# Patient Record
Sex: FEMALE | Race: OTHER | NOT HISPANIC OR LATINO | Employment: UNEMPLOYED | URBAN - METROPOLITAN AREA
[De-identification: names, ages, dates, MRNs, and addresses within clinical notes are randomized per-mention and may not be internally consistent; named-entity substitution may affect disease eponyms.]

---

## 2017-04-01 DIAGNOSIS — M54.31 SCIATICA OF RIGHT SIDE: ICD-10-CM

## 2017-04-03 RX ORDER — GABAPENTIN 300 MG/1
CAPSULE ORAL
Qty: 60 CAPSULE | Refills: 5 | OUTPATIENT
Start: 2017-04-03

## 2017-04-06 DIAGNOSIS — M54.31 SCIATICA OF RIGHT SIDE: ICD-10-CM

## 2017-04-07 RX ORDER — GABAPENTIN 300 MG/1
CAPSULE ORAL
Qty: 60 CAPSULE | Refills: 5 | Status: SHIPPED | OUTPATIENT
Start: 2017-04-07 | End: 2017-04-17 | Stop reason: SDUPTHER

## 2017-04-10 ENCOUNTER — TELEPHONE (OUTPATIENT)
Dept: FAMILY MEDICINE | Facility: CLINIC | Age: 57
End: 2017-04-10

## 2017-04-10 NOTE — TELEPHONE ENCOUNTER
----- Message from Judy Browning sent at 4/10/2017 11:15 AM CDT -----  Contact: Osbaldo with [facility]   Refill request for Gabapentin  #[phone] MB

## 2017-04-10 NOTE — TELEPHONE ENCOUNTER
Spoke with Osbaldo at [facility], notified him that patient script was sent on 4/7/17

## 2017-04-12 ENCOUNTER — TELEPHONE (OUTPATIENT)
Dept: FAMILY MEDICINE | Facility: CLINIC | Age: 57
End: 2017-04-12

## 2017-04-12 NOTE — TELEPHONE ENCOUNTER
----- Message from Yadiel Grace sent at 4/12/2017 11:28 AM CDT -----  Contact: Reed/[facility]/[phone]  Refill:  gabapentin (NEURONTIN) 300 MG capsule    Thank you.

## 2017-04-17 DIAGNOSIS — M54.31 SCIATICA OF RIGHT SIDE: ICD-10-CM

## 2017-04-17 NOTE — TELEPHONE ENCOUNTER
Received a fax from [facility] pharmacy requesting 90 days supplies Gabapentin  300 mg

## 2017-04-18 RX ORDER — GABAPENTIN 300 MG/1
300 CAPSULE ORAL 2 TIMES DAILY
Qty: 180 CAPSULE | Refills: 3 | Status: ON HOLD | OUTPATIENT
Start: 2017-04-18 | End: 2018-03-09 | Stop reason: HOSPADM

## 2017-10-10 ENCOUNTER — OFFICE VISIT (OUTPATIENT)
Dept: OBSTETRICS AND GYNECOLOGY | Facility: CLINIC | Age: 57
End: 2017-10-10
Payer: COMMERCIAL

## 2017-10-10 VITALS
DIASTOLIC BLOOD PRESSURE: 76 MMHG | SYSTOLIC BLOOD PRESSURE: 132 MMHG | BODY MASS INDEX: 34.91 KG/M2 | HEIGHT: 63 IN | WEIGHT: 197.06 LBS

## 2017-10-10 DIAGNOSIS — Z90.710 STATUS POST HYSTERECTOMY: ICD-10-CM

## 2017-10-10 DIAGNOSIS — Z01.419 ENCOUNTER FOR GYNECOLOGICAL EXAMINATION WITHOUT ABNORMAL FINDING: ICD-10-CM

## 2017-10-10 DIAGNOSIS — N95.1 MENOPAUSAL STATE: ICD-10-CM

## 2017-10-10 DIAGNOSIS — Z00.00 ANNUAL PHYSICAL EXAM: Primary | ICD-10-CM

## 2017-10-10 PROCEDURE — 99396 PREV VISIT EST AGE 40-64: CPT | Mod: S$GLB,,, | Performed by: OBSTETRICS & GYNECOLOGY

## 2017-10-10 PROCEDURE — 99999 PR PBB SHADOW E&M-EST. PATIENT-LVL IV: CPT | Mod: PBBFAC,,, | Performed by: OBSTETRICS & GYNECOLOGY

## 2017-10-10 NOTE — PROGRESS NOTES
Subjective:      Chief Complaint:    Chief Complaint   Patient presents with    Gynecologic Exam       Menstrual History:    OB History      Para Term  AB Living    4         4    SAB TAB Ectopic Multiple Live Births                       Menarche age: 12     No LMP recorded. Patient has had a hysterectomy.           Objective:        History of Present Illness AND  Examination detailed DICTATE:       HISTORY OF PRESENT ILLNESS:  The patient is 57 years of age.  Here for annual   examination.  She is a  4, para 4.  Medical history of problem, increased   blood pressure, cardiac problem.  Surgery hysterectomy, BSO, cardiac bypass.    The patient is presently on Premarin.  Mammogram in 2015, negative.  Bone   density negative,   Pap smear 2015 is negative.  The patient is doing well,   no real problem or complaint.    REVIEW OF SYSTEMS:  HEAD, EAR, EYES, NOSE, AND THROAT:  Negative.  CARDIORESPIRATORY:  Negative.  GASTROINTESTINAL:  Negative.  GENITOURINARY:  See present illness.  NEUROMUSCULAR:  No problem or complaint.    PHYSICAL EXAMINATION:  VITAL SIGNS:  Blood pressure 132/76, weight 197.  HEAD:  Normocephalic.  EYES:  Reactive.  NECK:  Supple.  Thyroid is palpable.  No nodes.  CHEST:  Clear.  HEART:  Regular sinus rhythm, no murmur.  BREASTS:  No lumps, masses, discharge, skin changes, retraction, nipple changes.    Axilla negative.  ABDOMEN:  Upper abdomen normal.  Lower abdomen normal.  No guarding, rebound   tenderness.  Midline scar noted.  Bowel sounds normal.  PELVIC:  External normal.  Vulva normal.  Bartholin, urethral and Colp glands   are negative.  Vagina clear.  Excellent rugal pattern.  Cervix, uterus and   adnexa are absent.  Good apical support.  No pain elicited on exam.  RECTAL:  Negative.  MUSCULOSKELETAL:  Negative.  NEUROLOGIC:  Grossly normal.  SKIN:  Clear.    Essentially normal postmenopausal examination.    PLAN:  Continue with Premarin We will order also  mammogram.  Continue with   multivitamin, calcium, vitamin D, and increase physical activity, and we will   see her back within a year.      EDWINA/IN  dd: 10/10/2017 15:15:25 (CDT)  td: 10/11/2017 04:42:46 (CDT)  Doc ID   #[id number]  Job ID #[id number]    CC:           Assessment:      Diagnosis: ANNUAL  EXAM  GYN  EXAM   MENOPAUSAL           Plan:      Return in 12  months

## 2017-11-07 ENCOUNTER — APPOINTMENT (OUTPATIENT)
Dept: RADIOLOGY | Facility: HOSPITAL | Age: 57
End: 2017-11-07
Attending: NURSE PRACTITIONER
Payer: COMMERCIAL

## 2017-11-07 ENCOUNTER — HOSPITAL ENCOUNTER (OUTPATIENT)
Dept: RADIOLOGY | Facility: HOSPITAL | Age: 57
Discharge: HOME | End: 2017-11-07
Attending: NURSE PRACTITIONER
Payer: COMMERCIAL

## 2017-11-07 ENCOUNTER — OFFICE VISIT (OUTPATIENT)
Dept: FAMILY MEDICINE | Facility: CLINIC | Age: 57
End: 2017-11-07
Payer: COMMERCIAL

## 2017-11-07 VITALS
OXYGEN SATURATION: 95 % | BODY MASS INDEX: 34.65 KG/M2 | TEMPERATURE: 98 F | RESPIRATION RATE: 17 BRPM | WEIGHT: 195.56 LBS | DIASTOLIC BLOOD PRESSURE: 100 MMHG | SYSTOLIC BLOOD PRESSURE: 150 MMHG | HEIGHT: 63 IN | HEART RATE: 76 BPM

## 2017-11-07 DIAGNOSIS — R05.9 COUGH: ICD-10-CM

## 2017-11-07 DIAGNOSIS — Z77.120 MOLD EXPOSURE: ICD-10-CM

## 2017-11-07 DIAGNOSIS — J30.89 ACUTE NON-SEASONAL ALLERGIC RHINITIS, UNSPECIFIED TRIGGER: Primary | ICD-10-CM

## 2017-11-07 PROCEDURE — 96372 THER/PROPH/DIAG INJ SC/IM: CPT | Mod: S$GLB,,, | Performed by: INTERNAL MEDICINE

## 2017-11-07 PROCEDURE — 99999 PR PBB SHADOW E&M-EST. PATIENT-LVL III: CPT | Mod: PBBFAC,,, | Performed by: NURSE PRACTITIONER

## 2017-11-07 PROCEDURE — 99214 OFFICE O/P EST MOD 30 MIN: CPT | Mod: 25,S$GLB,, | Performed by: NURSE PRACTITIONER

## 2017-11-07 PROCEDURE — 71020 XR CHEST PA AND LATERAL: CPT | Mod: TC,PN

## 2017-11-07 PROCEDURE — 71020 XR CHEST PA AND LATERAL: CPT | Mod: 26,,, | Performed by: RADIOLOGY

## 2017-11-07 RX ORDER — LOSARTAN POTASSIUM 100 MG/1
TABLET ORAL
Status: ON HOLD | COMMUNITY
Start: 2017-10-01 | End: 2018-03-09 | Stop reason: HOSPADM

## 2017-11-07 RX ORDER — CLONIDINE HYDROCHLORIDE 0.2 MG/1
0.2 TABLET ORAL ONCE
Refills: 0 | Status: ON HOLD | COMMUNITY
Start: 2017-10-31 | End: 2018-03-09 | Stop reason: HOSPADM

## 2017-11-07 RX ORDER — DIAZEPAM 5 MG/1
TABLET ORAL
Status: ON HOLD | COMMUNITY
Start: 2017-07-31 | End: 2018-03-09 | Stop reason: HOSPADM

## 2017-11-07 RX ORDER — LOSARTAN POTASSIUM 25 MG/1
TABLET ORAL
Refills: 3 | Status: ON HOLD | COMMUNITY
Start: 2017-10-30 | End: 2018-03-09 | Stop reason: HOSPADM

## 2017-11-07 RX ORDER — AMLODIPINE BESYLATE 5 MG/1
5 TABLET ORAL DAILY
COMMUNITY
Start: 2017-10-02 | End: 2018-03-27 | Stop reason: SDUPTHER

## 2017-11-07 RX ORDER — FLUTICASONE PROPIONATE 50 MCG
2 SPRAY, SUSPENSION (ML) NASAL DAILY
Qty: 1 BOTTLE | Refills: 1 | Status: SHIPPED | OUTPATIENT
Start: 2017-11-07 | End: 2017-12-07

## 2017-11-07 RX ORDER — SERTRALINE HYDROCHLORIDE 50 MG/1
TABLET, FILM COATED ORAL
Refills: 3 | Status: ON HOLD | COMMUNITY
Start: 2017-10-30 | End: 2018-03-09 | Stop reason: HOSPADM

## 2017-11-07 RX ORDER — CETIRIZINE HYDROCHLORIDE 10 MG/1
10 TABLET ORAL DAILY
Qty: 30 TABLET | Refills: 1 | Status: ON HOLD | COMMUNITY
Start: 2017-11-07 | End: 2018-03-09 | Stop reason: HOSPADM

## 2017-11-07 RX ORDER — DICLOFENAC SODIUM 20 MG/G
SOLUTION TOPICAL
Refills: 2 | Status: ON HOLD | COMMUNITY
Start: 2017-10-02 | End: 2018-03-09 | Stop reason: HOSPADM

## 2017-11-07 RX ORDER — BENZONATATE 200 MG/1
200 CAPSULE ORAL 3 TIMES DAILY PRN
Qty: 30 CAPSULE | Refills: 0 | Status: SHIPPED | OUTPATIENT
Start: 2017-11-07 | End: 2017-11-17

## 2017-11-07 RX ORDER — HYDROCODONE BITARTRATE AND ACETAMINOPHEN 7.5; 325 MG/1; MG/1
TABLET ORAL
Status: ON HOLD | COMMUNITY
Start: 2017-08-14 | End: 2018-03-09 | Stop reason: HOSPADM

## 2017-11-07 RX ORDER — OXCARBAZEPINE 150 MG/1
TABLET, FILM COATED ORAL
Status: ON HOLD | COMMUNITY
Start: 2017-10-02 | End: 2018-03-09 | Stop reason: HOSPADM

## 2017-11-07 NOTE — PROGRESS NOTES
Patient tolerate Solu medrol 125 mg IM injection. Patient advise to wait 15 min after injection  for assessment of any posssible side effects.

## 2017-11-07 NOTE — PATIENT INSTRUCTIONS
Follow up with primary care provider if not improved  Go to ER for new, worse or concerning symptoms  Drink plenty of fluids  Tylenol as needed for fever or pain    Understanding Nasal Allergies  Nasal allergies (also called allergic rhinitis) are a common health problem. They may be seasonal. This means they cause symptoms only at certain times of the year. Or they may be perennial. This means they cause symptoms all year long. Other health problems, such as asthma, often occur along with allergies as well.    What is an allergic reaction?  An allergy is a reaction to a substance called an allergen. Common allergens include:  · Wind-borne pollen  · Mold  · Dust mites  · Furry and feathered animals  · Cockroaches  Normally, allergens are harmless. But when a person has allergies, the body thinks they are harmful. The body then attacks allergens with antibodies. Antibodies are attached to special cells called mast cells. Allergens stick to the antibodies. This makes the mast cells release histamine and other chemicals. This is an allergic reaction. The chemicals irritate nearby nasal tissue. This causes nasal allergy symptoms.  Common nasal allergy symptoms  Allergies can cause nasal tissue to swell. This makes the air passages smaller. The nose may feel stuffed up. The nose may also make extra mucus, which can plug the nasal passages or drip out of the nose. Mucus can drip down the back of the throat (postnasal drip) as well. Sinus tissue can swell. This may cause pain and headache. Common allergy symptoms include:  · Runny nose with clear, watery discharge  · Stuffy nose (nasal congestion)  · Drainage down your throat (postnasal drip)  · Sneezing  · Red, watery eyes  · Itchy nose, eyes, ears, and throat  · Plugged-up ears (ear congestion)  · Sore throat  · Coughing  · Sinus pain and swelling  · Headache  It may not be allergies  Other health problems can cause symptoms like those of nasal allergies. These  include:  · Nonallergic rhinitis and viruses such as colds  · Irritants and pollutants, such as strong odors or smoke  · Certain medicines  · Changes in the weather   Treatment  Your healthcare provider will evaluate you to find the cause of your symptoms then recommend treatment. If your symptoms are due to nasal allergies, your healthcare provider may prescribe nasal steroid sprays or oral antihistamines to help reduce symptoms. Avoidance of the allergen will also be suggested. You may also be referred to an allergist.   Date Last Reviewed: 10/1/2016  © 2968-6316 [employer]. [address] [place] [address]. All rights reserved. This information is not intended as a substitute for professional medical care. Always follow your healthcare professional's instructions.

## 2017-11-07 NOTE — PROGRESS NOTES
Upper Respiratory Infection  Patient complains of a 1week history of some URI complaints. Associated symptoms include productive cough, sore throat, worse at night.  She has attempted coriciden OTC.  Sick contacts include none, but she was cleaning an old house with mold and mildew.  She has not had a flu shot this season.    Subjective:       Patient ID: Keyonna Guillen is a 57 y.o. female.      Review of Systems   Constitutional: Negative for fever.   HENT: Positive for postnasal drip.    Respiratory: Positive for cough.        Objective:      Physical Exam   Constitutional: She is oriented to person, place, and time. She appears well-developed and well-nourished. She does not appear ill. No distress.   HENT:   Right Ear: A middle ear effusion is present.   Left Ear: A middle ear effusion is present.   Nose: Nose normal. Right sinus exhibits no maxillary sinus tenderness and no frontal sinus tenderness. Left sinus exhibits no maxillary sinus tenderness and no frontal sinus tenderness.   Cardiovascular: Normal rate, regular rhythm and normal heart sounds.  Exam reveals no friction rub.    No murmur heard.  Pulmonary/Chest: Effort normal and breath sounds normal. No respiratory distress. She has no decreased breath sounds. She has no wheezes. She has no rhonchi. She has no rales.   Musculoskeletal: Normal range of motion.   Neurological: She is alert and oriented to person, place, and time.   Skin: Skin is warm and dry.   Psychiatric: She has a normal mood and affect. Her behavior is normal.   Vitals reviewed.      Assessment:       1. Acute non-seasonal allergic rhinitis, unspecified trigger    2. Mold exposure    3. Cough        Plan:       Acute non-seasonal allergic rhinitis, unspecified trigger  -     fluticasone (FLONASE) 50 mcg/actuation nasal spray; 2 sprays by Each Nare route once daily.  Dispense: 1 Bottle; Refill: 1  -     cetirizine (ZYRTEC) 10 MG tablet; Take 1 tablet (10 mg total) by mouth once daily.   Dispense: 30 tablet; Refill: 1    Mold exposure  -     X-Ray Chest PA And Lateral; Future; Expected date: 11/07/2017    Cough  -     methylPREDNISolone sod suc(PF) injection 125 mg; Inject 125 mg into the muscle one time.  -     benzonatate (TESSALON) 200 MG capsule; Take 1 capsule (200 mg total) by mouth 3 (three) times daily as needed for Cough.  Dispense: 30 capsule; Refill: 0  Viral vs bacterial, will treat symptomatically, f/u if not improved, will consider abx.    Follow up with primary care provider if not improved  Go to ER for new, worse or concerning symptoms  Drink plenty of fluids  Tylenol as needed for fever or pain

## 2017-11-13 ENCOUNTER — TELEPHONE (OUTPATIENT)
Dept: FAMILY MEDICINE | Facility: CLINIC | Age: 57
End: 2017-11-13

## 2017-11-13 DIAGNOSIS — J32.9 SINUSITIS, UNSPECIFIED CHRONICITY, UNSPECIFIED LOCATION: Primary | ICD-10-CM

## 2017-11-13 RX ORDER — AMOXICILLIN 500 MG/1
500 TABLET, FILM COATED ORAL EVERY 12 HOURS
Qty: 20 TABLET | Refills: 0 | Status: SHIPPED | OUTPATIENT
Start: 2017-11-13 | End: 2017-11-23

## 2017-11-13 NOTE — TELEPHONE ENCOUNTER
----- Message from Cheryl Terrell sent at 11/13/2017 11:12 AM CST -----  Contact: self  Patient called stating that she was advised to contact office if she did not feel better. Patient states that she would like antibiotics sent to pharmacy. Please contact her at [phone].    Thanks!

## 2017-12-19 ENCOUNTER — HOSPITAL ENCOUNTER (OUTPATIENT)
Dept: RADIOLOGY | Facility: HOSPITAL | Age: 57
Discharge: HOME | End: 2017-12-19
Attending: OBSTETRICS & GYNECOLOGY
Payer: COMMERCIAL

## 2017-12-19 VITALS — WEIGHT: 196 LBS | BODY MASS INDEX: 34.73 KG/M2 | HEIGHT: 63 IN

## 2017-12-19 DIAGNOSIS — N95.1 MENOPAUSAL STATE: ICD-10-CM

## 2017-12-19 DIAGNOSIS — Z12.31 ENCOUNTER FOR SCREENING MAMMOGRAM FOR BREAST CANCER: ICD-10-CM

## 2017-12-19 PROCEDURE — 77063 BREAST TOMOSYNTHESIS BI: CPT | Mod: 26,,, | Performed by: RADIOLOGY

## 2017-12-19 PROCEDURE — 77067 SCR MAMMO BI INCL CAD: CPT | Mod: 26,,, | Performed by: RADIOLOGY

## 2017-12-19 PROCEDURE — 77067 SCR MAMMO BI INCL CAD: CPT | Mod: TC

## 2017-12-20 ENCOUNTER — TELEPHONE (OUTPATIENT)
Dept: OBSTETRICS AND GYNECOLOGY | Facility: CLINIC | Age: 57
End: 2017-12-20

## 2017-12-20 NOTE — TELEPHONE ENCOUNTER
----- Message from Luz Mas sent at 12/20/2017  3:12 PM CST -----  Contact: Self   Patient need Mammogram results. Please call at [phone].

## 2018-01-10 ENCOUNTER — PATIENT MESSAGE (OUTPATIENT)
Dept: FAMILY MEDICINE | Facility: CLINIC | Age: 58
End: 2018-01-10

## 2018-01-10 RX ORDER — FLUTICASONE PROPIONATE 50 MCG
2 SPRAY, SUSPENSION (ML) NASAL DAILY
Qty: 1 BOTTLE | Refills: 1 | Status: ON HOLD | OUTPATIENT
Start: 2018-01-10 | End: 2018-03-09 | Stop reason: HOSPADM

## 2018-03-09 ENCOUNTER — HOSPITAL ENCOUNTER (OUTPATIENT)
Facility: HOSPITAL | Age: 58
Discharge: HOME | End: 2018-03-09
Attending: EMERGENCY MEDICINE | Admitting: HOSPITALIST
Payer: COMMERCIAL

## 2018-03-09 ENCOUNTER — ANESTHESIA (OUTPATIENT)
Dept: CARDIOLOGY | Facility: HOSPITAL | Age: 58
End: 2018-03-09
Payer: COMMERCIAL

## 2018-03-09 ENCOUNTER — ANESTHESIA EVENT (OUTPATIENT)
Dept: CARDIOLOGY | Facility: HOSPITAL | Age: 58
End: 2018-03-09
Payer: COMMERCIAL

## 2018-03-09 VITALS
RESPIRATION RATE: 20 BRPM | HEART RATE: 61 BPM | TEMPERATURE: 97 F | HEIGHT: 63 IN | OXYGEN SATURATION: 95 % | SYSTOLIC BLOOD PRESSURE: 143 MMHG | BODY MASS INDEX: 33.51 KG/M2 | DIASTOLIC BLOOD PRESSURE: 61 MMHG | WEIGHT: 189.13 LBS

## 2018-03-09 DIAGNOSIS — I25.709 CORONARY ARTERY DISEASE INVOLVING CORONARY BYPASS GRAFT OF NATIVE HEART WITH ANGINA PECTORIS: ICD-10-CM

## 2018-03-09 DIAGNOSIS — I48.91 A-FIB: ICD-10-CM

## 2018-03-09 DIAGNOSIS — E87.6 HYPOKALEMIA: ICD-10-CM

## 2018-03-09 DIAGNOSIS — I10 ESSENTIAL HYPERTENSION: ICD-10-CM

## 2018-03-09 DIAGNOSIS — I48.91 NEW ONSET ATRIAL FIBRILLATION: Primary | ICD-10-CM

## 2018-03-09 DIAGNOSIS — R00.2 PALPITATIONS: ICD-10-CM

## 2018-03-09 PROBLEM — E78.49 OTHER HYPERLIPIDEMIA: Status: ACTIVE | Noted: 2018-03-09

## 2018-03-09 LAB
ALBUMIN SERPL BCP-MCNC: 3.8 G/DL
ALP SERPL-CCNC: 150 U/L
ALT SERPL W/O P-5'-P-CCNC: 34 U/L
ANION GAP SERPL CALC-SCNC: 12 MMOL/L
ANION GAP SERPL CALC-SCNC: 9 MMOL/L
AORTIC VALVE REGURGITATION: NORMAL
AST SERPL-CCNC: 23 U/L
BASOPHILS # BLD AUTO: 0.01 K/UL
BASOPHILS NFR BLD: 0.1 %
BILIRUB SERPL-MCNC: 0.3 MG/DL
BUN SERPL-MCNC: 21 MG/DL
BUN SERPL-MCNC: 22 MG/DL
CALCIUM SERPL-MCNC: 9.5 MG/DL
CALCIUM SERPL-MCNC: 9.9 MG/DL
CHLORIDE SERPL-SCNC: 106 MMOL/L
CHLORIDE SERPL-SCNC: 107 MMOL/L
CHOLEST SERPL-MCNC: 158 MG/DL
CHOLEST/HDLC SERPL: 4.1 {RATIO}
CO2 SERPL-SCNC: 26 MMOL/L
CO2 SERPL-SCNC: 28 MMOL/L
CREAT SERPL-MCNC: 0.9 MG/DL
CREAT SERPL-MCNC: 1.1 MG/DL
DIASTOLIC DYSFUNCTION: NO
DIFFERENTIAL METHOD: NORMAL
EOSINOPHIL # BLD AUTO: 0.1 K/UL
EOSINOPHIL NFR BLD: 0.6 %
ERYTHROCYTE [DISTWIDTH] IN BLOOD BY AUTOMATED COUNT: 13 %
EST. GFR  (AFRICAN AMERICAN): >60 ML/MIN/1.73 M^2
EST. GFR  (AFRICAN AMERICAN): >60 ML/MIN/1.73 M^2
EST. GFR  (NON AFRICAN AMERICAN): 55 ML/MIN/1.73 M^2
EST. GFR  (NON AFRICAN AMERICAN): >60 ML/MIN/1.73 M^2
ESTIMATED PA SYSTOLIC PRESSURE: 22.66
GLUCOSE SERPL-MCNC: 116 MG/DL
GLUCOSE SERPL-MCNC: 138 MG/DL
HCT VFR BLD AUTO: 41.2 %
HDLC SERPL-MCNC: 39 MG/DL
HDLC SERPL: 24.7 %
HGB BLD-MCNC: 14.1 G/DL
LDLC SERPL CALC-MCNC: 90.6 MG/DL
LYMPHOCYTES # BLD AUTO: 1.9 K/UL
LYMPHOCYTES NFR BLD: 23.1 %
MAGNESIUM SERPL-MCNC: 2 MG/DL
MCH RBC QN AUTO: 30.3 PG
MCHC RBC AUTO-ENTMCNC: 34.2 G/DL
MCV RBC AUTO: 89 FL
MITRAL VALVE MOBILITY: NORMAL
MITRAL VALVE REGURGITATION: NORMAL
MONOCYTES # BLD AUTO: 0.5 K/UL
MONOCYTES NFR BLD: 6.2 %
NEUTROPHILS # BLD AUTO: 5.6 K/UL
NEUTROPHILS NFR BLD: 70 %
NONHDLC SERPL-MCNC: 119 MG/DL
PLATELET # BLD AUTO: 285 K/UL
PMV BLD AUTO: 10.1 FL
POTASSIUM SERPL-SCNC: 2.9 MMOL/L
POTASSIUM SERPL-SCNC: 3 MMOL/L
PROT SERPL-MCNC: 7.2 G/DL
RBC # BLD AUTO: 4.65 M/UL
RETIRED EF AND QEF - SEE NOTES: 60 (ref 55–65)
SODIUM SERPL-SCNC: 143 MMOL/L
SODIUM SERPL-SCNC: 145 MMOL/L
TRICUSPID VALVE REGURGITATION: NORMAL
TRIGL SERPL-MCNC: 142 MG/DL
TROPONIN I SERPL DL<=0.01 NG/ML-MCNC: 0.03 NG/ML
TROPONIN I SERPL DL<=0.01 NG/ML-MCNC: 0.07 NG/ML
TSH SERPL DL<=0.005 MIU/L-ACNC: 1.48 UIU/ML
WBC # BLD AUTO: 8.06 K/UL

## 2018-03-09 PROCEDURE — 63600175 PHARM REV CODE 636 W HCPCS: Performed by: NURSE PRACTITIONER

## 2018-03-09 PROCEDURE — 93312 ECHO TRANSESOPHAGEAL: CPT | Mod: 26,,, | Performed by: INTERNAL MEDICINE

## 2018-03-09 PROCEDURE — G0378 HOSPITAL OBSERVATION PER HR: HCPCS

## 2018-03-09 PROCEDURE — 99220 PR INITIAL OBSERVATION CARE,LEVL III: CPT | Mod: ,,, | Performed by: INTERNAL MEDICINE

## 2018-03-09 PROCEDURE — 63600175 PHARM REV CODE 636 W HCPCS: Performed by: NURSE ANESTHETIST, CERTIFIED REGISTERED

## 2018-03-09 PROCEDURE — 85025 COMPLETE CBC W/AUTO DIFF WBC: CPT

## 2018-03-09 PROCEDURE — 80048 BASIC METABOLIC PNL TOTAL CA: CPT

## 2018-03-09 PROCEDURE — 25000003 PHARM REV CODE 250: Performed by: INTERNAL MEDICINE

## 2018-03-09 PROCEDURE — 92960 CARDIOVERSION ELECTRIC EXT: CPT | Mod: ,,, | Performed by: INTERNAL MEDICINE

## 2018-03-09 PROCEDURE — 25000003 PHARM REV CODE 250: Performed by: EMERGENCY MEDICINE

## 2018-03-09 PROCEDURE — 84443 ASSAY THYROID STIM HORMONE: CPT

## 2018-03-09 PROCEDURE — 37000008 HC ANESTHESIA 1ST 15 MINUTES: Performed by: INTERNAL MEDICINE

## 2018-03-09 PROCEDURE — 93010 ELECTROCARDIOGRAM REPORT: CPT | Mod: ,,, | Performed by: INTERNAL MEDICINE

## 2018-03-09 PROCEDURE — 37000009 HC ANESTHESIA EA ADD 15 MINS: Performed by: INTERNAL MEDICINE

## 2018-03-09 PROCEDURE — 80053 COMPREHEN METABOLIC PANEL: CPT

## 2018-03-09 PROCEDURE — 80061 LIPID PANEL: CPT

## 2018-03-09 PROCEDURE — 84484 ASSAY OF TROPONIN QUANT: CPT | Mod: 91

## 2018-03-09 PROCEDURE — D9220A PRA ANESTHESIA: Mod: ANES,,, | Performed by: ANESTHESIOLOGY

## 2018-03-09 PROCEDURE — 93320 DOPPLER ECHO COMPLETE: CPT | Mod: 26,,, | Performed by: INTERNAL MEDICINE

## 2018-03-09 PROCEDURE — D9220A PRA ANESTHESIA: Mod: CRNA,,, | Performed by: NURSE ANESTHETIST, CERTIFIED REGISTERED

## 2018-03-09 PROCEDURE — 93005 ELECTROCARDIOGRAM TRACING: CPT

## 2018-03-09 PROCEDURE — 83735 ASSAY OF MAGNESIUM: CPT

## 2018-03-09 PROCEDURE — 27200139 CARDIOVERSION (DCCV)

## 2018-03-09 PROCEDURE — 96374 THER/PROPH/DIAG INJ IV PUSH: CPT

## 2018-03-09 PROCEDURE — 93010 ELECTROCARDIOGRAM REPORT: CPT | Mod: 76,,, | Performed by: INTERNAL MEDICINE

## 2018-03-09 PROCEDURE — 36415 COLL VENOUS BLD VENIPUNCTURE: CPT

## 2018-03-09 PROCEDURE — 96366 THER/PROPH/DIAG IV INF ADDON: CPT

## 2018-03-09 PROCEDURE — 93320 DOPPLER ECHO COMPLETE: CPT

## 2018-03-09 PROCEDURE — 99285 EMERGENCY DEPT VISIT HI MDM: CPT | Mod: 25

## 2018-03-09 PROCEDURE — 84484 ASSAY OF TROPONIN QUANT: CPT

## 2018-03-09 PROCEDURE — 96365 THER/PROPH/DIAG IV INF INIT: CPT

## 2018-03-09 RX ORDER — METOPROLOL SUCCINATE 50 MG/1
50 TABLET, EXTENDED RELEASE ORAL DAILY
Status: DISCONTINUED | OUTPATIENT
Start: 2018-03-09 | End: 2018-03-09 | Stop reason: HOSPADM

## 2018-03-09 RX ORDER — DILTIAZEM HYDROCHLORIDE 5 MG/ML
10 INJECTION INTRAVENOUS
Status: DISCONTINUED | OUTPATIENT
Start: 2018-03-09 | End: 2018-03-09 | Stop reason: HOSPADM

## 2018-03-09 RX ORDER — AMLODIPINE BESYLATE 5 MG/1
5 TABLET ORAL DAILY
Status: DISCONTINUED | OUTPATIENT
Start: 2018-03-09 | End: 2018-03-09 | Stop reason: HOSPADM

## 2018-03-09 RX ORDER — LOSARTAN POTASSIUM 25 MG/1
25 TABLET ORAL DAILY
Qty: 90 TABLET | Refills: 3 | Status: SHIPPED | OUTPATIENT
Start: 2018-03-10 | End: 2018-04-11 | Stop reason: SDUPTHER

## 2018-03-09 RX ORDER — PROPOFOL 10 MG/ML
VIAL (ML) INTRAVENOUS
Status: DISCONTINUED | OUTPATIENT
Start: 2018-03-09 | End: 2018-03-09

## 2018-03-09 RX ORDER — ASPIRIN 81 MG/1
81 TABLET ORAL DAILY
Status: DISCONTINUED | OUTPATIENT
Start: 2018-03-09 | End: 2018-03-09 | Stop reason: HOSPADM

## 2018-03-09 RX ORDER — LOSARTAN POTASSIUM 25 MG/1
25 TABLET ORAL DAILY
Status: DISCONTINUED | OUTPATIENT
Start: 2018-03-09 | End: 2018-03-09 | Stop reason: HOSPADM

## 2018-03-09 RX ORDER — ENOXAPARIN SODIUM 100 MG/ML
1 INJECTION SUBCUTANEOUS
Status: DISCONTINUED | OUTPATIENT
Start: 2018-03-09 | End: 2018-03-09

## 2018-03-09 RX ORDER — LIDOCAINE HCL/PF 100 MG/5ML
SYRINGE (ML) INTRAVENOUS
Status: DISCONTINUED | OUTPATIENT
Start: 2018-03-09 | End: 2018-03-09

## 2018-03-09 RX ORDER — DILTIAZEM HYDROCHLORIDE 5 MG/ML
10 INJECTION INTRAVENOUS
Status: COMPLETED | OUTPATIENT
Start: 2018-03-09 | End: 2018-03-09

## 2018-03-09 RX ORDER — ATORVASTATIN CALCIUM 40 MG/1
40 TABLET, FILM COATED ORAL NIGHTLY
Status: DISCONTINUED | OUTPATIENT
Start: 2018-03-09 | End: 2018-03-09 | Stop reason: HOSPADM

## 2018-03-09 RX ORDER — MIDAZOLAM HYDROCHLORIDE 1 MG/ML
INJECTION, SOLUTION INTRAMUSCULAR; INTRAVENOUS
Status: DISCONTINUED | OUTPATIENT
Start: 2018-03-09 | End: 2018-03-09

## 2018-03-09 RX ORDER — POTASSIUM CHLORIDE 7.45 MG/ML
10 INJECTION INTRAVENOUS
Status: ACTIVE | OUTPATIENT
Start: 2018-03-09 | End: 2018-03-09

## 2018-03-09 RX ORDER — ENOXAPARIN SODIUM 100 MG/ML
70 INJECTION SUBCUTANEOUS
Status: DISCONTINUED | OUTPATIENT
Start: 2018-03-09 | End: 2018-03-09

## 2018-03-09 RX ORDER — CLONIDINE HYDROCHLORIDE 0.1 MG/1
0.1 TABLET ORAL ONCE
Status: ON HOLD | COMMUNITY
End: 2018-03-09 | Stop reason: HOSPADM

## 2018-03-09 RX ORDER — POTASSIUM CHLORIDE 20 MEQ/1
40 TABLET, EXTENDED RELEASE ORAL ONCE
Status: COMPLETED | OUTPATIENT
Start: 2018-03-09 | End: 2018-03-09

## 2018-03-09 RX ORDER — SODIUM CHLORIDE 9 MG/ML
INJECTION, SOLUTION INTRAVENOUS CONTINUOUS
Status: DISCONTINUED | OUTPATIENT
Start: 2018-03-09 | End: 2018-03-09 | Stop reason: HOSPADM

## 2018-03-09 RX ORDER — METOPROLOL SUCCINATE 50 MG/1
50 TABLET, EXTENDED RELEASE ORAL DAILY
Qty: 30 TABLET | Refills: 11 | Status: SHIPPED | OUTPATIENT
Start: 2018-03-10 | End: 2018-04-11 | Stop reason: SDUPTHER

## 2018-03-09 RX ADMIN — LOSARTAN POTASSIUM 25 MG: 25 TABLET ORAL at 08:03

## 2018-03-09 RX ADMIN — AMLODIPINE BESYLATE 5 MG: 5 TABLET ORAL at 08:03

## 2018-03-09 RX ADMIN — POTASSIUM CHLORIDE 10 MEQ: 7.46 INJECTION, SOLUTION INTRAVENOUS at 08:03

## 2018-03-09 RX ADMIN — ASPIRIN 81 MG: 81 TABLET, COATED ORAL at 08:03

## 2018-03-09 RX ADMIN — LIDOCAINE HYDROCHLORIDE 50 MG: 20 INJECTION, SOLUTION INTRAVENOUS at 01:03

## 2018-03-09 RX ADMIN — METOPROLOL SUCCINATE 50 MG: 50 TABLET, EXTENDED RELEASE ORAL at 08:03

## 2018-03-09 RX ADMIN — RIVAROXABAN 20 MG: 20 TABLET, FILM COATED ORAL at 10:03

## 2018-03-09 RX ADMIN — PROPOFOL 20 MG: 10 INJECTION, EMULSION INTRAVENOUS at 01:03

## 2018-03-09 RX ADMIN — MIDAZOLAM HYDROCHLORIDE 2 MG: 1 INJECTION, SOLUTION INTRAMUSCULAR; INTRAVENOUS at 01:03

## 2018-03-09 RX ADMIN — PROPOFOL 80 MG: 10 INJECTION, EMULSION INTRAVENOUS at 01:03

## 2018-03-09 RX ADMIN — DILTIAZEM HYDROCHLORIDE 10 MG: 5 INJECTION INTRAVENOUS at 02:03

## 2018-03-09 RX ADMIN — SODIUM CHLORIDE: 0.9 INJECTION, SOLUTION INTRAVENOUS at 08:03

## 2018-03-09 RX ADMIN — POTASSIUM CHLORIDE 40 MEQ: 1500 TABLET, EXTENDED RELEASE ORAL at 09:03

## 2018-03-09 NOTE — H&P
"[facility] Medicine  History & Physical    Patient Name: Keyonna Guillen  MRN: [id number]  Admission Date: 3/9/2018  Attending Physician: Heather Wynne MD   Primary Care Provider: Primary Doctor No         Patient information was obtained from patient and ER records.     Subjective:     Principal Problem:New onset atrial fibrillation    Chief Complaint:   Chief Complaint   Patient presents with    Jaw Pain     pt reports she had jaw pain and palpitations starting about 2 hours ago; pt took 3 Nitro SL tablets 5 minutes apart; pt currently denies the jaw pain & palpitations; pt reports that her Cardiologist told her to take the Nitros when having jaw pains because that is the same symptoms she had before needing 3 bipass surgeries    Palpitations        HPI: Keyonna Guillen is a 57 y.o. female with a history as below who presented to the ED with a CC of  bilateral jaw tighntess which radiates to her upper chest and palpitations she describes as "fast then slow, feeling like a basketball dripping in my chest" which began x2 hours PTA. Patient reports she took SL NTG x3 with relief after the 3rd attempt while in route to the emergency room. She denies fever, chills, diaphoresis, abdominal pain, NVD or any other associated symptoms. Patient reports similar episode prior to having her triple bypass surgery 3 years ago. She does not smoke or drink etoh.  In the ED, found to be in a-fib rvr RVR, rate controlled with IV diltiazem. Imaging and labs obtained and reviewed.                  Past Medical History:   Diagnosis Date    Hypertension        Past Surgical History:   Procedure Laterality Date    BREAST BIOPSY      CARDIAC SURGERY      HYSTERECTOMY         Review of patient's allergies indicates:  No Known Allergies    No current facility-administered medications on file prior to encounter.      Current Outpatient Prescriptions on File Prior to Encounter   Medication Sig    amLODIPine " (NORVASC) 5 MG tablet Take 5 mg by mouth once daily.     atorvastatin (LIPITOR) 40 MG tablet Take 40 mg by mouth every evening.    cloNIDine (CATAPRES) 0.2 MG tablet Take 0.2 mg by mouth once.     hydrALAZINE (APRESOLINE) 100 MG tablet once daily    metoprolol succinate (TOPROL-XL) 25 MG 24 hr tablet Take 25 mg by mouth 2 (two) times daily.    triamterene-hydrochlorothiazide 37.5-25 mg (MAXZIDE-25) 37.5-25 mg per tablet Take 1 tablet by mouth once daily.    cetirizine (ZYRTEC) 10 MG tablet Take 1 tablet (10 mg total) by mouth once daily.    cloNIDine 0.2 mg/24 hr td ptwk (CATAPRES) 0.2 mg/24 hr Place 1 patch onto the skin every 7 days.    diazePAM (VALIUM) 5 MG tablet     estrogens, conjugated, (PREMARIN) 0.3 MG tablet Take 1 tablet (0.3 mg total) by mouth once daily.    fluticasone (FLONASE) 50 mcg/actuation nasal spray 2 sprays (100 mcg total) by Each Nare route once daily.    gabapentin (NEURONTIN) 300 MG capsule Take 1 capsule (300 mg total) by mouth 2 (two) times daily.    hydrocodone-acetaminophen 7.5-325mg (NORCO) 7.5-325 mg per tablet     losartan (COZAAR) 100 MG tablet     losartan (COZAAR) 25 MG tablet TAKE 1 TABLET(S) EVERY DAY BY ORAL ROUTE IN THE MORNING FOR 30 DAYS.    methocarbamol (ROBAXIN) 750 MG Tab Take 1,500 mg by mouth 3 (three) times daily.    naproxen (NAPROSYN) 500 MG tablet Take 1 tablet (500 mg total) by mouth 2 (two) times daily.    OXcarbazepine (TRILEPTAL) 150 MG Tab     PENNSAID 20 mg/gram /actuation(2 %) sopm APPLY 2 PUMPS TO THE AFFECTED AREA TWICE DAILY    sertraline (ZOLOFT) 50 MG tablet TAKE 1 TABLET(S) EVERY DAY BY ORAL ROUTE AT DINNER FOR 30 DAYS.     Family History     Problem Relation (Age of Onset)    Breast cancer Maternal Uncle    Cancer Mother        Social History Main Topics    Smoking status: Never Smoker    Smokeless tobacco: Never Used    Alcohol use No    Drug use: No    Sexual activity: Yes     Partners: Male     Birth control/ protection:  Surgical     Review of Systems   Constitutional: Negative for chills, diaphoresis and fever.   HENT: Negative for congestion, postnasal drip, sinus pressure and sore throat.    Eyes: Negative for visual disturbance.   Respiratory: Negative for cough, chest tightness, shortness of breath and wheezing.    Cardiovascular: Positive for chest pain and palpitations. Negative for leg swelling.        Jaw pain   Gastrointestinal: Negative for abdominal distention, abdominal pain, blood in stool, constipation, diarrhea, nausea and vomiting.   Endocrine: Negative.    Genitourinary: Negative for dysuria.   Musculoskeletal: Negative.    Skin: Negative.    Allergic/Immunologic: Negative.    Neurological: Negative for dizziness, weakness, numbness and headaches.   Hematological: Negative.    Psychiatric/Behavioral: Negative.      Objective:     Vital Signs (Most Recent):  Temp: 97.6 °F (36.4 °C) (03/09/18 0815)  Pulse: 95 (03/09/18 0815)  Resp: 18 (03/09/18 0815)  BP: (!) 210/112 (03/09/18 0815)  SpO2: 95 % (03/09/18 0815) Vital Signs (24h Range):  Temp:  [97.1 °F (36.2 °C)-97.7 °F (36.5 °C)] 97.6 °F (36.4 °C)  Pulse:  [] 95  Resp:  [18-20] 18  SpO2:  [94 %-97 %] 95 %  BP: (129-210)/() 210/112     Weight: 85.8 kg (189 lb 2.5 oz)  Body mass index is 33.51 kg/m².    Physical Exam   Constitutional: She is oriented to person, place, and time. She appears well-developed and well-nourished. She is cooperative.  Non-toxic appearance. No distress.   HENT:   Head: Normocephalic and atraumatic.   Eyes: Conjunctivae and lids are normal. Pupils are equal, round, and reactive to light.   Neck: Trachea normal, normal range of motion and full passive range of motion without pain. Neck supple. Normal carotid pulses and no JVD present. No tracheal deviation present. No thyroid mass and no thyromegaly present.   Cardiovascular: Normal rate, regular rhythm, S1 normal, S2 normal, normal heart sounds and normal pulses.    Pulmonary/Chest:  Effort normal and breath sounds normal. No stridor.   Abdominal: Soft. Normal appearance and bowel sounds are normal. There is no tenderness.   Musculoskeletal: Normal range of motion.   Neurological: She is alert and oriented to person, place, and time. She has normal strength. No cranial nerve deficit or sensory deficit.   Skin: Skin is warm, dry and intact. She is not diaphoretic. No cyanosis. Nails show no clubbing.   Psychiatric: She has a normal mood and affect. Her speech is normal and behavior is normal. Judgment and thought content normal. Cognition and memory are normal.         CRANIAL NERVES     CN III, IV, VI   Pupils are equal, round, and reactive to light.       Significant Labs:   CBC:   Recent Labs  Lab 03/09/18  0150   WBC 8.06   HGB 14.1   HCT 41.2        CMP:   Recent Labs  Lab 03/09/18  0150 03/09/18  0615    143   K 2.9* 3.0*    106   CO2 26 28   * 116*   BUN 22* 21*   CREATININE 1.1 0.9   CALCIUM 9.9 9.5   PROT 7.2  --    ALBUMIN 3.8  --    BILITOT 0.3  --    ALKPHOS 150*  --    AST 23  --    ALT 34  --    ANIONGAP 12 9   EGFRNONAA 55* >60     Magnesium:   Recent Labs  Lab 03/09/18  0615   MG 2.0     Troponin:   Recent Labs  Lab 03/09/18  0150 03/09/18  0826   TROPONINI 0.027* 0.065*       Significant Imaging:   Imaging Results          X-Ray Chest 1 View (Final result)  Result time 03/09/18 02:29:12    Final result by Florin Camp MD (03/09/18 02:29:12)                 Impression:       No acute process.              Electronically signed by: FLORIN CAMP MD  Date:     03/09/18  Time:    02:29              Narrative:    Exam: [id number]  03/09/18  02:10:46 IMG34 ([facility]) : XR CHEST 1 VIEW    Technique:    Single frontal chest x-ray.    Comparison:    11/7/17    Findings:      The monitoring EKG leads are present.  The patient is status post median sternotomy.  The appearance of the sternal wires are unremarkable.    The trachea is unremarkable.  There is stable prominence  of the cardiomediastinal silhouette.  Hemidiaphragms are unremarkable.  There is no evidence of free air beneath the hemidiaphragms.  There are no pleural effusions.  There is no evidence of a pneumothorax.  There is no evidence of pneumomediastinum.  No airspace opacities are present.  There are degenerative changes in the osseous structures.  The subcutaneous tissues are unremarkable.                                Assessment/Plan:     * New onset atrial fibrillation    See HPI.  EKG showed atrial fibrillation with rate 140s. In ED, IV diltiazem given with rate controlled. Cardiology consulted and evaluated with recommendations for FRANCISCO/CV. Plan for xeralto prior to discharge. awating further recommendations per cardiology.                Palpitations    See above          Hypokalemia    Could likely be 2/2 use of diuretics without replacement supplement. Will monitor and replete.          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 Patient appears to be on several antihypertensive agents, however, not maximized on any.  Per cardiology, start metoprolol ER 50mg qd, losartan 25mg qd, amlod 5mg qd; discontinued maxide, hydrala, clonidine. Monitor and adjust for appropriate response.           VTE Risk Mitigation         Ordered     rivaroxaban tablet 20 mg  With dinner     Route:  Oral        03/09/18 0905             LEXI Manzo  Department of Hospital Medicine   [facility]

## 2018-03-09 NOTE — SUBJECTIVE & OBJECTIVE
Past Medical History:   Diagnosis Date    Hypertension        Past Surgical History:   Procedure Laterality Date    BREAST BIOPSY      CARDIAC SURGERY      HYSTERECTOMY         Review of patient's allergies indicates:  No Known Allergies    No current facility-administered medications on file prior to encounter.      Current Outpatient Prescriptions on File Prior to Encounter   Medication Sig    amLODIPine (NORVASC) 5 MG tablet Take 5 mg by mouth once daily.     atorvastatin (LIPITOR) 40 MG tablet Take 40 mg by mouth every evening.    cloNIDine (CATAPRES) 0.2 MG tablet Take 0.2 mg by mouth once.     hydrALAZINE (APRESOLINE) 100 MG tablet once daily    metoprolol succinate (TOPROL-XL) 25 MG 24 hr tablet Take 25 mg by mouth 2 (two) times daily.    triamterene-hydrochlorothiazide 37.5-25 mg (MAXZIDE-25) 37.5-25 mg per tablet Take 1 tablet by mouth once daily.    cetirizine (ZYRTEC) 10 MG tablet Take 1 tablet (10 mg total) by mouth once daily.    cloNIDine 0.2 mg/24 hr td ptwk (CATAPRES) 0.2 mg/24 hr Place 1 patch onto the skin every 7 days.    diazePAM (VALIUM) 5 MG tablet     estrogens, conjugated, (PREMARIN) 0.3 MG tablet Take 1 tablet (0.3 mg total) by mouth once daily.    fluticasone (FLONASE) 50 mcg/actuation nasal spray 2 sprays (100 mcg total) by Each Nare route once daily.    gabapentin (NEURONTIN) 300 MG capsule Take 1 capsule (300 mg total) by mouth 2 (two) times daily.    hydrocodone-acetaminophen 7.5-325mg (NORCO) 7.5-325 mg per tablet     losartan (COZAAR) 100 MG tablet     losartan (COZAAR) 25 MG tablet TAKE 1 TABLET(S) EVERY DAY BY ORAL ROUTE IN THE MORNING FOR 30 DAYS.    methocarbamol (ROBAXIN) 750 MG Tab Take 1,500 mg by mouth 3 (three) times daily.    naproxen (NAPROSYN) 500 MG tablet Take 1 tablet (500 mg total) by mouth 2 (two) times daily.    OXcarbazepine (TRILEPTAL) 150 MG Tab     PENNSAID 20 mg/gram /actuation(2 %) sopm APPLY 2 PUMPS TO THE AFFECTED AREA TWICE DAILY     sertraline (ZOLOFT) 50 MG tablet TAKE 1 TABLET(S) EVERY DAY BY ORAL ROUTE AT DINNER FOR 30 DAYS.     Family History     Problem Relation (Age of Onset)    Breast cancer Maternal Uncle    Cancer Mother        Social History Main Topics    Smoking status: Never Smoker    Smokeless tobacco: Never Used    Alcohol use No    Drug use: No    Sexual activity: Yes     Partners: Male     Birth control/ protection: Surgical     Review of Systems   Constitutional: Negative for chills, diaphoresis and fever.   HENT: Negative for congestion, postnasal drip, sinus pressure and sore throat.    Eyes: Negative for visual disturbance.   Respiratory: Negative for cough, chest tightness, shortness of breath and wheezing.    Cardiovascular: Positive for chest pain and palpitations. Negative for leg swelling.        Jaw pain   Gastrointestinal: Negative for abdominal distention, abdominal pain, blood in stool, constipation, diarrhea, nausea and vomiting.   Endocrine: Negative.    Genitourinary: Negative for dysuria.   Musculoskeletal: Negative.    Skin: Negative.    Allergic/Immunologic: Negative.    Neurological: Negative for dizziness, weakness, numbness and headaches.   Hematological: Negative.    Psychiatric/Behavioral: Negative.      Objective:     Vital Signs (Most Recent):  Temp: 97.6 °F (36.4 °C) (03/09/18 0815)  Pulse: 95 (03/09/18 0815)  Resp: 18 (03/09/18 0815)  BP: (!) 210/112 (03/09/18 0815)  SpO2: 95 % (03/09/18 0815) Vital Signs (24h Range):  Temp:  [97.1 °F (36.2 °C)-97.7 °F (36.5 °C)] 97.6 °F (36.4 °C)  Pulse:  [] 95  Resp:  [18-20] 18  SpO2:  [94 %-97 %] 95 %  BP: (129-210)/() 210/112     Weight: 85.8 kg (189 lb 2.5 oz)  Body mass index is 33.51 kg/m².    Physical Exam   Constitutional: She is oriented to person, place, and time. She appears well-developed and well-nourished. She is cooperative.  Non-toxic appearance. No distress.   HENT:   Head: Normocephalic and atraumatic.   Eyes: Conjunctivae and  lids are normal. Pupils are equal, round, and reactive to light.   Neck: Trachea normal, normal range of motion and full passive range of motion without pain. Neck supple. Normal carotid pulses and no JVD present. No tracheal deviation present. No thyroid mass and no thyromegaly present.   Cardiovascular: Normal rate, regular rhythm, S1 normal, S2 normal, normal heart sounds and normal pulses.    Pulmonary/Chest: Effort normal and breath sounds normal. No stridor.   Abdominal: Soft. Normal appearance and bowel sounds are normal. There is no tenderness.   Musculoskeletal: Normal range of motion.   Neurological: She is alert and oriented to person, place, and time. She has normal strength. No cranial nerve deficit or sensory deficit.   Skin: Skin is warm, dry and intact. She is not diaphoretic. No cyanosis. Nails show no clubbing.   Psychiatric: She has a normal mood and affect. Her speech is normal and behavior is normal. Judgment and thought content normal. Cognition and memory are normal.         CRANIAL NERVES     CN III, IV, VI   Pupils are equal, round, and reactive to light.       Significant Labs:   CBC:   Recent Labs  Lab 03/09/18  0150   WBC 8.06   HGB 14.1   HCT 41.2        CMP:   Recent Labs  Lab 03/09/18  0150 03/09/18  0615    143   K 2.9* 3.0*    106   CO2 26 28   * 116*   BUN 22* 21*   CREATININE 1.1 0.9   CALCIUM 9.9 9.5   PROT 7.2  --    ALBUMIN 3.8  --    BILITOT 0.3  --    ALKPHOS 150*  --    AST 23  --    ALT 34  --    ANIONGAP 12 9   EGFRNONAA 55* >60     Magnesium:   Recent Labs  Lab 03/09/18  0615   MG 2.0     Troponin:   Recent Labs  Lab 03/09/18  0150 03/09/18  0826   TROPONINI 0.027* 0.065*       Significant Imaging:   Imaging Results          X-Ray Chest 1 View (Final result)  Result time 03/09/18 02:29:12    Final result by Florin Camp MD (03/09/18 02:29:12)                 Impression:       No acute process.              Electronically signed by: FLORIN CAMP  MD  Date:     03/09/18  Time:    02:29              Narrative:    Exam: [id number]  03/09/18  02:10:46 IMG34 ([facility]) : XR CHEST 1 VIEW    Technique:    Single frontal chest x-ray.    Comparison:    11/7/17    Findings:      The monitoring EKG leads are present.  The patient is status post median sternotomy.  The appearance of the sternal wires are unremarkable.    The trachea is unremarkable.  There is stable prominence of the cardiomediastinal silhouette.  Hemidiaphragms are unremarkable.  There is no evidence of free air beneath the hemidiaphragms.  There are no pleural effusions.  There is no evidence of a pneumothorax.  There is no evidence of pneumomediastinum.  No airspace opacities are present.  There are degenerative changes in the osseous structures.  The subcutaneous tissues are unremarkable.

## 2018-03-09 NOTE — ASSESSMENT & PLAN NOTE
See HPI.  EKG showed atrial fibrillation with rate 140s. In ED, IV diltiazem given with rate controlled. Cardiology consulted and evaluated with recommendations for FRANCISCO/CV. Plan for xeralto prior to discharge. awating further recommendations per cardiology.

## 2018-03-09 NOTE — HPI
"57 y.o. Female with HTN presents to the ED c/o bilateral jaw tighntess which radiates to her upper chest and palpitations which began x2 hours PTA. Pt reports she took nitro x3 with relief after the 3rd attempt. She denies nausea, diaphoresis, fever, chills, sore throat, diarrhea, "dizzy spells", smoking or drinking. Pt has hx of x3 bypass surgeries.  She also noted palps "like a basketball in my chest".  Noted to be in AF/RVR, now controlled.  CP resolved.  Initial trop neg.      Prev followed by Dr. Meyer at [facility].  Told by practitioner there that she was not to take ASA d/t her BP.  "

## 2018-03-09 NOTE — CONSULTS
"[facility]  Cardiology  Consult Note    Patient Name: Keyonna Guillen  MRN: [id number]  Admission Date: 3/9/2018  Hospital Length of Stay: 0 days  Code Status: No Order   Attending Provider: Heather Wynne MD   Consulting Provider: José Torres MD  Primary Care Physician: Primary Doctor No  Principal Problem:New onset atrial fibrillation    Patient information was obtained from patient and ER records.     Inpatient consult to Cardiology  Consult performed by: JOSÉ TORRES  Consult ordered by: QUINCY GONZALEZ  Reason for consult: AF/RVR, CP/CAD        Subjective:     Chief Complaint:  CP, palps     HPI:   57 y.o. Female with HTN presents to the ED c/o bilateral jaw tighntess which radiates to her upper chest and palpitations which began x2 hours PTA. Pt reports she took nitro x3 with relief after the 3rd attempt. She denies nausea, diaphoresis, fever, chills, sore throat, diarrhea, "dizzy spells", smoking or drinking. Pt has hx of x3 bypass surgeries.  She also noted palps "like a basketball in my chest".  Noted to be in AF/RVR, now controlled.  CP resolved.  Initial trop neg.      Prev followed by Dr. Meyer at [facility].  Told by practitioner there that she was not to take ASA d/t her BP.    Past Medical History:   Diagnosis Date    Hypertension        Past Surgical History:   Procedure Laterality Date    BREAST BIOPSY      CARDIAC SURGERY      HYSTERECTOMY         Review of patient's allergies indicates:  No Known Allergies    No current facility-administered medications on file prior to encounter.      Current Outpatient Prescriptions on File Prior to Encounter   Medication Sig    amLODIPine (NORVASC) 5 MG tablet Take 5 mg by mouth once daily.     atorvastatin (LIPITOR) 40 MG tablet Take 40 mg by mouth every evening.    cloNIDine (CATAPRES) 0.2 MG tablet Take 0.2 mg by mouth once.     hydrALAZINE (APRESOLINE) 100 MG tablet once daily    metoprolol succinate (TOPROL-XL) 25 MG 24 " hr tablet Take 25 mg by mouth 2 (two) times daily.    triamterene-hydrochlorothiazide 37.5-25 mg (MAXZIDE-25) 37.5-25 mg per tablet Take 1 tablet by mouth once daily.    cetirizine (ZYRTEC) 10 MG tablet Take 1 tablet (10 mg total) by mouth once daily.    cloNIDine 0.2 mg/24 hr td ptwk (CATAPRES) 0.2 mg/24 hr Place 1 patch onto the skin every 7 days.    diazePAM (VALIUM) 5 MG tablet     estrogens, conjugated, (PREMARIN) 0.3 MG tablet Take 1 tablet (0.3 mg total) by mouth once daily.    fluticasone (FLONASE) 50 mcg/actuation nasal spray 2 sprays (100 mcg total) by Each Nare route once daily.    gabapentin (NEURONTIN) 300 MG capsule Take 1 capsule (300 mg total) by mouth 2 (two) times daily.    hydrocodone-acetaminophen 7.5-325mg (NORCO) 7.5-325 mg per tablet     losartan (COZAAR) 100 MG tablet     losartan (COZAAR) 25 MG tablet TAKE 1 TABLET(S) EVERY DAY BY ORAL ROUTE IN THE MORNING FOR 30 DAYS.    methocarbamol (ROBAXIN) 750 MG Tab Take 1,500 mg by mouth 3 (three) times daily.    naproxen (NAPROSYN) 500 MG tablet Take 1 tablet (500 mg total) by mouth 2 (two) times daily.    OXcarbazepine (TRILEPTAL) 150 MG Tab     PENNSAID 20 mg/gram /actuation(2 %) sopm APPLY 2 PUMPS TO THE AFFECTED AREA TWICE DAILY    sertraline (ZOLOFT) 50 MG tablet TAKE 1 TABLET(S) EVERY DAY BY ORAL ROUTE AT DINNER FOR 30 DAYS.     Family History     Problem Relation (Age of Onset)    Breast cancer Maternal Uncle    Cancer Mother        Social History Main Topics    Smoking status: Never Smoker    Smokeless tobacco: Never Used    Alcohol use No    Drug use: No    Sexual activity: Yes     Partners: Male     Birth control/ protection: Surgical     Review of Systems   Constitution: Negative for chills, diaphoresis, fever, weakness and malaise/fatigue.   HENT: Negative for nosebleeds.    Eyes: Negative for blurred vision and double vision.   Cardiovascular: Positive for chest pain and palpitations. Negative for claudication,  cyanosis, dyspnea on exertion, leg swelling, orthopnea, paroxysmal nocturnal dyspnea and syncope.   Respiratory: Negative for cough, shortness of breath and wheezing.    Skin: Negative for dry skin and poor wound healing.   Musculoskeletal: Negative for back pain, joint swelling and myalgias.   Gastrointestinal: Negative for abdominal pain, nausea and vomiting.   Genitourinary: Negative for hematuria.   Neurological: Negative for dizziness, headaches, numbness and seizures.   Psychiatric/Behavioral: Negative for altered mental status and depression.     Objective:     Vital Signs (Most Recent):  Temp: 97.1 °F (36.2 °C) (03/09/18 0632)  Pulse: 88 (03/09/18 0632)  Resp: 18 (03/09/18 0632)  BP: (!) 136/92 (03/09/18 0632)  SpO2: 97 % (03/09/18 0632) Vital Signs (24h Range):  Temp:  [97.1 °F (36.2 °C)-97.7 °F (36.5 °C)] 97.1 °F (36.2 °C)  Pulse:  [] 88  Resp:  [18-20] 18  SpO2:  [94 %-97 %] 97 %  BP: (129-141)/(82-92) 136/92     Weight: 85.8 kg (189 lb 2.5 oz)  Body mass index is 33.51 kg/m².    SpO2: 97 %  O2 Device (Oxygen Therapy): room air    No intake or output data in the 24 hours ending 03/09/18 0749    Lines/Drains/Airways          No matching active lines, drains, or airways          Physical Exam   Constitutional: She is oriented to person, place, and time. She appears well-developed and well-nourished. No distress.   HENT:   Head: Normocephalic and atraumatic.   Mouth/Throat: No oropharyngeal exudate.   Eyes: Conjunctivae and EOM are normal. Pupils are equal, round, and reactive to light. No scleral icterus.   Neck: Normal range of motion. Neck supple. No JVD present. No tracheal deviation present.   Cardiovascular: Normal rate, S1 normal and S2 normal.  An irregularly irregular rhythm present. Exam reveals no gallop and no friction rub.    No murmur heard.  Pulmonary/Chest: Effort normal and breath sounds normal. No respiratory distress. She has no wheezes. She has no rales. She exhibits no tenderness.    Abdominal: Soft. She exhibits no distension.   obese   Musculoskeletal: Normal range of motion. She exhibits no edema.   Neurological: She is alert and oriented to person, place, and time. No cranial nerve deficit.   Skin: Skin is warm and dry. She is not diaphoretic.   Psychiatric: She has a normal mood and affect. Her behavior is normal. Judgment normal.       Current Medications:   enoxaparin  70 mg Subcutaneous Q24H    potassium chloride  10 mEq Intravenous Q1H       [id number]    Laboratory:  CBC:    Recent Labs  Lab 03/09/18  0150   WHITE BLOOD CELL COUNT 8.06   HEMOGLOBIN 14.1   HEMATOCRIT 41.2   PLATELETS 285       CHEMISTRIES:    Recent Labs  Lab 03/09/18  0150 03/09/18  0615   GLUCOSE 138 H 116 H   SODIUM 145 143   POTASSIUM 2.9 L 3.0 L   BUN BLD 22 H 21 H   CREATININE 1.1 0.9   EGFR IF  >60 >60   EGFR IF NON- 55 A >60   CALCIUM 9.9 9.5   MAGNESIUM  --  2.0       CARDIAC BIOMARKERS:    Recent Labs  Lab 03/09/18  0150   TROPONIN I 0.027 H       COAGS:        LIPIDS/LFTS:    Recent Labs  Lab 03/09/18  0150   AST 23   ALT 34     No results found for: TSH    Diagnostic Results:  ECG (personally reviewed tracings):   3/9/18 0129 , inflat ST abnl ?isch    Chest X-Ray (personally reviewed image(s)): 3/9/18 NAD, prior sternotomy      Assessment and Plan:     * New onset atrial fibrillation    AF/RVR with associated CP noted on admission.  Now rate controlled and CP resolved.  Initial trop neg, repeat pending.  If trop neg, will plan FRANCISCO/DCCV today.  If trop positive, pt will need cath.  Agree with enox for time being with eventual NOAC (Xarelto 20mg qd) and ASA 81mg qd.  Cont metoprolol po for rate control.        Coronary artery disease involving coronary bypass graft of native heart with angina pectoris    CP noted with AF/RVR, now CP resolved with control of HR.  ?Demand related.  If trop elev, pt will need cath.  Restart ASA 81mg qd, atorva 40mg qhs, metoprolol ER 50mg  qd, losartan 25mg qd, amlod 5mg qd  Stop maxide, hydrala, clonidine.        Essential hypertension    Med rx as above  Hope to simplify regimen        Other hyperlipidemia    Cont atorva 40mg        Hypokalemia    Stop maxide  Replete K+            VTE Risk Mitigation         Ordered     enoxaparin injection 70 mg  Every 12 hours (non-standard times)     Route:  Subcutaneous        03/09/18 0823          Thank you for your consult. I will follow-up with patient. Please contact us if you have any additional questions.    José Khalil MD  Cardiology   [facility]    Addendum 920am:    Recent Labs  Lab 03/09/18  0826   TROPONINI 0.065*     Lab Results   Component Value Date    TSH 1.481 03/09/2018       Will proceed with FRANCISCO/DCCV.  Start Xarelto 20mg qd, first dose now.  Outpatient MPI.  Pt voices wish to follow up with me after discharge.    Risks, benefits and alternatives of the FRANCISCO/Cardioversion procedure were discussed with the patient and her sons in attendance, including throat irritation, aspiration, anesthetic complications, esophageal irritation/perforation, skin irritation, arrhythmia, etc.  Patient understands and agrees to proceed.

## 2018-03-09 NOTE — TRANSFER OF CARE
"Anesthesia Transfer of Care Note    Patient: Keyonna Guillen    Procedure(s) Performed: Procedure(s) (LRB):  Transesophageal Echocardiogram (FRANCISCO) (N/A)    Patient location: Other: OR6    Anesthesia Type: general    Transport from OR: Transported from OR on 2-3 L/min O2 by NC with adequate spontaneous ventilation    Post pain: adequate analgesia    Post assessment: no apparent anesthetic complications    Post vital signs: stable    Level of consciousness: awake    Nausea/Vomiting: no nausea/vomiting    Complications: none    Transfer of care protocol was followed      Last vitals:   Visit Vitals  /77   Pulse 72   Temp 36 °C (96.8 °F)   Resp 20   Ht 5' 3" (1.6 m)   Wt 85.8 kg (189 lb 2.5 oz)   SpO2 95%   Breastfeeding? No   BMI 33.51 kg/m²     "

## 2018-03-09 NOTE — ASSESSMENT & PLAN NOTE
AF/RVR with associated CP noted on admission.  Now rate controlled and CP resolved.  Initial trop neg, repeat pending.  If trop neg, will plan FRANCISCO/DCCV today.  If trop positive, pt will need cath.  Agree with enox for time being with eventual NOAC (Xarelto 20mg qd) and ASA 81mg qd.  Cont metoprolol po for rate control.

## 2018-03-09 NOTE — ED PROVIDER NOTES
"Encounter Date: 3/9/2018    SCRIBE #1 NOTE: I, Markos Hodge, am scribing for, and in the presence of,  Umair Davidson MD. I have scribed the following portions of the note - Other sections scribed: HPI, ROS, PE, EKG, MDM.       History     Chief Complaint   Patient presents with    Jaw Pain     pt reports she had jaw pain and palpitations starting about 2 hours ago; pt took 3 Nitro SL tablets 5 minutes apart; pt currently denies the jaw pain & palpitations; pt reports that her Cardiologist told her to take the Nitros when having jaw pains because that is the same symptoms she had before needing 3 bipass surgeries    Palpitations     CC: Jaw Pain; Palpitations;    HPI: This 57 y.o. Female with HTN presents to the ED c/o bilateral jaw tighntess which radiates to her upper chest and palpitations which began x2 hours PTA. Pt reports she took nitro x3 with relief after the 3rd attempt. She denies nausea, diaphoresis, fever, chills, sore throat, diarrhea, "dizzy spells", smoking or drinking. Pt has hx of x3 bypass surgeries.      The history is provided by the patient. No  was used.     Review of patient's allergies indicates:  No Known Allergies  Past Medical History:   Diagnosis Date    Hypertension      Past Surgical History:   Procedure Laterality Date    BREAST BIOPSY      CARDIAC SURGERY      HYSTERECTOMY       Family History   Problem Relation Age of Onset    Cancer Mother     Breast cancer Maternal Uncle      Social History   Substance Use Topics    Smoking status: Never Smoker    Smokeless tobacco: Never Used    Alcohol use No     Review of Systems   Constitutional: Negative for chills and fever.   HENT: Negative for ear pain, rhinorrhea and sore throat.         (+) bilateral jaw tightness   Eyes: Negative for redness.   Respiratory: Negative for shortness of breath.    Cardiovascular: Positive for palpitations. Negative for chest pain.   Gastrointestinal: Negative for abdominal " pain, diarrhea, nausea and vomiting.   Genitourinary: Negative for dysuria and hematuria.   Musculoskeletal: Negative for back pain and neck pain.   Skin: Negative for rash.   Neurological: Negative for weakness, numbness and headaches.   Hematological: Does not bruise/bleed easily.   Psychiatric/Behavioral: The patient is not nervous/anxious.        Physical Exam     Initial Vitals [03/09/18 0121]   BP Pulse Resp Temp SpO2   (!) 141/82 (!) 123 20 97.7 °F (36.5 °C) 97 %      MAP       101.67         Physical Exam    Nursing note and vitals reviewed.  Constitutional: She appears well-developed and well-nourished. She is cooperative.  Non-toxic appearance. No distress.   HENT:   Head: Normocephalic and atraumatic.   Mouth/Throat: Oropharynx is clear and moist. No oral lesions. No lacerations.   She has moist mucous membranes.   Eyes: Conjunctivae and EOM are normal. Pupils are equal, round, and reactive to light.   Neck: Normal range of motion and full passive range of motion without pain. Neck supple. No thyromegaly present. No JVD present.   Cardiovascular: Normal rate, normal heart sounds and normal pulses. An irregular rhythm present.   Pulmonary/Chest: Effort normal and breath sounds normal. No respiratory distress. She has no wheezes.   Pt has good inspiratory and expiratory breath sounds bilaterally.   Abdominal: Soft. Normal appearance. She exhibits no distension and no mass. Bowel sounds are decreased. There is no tenderness.   Musculoskeletal: Normal range of motion.        Cervical back: She exhibits no tenderness.        Right foot: Plantar foot sensation: normal.        Left foot: Plantar foot sensation: normal.   Pt has 2+ radial pulses bilaterally with good strength.   Neurological: She is alert and oriented to person, place, and time. She has normal strength. No cranial nerve deficit or sensory deficit.   Skin: Skin is warm, dry and intact. No rash noted.   Psychiatric: She has a normal mood and  affect. Her speech is normal and behavior is normal. Judgment and thought content normal.         ED Course   Procedures  Labs Reviewed   CBC W/ AUTO DIFFERENTIAL   COMPREHENSIVE METABOLIC PANEL   TROPONIN I     EKG Readings: (Independently Interpreted)   Heart Rate: 146. Axis: Normal.   Pt has no detectable p wave, PRS narrow, 496 QTC, ST depressions in multiple locations, no AV blocks and no hypertrophy.          Medical Decision Making:   Initial Assessment:   Patient presented with jaw pain which is her anginal equivalent which she had present prior to her three-vessel CABG started approximately 2 hours before arrival.  She took 3 nitroglycerin tablets with minimal relief.  By the time she arrived at triage was nearly gone and by the time she arrived in bed in the emergency department it was gone her pain did progress into her upper chest and she began noticing some significant palpitations.  She was found to have an EKG heart rate of 146 in atrial fibrillation for which she  had no prior history.  Differential Diagnosis:   Electrolyte abnormalities  Pneumonia  Currently in a fib  Heart Injuries  ED Management:  She was noted to have elevated heart rate irregular and a pattern of atrial fibrillation which was within rapid ventricular response controlled with 1 dose of Cardizem 10 mg IV which brought her into the 80s and 90s her blood pressure never changed from 120s to low 130s systolic she maintained good sats 95 x 6%.  She was more comfortable after the Cardizem dosage reported .  Also noted to have an elevated troponin at 0.027.  Patient met observation status and was given Lovenox dose and cards consult and a 2-D echo was ordered for the morning.              Scribe Attestation:   Scribe #1: I performed the above scribed service and the documentation accurately describes the services I performed. I attest to the accuracy of the note.    Attending Attestation:           Physician Attestation for  Scribe:  Physician Attestation Statement for Scribe #1: I, Umair Davidson MD, reviewed documentation, as scribed by Markos Hodge in my presence, and it is both accurate and complete.                    Clinical Impression:   The encounter diagnosis was Palpitations.   New onset atrial fibrillation  Elevated troponin    Disposition:   Disposition: Placed in Observation  Condition: Stable       I, Dr. Umair Davidson, personally performed the services described in this documentation. All medical record entries made by the scribe were at my direction and in my presence.  I have reviewed the chart and agree that the record reflects my personal performance and is accurate and complete. Umair Davidson MD.  4:25 AM 03/09/2018                   Umair Davidson MD  03/09/18 0425

## 2018-03-09 NOTE — PROGRESS NOTES
WRITTEN HEALTHCARE DISCHARGE INFORMATION     Things that YOU are responsible for to Manage Your Care At Home:  1. Getting your prescriptions filled.  2. Taking you medications as directed. DO NOT MISS ANY DOSES!  3. Going to your follow-up doctor appointments. This is important because it allows the doctor to monitor your progress and to determine if any changes need to be made to your treatment plan.    If you are unable to make your follow up appointments, please call the number listed and reschedule this appointment.     After discharge, if you need assistance, you can call [facility] On Call Nurse Care Line for 24/7 assistance at [phone]    Thank you for choosing [facility] and allowing us to care for you.   From your care manager:  Lori LOWE RN,TN  [phone]     You should receive a call from [facility] Discharge Department within 48-72 hours to help manage your care after discharge. Please try to make sure that you answer your phone for this important phone call.     Follow-up Information     Morgan Leger MD On 3/15/2018.    Specialty:  Family Medicine  Why:  Outpatient Services, PCP follow-up appointment. Patient should arrive by 9:40AM.   Contact information:  [address] [facility]  [place] [address]  [phone]             José Khalil MD On 3/19/2018.    Specialties:  Cardiology, INTERVENTIONAL CARDIOLOGY  Why:  Outpatient Services, Cardiology follow-up appointment. Patient should arrive by 11:20AM.   Contact information:  [address] [facility]  [address]  [place] [address]  [phone]

## 2018-03-09 NOTE — ANESTHESIA POSTPROCEDURE EVALUATION
"Anesthesia Post Evaluation    Patient: Keyonna Guillen    Procedure(s) Performed: Procedure(s) (LRB):  Transesophageal Echocardiogram (FRANCISCO) (N/A)    Final Anesthesia Type: general  Patient location during evaluation: PACU  Patient participation: Yes- Able to Participate  Level of consciousness: awake and alert, oriented and awake  Post-procedure vital signs: reviewed and stable  Pain management: adequate  Airway patency: patent  PONV status at discharge: No PONV  Anesthetic complications: no      Cardiovascular status: blood pressure returned to baseline, hemodynamically stable and stable  Respiratory status: unassisted and spontaneous ventilation  Hydration status: euvolemic  Follow-up not needed.        Visit Vitals  /77   Pulse 72   Temp 36 °C (96.8 °F)   Resp 20   Ht 5' 3" (1.6 m)   Wt 85.8 kg (189 lb 2.5 oz)   SpO2 95%   Breastfeeding? No   BMI 33.51 kg/m²       Pain/Hilario Score: Pain Assessment Performed: Yes (3/9/2018 12:00 PM)  Presence of Pain: denies (3/9/2018  8:00 AM)      "

## 2018-03-09 NOTE — HPI
"Keyonna Guillen is a 57 y.o. female with a history as below who presented to the ED with a CC of  bilateral jaw tighntess which radiates to her upper chest and palpitations she describes as "fast then slow, feeling like a basketball dripping in my chest" which began x2 hours PTA. Patient reports she took SL NTG x3 with relief after the 3rd attempt while in route to the emergency room. She denies fever, chills, diaphoresis, abdominal pain, NVD or any other associated symptoms. Patient reports similar episode prior to having her triple bypass surgery 3 years ago. She does not smoke or drink etoh.  In the ED, found to be in a-fib rvr RVR, rate controlled with IV diltiazem. Imaging and labs obtained and reviewed.                "

## 2018-03-09 NOTE — PROGRESS NOTES
Discharge Instructions for Atrial Fibrillation  You have been diagnosed with an abnormal heart rhythm called atrial fibrillation. With this condition, your hearts 2 upper chambers quiver rather than squeeze the blood out in a normal pattern. This leads to an irregular and sometimes rapid heartbeat. Some people will develop associated symptoms such as a flip-flopping heartbeat, chest pain, lightheadedness, or shortness of breath. Other people may have no symptoms at all. Atrial fibrillation is serious because it affects the hearts ability to fill with blood as it should. Blood clots may form. This increases the risk for stroke. Untreated atrial fibrillation can also lead to heart failure. Atrial fibrillation can be controlled. With treatment, most people with atrial fibrillation lead normal lives.  Treatment options  Recommended treatment for atrial fibrillation depends on your age, symptoms, how long you have had atrial fibrillation, and other factors. You will have a complete evaluation to find out if you have any abnormalities that caused your heart to go into atrial fibrillation. This might be blocked heart arteries or a thyroid problem. Your doctor will assess your particular case and discuss choices with you.  Treatment choices may include:  · Treating an underlying disorder that puts you at risk for atrial fibrillation. For example, correcting an abnormal thyroid or electrolyte problem, or treating a blocked heart artery.  · Restoring a normal heart rhythm with an electrical shock (cardioversion) or with an antiarrhythmic medicine (chemical cardioversion)  · Using medicine to control your heart rate in atrial fibrillation.  · Preventing the risk for blood clot and stroke using blood-thinning medicines. Your doctor will tell you what he or she recommends. Choices may include aspirin, clopidogrel, warfarin, dabigatran, rivaroxaban, apixaban, and edoxaban.  · Doing catheter ablation or a surgical maze  procedure. These use different methods to destroy certain areas of heart tissue. This interrupts the electrical signals causing atrial fibrillation. One of these procedures may be a choice when medicines do not work, or as an alternative to long-term medicine.  · Other treatment choices may be recommended for you by your doctor.  Managing risk factors for stroke and preventing heart failure are important parts of any treatment plan for atrial fibrillation.  Home care  · Take your medicines exactly as directed. Dont skip doses.  · Work with your doctor to find the right medicines and doses for you.  · Learn to take your own pulse. Keep a record of your results. Ask your doctor which pulse rates mean that you need medical attention. Slowing your pulse is often the goal of treatment. Ask your doctor if its OK for you to use an automatic machine to check your pulse at home. Sometimes these machines dont count the pulse correctly when you have atrial fibrillation.  · Limit your intake of coffee, tea, cola, and other beverages with caffeine. Talk with your doctor about whether you should eliminate caffeine.  · Avoid over-the-counter medicines that have caffeine in them.  · Let your doctor know what medicines you take, including prescription and over-the-counter medicines, as well as any supplements. They interfere with some medicines given for atrial fibrillation.  · Ask your doctor about whether you can drink alcohol. Some people need to avoid alcohol to better treat atrial fibrillation. If you are taking blood-thinner medicines, alcohol may interfere with them by increasing their effect.  · Never take stimulants such as amphetamines or cocaine. These drugs can speed up your heart rate and trigger atrial fibrillation.  Follow-up care  Follow up with your doctor, or as advised.     When should I call my healthcare provider  Call your healthcare provider right away if you have any of the  following:  · Weakness  · Dizziness  · Fainting  · Fatigue  · Shortness of breath  · Chest pain with increased activity  · A change in the usual regularity of your heartbeat, or an unusually fast heartbeat

## 2018-03-09 NOTE — ASSESSMENT & PLAN NOTE
CP noted with AF/RVR, now CP resolved with control of HR.  ?Demand related.  If trop elev, pt will need cath.  Restart ASA 81mg qd, atorva 40mg qhs, metoprolol ER 50mg qd, losartan 25mg qd, amlod 5mg qd  Stop maxide, hydrala, clonidine.

## 2018-03-09 NOTE — PROGRESS NOTES
GENO contacted scheduling @ [phone] to sachedule PCP and cardiology follow-up appointments. Yolanda schedule PCP appointment on 3/15 @ 9:40AM, and cardiology on 3/19 @ 11:40AM. Appointments has been added to follow-up tab.

## 2018-03-09 NOTE — ED TRIAGE NOTES
Pt reports around 2340 c/o b/l jaw pain, upper Cp, heart racing, took 3 nitro, 5 minutes apart, when pt arrived to ED bed 5 denied jaw pain. Pt denies SOB, N/V.

## 2018-03-09 NOTE — ANESTHESIA PREPROCEDURE EVALUATION
03/09/2018  Keyonna Guillen is a 57 y.o., female.    Anesthesia Evaluation    I have reviewed the Patient Summary Reports.    I have reviewed the Nursing Notes.   I have reviewed the Medications.     Review of Systems  Anesthesia Hx:  No previous Anesthesia  Denies Family Hx of Anesthesia complications.   Denies Personal Hx of Anesthesia complications.   Social:  Non-Smoker    Hematology/Oncology:  Hematology Normal   Oncology Normal     EENT/Dental:EENT/Dental Normal   Cardiovascular:   Hypertension Dysrhythmias Angina    Pulmonary:  Pulmonary Normal    Renal/:  Renal/ Normal     Hepatic/GI:  Hepatic/GI Normal    Musculoskeletal:  Musculoskeletal Normal    Neurological:  Neurology Normal    Endocrine:  Endocrine Normal    Dermatological:  Skin Normal    Psych:  Psychiatric Normal           Physical Exam  General:  Well nourished    Airway/Jaw/Neck:  Airway Findings: Mouth Opening: Normal Tongue: Normal  General Airway Assessment: Adult  Mallampati: II  TM Distance: 4 - 6 cm      Dental:  Dental Findings: In tact             Anesthesia Plan  Type of Anesthesia, risks & benefits discussed:  Anesthesia Type:  general  Patient's Preference:   Intra-op Monitoring Plan: standard ASA monitors  Intra-op Monitoring Plan Comments:   Post Op Pain Control Plan: multimodal analgesia, IV/PO Opioids PRN and per primary service following discharge from PACU  Post Op Pain Control Plan Comments:   Induction:    Beta Blocker:  Patient is not currently on a Beta-Blocker (No further documentation required).       Informed Consent: Patient understands risks and agrees with Anesthesia plan.  Questions answered. Anesthesia consent signed with patient.  ASA Score: 2     Day of Surgery Review of History & Physical:    H&P update referred to the provider.         Ready For Surgery From Anesthesia Perspective.

## 2018-03-09 NOTE — ASSESSMENT & PLAN NOTE
Uncontrolled on current home antihypertensive medication regimen. Plan to adjust medication and monitor for appropriate response.

## 2018-03-09 NOTE — BRIEF OP NOTE
[facility]  Brief Operative Note     SUMMARY     Surgery Date: 3/9/2018     Surgeon(s) and Role:     * José Khalil MD - Primary    Assisting Surgeon: None    Pre-op Diagnosis:  A-fib [I48.91]  New onset atrial fibrillation [I48.91]    Post-op Diagnosis:  Post-Op Diagnosis Codes:     * A-fib [I48.91]     * New onset atrial fibrillation [I48.91]    Procedure(s) (LRB):  Transesophageal Echocardiogram (FRANCISCO) (N/A)/DCCV    Anesthesia: General/MAC    Description of the findings of the procedure:uneventful FRANCISCO/DCCV with anesthesia    Findings/Key Components:  Normal bivent size/fxn, EF 55%, normal wall motion  Normal appearing valves.  Trace AI, mild MR, mild TR  No LA/LON thrombus    Succcessful DCCV af->NSR 360J x1.    Plan:  Cont med rx  Cont Xarelto 20mg qd  OK for discharge later on today and follow up with me in the office 1 week for outpatient stress test.    Estimated Blood Loss: none         Specimens: None    Diet: cardiac    Activity: ad wil.

## 2018-03-09 NOTE — PLAN OF CARE
"TN met with patient at bedside to complete discharge needs assessment. TN explained duties of case management to patient.  TN reviewed and provided  "[facility] Packet", "Discharge Planning Begins on Admission" brochure and discussed "Help at Home". TN also discussed her responsibilities to manage her health at home. Patient was informed to leave folder at bedside during hospital stay. Contact information added to white board.    Patient Preferred Pharmacy:   [id number] - [place] - [address]  [address] [place] [address]  Phone: [phone] Fax: [phone]      Appointment Time Preference: afternoon       03/09/18 1225   Discharge Assessment   Assessment Type Discharge Planning Assessment   Confirmed/corrected address and phone number on facesheet? Yes   Assessment information obtained from? Patient   Prior to hospitilization cognitive status: Alert/Oriented   Prior to hospitalization functional status: Independent   Current cognitive status: Alert/Oriented   Current Functional Status: Independent   Lives With spouse   Able to Return to Prior Arrangements yes   Is patient able to care for self after discharge? Yes   Who are your caregiver(s) and their phone number(s)? Spouse- George @ [phone]   Patient's perception of discharge disposition home or selfcare   Readmission Within The Last 30 Days no previous admission in last 30 days   Patient currently being followed by outpatient case management? No   Patient currently receives any other outside agency services? No   Equipment Currently Used at Home none   Do you have any problems affording any of your prescribed medications? No   Is the patient taking medications as prescribed? yes   Does the patient have transportation home? Yes   Transportation Available car;family or friend will provide   Does the patient receive services at the [facility]? No   Discharge Plan A Home;Home with family   Discharge Plan B Home;Home with family "   Patient/Family In Agreement With Plan yes

## 2018-03-09 NOTE — PROGRESS NOTES
03/09/18 0632   Vital Signs   Temp 97.1 °F (36.2 °C)   Temp src Oral   Pulse 88   Heart Rate Source NIBP   Resp 18   SpO2 97 %   Pulse Oximetry Type Intermittent   O2 Device (Oxygen Therapy) room air   BP (!) 136/92     Patient arrived to unit via wheelchair. AAO x 4 . Breathing is unlabored. Patient denies any discomforts at this time. IV is intact with no redness or swelling. Tele monitoring initiated. Patient oriented to room, bed is low and call light is within reach.

## 2018-03-09 NOTE — SUBJECTIVE & OBJECTIVE
Past Medical History:   Diagnosis Date    Hypertension        Past Surgical History:   Procedure Laterality Date    BREAST BIOPSY      CARDIAC SURGERY      HYSTERECTOMY         Review of patient's allergies indicates:  No Known Allergies    No current facility-administered medications on file prior to encounter.      Current Outpatient Prescriptions on File Prior to Encounter   Medication Sig    amLODIPine (NORVASC) 5 MG tablet Take 5 mg by mouth once daily.     atorvastatin (LIPITOR) 40 MG tablet Take 40 mg by mouth every evening.    cloNIDine (CATAPRES) 0.2 MG tablet Take 0.2 mg by mouth once.     hydrALAZINE (APRESOLINE) 100 MG tablet once daily    metoprolol succinate (TOPROL-XL) 25 MG 24 hr tablet Take 25 mg by mouth 2 (two) times daily.    triamterene-hydrochlorothiazide 37.5-25 mg (MAXZIDE-25) 37.5-25 mg per tablet Take 1 tablet by mouth once daily.    cetirizine (ZYRTEC) 10 MG tablet Take 1 tablet (10 mg total) by mouth once daily.    cloNIDine 0.2 mg/24 hr td ptwk (CATAPRES) 0.2 mg/24 hr Place 1 patch onto the skin every 7 days.    diazePAM (VALIUM) 5 MG tablet     estrogens, conjugated, (PREMARIN) 0.3 MG tablet Take 1 tablet (0.3 mg total) by mouth once daily.    fluticasone (FLONASE) 50 mcg/actuation nasal spray 2 sprays (100 mcg total) by Each Nare route once daily.    gabapentin (NEURONTIN) 300 MG capsule Take 1 capsule (300 mg total) by mouth 2 (two) times daily.    hydrocodone-acetaminophen 7.5-325mg (NORCO) 7.5-325 mg per tablet     losartan (COZAAR) 100 MG tablet     losartan (COZAAR) 25 MG tablet TAKE 1 TABLET(S) EVERY DAY BY ORAL ROUTE IN THE MORNING FOR 30 DAYS.    methocarbamol (ROBAXIN) 750 MG Tab Take 1,500 mg by mouth 3 (three) times daily.    naproxen (NAPROSYN) 500 MG tablet Take 1 tablet (500 mg total) by mouth 2 (two) times daily.    OXcarbazepine (TRILEPTAL) 150 MG Tab     PENNSAID 20 mg/gram /actuation(2 %) sopm APPLY 2 PUMPS TO THE AFFECTED AREA TWICE DAILY     sertraline (ZOLOFT) 50 MG tablet TAKE 1 TABLET(S) EVERY DAY BY ORAL ROUTE AT DINNER FOR 30 DAYS.     Family History     Problem Relation (Age of Onset)    Breast cancer Maternal Uncle    Cancer Mother        Social History Main Topics    Smoking status: Never Smoker    Smokeless tobacco: Never Used    Alcohol use No    Drug use: No    Sexual activity: Yes     Partners: Male     Birth control/ protection: Surgical     Review of Systems   Constitution: Negative for chills, diaphoresis, fever, weakness and malaise/fatigue.   HENT: Negative for nosebleeds.    Eyes: Negative for blurred vision and double vision.   Cardiovascular: Positive for chest pain and palpitations. Negative for claudication, cyanosis, dyspnea on exertion, leg swelling, orthopnea, paroxysmal nocturnal dyspnea and syncope.   Respiratory: Negative for cough, shortness of breath and wheezing.    Skin: Negative for dry skin and poor wound healing.   Musculoskeletal: Negative for back pain, joint swelling and myalgias.   Gastrointestinal: Negative for abdominal pain, nausea and vomiting.   Genitourinary: Negative for hematuria.   Neurological: Negative for dizziness, headaches, numbness and seizures.   Psychiatric/Behavioral: Negative for altered mental status and depression.     Objective:     Vital Signs (Most Recent):  Temp: 97.1 °F (36.2 °C) (03/09/18 0632)  Pulse: 88 (03/09/18 0632)  Resp: 18 (03/09/18 0632)  BP: (!) 136/92 (03/09/18 0632)  SpO2: 97 % (03/09/18 0632) Vital Signs (24h Range):  Temp:  [97.1 °F (36.2 °C)-97.7 °F (36.5 °C)] 97.1 °F (36.2 °C)  Pulse:  [] 88  Resp:  [18-20] 18  SpO2:  [94 %-97 %] 97 %  BP: (129-141)/(82-92) 136/92     Weight: 85.8 kg (189 lb 2.5 oz)  Body mass index is 33.51 kg/m².    SpO2: 97 %  O2 Device (Oxygen Therapy): room air    No intake or output data in the 24 hours ending 03/09/18 0749    Lines/Drains/Airways          No matching active lines, drains, or airways          Physical Exam    Constitutional: She is oriented to person, place, and time. She appears well-developed and well-nourished. No distress.   HENT:   Head: Normocephalic and atraumatic.   Mouth/Throat: No oropharyngeal exudate.   Eyes: Conjunctivae and EOM are normal. Pupils are equal, round, and reactive to light. No scleral icterus.   Neck: Normal range of motion. Neck supple. No JVD present. No tracheal deviation present.   Cardiovascular: Normal rate, S1 normal and S2 normal.  An irregularly irregular rhythm present. Exam reveals no gallop and no friction rub.    No murmur heard.  Pulmonary/Chest: Effort normal and breath sounds normal. No respiratory distress. She has no wheezes. She has no rales. She exhibits no tenderness.   Abdominal: Soft. She exhibits no distension.   obese   Musculoskeletal: Normal range of motion. She exhibits no edema.   Neurological: She is alert and oriented to person, place, and time. No cranial nerve deficit.   Skin: Skin is warm and dry. She is not diaphoretic.   Psychiatric: She has a normal mood and affect. Her behavior is normal. Judgment normal.       Current Medications:   enoxaparin  70 mg Subcutaneous Q24H    potassium chloride  10 mEq Intravenous Q1H       [id number]    Laboratory:  CBC:    Recent Labs  Lab 03/09/18  0150   WHITE BLOOD CELL COUNT 8.06   HEMOGLOBIN 14.1   HEMATOCRIT 41.2   PLATELETS 285       CHEMISTRIES:    Recent Labs  Lab 03/09/18  0150 03/09/18  0615   GLUCOSE 138 H 116 H   SODIUM 145 143   POTASSIUM 2.9 L 3.0 L   BUN BLD 22 H 21 H   CREATININE 1.1 0.9   EGFR IF  >60 >60   EGFR IF NON- 55 A >60   CALCIUM 9.9 9.5   MAGNESIUM  --  2.0       CARDIAC BIOMARKERS:    Recent Labs  Lab 03/09/18  0150   TROPONIN I 0.027 H       COAGS:        LIPIDS/LFTS:    Recent Labs  Lab 03/09/18  0150   AST 23   ALT 34     No results found for: TSH    Diagnostic Results:  ECG (personally reviewed tracings):   3/9/18 0129 , inflat ST abnl ?isch    Chest  X-Ray (personally reviewed image(s)): 3/9/18 NAD, prior sternotomy

## 2018-03-09 NOTE — PLAN OF CARE
"TN reviewed follow up appointment information as well as  "A-fib discharge instructions" handout with patient using teach back. Patient is in agreement and verbalized an understanding. Placed discharge information in blue discharge folder.  TN also reviewed patient responsibility checklist with her using teach back. Patient was able to verbalize her responsibilities after discharge to manage her care at home bein. Going to follow up appointments   2. Picking up rx from the pharmacy when discharged  3. Taking her medications as prescribed        18 1426   Final Note   Assessment Type Final Discharge Note   Discharge Disposition Home   What phone number can be called within the next 1-3 days to see how you are doing after discharge? ([phone])   Hospital Follow Up  Appt(s) scheduled? Yes   Discharge plans and expectations educations in teach back method with documentation complete? Yes   Right Care Referral Info   Post Acute Recommendation No Care     "

## 2018-03-09 NOTE — ASSESSMENT & PLAN NOTE
02/12/21        Gilda Plunkett  [address]  [id number]      Dear Ki Lopez,    [facility] records indicate that you have outstanding lab work and or testing that was ordered for you and has not yet been completed:  Orders Placed This Encounter Currently uncontrolled on home antihypertensive medication regimen. Patient appears to be on several antihypertensive agents, however, not maximized on any.  Per cardiology, start metoprolol ER 50mg qd, losartan 25mg qd, amlod 5mg qd; discontinued maxide, hydrala, clonidine. Monitor and adjust for appropriate response.

## 2018-03-09 NOTE — PROGRESS NOTES
Returned from [facility] post cardioversion. Vital signs stable and listed on flow sheet. Call bell in reach. Instructed on post procedure orders.

## 2018-03-20 NOTE — HOSPITAL COURSE
Patient admitted for evaluation of  bilateral jaw tighntess which radiates to her upper chest and palpitations. EKG showed atrial fibrillation with rate 140s. In ED, IV diltiazem given with rate controlled. Cardiology consulted and evaluated with recommendations for FRANCISCO/CV, which was completed successfully. Xeralto initiated prior to discharge. Patient with uncontrolled BP on multiple blood pressure medications, however, not maximized.  Adjustments made to medication regimen - per cardiology, restart ASA 81mg qd, atorva 40mg qhs, metoprolol ER 50mg qd, losartan 25mg qd, amlod 5mg qd. Stop maxide, hydrala, clonidine. BP at discharge improved with changes. Patient remained stable and will discharge with instructions to follow up with PCP and cardiology in 1 week for further outpatient evaluation and testing. Instructed to keep daily log of BP for further evaluation by PCP an cardiology to maintain good BP control and adjust BP medications if needed. Instructed to start 2gm sodium/low cholesterol diet. Activity as tolerated.

## 2018-03-20 NOTE — DISCHARGE SUMMARY
"[facility] Hospital Medicine  Discharge Summary      Patient Name: Keyonna Guillen  MRN: [id number]  Admission Date: 3/9/2018  Hospital Length of Stay: 0 days  Discharge Date and Time: 3/9/2018  5:14 PM  Attending Physician: Ely att. providers found   Discharging Provider: LEXI Manzo  Primary Care Provider: Primary Doctor Ely      HPI:   Keyonna Guillen is a 57 y.o. female with a history as below who presented to the ED with a CC of  bilateral jaw tighntess which radiates to her upper chest and palpitations she describes as "fast then slow, feeling like a basketball dripping in my chest" which began x2 hours PTA. Patient reports she took SL NTG x3 with relief after the 3rd attempt while in route to the emergency room. She denies fever, chills, diaphoresis, abdominal pain, NVD or any other associated symptoms. Patient reports similar episode prior to having her triple bypass surgery 3 years ago. She does not smoke or drink etoh.  In the ED, found to be in a-fib rvr RVR, rate controlled with IV diltiazem. Imaging and labs obtained and reviewed.                  Procedure(s) (LRB):  Transesophageal Echocardiogram (FRANCISCO) (N/A)      Hospital Course:   Patient admitted for evaluation of  bilateral jaw tighntess which radiates to her upper chest and palpitations. EKG showed atrial fibrillation with rate 140s. In ED, IV diltiazem given with rate controlled. Cardiology consulted and evaluated with recommendations for FRANCISCO/CV, which was completed successfully. Xeralto initiated prior to discharge. Patient with uncontrolled BP on multiple blood pressure medications, however, not maximized.  Adjustments made to medication regimen - per cardiology, restart ASA 81mg qd, atorva 40mg qhs, metoprolol ER 50mg qd, losartan 25mg qd, amlod 5mg qd. Stop maxide, hydrala, clonidine. BP at discharge improved with changes. Patient remained stable and will discharge with instructions to follow up with PCP and " cardiology in 1 week for further outpatient evaluation and testing. Instructed to keep daily log of BP for further evaluation by PCP an cardiology to maintain good BP control and adjust BP medications if needed. Instructed to start 2gm sodium/low cholesterol diet. Activity as tolerated.     Consults:   Consults         Status Ordering Provider     Inpatient consult to Cardiology  Once     Provider:  (Not yet assigned)    Completed QUINCY GONZALEZ          Essential hypertension    Currently uncontrolled on home antihypertensive medication regimen. Patient appears to be on several antihypertensive agents, however, not maximized on any.  Per cardiology, start metoprolol ER 50mg qd, losartan 25mg qd, amlod 5mg qd; discontinued maxide, hydrala, clonidine. Monitor and adjust for appropriate response.           Final Active Diagnoses:    Diagnosis Date Noted POA    PRINCIPAL PROBLEM:  New onset atrial fibrillation [I48.91] 03/09/2018 Yes    Other hyperlipidemia [E78.4] 03/09/2018 Yes    Hypokalemia [E87.6] 03/09/2018 Yes    A-fib [I48.91] 03/09/2018 Yes    Palpitations [R00.2]  Yes    Coronary artery disease involving coronary bypass graft of native heart with angina pectoris [I25.709] 08/18/2016 Yes    Essential hypertension [I10] 08/18/2016 Yes      Problems Resolved During this Admission:    Diagnosis Date Noted Date Resolved POA       Discharged Condition: good    Disposition: Home or Self Care    Follow Up:  Follow-up Information     Morgan Leger MD On 3/15/2018.    Specialty:  Family Medicine  Why:  Outpatient Services, PCP follow-up appointment. Patient should arrive by 9:40AM.   Contact information:  [address] [facility]  [place] [address]  [phone]             José Khalil MD On 3/19/2018.    Specialties:  Cardiology, INTERVENTIONAL CARDIOLOGY  Why:  Outpatient Services, Cardiology follow-up appointment. Patient should arrive by 11:20AM.   Contact information:  [address] [facility]  [address]  [place]  [address]  [phone]                 Patient Instructions:     Diet Cardiac     Activity as tolerated     Notify your health care provider if you experience any of the following:  severe uncontrolled pain     Notify your health care provider if you experience any of the following:  severe persistent headache     Notify your health care provider if you experience any of the following:  difficulty breathing or increased cough     Notify your health care provider if you experience any of the following:  persistent dizziness, light-headedness, or visual disturbances     Notify your health care provider if you experience any of the following:  increased confusion or weakness         Significant Diagnostic Studies: Labs: All labs within the past 24 hours have been reviewed    Pending Diagnostic Studies:     None         Medications:  Reconciled Home Medications:   Discharge Medication List as of 3/9/2018  4:55 PM      CONTINUE these medications which have CHANGED    Details   losartan (COZAAR) 25 MG tablet Take 1 tablet (25 mg total) by mouth once daily., Starting Sat 3/10/2018, Until Sun 3/10/2019, Normal      metoprolol succinate (TOPROL-XL) 50 MG 24 hr tablet Take 1 tablet (50 mg total) by mouth once daily., Starting Sat 3/10/2018, Until Sun 3/10/2019, Normal         CONTINUE these medications which have NOT CHANGED    Details   amLODIPine (NORVASC) 5 MG tablet Take 5 mg by mouth once daily. , Starting Mon 10/2/2017, Historical Med      atorvastatin (LIPITOR) 40 MG tablet Take 40 mg by mouth every evening., Starting 1/22/2015, Until Discontinued, Historical Med         STOP taking these medications       cloNIDine (CATAPRES) 0.1 MG tablet Comments:   Reason for Stopping:         cloNIDine (CATAPRES) 0.2 MG tablet Comments:   Reason for Stopping:         hydrALAZINE (APRESOLINE) 100 MG tablet Comments:   Reason for Stopping:         triamterene-hydrochlorothiazide 37.5-25 mg (MAXZIDE-25) 37.5-25 mg per tablet Comments:    Reason for Stopping:         cetirizine (ZYRTEC) 10 MG tablet Comments:   Reason for Stopping:         cloNIDine 0.2 mg/24 hr td ptwk (CATAPRES) 0.2 mg/24 hr Comments:   Reason for Stopping:         diazePAM (VALIUM) 5 MG tablet Comments:   Reason for Stopping:         estrogens, conjugated, (PREMARIN) 0.3 MG tablet Comments:   Reason for Stopping:         fluticasone (FLONASE) 50 mcg/actuation nasal spray Comments:   Reason for Stopping:         gabapentin (NEURONTIN) 300 MG capsule Comments:   Reason for Stopping:         hydrocodone-acetaminophen 7.5-325mg (NORCO) 7.5-325 mg per tablet Comments:   Reason for Stopping:         methocarbamol (ROBAXIN) 750 MG Tab Comments:   Reason for Stopping:         naproxen (NAPROSYN) 500 MG tablet Comments:   Reason for Stopping:         OXcarbazepine (TRILEPTAL) 150 MG Tab Comments:   Reason for Stopping:         PENNSAID 20 mg/gram /actuation(2 %) sopm Comments:   Reason for Stopping:         sertraline (ZOLOFT) 50 MG tablet Comments:   Reason for Stopping:               Indwelling Lines/Drains at time of discharge:   Lines/Drains/Airways          No matching active lines, drains, or airways          Time spent on the discharge of patient: 30 minutes  Patient was seen and examined on the date of discharge and determined to be suitable for discharge.         LEXI Manzo  Department of Hospital Medicine  [facility]

## 2018-03-27 ENCOUNTER — OFFICE VISIT (OUTPATIENT)
Dept: CARDIOLOGY | Facility: CLINIC | Age: 58
End: 2018-03-27
Payer: COMMERCIAL

## 2018-03-27 VITALS
OXYGEN SATURATION: 95 % | HEART RATE: 79 BPM | RESPIRATION RATE: 15 BRPM | DIASTOLIC BLOOD PRESSURE: 90 MMHG | WEIGHT: 193.56 LBS | HEIGHT: 63 IN | BODY MASS INDEX: 34.3 KG/M2 | SYSTOLIC BLOOD PRESSURE: 160 MMHG

## 2018-03-27 DIAGNOSIS — E78.5 HYPERLIPIDEMIA, UNSPECIFIED HYPERLIPIDEMIA TYPE: ICD-10-CM

## 2018-03-27 DIAGNOSIS — I10 ESSENTIAL HYPERTENSION: ICD-10-CM

## 2018-03-27 DIAGNOSIS — I48.91 NEW ONSET ATRIAL FIBRILLATION: ICD-10-CM

## 2018-03-27 DIAGNOSIS — I25.709 CORONARY ARTERY DISEASE INVOLVING CORONARY BYPASS GRAFT OF NATIVE HEART WITH ANGINA PECTORIS: Primary | ICD-10-CM

## 2018-03-27 DIAGNOSIS — E66.9 NON MORBID OBESITY, UNSPECIFIED OBESITY TYPE: ICD-10-CM

## 2018-03-27 DIAGNOSIS — I65.02 ASYMPTOMATIC STENOSIS OF LEFT VERTEBRAL ARTERY: ICD-10-CM

## 2018-03-27 PROCEDURE — 99999 PR PBB SHADOW E&M-EST. PATIENT-LVL IV: CPT | Mod: PBBFAC,,, | Performed by: INTERNAL MEDICINE

## 2018-03-27 PROCEDURE — 3077F SYST BP >= 140 MM HG: CPT | Mod: CPTII,S$GLB,, | Performed by: INTERNAL MEDICINE

## 2018-03-27 PROCEDURE — 99215 OFFICE O/P EST HI 40 MIN: CPT | Mod: S$GLB,,, | Performed by: INTERNAL MEDICINE

## 2018-03-27 PROCEDURE — 3080F DIAST BP >= 90 MM HG: CPT | Mod: CPTII,S$GLB,, | Performed by: INTERNAL MEDICINE

## 2018-03-27 NOTE — PROGRESS NOTES
CARDIOVASCULAR PROGRESS NOTE    REASON FOR CONSULT:   Keyonna Guillen is a 57 y.o. female who presents for follow up of PAF, recent hosp.    PCP: Page  HISTORY OF PRESENT ILLNESS:   The patient returns for follow-up of her recent hospitalization.  She is accompanied by her daughter to today's visit.  She was seen at the [facility] in early March for chest pain and paroxysmal atrial fibrillation.  The chest discomfort was controlled with control of her heart rate, although a minimal troponin elevation was noted.  She underwent successful FRANCISCO guided cardioversion.  She returns today without chest discomfort or shortness of breath.  Her  did accidentally dropped a fence post on her right leg, and there is some abrasion and erythema as well as swelling of that leg.  She has continued to take her Xarelto.  She otherwise denies palpitations, lightheadedness, dizziness, or syncope.  There's been no PND, orthopnea, melena, hematuria, or claudicant symptoms.    The patient brought with her today and MRI report of her neck noting a history of left vertebral artery occlusion.  She was subsequent seen by Dr. Kenrs without plans for intervention.    CARDIOVASCULAR HISTORY:   PAF s/p FRANCISCO/DCCV 3/8/18, on Xarelto, CHADS VASC 2    Hx L vert art occlusion    PAST MEDICAL HISTORY:     Past Medical History:   Diagnosis Date    Hypertension        PAST SURGICAL HISTORY:     Past Surgical History:   Procedure Laterality Date    BREAST BIOPSY      CARDIAC SURGERY      HYSTERECTOMY         ALLERGIES AND MEDICATION:   Review of patient's allergies indicates:  No Known Allergies  Previous Medications    ATORVASTATIN (LIPITOR) 40 MG TABLET    Take 40 mg by mouth every evening.    LOSARTAN (COZAAR) 25 MG TABLET    Take 1 tablet (25 mg total) by mouth once daily.    METOPROLOL SUCCINATE (TOPROL-XL) 50 MG 24 HR TABLET    Take 1 tablet (50 mg total) by mouth once daily.    RIVAROXABAN (XARELTO) 20 MG TAB    Take 1 tablet  "(20 mg total) by mouth daily with dinner or evening meal.       SOCIAL HISTORY:     Social History     Social History    Marital status:      Spouse name: N/A    Number of children: N/A    Years of education: N/A     Occupational History    Not on file.     Social History Main Topics    Smoking status: Never Smoker    Smokeless tobacco: Never Used    Alcohol use No    Drug use: No    Sexual activity: Yes     Partners: Male     Birth control/ protection: Surgical     Other Topics Concern    Not on file     Social History Narrative    No narrative on file       FAMILY HISTORY:     Family History   Problem Relation Age of Onset    Cancer Mother     Breast cancer Maternal Uncle        REVIEW OF SYSTEMS:   Review of Systems   Constitutional: Negative for chills, diaphoresis and fever.   HENT: Negative for nosebleeds.    Eyes: Negative for blurred vision, double vision and photophobia.   Respiratory: Negative for hemoptysis, shortness of breath and wheezing.    Cardiovascular: Negative for chest pain, palpitations, orthopnea, claudication, leg swelling (RLE related to injury) and PND.   Gastrointestinal: Negative for abdominal pain, blood in stool, heartburn, melena, nausea and vomiting.   Genitourinary: Negative for flank pain and hematuria.   Musculoskeletal: Negative for falls, myalgias and neck pain.   Skin: Negative for rash.   Neurological: Negative for dizziness, seizures, loss of consciousness, weakness and headaches.   Endo/Heme/Allergies: Negative for polydipsia. Does not bruise/bleed easily.   Psychiatric/Behavioral: Negative for depression and memory loss. The patient is not nervous/anxious.        PHYSICAL EXAM:     Vitals:    03/27/18 1150   BP: (!) 160/90   Pulse: 79   Resp: 15    Body mass index is 34.29 kg/m².  Weight: 87.8 kg (193 lb 9 oz)   Height: 5' 3" (160 cm)     Physical Exam   Constitutional: She is oriented to person, place, and time. She appears well-developed and " well-nourished. She is cooperative.  Non-toxic appearance. No distress.   HENT:   Head: Normocephalic and atraumatic.   Eyes: Conjunctivae and EOM are normal. Pupils are equal, round, and reactive to light. No scleral icterus.   Neck: Trachea normal. Neck supple. Normal carotid pulses and no JVD present. Carotid bruit is not present. No neck rigidity. No edema present. No thyromegaly present.   Cardiovascular: Normal rate, regular rhythm, S1 normal and S2 normal.  PMI is not displaced.  Exam reveals no gallop and no friction rub.    No murmur heard.  Pulses:       Carotid pulses are 2+ on the right side, and 2+ on the left side.  Pulmonary/Chest: Effort normal and breath sounds normal. No respiratory distress. She has no wheezes. She has no rales. She exhibits no tenderness.   Abdominal: Soft. Bowel sounds are normal. She exhibits no distension. There is no hepatosplenomegaly.   obese   Musculoskeletal: She exhibits edema (RLE related to recent injury). She exhibits no tenderness.   Feet:   Right Foot:   Skin Integrity: Negative for ulcer.   Left Foot:   Skin Integrity: Negative for ulcer.   Neurological: She is alert and oriented to person, place, and time. No cranial nerve deficit.   Skin: Skin is warm and dry. No rash noted. No erythema.   Psychiatric: She has a normal mood and affect. Her speech is normal and behavior is normal.   Vitals reviewed.      DATA:   EKG: (personally reviewed tracing)  3/9/18 , inflat ST abnl ?isch    Laboratory:  CBC:    Recent Labs  Lab 03/09/18  0150   WHITE BLOOD CELL COUNT 8.06   HEMOGLOBIN 14.1   HEMATOCRIT 41.2   PLATELETS 285       CHEMISTRIES:    Recent Labs  Lab 03/09/18  0150 03/09/18  0615   GLUCOSE 138 H 116 H   SODIUM 145 143   POTASSIUM 2.9 L 3.0 L   BUN BLD 22 H 21 H   CREATININE 1.1 0.9   EGFR IF  >60 >60   EGFR IF NON- 55 A >60   CALCIUM 9.9 9.5   MAGNESIUM  --  2.0       CARDIAC BIOMARKERS:    Recent Labs  Lab 03/09/18  0150  03/09/18  0826   TROPONIN I 0.027 H 0.065 H       COAGS:        LIPIDS/LFTS:    Recent Labs  Lab 03/09/18  0150 03/09/18  0615   CHOLESTEROL  --  158   TRIGLYCERIDES  --  142   HDL  --  39 L   LDL CHOLESTEROL  --  90.6   NON-HDL CHOLESTEROL  --  119   AST 23  --    ALT 34  --      Lab Results   Component Value Date    TSH 1.481 03/09/2018         Cardiovascular Testing:  FRANCISCO/DCCV 3/9/18  Normal bivent size/fxn, EF 55%, normal wall motion  Normal appearing valves.  Trace AI, mild MR, mild TR  No LA/LON thrombus  Succcessful DCCV af->NSR 360J x1.  Plan:  Cont med rx  Cont Xarelto 20mg qd  OK for discharge later on today and follow up with me in the office 1 week for outpatient stress test.      ASSESSMENT:   # PAF s/p FRANCISCO/DCCV 3/9/18.  CHADS VASC 2 on Xarelto.  # HTN  # HLP  # prior elev trop, ?demand MI  # hx L vert art occlusion    PLAN:   Cont med rx  In amlod to 10mg qd  Follow up with PCP for eval of RLE ?cellulitis  Check [place] MPI  Carotid US  Obtain records from [facility]  Diet/exercise/weight loss, Na+ restriction  RTC 1 month      José Khalil MD, FACC

## 2018-03-28 ENCOUNTER — OFFICE VISIT (OUTPATIENT)
Dept: FAMILY MEDICINE | Facility: CLINIC | Age: 58
End: 2018-03-28
Payer: COMMERCIAL

## 2018-03-28 VITALS
RESPIRATION RATE: 12 BRPM | HEIGHT: 63 IN | OXYGEN SATURATION: 97 % | TEMPERATURE: 98 F | DIASTOLIC BLOOD PRESSURE: 96 MMHG | HEART RATE: 72 BPM | WEIGHT: 194.25 LBS | BODY MASS INDEX: 34.42 KG/M2 | SYSTOLIC BLOOD PRESSURE: 160 MMHG

## 2018-03-28 DIAGNOSIS — S81.811D LACERATION OF RIGHT LOWER EXTREMITY, SUBSEQUENT ENCOUNTER: Primary | ICD-10-CM

## 2018-03-28 DIAGNOSIS — I10 ESSENTIAL HYPERTENSION: Chronic | ICD-10-CM

## 2018-03-28 PROCEDURE — 3080F DIAST BP >= 90 MM HG: CPT | Mod: CPTII,S$GLB,, | Performed by: FAMILY MEDICINE

## 2018-03-28 PROCEDURE — 99999 PR PBB SHADOW E&M-EST. PATIENT-LVL III: CPT | Mod: PBBFAC,,, | Performed by: FAMILY MEDICINE

## 2018-03-28 PROCEDURE — 3077F SYST BP >= 140 MM HG: CPT | Mod: CPTII,S$GLB,, | Performed by: FAMILY MEDICINE

## 2018-03-28 PROCEDURE — 99214 OFFICE O/P EST MOD 30 MIN: CPT | Mod: 25,S$GLB,, | Performed by: FAMILY MEDICINE

## 2018-03-28 PROCEDURE — 90471 IMMUNIZATION ADMIN: CPT | Mod: S$GLB,,, | Performed by: FAMILY MEDICINE

## 2018-03-28 PROCEDURE — 90715 TDAP VACCINE 7 YRS/> IM: CPT | Mod: S$GLB,,, | Performed by: FAMILY MEDICINE

## 2018-03-28 RX ORDER — MUPIROCIN 20 MG/G
OINTMENT TOPICAL 3 TIMES DAILY
Qty: 30 G | Refills: 0 | Status: SHIPPED | OUTPATIENT
Start: 2018-03-28 | End: 2018-12-13

## 2018-03-28 RX ORDER — CEPHALEXIN 500 MG/1
500 CAPSULE ORAL EVERY 6 HOURS
COMMUNITY
End: 2018-03-29

## 2018-03-28 RX ORDER — SULFAMETHOXAZOLE AND TRIMETHOPRIM 800; 160 MG/1; MG/1
1 TABLET ORAL 2 TIMES DAILY
Qty: 20 TABLET | Refills: 0 | Status: SHIPPED | OUTPATIENT
Start: 2018-03-28 | End: 2018-04-07

## 2018-03-29 ENCOUNTER — HOSPITAL ENCOUNTER (EMERGENCY)
Facility: HOSPITAL | Age: 58
Discharge: HOME | End: 2018-03-29
Attending: EMERGENCY MEDICINE
Payer: COMMERCIAL

## 2018-03-29 ENCOUNTER — TELEPHONE (OUTPATIENT)
Dept: FAMILY MEDICINE | Facility: CLINIC | Age: 58
End: 2018-03-29

## 2018-03-29 VITALS
OXYGEN SATURATION: 97 % | SYSTOLIC BLOOD PRESSURE: 194 MMHG | BODY MASS INDEX: 34.2 KG/M2 | HEART RATE: 65 BPM | WEIGHT: 193 LBS | RESPIRATION RATE: 18 BRPM | DIASTOLIC BLOOD PRESSURE: 95 MMHG | TEMPERATURE: 98 F | HEIGHT: 63 IN

## 2018-03-29 DIAGNOSIS — M79.89 LEG SWELLING: ICD-10-CM

## 2018-03-29 DIAGNOSIS — L03.90 CELLULITIS, UNSPECIFIED CELLULITIS SITE: Primary | ICD-10-CM

## 2018-03-29 PROCEDURE — 25000003 PHARM REV CODE 250: Performed by: EMERGENCY MEDICINE

## 2018-03-29 PROCEDURE — 99284 EMERGENCY DEPT VISIT MOD MDM: CPT

## 2018-03-29 RX ORDER — TRAMADOL HYDROCHLORIDE 50 MG/1
50 TABLET ORAL EVERY 6 HOURS PRN
Qty: 12 TABLET | Refills: 0 | Status: SHIPPED | OUTPATIENT
Start: 2018-03-29 | End: 2018-04-08

## 2018-03-29 RX ORDER — TRAMADOL HYDROCHLORIDE 50 MG/1
50 TABLET ORAL
Status: COMPLETED | OUTPATIENT
Start: 2018-03-29 | End: 2018-03-29

## 2018-03-29 RX ORDER — CEPHALEXIN 500 MG/1
500 CAPSULE ORAL 4 TIMES DAILY
Qty: 20 CAPSULE | Refills: 0 | Status: SHIPPED | OUTPATIENT
Start: 2018-03-29 | End: 2018-04-03

## 2018-03-29 RX ADMIN — TRAMADOL HYDROCHLORIDE 50 MG: 50 TABLET, FILM COATED ORAL at 02:03

## 2018-03-29 NOTE — ED TRIAGE NOTES
"Pt states "I was told I had cellulitis or a staph infection in my leg" (right mid calf). Pt states she was hit in the right leg with a ladder on the 18th. Pt right leg is red and swollen. Pt c/o tenderness in entire leg, swelling in right calf/ankle/foot, pain with weightbearing  "

## 2018-03-29 NOTE — ED NOTES
Pt notified of need for a ride home if given tramadol. Pt states her  or daughter will be picking her up

## 2018-03-29 NOTE — PROGRESS NOTES
(4 PM on 3/28/18) - TDAp was given IM in the right deltoid. Tolerated well. Remained in the clinic after injection for observation.

## 2018-03-29 NOTE — ED PROVIDER NOTES
"Encounter Date: 3/29/2018    SCRIBE #1 NOTE: I, Mary Alber, am scribing for, and in the presence of,  Oralia Quesada MD. I have scribed the following portions of the note - Other sections scribed: HPI, ROS, PE and MDM.       History     Chief Complaint   Patient presents with    Leg Pain     right leg pain since Sunday. Reports being hit in leg with ladder. Reports having cellulitis and currently taking anibiotics     CC: Leg Pain    HPI: This 57 y.o. female with a medical history of hypertension, atrial fibrillation and hyperlipidemia presents to the ED c/o constant right sided leg pain that began on 3/18/18. Pt reports that she was feeding her dogs in the backyard when her  accidentally dropped a ladder onto her right lower leg. She states that there was a small laceration present to the lower leg at onset and notes that the redness began to appear 3x days later. Pt reports that after the onset of symptoms she travelled to [place] with her  for work and notes that while there she visited an Urgent Care facility where she was treated for a possible infection and placed on a 7x day course of antibiotics (Azithromycin) without any improvement. She states that the symptoms have since worsened as the bruising, swelling and pain have radiated down the right lower leg into the the right foot. Pt describes the symptoms as "tight", "heavy" and "burning", rating the pain 7/10. Pt adds that she has also been experiencing a subjective fever and chills. She reports that she was evaluated by her PCP, Dr. Leger, yesterday for the symptoms and notes that she was advised to discontinue use of the Azithromycin. Pt denies knee pain and any other associated symptoms. No alleviating or exacerbating factors.           The history is provided by the patient. No  was used.     Review of patient's allergies indicates:  No Known Allergies  Past Medical History:   Diagnosis Date    Atrial " fibrillation     Hyperlipidemia     Hypertension      Past Surgical History:   Procedure Laterality Date    BREAST BIOPSY      CARDIAC SURGERY      CORONARY ARTERY BYPASS GRAFT      HYSTERECTOMY       Family History   Problem Relation Age of Onset    Cancer Mother     Breast cancer Maternal Uncle      Social History   Substance Use Topics    Smoking status: Never Smoker    Smokeless tobacco: Never Used    Alcohol use No     Review of Systems   Constitutional: Positive for chills and fever (subjective).   HENT: Negative for sore throat.    Respiratory: Negative for shortness of breath.    Cardiovascular: Negative for chest pain.   Gastrointestinal: Negative for nausea.   Genitourinary: Negative for dysuria.   Musculoskeletal: Negative for back pain.        (+) pain and swelling to the right lower leg (radiating into the foot)   Skin: Negative for rash.        (+) bruising to the right lower leg radiating into the foot   Neurological: Negative for weakness.       Physical Exam     Initial Vitals [03/29/18 1251]   BP Pulse Resp Temp SpO2   (!) 161/95 74 18 98.3 °F (36.8 °C) 98 %      MAP       117         Physical Exam    Nursing note and vitals reviewed.  Constitutional: She appears well-developed and well-nourished.  Non-toxic appearance. She does not appear ill.   HENT:   Head: Normocephalic and atraumatic.   Nose: Nose normal.   Eyes: EOM and lids are normal.   Neck: Neck supple.   Cardiovascular: Normal rate, regular rhythm, normal heart sounds and intact distal pulses.   Pulses:       Dorsalis pedis pulses are 2+ on the right side, and 2+ on the left side.   Pulmonary/Chest: Effort normal and breath sounds normal. No respiratory distress.   Abdominal: Soft. Normal appearance and bowel sounds are normal. She exhibits no distension. There is no tenderness.   Musculoskeletal: Normal range of motion. She exhibits edema.   There is significant edema present to the right lower extremity.   Neurological: She  is alert and oriented to person, place, and time. She has normal strength. No sensory deficit.   Skin: Skin is warm and dry.   There is a 17x9 cm erythematous area present to the right anterior lower extremity. There is ecchymosis of the right ankle and toes.   Psychiatric: She has a normal mood and affect. Her behavior is normal. Thought content normal.         ED Course   Procedures  Labs Reviewed - No data to display          Medical Decision Making:   ED Management:  A bedside ultrasound was preformed with no drainage collection present. ROCIO Howell-Student, will malcolm around the erythematous area. Pt will be given medication for pain and prescribed antibiotics upon discharge.               Scribe Attestation:   Scribe #1: I performed the above scribed service and the documentation accurately describes the services I performed. I attest to the accuracy of the note.    Attending Attestation:   Physician Attestation Statement for Resident:  As the supervising MD . -: Discussed w the mid-level provider.  Patient seen and examined by me.  Patient has no symptoms of end organ damage.  tookblood pressure medication this morning.  The patient likely has an infected hematoma on the anterior shin.  It is not streaking.  No constitutional symptoms.  She is not diabetic.  On bedside ultrasound does not have a drainable collection.  It is minimally minimally worse from yesterday with only about a 1 cm area that is worse in the distal medial region and appears to be more likely hematoma than cellulitis.  She has no constitutional symptoms.  We'll add cephalexin to Bactrim.  Ultrasound was negative.  She'll follow up with her doctor Monday.  Patient understands indications of when to return to emergency room for IV antibiotics.  She has no other complaints at this time.         Physician Attestation for Scribe:  Physician Attestation Statement for Scribe #1: I, Oralia Quesada MD, reviewed documentation, as scribed by  Mary Campo in my presence, and it is both accurate and complete.                    Clinical Impression:   The encounter diagnosis was Leg swelling.                           Oralia Quesada MD  03/29/18 [id number]

## 2018-03-29 NOTE — TELEPHONE ENCOUNTER
Spoke with patient and she said that she has started on the Bactrim but today the redness has now traveled down to her ankle and she is requesting to have IV therapy that was suggested by Dr [facility]. She said that she has been on 3 oral antibiotics and nothing is helping. She wanted to know how do she go about getting the IV antibiotics ordered. I advise her to contact Dr Wilson to see how he recommend this to be done due to his suggestion for there to have it done IV. Please advise if any other instruction is needed.

## 2018-03-29 NOTE — TELEPHONE ENCOUNTER
She just started the new antibiotic yesterday, so may need to take another day to see improvement.  If she has any fevers, chills, sweats, n/v/d, then she needs to go to the ED.    Thanks,  Dr. Leger

## 2018-03-29 NOTE — TELEPHONE ENCOUNTER
----- Message from Sherrell Iniguez sent at 3/28/2018  4:41 PM CDT -----  Contact: self  Pt states she just left appt and asking if a pain pill can be prescribed for .  Pt call back [phone].

## 2018-03-29 NOTE — PROGRESS NOTES
Routine Office Visit    Patient Name: Keyonna Guillen    : 1960  MRN: [id number]    Subjective:  Keyonna is a 57 y.o. female who presents today for:    1. Leg laceration  Patient presenting today with redness to RLE after having a shad ladder fall on it.  She was seen in urgent care in [place] and given a zpak, then by Dr. Wilson (derm) and given Keflex.  The redness continues to be present and is not getting any better.  There has been no fevers or chills.  The laceration has scabbed over.      Past Medical History  Past Medical History:   Diagnosis Date    Atrial fibrillation     Hyperlipidemia     Hypertension        Past Surgical History  Past Surgical History:   Procedure Laterality Date    BREAST BIOPSY      CARDIAC SURGERY      HYSTERECTOMY         Family History  Family History   Problem Relation Age of Onset    Cancer Mother     Breast cancer Maternal Uncle        Social History  Social History     Social History    Marital status:      Spouse name: N/A    Number of children: N/A    Years of education: N/A     Occupational History    Not on file.     Social History Main Topics    Smoking status: Never Smoker    Smokeless tobacco: Never Used    Alcohol use No    Drug use: No    Sexual activity: Yes     Partners: Male     Birth control/ protection: Surgical     Other Topics Concern    Not on file     Social History Narrative    No narrative on file       Current Medications  Current Outpatient Prescriptions on File Prior to Visit   Medication Sig Dispense Refill    amLODIPine (NORVASC) 10 MG Tab Take 1 tablet (10 mg total) by mouth once daily. 90 tablet 3    atorvastatin (LIPITOR) 40 MG tablet Take 40 mg by mouth every evening.  2    losartan (COZAAR) 25 MG tablet Take 1 tablet (25 mg total) by mouth once daily. 90 tablet 3    metoprolol succinate (TOPROL-XL) 50 MG 24 hr tablet Take 1 tablet (50 mg total) by mouth once daily. 30 tablet 11    rivaroxaban (XARELTO) 20 mg Tab Take 1  "tablet (20 mg total) by mouth daily with dinner or evening meal. 30 tablet 3     Current Facility-Administered Medications on File Prior to Visit   Medication Dose Route Frequency Provider Last Rate Last Dose    estradiol valerate injection 20 mg  20 mg Intramuscular Q21 Days Koby Brewer MD   20 mg at 06/15/16 1400    testosterone cypionate injection 50 mg  50 mg Intramuscular Q21 Days Koby Brewer MD   50 mg at 06/15/16 1401       Allergies   Review of patient's allergies indicates:  No Known Allergies    Review of Systems (Pertinent positives)  Review of Systems   Constitutional: Negative.    HENT: Negative.    Eyes: Negative.    Respiratory: Negative.    Cardiovascular: Negative.    Gastrointestinal: Negative.    Musculoskeletal: Positive for myalgias.   Skin: Positive for rash.   Neurological: Negative.          BP (!) 160/96 (BP Location: Right arm, Patient Position: Sitting, BP Method: Medium (Manual))   Pulse 72   Temp 98.1 °F (36.7 °C) (Oral)   Resp 12   Ht 5' 3" (1.6 m)   Wt 88.1 kg (194 lb 3.6 oz)   SpO2 97%   BMI 34.41 kg/m²     GENERAL APPEARANCE: in no apparent distress and well developed and well nourished  HEENT: PERRL, EOMI, Sclera clear, anicteric, Oropharynx clear, no lesions, Neck supple with midline trachea  NECK: normal, supple, no adenopathy, thyroid normal in size  RESPIRATORY: appears well, vitals normal, no respiratory distress, acyanotic, normal RR, chest clear, no wheezing, crepitations, rhonchi, normal symmetric air entry  HEART: regular rate and rhythm, S1, S2 normal, no murmur, click, rub or gallop.    ABDOMEN: abdomen is soft without tenderness, no masses, no hernias, no organomegaly, no rebound, no guarding. Suprapubic tenderness absent. No CVA tenderness.  SKIN: erythematous rash over right anterior lower leg; no drainage; wound scabbed over  PSYCH: Alert, oriented x 3, thought content appropriate, speech normal, pleasant and cooperative, good eye contact, well " groomed    Assessment/Plan:  Keyonna Guillen is a 57 y.o. female who presents today for :    Keyonna was seen today for recurrent skin infections.    Diagnoses and all orders for this visit:    Laceration of right lower extremity, subsequent encounter  -     sulfamethoxazole-trimethoprim 800-160mg (BACTRIM DS) 800-160 mg Tab; Take 1 tablet by mouth 2 (two) times daily.  -     Tdap Vaccine  -     mupirocin (BACTROBAN) 2 % ointment; Apply topically 3 (three) times daily.    Essential hypertension      1.  tdap given  2.  abx prescribed  3.  Cardiology following a-fib and htn      Morgan Leger MD

## 2018-04-03 ENCOUNTER — TELEPHONE (OUTPATIENT)
Dept: CARDIOLOGY | Facility: CLINIC | Age: 58
End: 2018-04-03

## 2018-04-03 NOTE — TELEPHONE ENCOUNTER
----- Message from Rose Arndt sent at 4/3/2018  1:15 PM CDT -----  Contact: self  Pt calling to see about getting a script refilled. Please call [phone]

## 2018-04-04 ENCOUNTER — HOSPITAL ENCOUNTER (OUTPATIENT)
Dept: CARDIOLOGY | Facility: HOSPITAL | Age: 58
Discharge: HOME | End: 2018-04-04
Attending: INTERNAL MEDICINE
Payer: COMMERCIAL

## 2018-04-04 ENCOUNTER — HOSPITAL ENCOUNTER (OUTPATIENT)
Dept: RADIOLOGY | Facility: HOSPITAL | Age: 58
Discharge: HOME | End: 2018-04-04
Attending: INTERNAL MEDICINE
Payer: COMMERCIAL

## 2018-04-04 DIAGNOSIS — I65.02 ASYMPTOMATIC STENOSIS OF LEFT VERTEBRAL ARTERY: ICD-10-CM

## 2018-04-04 DIAGNOSIS — I25.709 CORONARY ARTERY DISEASE INVOLVING CORONARY BYPASS GRAFT OF NATIVE HEART WITH ANGINA PECTORIS: ICD-10-CM

## 2018-04-04 LAB
DIASTOLIC DYSFUNCTION: NO
INTERNAL CAROTID STENOSIS: ABNORMAL

## 2018-04-04 PROCEDURE — 93018 CV STRESS TEST I&R ONLY: CPT | Mod: ,,, | Performed by: INTERNAL MEDICINE

## 2018-04-04 PROCEDURE — 93880 EXTRACRANIAL BILAT STUDY: CPT

## 2018-04-04 PROCEDURE — 93016 CV STRESS TEST SUPVJ ONLY: CPT | Mod: ,,, | Performed by: INTERNAL MEDICINE

## 2018-04-04 PROCEDURE — 63600175 PHARM REV CODE 636 W HCPCS

## 2018-04-04 PROCEDURE — 93017 CV STRESS TEST TRACING ONLY: CPT

## 2018-04-04 PROCEDURE — 78452 HT MUSCLE IMAGE SPECT MULT: CPT | Mod: 26,,, | Performed by: INTERNAL MEDICINE

## 2018-04-04 PROCEDURE — 93880 EXTRACRANIAL BILAT STUDY: CPT | Mod: 26,,, | Performed by: INTERNAL MEDICINE

## 2018-04-04 PROCEDURE — A9502 TC99M TETROFOSMIN: HCPCS

## 2018-04-04 RX ORDER — REGADENOSON 0.08 MG/ML
INJECTION, SOLUTION INTRAVENOUS
Status: DISPENSED
Start: 2018-04-04 | End: 2018-04-04

## 2018-04-06 ENCOUNTER — OFFICE VISIT (OUTPATIENT)
Dept: FAMILY MEDICINE | Facility: CLINIC | Age: 58
End: 2018-04-06
Payer: COMMERCIAL

## 2018-04-06 ENCOUNTER — LAB VISIT (OUTPATIENT)
Dept: LAB | Facility: HOSPITAL | Age: 58
End: 2018-04-06
Attending: FAMILY MEDICINE
Payer: COMMERCIAL

## 2018-04-06 VITALS
WEIGHT: 194.88 LBS | DIASTOLIC BLOOD PRESSURE: 80 MMHG | HEART RATE: 59 BPM | BODY MASS INDEX: 34.53 KG/M2 | SYSTOLIC BLOOD PRESSURE: 118 MMHG | TEMPERATURE: 98 F | OXYGEN SATURATION: 98 % | HEIGHT: 63 IN

## 2018-04-06 DIAGNOSIS — L03.115 CELLULITIS OF RIGHT LEG: Primary | ICD-10-CM

## 2018-04-06 DIAGNOSIS — L03.115 CELLULITIS OF RIGHT LEG: ICD-10-CM

## 2018-04-06 LAB
BASOPHILS # BLD AUTO: 0.01 K/UL
BASOPHILS NFR BLD: 0.2 %
DIFFERENTIAL METHOD: NORMAL
EOSINOPHIL # BLD AUTO: 0.1 K/UL
EOSINOPHIL NFR BLD: 0.8 %
ERYTHROCYTE [DISTWIDTH] IN BLOOD BY AUTOMATED COUNT: 12.9 %
HCT VFR BLD AUTO: 39.7 %
HGB BLD-MCNC: 12.8 G/DL
LYMPHOCYTES # BLD AUTO: 1.5 K/UL
LYMPHOCYTES NFR BLD: 23.5 %
MCH RBC QN AUTO: 29.5 PG
MCHC RBC AUTO-ENTMCNC: 32.2 G/DL
MCV RBC AUTO: 92 FL
MONOCYTES # BLD AUTO: 0.4 K/UL
MONOCYTES NFR BLD: 7 %
NEUTROPHILS # BLD AUTO: 4.3 K/UL
NEUTROPHILS NFR BLD: 68.3 %
PLATELET # BLD AUTO: 284 K/UL
PMV BLD AUTO: 10.7 FL
RBC # BLD AUTO: 4.34 M/UL
WBC # BLD AUTO: 6.29 K/UL

## 2018-04-06 PROCEDURE — 99999 PR PBB SHADOW E&M-EST. PATIENT-LVL III: CPT | Mod: PBBFAC,,, | Performed by: FAMILY MEDICINE

## 2018-04-06 PROCEDURE — 36415 COLL VENOUS BLD VENIPUNCTURE: CPT | Mod: PN

## 2018-04-06 PROCEDURE — 3079F DIAST BP 80-89 MM HG: CPT | Mod: CPTII,S$GLB,, | Performed by: FAMILY MEDICINE

## 2018-04-06 PROCEDURE — 85025 COMPLETE CBC W/AUTO DIFF WBC: CPT

## 2018-04-06 PROCEDURE — 3074F SYST BP LT 130 MM HG: CPT | Mod: CPTII,S$GLB,, | Performed by: FAMILY MEDICINE

## 2018-04-06 PROCEDURE — 99214 OFFICE O/P EST MOD 30 MIN: CPT | Mod: S$GLB,,, | Performed by: FAMILY MEDICINE

## 2018-04-06 RX ORDER — TRIAMTERENE/HYDROCHLOROTHIAZID 37.5-25 MG
1 TABLET ORAL DAILY
Refills: 11 | COMMUNITY
Start: 2018-03-17 | End: 2019-01-17

## 2018-04-06 RX ORDER — CEPHALEXIN 500 MG/1
CAPSULE ORAL
COMMUNITY
Start: 2018-04-04 | End: 2018-05-29

## 2018-04-06 NOTE — PROGRESS NOTES
Routine Office Visit    Patient Name: Keyonna Guillen    : 1960  MRN: [id number]    Subjective:  Keyonna is a 57 y.o. female who presents today for:    1. Cellulitis of right leg  Patient presenting for follow up of cellulitis of right leg.  She was seen in the ED and had keflex added to her bactrim regimen.  Symptoms improved, but she does have some nausea.  She states she wasn't sure if the nausea was from the antbiotics or from the infection.  No documented fevers.     Past Medical History  Past Medical History:   Diagnosis Date    Atrial fibrillation     Hyperlipidemia     Hypertension        Past Surgical History  Past Surgical History:   Procedure Laterality Date    BREAST BIOPSY      CARDIAC SURGERY      CORONARY ARTERY BYPASS GRAFT      HYSTERECTOMY         Family History  Family History   Problem Relation Age of Onset    Cancer Mother     Breast cancer Maternal Uncle        Social History  Social History     Social History    Marital status:      Spouse name: N/A    Number of children: N/A    Years of education: N/A     Occupational History    Not on file.     Social History Main Topics    Smoking status: Never Smoker    Smokeless tobacco: Never Used    Alcohol use No    Drug use: No    Sexual activity: Yes     Partners: Male     Birth control/ protection: Surgical     Other Topics Concern    Not on file     Social History Narrative    No narrative on file       Current Medications  Current Outpatient Prescriptions on File Prior to Visit   Medication Sig Dispense Refill    amLODIPine (NORVASC) 10 MG Tab Take 1 tablet (10 mg total) by mouth once daily. 90 tablet 3    atorvastatin (LIPITOR) 40 MG tablet Take 40 mg by mouth every evening.  2    losartan (COZAAR) 25 MG tablet Take 1 tablet (25 mg total) by mouth once daily. 90 tablet 3    metoprolol succinate (TOPROL-XL) 50 MG 24 hr tablet Take 1 tablet (50 mg total) by mouth once daily. 30 tablet 11    mupirocin (BACTROBAN) 2  "% ointment Apply topically 3 (three) times daily. 30 g 0    rivaroxaban (XARELTO) 20 mg Tab Take 1 tablet (20 mg total) by mouth daily with dinner or evening meal. 30 tablet 3    sulfamethoxazole-trimethoprim 800-160mg (BACTRIM DS) 800-160 mg Tab Take 1 tablet by mouth 2 (two) times daily. 20 tablet 0    traMADol (ULTRAM) 50 mg tablet Take 1 tablet (50 mg total) by mouth every 6 (six) hours as needed for Pain. 12 tablet 0     Current Facility-Administered Medications on File Prior to Visit   Medication Dose Route Frequency Provider Last Rate Last Dose    estradiol valerate injection 20 mg  20 mg Intramuscular Q21 Days Koby rBewer MD   20 mg at 06/15/16 1400    testosterone cypionate injection 50 mg  50 mg Intramuscular Q21 Days Koby Brewer MD   50 mg at 06/15/16 1401       Allergies   Review of patient's allergies indicates:  No Known Allergies    Review of Systems (Pertinent positives)  Review of Systems   Constitutional: Negative.    HENT: Negative.    Eyes: Negative.    Respiratory: Negative.    Cardiovascular: Negative.    Gastrointestinal: Positive for nausea.   Musculoskeletal: Positive for myalgias.   Skin: Positive for rash.         /80   Pulse (!) 59   Temp 98 °F (36.7 °C) (Oral)   Ht 5' 3" (1.6 m)   Wt 88.4 kg (194 lb 14.2 oz)   SpO2 98%   BMI 34.52 kg/m²     GENERAL APPEARANCE: in no apparent distress and well developed and well nourished  HEENT: PERRL, EOMI, Sclera clear, anicteric, Oropharynx clear, no lesions, Neck supple with midline trachea  NECK: normal, supple, no adenopathy, thyroid normal in size  RESPIRATORY: appears well, vitals normal, no respiratory distress, acyanotic, normal RR, chest clear, no wheezing, crepitations, rhonchi, normal symmetric air entry  HEART: regular rate and rhythm, S1, S2 normal, no murmur, click, rub or gallop.    ABDOMEN: abdomen is soft without tenderness, no masses, no hernias, no organomegaly, no rebound, no guarding. Suprapubic tenderness " absent. No CVA tenderness.  Extremities: warm/well perfused.  No abnormal hair patterns.  1+ edema in RLE with minimal redness (improved from last visit)  SKIN: erythema to anterior RLE  PSYCH: Alert, oriented x 3, thought content appropriate, speech normal, pleasant and cooperative, good eye contact, well groomed    Assessment/Plan:  Keynona Guillen is a 57 y.o. female who presents today for :    Keyonna was seen today for recurrent skin infections.    Diagnoses and all orders for this visit:    Cellulitis of right leg  -     CBC auto differential; Future      1.  Labs to be done today  2.  Finish abx  3.  Call with any concerns    Morgan Leger MD

## 2018-04-11 ENCOUNTER — OFFICE VISIT (OUTPATIENT)
Dept: CARDIOLOGY | Facility: CLINIC | Age: 58
End: 2018-04-11
Payer: COMMERCIAL

## 2018-04-11 VITALS
DIASTOLIC BLOOD PRESSURE: 65 MMHG | HEIGHT: 63 IN | HEART RATE: 60 BPM | OXYGEN SATURATION: 97 % | SYSTOLIC BLOOD PRESSURE: 130 MMHG | WEIGHT: 191.81 LBS | BODY MASS INDEX: 33.98 KG/M2 | RESPIRATION RATE: 15 BRPM

## 2018-04-11 DIAGNOSIS — E78.49 OTHER HYPERLIPIDEMIA: ICD-10-CM

## 2018-04-11 DIAGNOSIS — I10 ESSENTIAL HYPERTENSION: Chronic | ICD-10-CM

## 2018-04-11 DIAGNOSIS — I25.709 CORONARY ARTERY DISEASE INVOLVING CORONARY BYPASS GRAFT OF NATIVE HEART WITH ANGINA PECTORIS: Primary | ICD-10-CM

## 2018-04-11 DIAGNOSIS — I48.0 PAROXYSMAL ATRIAL FIBRILLATION: ICD-10-CM

## 2018-04-11 DIAGNOSIS — I65.02 OCCLUSION OF LEFT VERTEBRAL ARTERY: ICD-10-CM

## 2018-04-11 PROBLEM — I48.91 NEW ONSET ATRIAL FIBRILLATION: Status: RESOLVED | Noted: 2018-03-09 | Resolved: 2018-04-11

## 2018-04-11 PROCEDURE — 99214 OFFICE O/P EST MOD 30 MIN: CPT | Mod: S$GLB,,, | Performed by: INTERNAL MEDICINE

## 2018-04-11 PROCEDURE — 99999 PR PBB SHADOW E&M-EST. PATIENT-LVL III: CPT | Mod: PBBFAC,,, | Performed by: INTERNAL MEDICINE

## 2018-04-11 PROCEDURE — 3078F DIAST BP <80 MM HG: CPT | Mod: CPTII,S$GLB,, | Performed by: INTERNAL MEDICINE

## 2018-04-11 PROCEDURE — 3075F SYST BP GE 130 - 139MM HG: CPT | Mod: CPTII,S$GLB,, | Performed by: INTERNAL MEDICINE

## 2018-04-11 RX ORDER — METOPROLOL SUCCINATE 50 MG/1
50 TABLET, EXTENDED RELEASE ORAL DAILY
Qty: 90 TABLET | Refills: 3 | Status: SHIPPED | OUTPATIENT
Start: 2018-04-11 | End: 2019-02-08 | Stop reason: ALTCHOICE

## 2018-04-11 RX ORDER — AMLODIPINE BESYLATE 10 MG/1
10 TABLET ORAL DAILY
Qty: 90 TABLET | Refills: 3 | Status: SHIPPED | OUTPATIENT
Start: 2018-04-11 | End: 2018-11-14

## 2018-04-11 RX ORDER — LOSARTAN POTASSIUM 25 MG/1
25 TABLET ORAL DAILY
Qty: 90 TABLET | Refills: 3 | Status: SHIPPED | OUTPATIENT
Start: 2018-04-11 | End: 2018-05-15 | Stop reason: SDUPTHER

## 2018-04-11 NOTE — PROGRESS NOTES
CARDIOVASCULAR PROGRESS NOTE    REASON FOR CONSULT:   Keyonna Guillen is a 57 y.o. female who presents for follow up of PAF, CAD/CABG, HTN.    PCP: Page  HISTORY OF PRESENT ILLNESS:   The patient returns for follow-up.  She reports a generally stable status without angina or dyspnea.  She continues to complain of right lower extremity swelling and discomfort with overlying cellulitis.  She otherwise has had no palpitations, lightheadedness, dizziness, or syncope.  She denies PND, orthopnea, or lower extremity edema other than that noted above.  There's been no melena, hematuria, or claudicant symptoms.  She appears to be tolerating her Xarelto anticoagulation.    [facility]  I also reviewed the results of her nuclear stress test or normal.  Her carotid ultrasound revealed no evidence of significant ICA stenosis.  On personal review of the images, there did not appear to be much in the way of flow in the left vertebral artery consistent with her history of left vertebral artery occlusion.    CARDIOVASCULAR HISTORY:   CAD s/p CABG 4/5/11: LIMA-LAD, SVG-D, SVG-OM    PAF s/p FRANCISCO/DCCV 3/8/18, on Xarelto, CHADS VASC 2    Hx L vert art occlusion    PAST MEDICAL HISTORY:     Past Medical History:   Diagnosis Date    Atrial fibrillation     Coronary artery disease     Hyperlipidemia     Hypertension        PAST SURGICAL HISTORY:     Past Surgical History:   Procedure Laterality Date    BREAST BIOPSY      CARDIAC SURGERY      CORONARY ARTERY BYPASS GRAFT      HYSTERECTOMY         ALLERGIES AND MEDICATION:   Review of patient's allergies indicates:  No Known Allergies  Previous Medications    ATORVASTATIN (LIPITOR) 40 MG TABLET    Take 40 mg by mouth every evening.    CEPHALEXIN (KEFLEX) 500 MG CAPSULE        MUPIROCIN (BACTROBAN) 2 % OINTMENT    Apply topically 3 (three) times daily.    TRIAMTERENE-HYDROCHLOROTHIAZIDE 37.5-25 MG (MAXZIDE-25) 37.5-25 MG PER TABLET    Take 1 tablet by mouth once daily.  "      SOCIAL HISTORY:     Social History     Social History    Marital status:      Spouse name: N/A    Number of children: N/A    Years of education: N/A     Occupational History    Not on file.     Social History Main Topics    Smoking status: Never Smoker    Smokeless tobacco: Never Used    Alcohol use No    Drug use: No    Sexual activity: Yes     Partners: Male     Birth control/ protection: Surgical     Other Topics Concern    Not on file     Social History Narrative    No narrative on file       FAMILY HISTORY:     Family History   Problem Relation Age of Onset    Cancer Mother     Breast cancer Maternal Uncle        REVIEW OF SYSTEMS:   Review of Systems   Constitutional: Negative for chills, diaphoresis and fever.   HENT: Negative for nosebleeds.    Eyes: Negative for blurred vision, double vision and photophobia.   Respiratory: Negative for hemoptysis, shortness of breath and wheezing.    Cardiovascular: Negative for chest pain, palpitations, orthopnea, claudication, leg swelling (RLE related to injury) and PND.   Gastrointestinal: Negative for abdominal pain, blood in stool, heartburn, melena, nausea and vomiting.   Genitourinary: Negative for flank pain and hematuria.   Musculoskeletal: Negative for falls, myalgias and neck pain.   Skin: Negative for rash.   Neurological: Negative for dizziness, seizures, loss of consciousness, weakness and headaches.   Endo/Heme/Allergies: Negative for polydipsia. Does not bruise/bleed easily.   Psychiatric/Behavioral: Negative for depression and memory loss. The patient is not nervous/anxious.        PHYSICAL EXAM:     Vitals:    04/11/18 1302   BP: 130/65   Pulse: 60   Resp: 15    Body mass index is 33.98 kg/m².  Weight: 87 kg (191 lb 12.8 oz)   Height: 5' 3" (160 cm)     Physical Exam   Constitutional: She is oriented to person, place, and time. She appears well-developed and well-nourished. She is cooperative.  Non-toxic appearance. No distress. "   HENT:   Head: Normocephalic and atraumatic.   Eyes: Conjunctivae and EOM are normal. Pupils are equal, round, and reactive to light. No scleral icterus.   Neck: Trachea normal. Neck supple. Normal carotid pulses and no JVD present. Carotid bruit is not present. No neck rigidity. No edema present. No thyromegaly present.   Cardiovascular: Normal rate, regular rhythm, S1 normal and S2 normal.  PMI is not displaced.  Exam reveals no gallop and no friction rub.    No murmur heard.  Pulses:       Carotid pulses are 2+ on the right side, and 2+ on the left side.  Pulmonary/Chest: Effort normal and breath sounds normal. No respiratory distress. She has no wheezes. She has no rales. She exhibits no tenderness.   Abdominal: Soft. Bowel sounds are normal. She exhibits no distension. There is no hepatosplenomegaly.   obese   Musculoskeletal: She exhibits edema (RLE related to recent injury). She exhibits no tenderness.   Feet:   Right Foot:   Skin Integrity: Negative for ulcer.   Left Foot:   Skin Integrity: Negative for ulcer.   Neurological: She is alert and oriented to person, place, and time. No cranial nerve deficit.   Skin: Skin is warm and dry. No rash noted. No erythema.   Psychiatric: She has a normal mood and affect. Her speech is normal and behavior is normal.   Vitals reviewed.      DATA:   EKG: (personally reviewed tracing)  3/9/18 , inflat ST abnl ?isch    Laboratory:  CBC:    Recent Labs  Lab 03/09/18  0150 04/06/18  1103   WHITE BLOOD CELL COUNT 8.06 6.29   HEMOGLOBIN 14.1 12.8   HEMATOCRIT 41.2 39.7   PLATELETS 285 284       CHEMISTRIES:    Recent Labs  Lab 03/09/18  0150 03/09/18  0615   GLUCOSE 138 H 116 H   SODIUM 145 143   POTASSIUM 2.9 L 3.0 L   BUN BLD 22 H 21 H   CREATININE 1.1 0.9   EGFR IF  >60 >60   EGFR IF NON- 55 A >60   CALCIUM 9.9 9.5   MAGNESIUM  --  2.0       CARDIAC BIOMARKERS:    Recent Labs  Lab 03/09/18  0150 03/09/18  0826   TROPONIN I 0.027 H 0.065  H       COAGS:        LIPIDS/LFTS:    Recent Labs  Lab 03/09/18  0150 03/09/18  0615   CHOLESTEROL  --  158   TRIGLYCERIDES  --  142   HDL  --  39 L   LDL CHOLESTEROL  --  90.6   NON-HDL CHOLESTEROL  --  119   AST 23  --    ALT 34  --      Lab Results   Component Value Date    TSH 1.481 03/09/2018         Cardiovascular Testing:  Carotid US 4/4/18 (images pers rev, ?L vert occlusion)  TDS  There is 40 - 49% right Internal Carotid stenosis.  There is 0 - 19% left Internal Carotid stenosis.    Ofelia MPI 4/4/18  Nuclear Quantitative Functional Analysis:   LVEF: 69 %  LVED Volume: 88 ml  LVES Volume: 27 ml  Impression: NORMAL MYOCARDIAL PERFUSION  1. The perfusion scan is free of evidence for myocardial ischemia or injury.   2. Resting wall motion is physiologic.   3. Resting LV function is normal.   4. The ventricular volumes are normal at rest and stress.   5. The extracardiac distribution of radioactivity is normal.     FRANCISCO/DCCV 3/9/18  Normal bivent size/fxn, EF 55%, normal wall motion  Normal appearing valves.  Trace AI, mild MR, mild TR  No LA/LON thrombus  Succcessful DCCV af->NSR 360J x1.  Plan:  Cont med rx  Cont Xarelto 20mg qd  OK for discharge later on today and follow up with me in the office 1 week for outpatient stress test.    Cath 3/25/11 ([facility], subsequently had CABG)  LM: normal  LAD sequential 90% prox and 80% mid stenoses   D2 prox 80%  LCx: MLI   OM2 prox 80%  RCA: dom, mid-dist 40% sequential stenoses    ASSESSMENT:   # CAD s/p CABG x3V 4/2011.  MPI 4/2018 normal.  # PAF s/p FRANCISCO/DCCV 3/9/18.  CHADS VASC 2 on Xarelto.  # HTN, controlled  # HLP  # prior elev trop, ?demand MI.  MPi 4/2018 normal.  # hx L vert art occlusion    PLAN:   Cont med rx  Diet/exercise/weight loss, Na+ restriction  RTC 6 months      José Khalil MD, FACC    Addendum 4/17/18 140pm  I received a phone call from Dr. Jaffe.  The patient apparently saw her today for hematuria.  Note was made of the patient passing clots.  I  advise that the patient should hold her Xarelto, and labs and imaging are currently eating performed.  The patient will be referred to urology to further evaluate for a definitive cause of bleeding, I which time I would hope the Xarelto could be reinitiated if no further bleeding is evident.

## 2018-04-16 ENCOUNTER — TELEPHONE (OUTPATIENT)
Dept: CARDIOLOGY | Facility: CLINIC | Age: 58
End: 2018-04-16

## 2018-04-17 ENCOUNTER — HOSPITAL ENCOUNTER (OUTPATIENT)
Dept: RADIOLOGY | Facility: HOSPITAL | Age: 58
Discharge: HOME | End: 2018-04-17
Attending: INTERNAL MEDICINE
Payer: COMMERCIAL

## 2018-04-17 ENCOUNTER — OFFICE VISIT (OUTPATIENT)
Dept: FAMILY MEDICINE | Facility: CLINIC | Age: 58
End: 2018-04-17
Payer: COMMERCIAL

## 2018-04-17 VITALS
RESPIRATION RATE: 20 BRPM | HEIGHT: 63 IN | DIASTOLIC BLOOD PRESSURE: 80 MMHG | WEIGHT: 176.13 LBS | BODY MASS INDEX: 31.21 KG/M2 | OXYGEN SATURATION: 98 % | HEART RATE: 53 BPM | TEMPERATURE: 98 F | SYSTOLIC BLOOD PRESSURE: 146 MMHG

## 2018-04-17 DIAGNOSIS — I10 ESSENTIAL HYPERTENSION: Chronic | ICD-10-CM

## 2018-04-17 DIAGNOSIS — N95.1 MENOPAUSAL STATE: ICD-10-CM

## 2018-04-17 DIAGNOSIS — R31.9 HEMATURIA, UNSPECIFIED TYPE: ICD-10-CM

## 2018-04-17 DIAGNOSIS — I65.02 OCCLUSION OF LEFT VERTEBRAL ARTERY: ICD-10-CM

## 2018-04-17 DIAGNOSIS — R31.9 HEMATURIA, UNSPECIFIED TYPE: Primary | ICD-10-CM

## 2018-04-17 DIAGNOSIS — I25.709 CORONARY ARTERY DISEASE INVOLVING CORONARY BYPASS GRAFT OF NATIVE HEART WITH ANGINA PECTORIS: ICD-10-CM

## 2018-04-17 DIAGNOSIS — R31.0 GROSS HEMATURIA: ICD-10-CM

## 2018-04-17 DIAGNOSIS — E78.49 OTHER HYPERLIPIDEMIA: ICD-10-CM

## 2018-04-17 DIAGNOSIS — N32.89 BLADDER WALL THICKENING: Primary | ICD-10-CM

## 2018-04-17 DIAGNOSIS — Z90.710 STATUS POST HYSTERECTOMY: ICD-10-CM

## 2018-04-17 DIAGNOSIS — I48.0 PAROXYSMAL ATRIAL FIBRILLATION: ICD-10-CM

## 2018-04-17 LAB
BILIRUB SERPL-MCNC: NORMAL MG/DL
BLOOD URINE, POC: NORMAL
COLOR, POC UA: NORMAL
GLUCOSE UR QL STRIP: NORMAL
KETONES UR QL STRIP: NORMAL
LEUKOCYTE ESTERASE URINE, POC: NORMAL
NITRITE, POC UA: NORMAL
PH, POC UA: 6
PROTEIN, POC: NORMAL
SPECIFIC GRAVITY, POC UA: 1.01
UROBILINOGEN, POC UA: NORMAL

## 2018-04-17 PROCEDURE — 81001 URINALYSIS AUTO W/SCOPE: CPT | Mod: S$GLB,,, | Performed by: INTERNAL MEDICINE

## 2018-04-17 PROCEDURE — 87088 URINE BACTERIA CULTURE: CPT

## 2018-04-17 PROCEDURE — 93005 ELECTROCARDIOGRAM TRACING: CPT

## 2018-04-17 PROCEDURE — 87077 CULTURE AEROBIC IDENTIFY: CPT

## 2018-04-17 PROCEDURE — 87186 SC STD MICRODIL/AGAR DIL: CPT

## 2018-04-17 PROCEDURE — 87086 URINE CULTURE/COLONY COUNT: CPT

## 2018-04-17 PROCEDURE — 74176 CT ABD & PELVIS W/O CONTRAST: CPT | Mod: 26,,, | Performed by: RADIOLOGY

## 2018-04-17 PROCEDURE — 99999 PR PBB SHADOW E&M-EST. PATIENT-LVL V: CPT | Mod: PBBFAC,,, | Performed by: INTERNAL MEDICINE

## 2018-04-17 PROCEDURE — 99214 OFFICE O/P EST MOD 30 MIN: CPT | Mod: 25,S$GLB,, | Performed by: INTERNAL MEDICINE

## 2018-04-17 PROCEDURE — 74176 CT ABD & PELVIS W/O CONTRAST: CPT | Mod: TC

## 2018-04-17 PROCEDURE — 93010 ELECTROCARDIOGRAM REPORT: CPT | Mod: S$GLB,,, | Performed by: INTERNAL MEDICINE

## 2018-04-17 RX ORDER — NITROFURANTOIN 25; 75 MG/1; MG/1
100 CAPSULE ORAL 2 TIMES DAILY
Qty: 10 CAPSULE | Refills: 0 | Status: SHIPPED | OUTPATIENT
Start: 2018-04-17 | End: 2018-04-22

## 2018-04-17 NOTE — PROGRESS NOTES
HISTORY OF PRESENT ILLNESS:  Keyonna Guillen is a 57 y.o. female who presents to the clinic today for Dizziness (3 day onset) and Hematuria (dark red colored urine---1day onset)      Mrs. Guillen is seen today at the [facility] with her daughter also in the room.  She reports that she first noted the blood in her urine yesterday morning and at that time it was dark red-maroon color.  She reports seeing a clot in the urine yesterday.  She denies ever noticing blood in the urine previously.  Denies fever/chills, no flank pain, no abdominal/suprapubic or groin pain.  She reports feeling a little light headed over the last 3 days but denies any episode of syncope.  Urine today still has bloody appearance but not as dark as yesterday.    Of note she was initiated on Xarelto on 3/9/2018 when she presented to [facility] ED with atrial fibrillation.  She received FRANCISCO that did not show LA thrombus, also received DC cardioversion in the ED.  She is being followed by Dr. Khalil.    She was recently treated for cellulitis with Keflex and Bactrim.  Has completed abx.      PAST MEDICAL HISTORY:  Past Medical History:   Diagnosis Date    Atrial fibrillation     Coronary artery disease     Hyperlipidemia     Hypertension        PAST SURGICAL HISTORY:  Past Surgical History:   Procedure Laterality Date    BREAST BIOPSY      CARDIAC SURGERY      CORONARY ARTERY BYPASS GRAFT      HYSTERECTOMY         SOCIAL HISTORY:  Social History     Social History    Marital status:      Spouse name: N/A    Number of children: N/A    Years of education: N/A     Occupational History    Not on file.     Social History Main Topics    Smoking status: Never Smoker    Smokeless tobacco: Never Used    Alcohol use No    Drug use: No    Sexual activity: Yes     Partners: Male     Birth control/ protection: Surgical     Other Topics Concern    Not on file     Social History Narrative    No narrative on file        FAMILY HISTORY:  Family History   Problem Relation Age of Onset    Cancer Mother     Breast cancer Maternal Uncle        ALLERGIES AND MEDICATIONS: updated and reviewed.  Review of patient's allergies indicates:  No Known Allergies  Medication List with Changes/Refills   Current Medications    ATORVASTATIN (LIPITOR) 40 MG TABLET    Take 40 mg by mouth every evening.    CEPHALEXIN (KEFLEX) 500 MG CAPSULE        INV AMLODIPINE (NORVASC) 10 MG TABLET    Take 1 tablet (10 mg total) by mouth once daily.    LOSARTAN (COZAAR) 25 MG TABLET    Take 1 tablet (25 mg total) by mouth once daily.    METOPROLOL SUCCINATE (TOPROL-XL) 50 MG 24 HR TABLET    Take 1 tablet (50 mg total) by mouth once daily.    MUPIROCIN (BACTROBAN) 2 % OINTMENT    Apply topically 3 (three) times daily.    RIVAROXABAN (XARELTO) 20 MG TAB    Take 1 tablet (20 mg total) by mouth daily with dinner or evening meal.    TRIAMTERENE-HYDROCHLOROTHIAZIDE 37.5-25 MG (MAXZIDE-25) 37.5-25 MG PER TABLET    Take 1 tablet by mouth once daily.          CARE TEAM:  Patient Care Team:  José Khalil MD as PCP - General (Cardiology)         REVIEW OF SYSTEMS:  Review of Systems   Constitutional: Negative for activity change, chills, fatigue and fever.   HENT: Negative for nosebleeds.    Eyes: Negative for visual disturbance.   Respiratory: Positive for shortness of breath. Negative for cough.    Cardiovascular: Negative for chest pain and palpitations.   Gastrointestinal: Positive for nausea. Negative for abdominal distention, anal bleeding, blood in stool, diarrhea and vomiting.   Genitourinary: Positive for hematuria. Negative for dysuria, flank pain, pelvic pain, urgency and vaginal discharge.   Musculoskeletal: Negative for arthralgias and myalgias.   Skin: Negative for pallor and rash.   Neurological: Positive for light-headedness. Negative for dizziness, seizures, syncope, numbness and headaches.     PHYSICAL EXAM:   Vitals:    04/17/18 1131   BP: (!)  "146/80   Pulse: (!) 53   Resp: 20   Temp: 97.7 °F (36.5 °C)     Weight: 79.9 kg (176 lb 2.4 oz)   Height: 5' 3" (160 cm)   Body mass index is 31.2 kg/m².     General appearance - alert, well appearing, and in no distress, oriented to person, place, and time and overweight  Mental status - alert, oriented to person, place, and time, normal mood, behavior, speech, dress, motor activity, and thought processes, pleasant  Eyes - sclera anicteric, left eye normal, right eye normal  Chest - clear to auscultation, no wheezes, rales or rhonchi, symmetric air entry, no tachypnea, retractions or cyanosis  Heart - normal rate and regular rhythm  Abdomen - soft, nontender, nondistended, no masses or organomegaly  no rebound tenderness noted  no CVA tenderness  Neurological - alert, oriented, normal speech, no focal findings or movement disorder noted, motor and sensory grossly normal bilaterally  Musculoskeletal - no joint tenderness, deformity or swelling  Extremities - trace pedal edema to right leg; healing erythema from cellultis  Skin - normal coloration and turgor, no rashes, no suspicious skin lesions noted      ASSESSMENT AND PLAN:  1. Hematuria, unspecified type    - Concern in setting of recent initiation of anticoagulation.  Will assess H/H today, currently hemodynamically stable and mild symptoms of lightheadedness.  Her [id number] score qualifies her for anticoagulation therapy but in the setting of acute bleeding will hold her Xarelto for now until we find out more detail as to cause of bleeding (assess malignancy, stones, infection), severity of bleeding and possible anemia, and consultation with urology.  I discussed this with her cardiologist Dr. Khalil this afternoon.  - POCT URINE DIPSTICK performed in clinic today  - Ambulatory referral to Urology  - Urine culture  - CT Abdomen Pelvis  Without Contrast; Future  - CBC auto differential; Future  - Basic metabolic panel; Future  - Magnesium; Future  - " PHOSPHORUS; Future  - CK; Future  - EKG 12-lead  - Protime-INR; Future  - APTT; Future  - Hepatic function panel; Future  - ANTI-XA HEPARIN MONITORING; Future    2. Coronary artery disease involving coronary bypass graft of native heart with angina pectoris    - Continue statin, losartan, Toprol XL    3. Essential hypertension    - Continue losartan, triamterene/HCTZ and Toprol XL    4. Occlusion of left vertebral artery    - monitor; no acute issues at this time.    5. Other hyperlipidemia     - continue statin    6. Paroxysmal atrial fibrillation    - s/p FRANCISCO and DCCV with NSR afterward in ED on 3/8/18; Plan for follow up with Dr. Khalil after assessment of new onset hematuria.  I spoke with him this afternoon in regards to holding the Xarelto until further evaluation for hematuria and blood count.  - Protime-INR; Future  - APTT; Future  - Hepatic function panel; Future  - ANTI-XA HEPARIN MONITORING; Future    7. Status post hysterectomy      8. Menopausal state    Follow up 4 weeks or sooner as needed.

## 2018-04-18 ENCOUNTER — OFFICE VISIT (OUTPATIENT)
Dept: UROLOGY | Facility: CLINIC | Age: 58
End: 2018-04-18
Payer: COMMERCIAL

## 2018-04-18 VITALS — HEIGHT: 63 IN | RESPIRATION RATE: 16 BRPM | BODY MASS INDEX: 33.84 KG/M2 | WEIGHT: 191 LBS

## 2018-04-18 DIAGNOSIS — R31.0 GROSS HEMATURIA: Primary | ICD-10-CM

## 2018-04-18 LAB
BILIRUB SERPL-MCNC: NORMAL MG/DL
BLOOD URINE, POC: 250
COLOR, POC UA: NORMAL
GLUCOSE UR QL STRIP: NORMAL
KETONES UR QL STRIP: NORMAL
LEUKOCYTE ESTERASE URINE, POC: NORMAL
NITRITE, POC UA: NORMAL
PH, POC UA: 5
PROTEIN, POC: 500
SPECIFIC GRAVITY, POC UA: 1005
UROBILINOGEN, POC UA: 1

## 2018-04-18 PROCEDURE — 99999 PR PBB SHADOW E&M-EST. PATIENT-LVL III: CPT | Mod: PBBFAC,,, | Performed by: NURSE PRACTITIONER

## 2018-04-18 PROCEDURE — 99243 OFF/OP CNSLTJ NEW/EST LOW 30: CPT | Mod: 25,S$GLB,, | Performed by: NURSE PRACTITIONER

## 2018-04-18 PROCEDURE — 81001 URINALYSIS AUTO W/SCOPE: CPT | Mod: S$GLB,,, | Performed by: NURSE PRACTITIONER

## 2018-04-18 NOTE — PROGRESS NOTES
"Subjective:       Patient ID: Keyonna Guillen is a 57 y.o. female who is a new patient was referred by Clifton Jaffe MD for evaluation of gross hematuria    Chief Complaint:   Chief Complaint   Patient presents with    Establish Care       Hematuria  She presented to [facility] clinic on yesterday with c/o gross hematuria. She reports "pinpoint" flimsy blood clots occasionally. Onset of hematuria was 3 days ago and was sudden in onset.There is not a history of nephrolithiasis. There is not a history of urologic trauma. Other urologic symptoms include none. Patient admits to history of no risk factors for cancer. Patient denies history of Agent Orange exposure, chronic Santoro catheter,  surgeries, occupational exposure, sexually transmitted diseases, tobacco use, trauma and urolithiasis. Prior workup has been UA'S/UCx, CT.     CT w/o contrast done 4/17 showed small nonobstructing right kidney stone, left kidney without stones, no hydronephrosis noted  UCx (4/17) shows 50-99k GNR and 10-49k Enterococcus (preliminary).    She was started on Xarelto in 3/2018 for atrial fibrillation. She was instructed to hold this medication for a "few days". She is followed by Dr. Khalil. She denies dysuria, frequency, urgency, flank pain or fever.    ACTIVE MEDICAL ISSUES:  Patient Active Problem List   Diagnosis    Menopausal state    Status post hysterectomy    Hormone replacement therapy (HRT)    Atrophic vaginitis    Essential hypertension    Coronary artery disease involving coronary bypass graft of native heart with angina pectoris    Encounter for gynecological examination without abnormal finding    Other hyperlipidemia    Hypokalemia    Palpitations    Paroxysmal atrial fibrillation    Occlusion of left vertebral artery    Hematuria       PAST MEDICAL HISTORY  Past Medical History:   Diagnosis Date    Atrial fibrillation     Coronary artery disease     Hyperlipidemia     Hypertension        PAST " SURGICAL HISTORY:  Past Surgical History:   Procedure Laterality Date    BREAST BIOPSY      CARDIAC SURGERY      CORONARY ARTERY BYPASS GRAFT      HYSTERECTOMY         SOCIAL HISTORY:  Social History   Substance Use Topics    Smoking status: Never Smoker    Smokeless tobacco: Never Used    Alcohol use No       FAMILY HISTORY:  Family History   Problem Relation Age of Onset    Cancer Mother     Breast cancer Maternal Uncle        ALLERGIES AND MEDICATIONS: updated and reviewed.  Review of patient's allergies indicates:  No Known Allergies  Current Outpatient Prescriptions   Medication Sig    atorvastatin (LIPITOR) 40 MG tablet Take 40 mg by mouth every evening.    cephALEXin (KEFLEX) 500 MG capsule     INV amlodipine (NORVASC) 10 MG tablet Take 1 tablet (10 mg total) by mouth once daily.    losartan (COZAAR) 25 MG tablet Take 1 tablet (25 mg total) by mouth once daily.    metoprolol succinate (TOPROL-XL) 50 MG 24 hr tablet Take 1 tablet (50 mg total) by mouth once daily.    mupirocin (BACTROBAN) 2 % ointment Apply topically 3 (three) times daily.    nitrofurantoin, macrocrystal-monohydrate, (MACROBID) 100 MG capsule Take 1 capsule (100 mg total) by mouth 2 (two) times daily.    rivaroxaban (XARELTO) 20 mg Tab Take 1 tablet (20 mg total) by mouth daily with dinner or evening meal.    triamterene-hydrochlorothiazide 37.5-25 mg (MAXZIDE-25) 37.5-25 mg per tablet Take 1 tablet by mouth once daily.     Current Facility-Administered Medications   Medication    estradiol valerate injection 20 mg    testosterone cypionate injection 50 mg       Review of Systems   Constitutional: Negative for activity change, chills, fatigue, fever and unexpected weight change.   Eyes: Negative for discharge, redness and visual disturbance.   Respiratory: Negative for cough, shortness of breath and wheezing.    Cardiovascular: Negative for chest pain and leg swelling.   Gastrointestinal: Negative for abdominal distention,  "abdominal pain, constipation, diarrhea, nausea and vomiting.   Genitourinary: Positive for hematuria. Negative for decreased urine volume, difficulty urinating, dysuria, flank pain, frequency, pelvic pain, urgency, vaginal bleeding and vaginal pain.   Musculoskeletal: Negative for arthralgias, joint swelling and myalgias.   Skin: Negative for color change and rash.   Neurological: Negative for dizziness and light-headedness.   Psychiatric/Behavioral: Negative for behavioral problems and confusion. The patient is not nervous/anxious.        Objective:      Vitals:    04/18/18 1348   Resp: 16   Weight: 86.6 kg (191 lb)   Height: 5' 3" (1.6 m)     Physical Exam   Constitutional: She is oriented to person, place, and time. She appears well-developed.   HENT:   Head: Normocephalic and atraumatic.   Nose: Nose normal.   Eyes: Conjunctivae are normal. Right eye exhibits no discharge. Left eye exhibits no discharge.   Neck: Normal range of motion. Neck supple. No tracheal deviation present. No thyromegaly present.   Cardiovascular: Normal rate and regular rhythm.    Pulmonary/Chest: Effort normal. No respiratory distress. She has no wheezes.   Abdominal: Soft. She exhibits no distension. There is no hepatosplenomegaly. There is no tenderness. There is no CVA tenderness. No hernia.   Genitourinary:   Genitourinary Comments: Patient declined exam   Musculoskeletal: Normal range of motion. She exhibits no edema.   Neurological: She is alert and oriented to person, place, and time.   Skin: Skin is warm and dry. No rash noted. No erythema.     Psychiatric: She has a normal mood and affect. Her behavior is normal. Judgment normal.       Urine dipstick shows ++protein, 250 RBCs ?nitrite/LE.     Assessment:       1. Gross hematuria          Plan:       1. Gross hematuria   - Discussed etiology and workup of hematuria   - UCx --pending   - Cytology - not indicated   - CT (4/17)-- small non obstructing right renal stone, no " hydronephrosis  - POCT urinalysis, dipstick or tablet reag  - Cystoscopy; Future  -Recommend holding Xarelto x 2-3 days          Follow-up for 2-3 weeks for cystoscopy.

## 2018-04-18 NOTE — LETTER
April 18, 2018      Clifton Jaffe MD  [address]  [place] [address]           [facility]  [address] [facility] [address]  [facility] [place] [facility] [address]  Phone: [phone]  Fax: [phone]          Patient: Keyonna Guillen   MR Number: [id number]   YOB: 1960   Date of Visit: 4/18/2018       Dear Dr. Clifton Jaffe:    Thank you for referring Keyonna Guillen to me for evaluation. Attached you will find relevant portions of my assessment and plan of care.    If you have questions, please do not hesitate to call me. I look forward to following Keyonna Guillen along with you.    Sincerely,    Grace Leal, NP    Enclosure  CC:  No Recipients    If you would like to receive this communication electronically, please contact [email or web address] or [phone] to request more information on [facility] access.    For providers and/or their staff who would like to refer a patient to [facility], please contact us through our one-stop-shop provider referral line, [facility], at [phone].    If you feel you have received this communication in error or would no longer like to receive these types of communications, please e-mail [email or web address]

## 2018-04-19 ENCOUNTER — TELEPHONE (OUTPATIENT)
Dept: FAMILY MEDICINE | Facility: CLINIC | Age: 58
End: 2018-04-19

## 2018-04-19 LAB
BACTERIA UR CULT: NORMAL
BACTERIA UR CULT: NORMAL

## 2018-04-19 NOTE — TELEPHONE ENCOUNTER
"Contacted patient on 4/19/18.  She was seen in urology clinic yesterday with possible plan for cystoscopy on 5/15/18. She remains off the Xarelto at this time.  Mrs. Guillen reports that urine is now more of a pink color and "lightening up".  She reports feeling mentally foggy sometimes, but no dyspnea/ABEL/CP/syncope.  Last H/H normal.      Urine culture was reviewed showing Enterococcus and E. Coli.  Advised her to complete treatment with Nitrofurantoin per culture sensitivities.    I advised her to hold her Xarelto for two more days and resume on Saturday.  Counseled to be vigilant of any new symptoms.  "

## 2018-04-24 RX ORDER — GABAPENTIN 300 MG/1
CAPSULE ORAL
Qty: 180 CAPSULE | Refills: 2 | Status: SHIPPED | OUTPATIENT
Start: 2018-04-24 | End: 2018-12-13

## 2018-05-09 ENCOUNTER — TELEPHONE (OUTPATIENT)
Dept: UROLOGY | Facility: CLINIC | Age: 58
End: 2018-05-09

## 2018-05-09 NOTE — TELEPHONE ENCOUNTER
----- Message from Melonie Pederson sent at 5/9/2018 11:50 AM CDT -----  Contact: Self  Refill : fluticasone (FLONASE) 50 mcg/actuation nasal spray (Discontinued)    #90 day supply#    Pharmacy     [id number] - [place] - [address]

## 2018-05-09 NOTE — TELEPHONE ENCOUNTER
----- Message from Sara Palacio sent at 5/9/2018  1:13 PM CDT -----  Contact: self   [phone]  Pt is calling to cancel her appt for urology procedure at 5/15/18 at 11:20 a.m.      Thank you!

## 2018-05-10 RX ORDER — FLUTICASONE PROPIONATE 50 MCG
2 SPRAY, SUSPENSION (ML) NASAL DAILY
Qty: 1 BOTTLE | Refills: 1 | Status: SHIPPED | OUTPATIENT
Start: 2018-05-10 | End: 2019-01-17

## 2018-05-15 ENCOUNTER — LAB VISIT (OUTPATIENT)
Dept: LAB | Facility: HOSPITAL | Age: 58
End: 2018-05-15
Attending: INTERNAL MEDICINE
Payer: COMMERCIAL

## 2018-05-15 ENCOUNTER — OFFICE VISIT (OUTPATIENT)
Dept: FAMILY MEDICINE | Facility: CLINIC | Age: 58
End: 2018-05-15
Payer: COMMERCIAL

## 2018-05-15 VITALS
DIASTOLIC BLOOD PRESSURE: 70 MMHG | SYSTOLIC BLOOD PRESSURE: 122 MMHG | OXYGEN SATURATION: 97 % | WEIGHT: 195.75 LBS | RESPIRATION RATE: 17 BRPM | BODY MASS INDEX: 34.68 KG/M2 | HEART RATE: 60 BPM | TEMPERATURE: 98 F | HEIGHT: 63 IN

## 2018-05-15 DIAGNOSIS — L03.115 CELLULITIS OF RIGHT LEG: Primary | ICD-10-CM

## 2018-05-15 DIAGNOSIS — G47.00 INSOMNIA, UNSPECIFIED TYPE: ICD-10-CM

## 2018-05-15 DIAGNOSIS — L03.115 CELLULITIS OF RIGHT LEG: ICD-10-CM

## 2018-05-15 LAB
CRP SERPL-MCNC: 2.7 MG/L
ERYTHROCYTE [SEDIMENTATION RATE] IN BLOOD BY WESTERGREN METHOD: 32 MM/HR

## 2018-05-15 PROCEDURE — 36415 COLL VENOUS BLD VENIPUNCTURE: CPT | Mod: PN

## 2018-05-15 PROCEDURE — 86140 C-REACTIVE PROTEIN: CPT

## 2018-05-15 PROCEDURE — 99999 PR PBB SHADOW E&M-EST. PATIENT-LVL III: CPT | Mod: PBBFAC,,, | Performed by: INTERNAL MEDICINE

## 2018-05-15 PROCEDURE — 99214 OFFICE O/P EST MOD 30 MIN: CPT | Mod: S$GLB,,, | Performed by: INTERNAL MEDICINE

## 2018-05-15 PROCEDURE — 85652 RBC SED RATE AUTOMATED: CPT

## 2018-05-15 RX ORDER — LOSARTAN POTASSIUM 25 MG/1
TABLET ORAL
COMMUNITY
End: 2018-12-13 | Stop reason: SDUPTHER

## 2018-05-15 RX ORDER — HYDROCODONE BITARTRATE AND ACETAMINOPHEN 7.5; 325 MG/1; MG/1
TABLET ORAL
COMMUNITY
End: 2018-12-13

## 2018-05-15 RX ORDER — METOPROLOL SUCCINATE 25 MG/1
TABLET, EXTENDED RELEASE ORAL
COMMUNITY
End: 2018-05-29

## 2018-05-15 RX ORDER — CYCLOBENZAPRINE HCL 10 MG
TABLET ORAL
COMMUNITY
End: 2018-12-13

## 2018-05-15 RX ORDER — METOPROLOL SUCCINATE 50 MG/1
TABLET, EXTENDED RELEASE ORAL
COMMUNITY
End: 2018-05-15 | Stop reason: SDUPTHER

## 2018-05-15 RX ORDER — TRIAMTERENE/HYDROCHLOROTHIAZID 37.5-25 MG
TABLET ORAL
COMMUNITY
End: 2018-05-15 | Stop reason: SDUPTHER

## 2018-05-15 RX ORDER — GABAPENTIN 300 MG/1
CAPSULE ORAL
COMMUNITY
End: 2018-05-29

## 2018-05-15 RX ORDER — ATORVASTATIN CALCIUM 40 MG/1
TABLET, FILM COATED ORAL
COMMUNITY
End: 2018-05-15 | Stop reason: SDUPTHER

## 2018-05-15 RX ORDER — AMLODIPINE BESYLATE 5 MG/1
TABLET ORAL
COMMUNITY
End: 2018-12-13

## 2018-05-15 RX ORDER — AMOXICILLIN 500 MG/1
CAPSULE ORAL
COMMUNITY
End: 2018-05-29

## 2018-05-15 RX ORDER — SERTRALINE HYDROCHLORIDE 50 MG/1
TABLET, FILM COATED ORAL
COMMUNITY
End: 2018-05-29

## 2018-05-15 RX ORDER — CYCLOBENZAPRINE HCL 5 MG
TABLET ORAL
COMMUNITY
End: 2018-12-13

## 2018-05-15 RX ORDER — DIAZEPAM 5 MG/1
TABLET ORAL
COMMUNITY
End: 2018-05-29

## 2018-05-15 RX ORDER — DIPHENHYDRAMINE HCL 25 MG
25 TABLET ORAL NIGHTLY PRN
Qty: 14 TABLET | Refills: 0 | COMMUNITY
Start: 2018-05-15 | End: 2018-05-29

## 2018-05-15 RX ORDER — SULFAMETHOXAZOLE AND TRIMETHOPRIM 800; 160 MG/1; MG/1
1 TABLET ORAL 2 TIMES DAILY
Qty: 28 TABLET | Refills: 0 | Status: SHIPPED | OUTPATIENT
Start: 2018-05-15 | End: 2018-05-29

## 2018-05-15 RX ORDER — OXCARBAZEPINE 150 MG/1
TABLET, FILM COATED ORAL
COMMUNITY
End: 2018-12-13

## 2018-05-15 RX ORDER — BENZONATATE 200 MG/1
CAPSULE ORAL
COMMUNITY
End: 2018-05-29

## 2018-05-15 RX ORDER — FLUTICASONE PROPIONATE 50 MCG
SPRAY, SUSPENSION (ML) NASAL
COMMUNITY
End: 2018-05-29

## 2018-05-15 RX ORDER — LOSARTAN POTASSIUM 100 MG/1
TABLET ORAL
COMMUNITY
End: 2018-05-29

## 2018-05-15 RX ORDER — HYDRALAZINE HYDROCHLORIDE 100 MG/1
TABLET, FILM COATED ORAL
COMMUNITY
Start: 2018-04-20 | End: 2018-05-15 | Stop reason: SDUPTHER

## 2018-05-15 RX ORDER — HYDRALAZINE HYDROCHLORIDE 100 MG/1
TABLET, FILM COATED ORAL
COMMUNITY
End: 2018-11-14

## 2018-05-15 RX ORDER — CLONIDINE HYDROCHLORIDE 0.2 MG/1
TABLET ORAL
COMMUNITY
End: 2018-12-13

## 2018-05-15 NOTE — PROGRESS NOTES
HISTORY OF PRESENT ILLNESS:  Keyonna Guillen is a 57 y.o. female who presents to the clinic today for Recurrent Skin Infections (right leg)    Had right leg cellulitis for which got abx in March.  She says it never really resolved since it began in March.   Feels swelling in shin, warmth, redness - extends downward to ankle.    No recurrence of hematuria but she says that she has been taking her Xarelto every other day - advised to take daily as prescribed.    Reports trouble falling asleep at night.    PAST MEDICAL HISTORY:  Past Medical History:   Diagnosis Date    Atrial fibrillation     Coronary artery disease     Hyperlipidemia     Hypertension        PAST SURGICAL HISTORY:  Past Surgical History:   Procedure Laterality Date    BREAST BIOPSY      CARDIAC SURGERY      CORONARY ARTERY BYPASS GRAFT      HYSTERECTOMY         SOCIAL HISTORY:  Social History     Social History    Marital status:      Spouse name: N/A    Number of children: N/A    Years of education: N/A     Occupational History    Not on file.     Social History Main Topics    Smoking status: Never Smoker    Smokeless tobacco: Never Used    Alcohol use No    Drug use: No    Sexual activity: Yes     Partners: Male     Birth control/ protection: Surgical     Other Topics Concern    Not on file     Social History Narrative    No narrative on file       FAMILY HISTORY:  Family History   Problem Relation Age of Onset    Cancer Mother     Breast cancer Maternal Uncle        ALLERGIES AND MEDICATIONS: updated and reviewed.  Review of patient's allergies indicates:  No Known Allergies  Medication List with Changes/Refills   Current Medications    AMLODIPINE (NORVASC) 5 MG TABLET    amlodipine 5 mg tablet    AMOXICILLIN (AMOXIL) 500 MG CAPSULE    amoxicillin 500 mg capsule    ATORVASTATIN (LIPITOR) 40 MG TABLET    Take 40 mg by mouth every evening.    BENZONATATE (TESSALON) 200 MG CAPSULE    benzonatate 200 mg capsule    CEPHALEXIN  (KEFLEX) 500 MG CAPSULE        CLONIDINE (CATAPRES) 0.2 MG TABLET    clonidine HCl 0.2 mg tablet    CYCLOBENZAPRINE (FLEXERIL) 10 MG TABLET    cyclobenzaprine 10 mg tablet    CYCLOBENZAPRINE (FLEXERIL) 5 MG TABLET    cyclobenzaprine 5 mg tablet    DIAZEPAM (VALIUM) 5 MG TABLET    diazepam 5 mg tablet    FLUTICASONE (FLONASE) 50 MCG/ACTUATION NASAL SPRAY    2 sprays (100 mcg total) by Each Nare route once daily.    FLUTICASONE (FLONASE) 50 MCG/ACTUATION NASAL SPRAY    fluticasone 50 mcg/actuation nasal spray,suspension    GABAPENTIN (NEURONTIN) 300 MG CAPSULE    TAKE ONE CAPSULE BY MOUTH TWICE A DAY    GABAPENTIN (NEURONTIN) 300 MG CAPSULE    gabapentin 300 mg capsule    HYDRALAZINE (APRESOLINE) 100 MG TABLET    hydralazine 100 mg tablet    HYDROCODONE-ACETAMINOPHEN 7.5-325MG (NORCO) 7.5-325 MG PER TABLET    hydrocodone 7.5 mg-acetaminophen 325 mg tablet    INV AMLODIPINE (NORVASC) 10 MG TABLET    Take 1 tablet (10 mg total) by mouth once daily.    LOSARTAN (COZAAR) 100 MG TABLET    losartan 100 mg tablet    LOSARTAN (COZAAR) 25 MG TABLET    losartan 25 mg tablet    METOPROLOL SUCCINATE (TOPROL-XL) 25 MG 24 HR TABLET    metoprolol succinate ER 25 mg tablet,extended release 24 hr    METOPROLOL SUCCINATE (TOPROL-XL) 50 MG 24 HR TABLET    Take 1 tablet (50 mg total) by mouth once daily.    MUPIROCIN (BACTROBAN) 2 % OINTMENT    Apply topically 3 (three) times daily.    OXCARBAZEPINE (OXTELLAR XR) 300 MG TB24    Oxtellar  mg tablet,extended release   Take 1 tablet every day by oral route at dinner for 10 days.    OXCARBAZEPINE (TRILEPTAL) 150 MG TAB    oxcarbazepine 150 mg tablet   Take 1 tablet twice a day by oral route before meals for 30 days.    RIVAROXABAN (XARELTO) 20 MG TAB    Take 1 tablet (20 mg total) by mouth daily with dinner or evening meal.    RIVAROXABAN (XARELTO) 20 MG TAB    Xarelto 20 mg tablet    SERTRALINE (ZOLOFT) 50 MG TABLET    sertraline 50 mg tablet   TAKE 1 TABLET(S) EVERY DAY BY ORAL ROUTE  "AT DINNER    TRIAMTERENE-HYDROCHLOROTHIAZIDE 37.5-25 MG (MAXZIDE-25) 37.5-25 MG PER TABLET    Take 1 tablet by mouth once daily.   Discontinued Medications    ATORVASTATIN (LIPITOR) 40 MG TABLET    atorvastatin 40 mg tablet    HYDRALAZINE (APRESOLINE) 100 MG TABLET        LOSARTAN (COZAAR) 25 MG TABLET    Take 1 tablet (25 mg total) by mouth once daily.    METOPROLOL SUCCINATE (TOPROL-XL) 50 MG 24 HR TABLET    metoprolol succinate ER 50 mg tablet,extended release 24 hr    TRIAMTERENE-HYDROCHLOROTHIAZIDE 37.5-25 MG (MAXZIDE-25) 37.5-25 MG PER TABLET    triamterene 37.5 mg-hydrochlorothiazide 25 mg tablet          CARE TEAM:  Patient Care Team:  Morgan Leger MD as PCP - General (Family Medicine)         REVIEW OF SYSTEMS:  Review of Systems   Constitutional: Negative for chills, fatigue and fever.   HENT: Negative for ear discharge, ear pain, postnasal drip, sinus pain and sinus pressure.    Eyes: Negative for pain and redness.   Respiratory: Negative for cough, choking, chest tightness and shortness of breath.    Cardiovascular: Positive for leg swelling. Negative for chest pain and palpitations.   Gastrointestinal: Negative for abdominal pain, nausea and vomiting.   Genitourinary: Positive for frequency. Negative for dysuria and flank pain.   Skin: Positive for color change. Negative for wound.   Neurological: Negative for tremors, syncope, weakness, light-headedness and headaches.   Psychiatric/Behavioral: Negative for behavioral problems and decreased concentration.     PHYSICAL EXAM:   Vitals:    05/15/18 1049   BP: 122/70   Pulse: 60   Resp: 17   Temp: 97.8 °F (36.6 °C)     Weight: 88.8 kg (195 lb 12.3 oz)   Height: 5' 3" (160 cm)   Body mass index is 34.68 kg/m².     General appearance - alert, well appearing, and in no distress and oriented to person, place, and time  Mental status - alert, oriented to person, place, and time, normal mood, behavior, speech, dress, motor activity, and thought " processes  Chest - clear to auscultation, no wheezes, rales or rhonchi, symmetric air entry, no tachypnea, retractions or cyanosis  Heart - normal rate and regular rhythm  Abdomen - soft, nontender, nondistended, no masses or organomegaly  no rebound tenderness noted  Neurological - alert, oriented, normal speech, no focal findings or movement disorder noted, normal muscle tone, no tremors, strength 5/5  Musculoskeletal - no joint tenderness, deformity or swelling  Extremities - pedal edema 1 + right leg. No calf tenderness.  Skin - mild erythema to right shin; edema as noted extends into foot      ASSESSMENT AND PLAN:  1. Cellulitis of right leg  - Recurrence since March; trial of longer course of abx  - sulfamethoxazole-trimethoprim 800-160mg (BACTRIM DS) 800-160 mg Tab; Take 1 tablet by mouth 2 (two) times daily.  Dispense: 28 tablet; Refill: 0  - Sedimentation rate, manual; Future  - C-reactive protein; Future    2. Insomnia, unspecified type  - diphenhydrAMINE (SOMINEX) 25 mg tablet; Take 1 tablet (25 mg total) by mouth nightly as needed for Insomnia.  Dispense: 14 tablet; Refill: 0       Follow up 2 weeks or sooner as needed.

## 2018-05-22 DIAGNOSIS — Z11.59 NEED FOR HEPATITIS C SCREENING TEST: Primary | ICD-10-CM

## 2018-05-23 RX ORDER — ATORVASTATIN CALCIUM 40 MG/1
TABLET, FILM COATED ORAL
Qty: 90 TABLET | Refills: 3 | Status: SHIPPED | OUTPATIENT
Start: 2018-05-23 | End: 2019-04-10 | Stop reason: SDUPTHER

## 2018-05-29 ENCOUNTER — OFFICE VISIT (OUTPATIENT)
Dept: FAMILY MEDICINE | Facility: CLINIC | Age: 58
End: 2018-05-29
Payer: COMMERCIAL

## 2018-05-29 VITALS
OXYGEN SATURATION: 95 % | HEART RATE: 71 BPM | DIASTOLIC BLOOD PRESSURE: 86 MMHG | BODY MASS INDEX: 34.76 KG/M2 | RESPIRATION RATE: 12 BRPM | WEIGHT: 196.19 LBS | TEMPERATURE: 98 F | SYSTOLIC BLOOD PRESSURE: 134 MMHG | HEIGHT: 63 IN

## 2018-05-29 DIAGNOSIS — R60.0 BILATERAL LEG EDEMA: Primary | ICD-10-CM

## 2018-05-29 PROCEDURE — 3075F SYST BP GE 130 - 139MM HG: CPT | Mod: CPTII,S$GLB,, | Performed by: FAMILY MEDICINE

## 2018-05-29 PROCEDURE — 3008F BODY MASS INDEX DOCD: CPT | Mod: CPTII,S$GLB,, | Performed by: FAMILY MEDICINE

## 2018-05-29 PROCEDURE — 3079F DIAST BP 80-89 MM HG: CPT | Mod: CPTII,S$GLB,, | Performed by: FAMILY MEDICINE

## 2018-05-29 PROCEDURE — 99999 PR PBB SHADOW E&M-EST. PATIENT-LVL III: CPT | Mod: PBBFAC,,, | Performed by: FAMILY MEDICINE

## 2018-05-29 PROCEDURE — 99214 OFFICE O/P EST MOD 30 MIN: CPT | Mod: S$GLB,,, | Performed by: FAMILY MEDICINE

## 2018-05-29 NOTE — PROGRESS NOTES
Routine Office Visit    Patient Name: Keyonna Guillen    : 1960  MRN: [id number]    Subjective:  Keyonna is a 57 y.o. female who presents today for:    1. Leg swelling  Patient presenting today with bilateral lower leg edema with R>L.  She states that she has been having swelling for a long time, but ever since she had trauma to the right leg it has been swelling more.  There has been no increased redness and no pain.  She states that the swelling goes down when she elevates them and that they look normal first thing int he morning.  She had right sided venous US done several weeks ago to r/o DVT and it was normal.  She has no history of varicose veins.    Past Medical History  Past Medical History:   Diagnosis Date    Atrial fibrillation     Coronary artery disease     Hyperlipidemia     Hypertension        Past Surgical History  Past Surgical History:   Procedure Laterality Date    BREAST BIOPSY      CARDIAC SURGERY      CORONARY ARTERY BYPASS GRAFT      HYSTERECTOMY         Family History  Family History   Problem Relation Age of Onset    Cancer Mother     Breast cancer Maternal Uncle        Social History  Social History     Social History    Marital status:      Spouse name: N/A    Number of children: N/A    Years of education: N/A     Occupational History    Not on file.     Social History Main Topics    Smoking status: Never Smoker    Smokeless tobacco: Never Used    Alcohol use No    Drug use: No    Sexual activity: Yes     Partners: Male     Birth control/ protection: Surgical     Other Topics Concern    Not on file     Social History Narrative    No narrative on file       Current Medications  Current Outpatient Prescriptions on File Prior to Visit   Medication Sig Dispense Refill    amLODIPine (NORVASC) 5 MG tablet amlodipine 5 mg tablet      atorvastatin (LIPITOR) 40 MG tablet TAKE 1 TABLET BY MOUTH EVERY NIGHT AT BEDTIME 90 tablet 3    fluticasone (FLONASE) 50  mcg/actuation nasal spray 2 sprays (100 mcg total) by Each Nare route once daily. 1 Bottle 1    INV amlodipine (NORVASC) 10 MG tablet Take 1 tablet (10 mg total) by mouth once daily. 90 tablet 3    losartan (COZAAR) 25 MG tablet losartan 25 mg tablet      metoprolol succinate (TOPROL-XL) 50 MG 24 hr tablet Take 1 tablet (50 mg total) by mouth once daily. 90 tablet 3    mupirocin (BACTROBAN) 2 % ointment Apply topically 3 (three) times daily. 30 g 0    rivaroxaban (XARELTO) 20 mg Tab Take 1 tablet (20 mg total) by mouth daily with dinner or evening meal. 90 tablet 3    sulfamethoxazole-trimethoprim 800-160mg (BACTRIM DS) 800-160 mg Tab Take 1 tablet by mouth 2 (two) times daily. 28 tablet 0    triamterene-hydrochlorothiazide 37.5-25 mg (MAXZIDE-25) 37.5-25 mg per tablet Take 1 tablet by mouth once daily.  11    cloNIDine (CATAPRES) 0.2 MG tablet clonidine HCl 0.2 mg tablet      cyclobenzaprine (FLEXERIL) 10 MG tablet cyclobenzaprine 10 mg tablet      cyclobenzaprine (FLEXERIL) 5 MG tablet cyclobenzaprine 5 mg tablet      diphenhydrAMINE (SOMINEX) 25 mg tablet Take 1 tablet (25 mg total) by mouth nightly as needed for Insomnia. 14 tablet 0    gabapentin (NEURONTIN) 300 MG capsule TAKE ONE CAPSULE BY MOUTH TWICE A  capsule 2    hydrALAZINE (APRESOLINE) 100 MG tablet hydralazine 100 mg tablet      hydrocodone-acetaminophen 7.5-325mg (NORCO) 7.5-325 mg per tablet hydrocodone 7.5 mg-acetaminophen 325 mg tablet      OXcarbazepine (OXTELLAR XR) 300 mg Tb24 Oxtellar  mg tablet,extended release   Take 1 tablet every day by oral route at dinner for 10 days.      OXcarbazepine (TRILEPTAL) 150 MG Tab oxcarbazepine 150 mg tablet   Take 1 tablet twice a day by oral route before meals for 30 days.      [DISCONTINUED] amoxicillin (AMOXIL) 500 MG capsule amoxicillin 500 mg capsule      [DISCONTINUED] benzonatate (TESSALON) 200 MG capsule benzonatate 200 mg capsule      [DISCONTINUED] cephALEXin  "(KEFLEX) 500 MG capsule       [DISCONTINUED] diazePAM (VALIUM) 5 MG tablet diazepam 5 mg tablet      [DISCONTINUED] fluticasone (FLONASE) 50 mcg/actuation nasal spray fluticasone 50 mcg/actuation nasal spray,suspension      [DISCONTINUED] gabapentin (NEURONTIN) 300 MG capsule gabapentin 300 mg capsule      [DISCONTINUED] losartan (COZAAR) 100 MG tablet losartan 100 mg tablet      [DISCONTINUED] metoprolol succinate (TOPROL-XL) 25 MG 24 hr tablet metoprolol succinate ER 25 mg tablet,extended release 24 hr      [DISCONTINUED] rivaroxaban (XARELTO) 20 mg Tab Xarelto 20 mg tablet      [DISCONTINUED] sertraline (ZOLOFT) 50 MG tablet sertraline 50 mg tablet   TAKE 1 TABLET(S) EVERY DAY BY ORAL ROUTE AT DINNER       Current Facility-Administered Medications on File Prior to Visit   Medication Dose Route Frequency Provider Last Rate Last Dose    estradiol valerate injection 20 mg  20 mg Intramuscular Q21 Days Koby Brewer MD   20 mg at 06/15/16 1400    testosterone cypionate injection 50 mg  50 mg Intramuscular Q21 Days Koby Brewer MD   50 mg at 06/15/16 1401       Allergies   Review of patient's allergies indicates:  No Known Allergies    Review of Systems (Pertinent positives)  Review of Systems   Constitutional: Negative.    HENT: Negative.    Eyes: Negative.    Respiratory: Negative.    Cardiovascular: Positive for leg swelling. Negative for chest pain.   Gastrointestinal: Negative.    Skin: Negative.          /86 (BP Location: Left arm, Patient Position: Sitting, BP Method: Medium (Manual))   Pulse 71   Temp 97.9 °F (36.6 °C) (Oral)   Resp 12   Ht 5' 3" (1.6 m)   Wt 89 kg (196 lb 3.4 oz)   SpO2 95%   BMI 34.76 kg/m²     GENERAL APPEARANCE: in no apparent distress and well developed and well nourished  HEENT: PERRL, EOMI, Sclera clear, anicteric, Oropharynx clear, no lesions, Neck supple with midline trachea  NECK: normal, supple, no adenopathy, thyroid normal in size  RESPIRATORY: appears " well, vitals normal, no respiratory distress, acyanotic, normal RR, chest clear, no wheezing, crepitations, rhonchi, normal symmetric air entry  HEART: regular rate and rhythm, S1, S2 normal, no murmur, click, rub or gallop.    ABDOMEN: abdomen is soft without tenderness, no masses, no hernias, no organomegaly, no rebound, no guarding. Suprapubic tenderness absent. No CVA tenderness.  Extremities: warm/well perfused.  No abnormal hair patterns.  2+ edema in right LE and 1+ in LLE  SKIN: no rashes, no wounds, no other lesions  PSYCH: Alert, oriented x 3, thought content appropriate, speech normal, pleasant and cooperative, good eye contact, well groomed    Assessment/Plan:  Keyonna Guillen is a 57 y.o. female who presents today for :    Keyonna was seen today for follow-up and back pain.    Diagnoses and all orders for this visit:    Bilateral leg edema  -     US Lower Extremity Veins Bilateral Insufficiency; Future  -     US Lower Extrem Arteries Bilat with STEFANI (xpd); Future      1.  US to be done ASAP  2.  Patient already on diuretic therapy  3.  Will refer to vascular if needed  4.  She is to call with any concerns    Morgan Leger MD

## 2018-05-30 ENCOUNTER — TELEPHONE (OUTPATIENT)
Dept: FAMILY MEDICINE | Facility: CLINIC | Age: 58
End: 2018-05-30

## 2018-05-30 NOTE — TELEPHONE ENCOUNTER
----- Message from Bindu Angel sent at 5/30/2018 12:14 PM CDT -----  Contact: Keyonna [phone]  Pt is calling to notify the staff that the medication for insomnia was not sent to the pharmacy. Please call at your earliest convenience.

## 2018-05-30 NOTE — TELEPHONE ENCOUNTER
Informed pt that medication was sent in as OTC; informed her to call office if she is not able to purchase OTC; verbalized understanding

## 2018-05-31 ENCOUNTER — HOSPITAL ENCOUNTER (OUTPATIENT)
Dept: RADIOLOGY | Facility: HOSPITAL | Age: 58
Discharge: HOME | End: 2018-05-31
Attending: FAMILY MEDICINE
Payer: COMMERCIAL

## 2018-05-31 DIAGNOSIS — R60.0 BILATERAL LEG EDEMA: ICD-10-CM

## 2018-05-31 PROCEDURE — 93925 LOWER EXTREMITY STUDY: CPT | Mod: 26,,, | Performed by: RADIOLOGY

## 2018-05-31 PROCEDURE — 93922 UPR/L XTREMITY ART 2 LEVELS: CPT | Mod: 26,,, | Performed by: RADIOLOGY

## 2018-05-31 PROCEDURE — 93922 UPR/L XTREMITY ART 2 LEVELS: CPT | Mod: TC

## 2018-05-31 PROCEDURE — 93970 EXTREMITY STUDY: CPT | Mod: TC

## 2018-05-31 PROCEDURE — 93970 EXTREMITY STUDY: CPT | Mod: 26,,, | Performed by: RADIOLOGY

## 2018-06-04 ENCOUNTER — TELEPHONE (OUTPATIENT)
Dept: FAMILY MEDICINE | Facility: CLINIC | Age: 58
End: 2018-06-04

## 2018-06-04 DIAGNOSIS — I73.9 PAD (PERIPHERAL ARTERY DISEASE): Primary | ICD-10-CM

## 2018-06-04 NOTE — TELEPHONE ENCOUNTER
----- Message from Morgan Leger MD sent at 6/1/2018 12:05 PM CDT -----  Contact: Keyonna [phone]  Please let patient know that her ultrasounds were fine.  She still needs to see vascular for options to help her swelling    Thanks,  Dr. Leger     ----- Message -----  From: Bessie Frederick MA  Sent: 6/1/2018  10:47 AM  To: Morgan Leger MD        ----- Message -----  From: Bindu Angel  Sent: 6/1/2018  10:31 AM  To: [facility] Staff    Pt is calling to get her results of her recent vascular ultrasound. Please call at your earliest convenience.

## 2018-06-04 NOTE — TELEPHONE ENCOUNTER
----- Message from Yadiel Grace sent at 6/4/2018 12:45 PM CDT -----  Contact: Self/[phone]  Patient would like speak to the staff about US results. Thank you.

## 2018-06-04 NOTE — TELEPHONE ENCOUNTER
The blood flow is fine in the main arteries.  She has a superficial artery that is narrowed on the right.  The main arteries are fine as of now.  Her veins are fine with no signs of back flow (which is a good thing).        Dr. Leger

## 2018-06-04 NOTE — TELEPHONE ENCOUNTER
Patient was notified of charted test results. Patient said that the right legs stills hurt and is swollen and this has been going on for several months. Patient is request if she has to be referred to a Dermatologist are infectious disease. Please advise

## 2018-06-04 NOTE — TELEPHONE ENCOUNTER
She needs to see vascular as they will be the ones to help the most.  I have placed an urgent referral.    Thanks,  Dr. Leger

## 2018-06-04 NOTE — TELEPHONE ENCOUNTER
Gave pt. U/S results.    Pt. Stated U/S tech told her she had 60% blockage in her leg.     Pt. Is concerned. Please advise.

## 2018-06-25 ENCOUNTER — OFFICE VISIT (OUTPATIENT)
Dept: VASCULAR SURGERY | Facility: CLINIC | Age: 58
End: 2018-06-25
Payer: COMMERCIAL

## 2018-06-25 VITALS
SYSTOLIC BLOOD PRESSURE: 150 MMHG | BODY MASS INDEX: 35.02 KG/M2 | WEIGHT: 197.63 LBS | HEIGHT: 63 IN | HEART RATE: 85 BPM | DIASTOLIC BLOOD PRESSURE: 100 MMHG

## 2018-06-25 DIAGNOSIS — R60.0 LEG EDEMA, RIGHT: Primary | ICD-10-CM

## 2018-06-25 DIAGNOSIS — I80.9 THROMBOPHLEBITIS: ICD-10-CM

## 2018-06-25 PROCEDURE — 99244 OFF/OP CNSLTJ NEW/EST MOD 40: CPT | Mod: S$GLB,,, | Performed by: SURGERY

## 2018-06-25 PROCEDURE — 99999 PR PBB SHADOW E&M-EST. PATIENT-LVL III: CPT | Mod: PBBFAC,,, | Performed by: SURGERY

## 2018-06-25 NOTE — PROGRESS NOTES
Edwin Healy MD RPVI                       [facility]                         06/25/2018    HPI:  Keyonna Guillen is a 58 y.o. female with   Patient Active Problem List   Diagnosis    Menopausal state    Status post hysterectomy    Hormone replacement therapy (HRT)    Atrophic vaginitis    Essential hypertension    Coronary artery disease involving coronary bypass graft of native heart with angina pectoris    Encounter for gynecological examination without abnormal finding    Other hyperlipidemia    Hypokalemia    Palpitations    Paroxysmal atrial fibrillation    Occlusion of left vertebral artery    Hematuria    PAD (peripheral artery disease)    being managed by PCP and specialists who is here today for evaluation of RLE edema and pain.  Pt with h/o RLE trauma approx 5 mo ago complicated by cellulitis from a ladder falling on RLE.  Pt had arterial and venous US showing no DVT/reflux and R SFA stenosis.  Sent here for further eval.  Patient states location is R anterior ankle/foot occurring for 5 mo.  Associated signs and symptoms include edema and pallor.  Quality is tight and severity is 3/10.  Symptoms began approx 5 mo ago but unrelated to trauma.  Alleviating factors include none.  Worsening factors include standing and dependency.    no MI  no Stroke  Tobacco use: no    Past Medical History:   Diagnosis Date    Atrial fibrillation     Coronary artery disease     Hyperlipidemia     Hypertension      Past Surgical History:   Procedure Laterality Date    BREAST BIOPSY      CARDIAC SURGERY      CORONARY ARTERY BYPASS GRAFT      HYSTERECTOMY       Family History   Problem Relation Age of Onset    Cancer Mother     Breast cancer Maternal Uncle      Social History     Social History    Marital status:      Spouse name: N/A    Number of children: N/A    Years of education: N/A     Occupational History    Not on file.     Social History Main Topics     Smoking status: Never Smoker    Smokeless tobacco: Never Used    Alcohol use No    Drug use: No    Sexual activity: Yes     Partners: Male     Birth control/ protection: Surgical     Other Topics Concern    Not on file     Social History Narrative    No narrative on file       Current Outpatient Prescriptions:     amLODIPine (NORVASC) 5 MG tablet, amlodipine 5 mg tablet, Disp: , Rfl:     atorvastatin (LIPITOR) 40 MG tablet, TAKE 1 TABLET BY MOUTH EVERY NIGHT AT BEDTIME, Disp: 90 tablet, Rfl: 3    cloNIDine (CATAPRES) 0.2 MG tablet, clonidine HCl 0.2 mg tablet, Disp: , Rfl:     cyclobenzaprine (FLEXERIL) 10 MG tablet, cyclobenzaprine 10 mg tablet, Disp: , Rfl:     cyclobenzaprine (FLEXERIL) 5 MG tablet, cyclobenzaprine 5 mg tablet, Disp: , Rfl:     fluticasone (FLONASE) 50 mcg/actuation nasal spray, 2 sprays (100 mcg total) by Each Nare route once daily., Disp: 1 Bottle, Rfl: 1    gabapentin (NEURONTIN) 300 MG capsule, TAKE ONE CAPSULE BY MOUTH TWICE A DAY, Disp: 180 capsule, Rfl: 2    hydrALAZINE (APRESOLINE) 100 MG tablet, hydralazine 100 mg tablet, Disp: , Rfl:     hydrocodone-acetaminophen 7.5-325mg (NORCO) 7.5-325 mg per tablet, hydrocodone 7.5 mg-acetaminophen 325 mg tablet, Disp: , Rfl:     INV amlodipine (NORVASC) 10 MG tablet, Take 1 tablet (10 mg total) by mouth once daily., Disp: 90 tablet, Rfl: 3    losartan (COZAAR) 25 MG tablet, losartan 25 mg tablet, Disp: , Rfl:     metoprolol succinate (TOPROL-XL) 50 MG 24 hr tablet, Take 1 tablet (50 mg total) by mouth once daily., Disp: 90 tablet, Rfl: 3    mupirocin (BACTROBAN) 2 % ointment, Apply topically 3 (three) times daily., Disp: 30 g, Rfl: 0    OXcarbazepine (OXTELLAR XR) 300 mg Tb24, Oxtellar  mg tablet,extended release  Take 1 tablet every day by oral route at dinner for 10 days., Disp: , Rfl:     OXcarbazepine (TRILEPTAL) 150 MG Tab, oxcarbazepine 150 mg tablet  Take 1 tablet twice a day by oral route before meals for 30  days., Disp: , Rfl:     rivaroxaban (XARELTO) 20 mg Tab, Take 1 tablet (20 mg total) by mouth daily with dinner or evening meal., Disp: 90 tablet, Rfl: 3    triamterene-hydrochlorothiazide 37.5-25 mg (MAXZIDE-25) 37.5-25 mg per tablet, Take 1 tablet by mouth once daily., Disp: , Rfl: 11    Current Facility-Administered Medications:     estradiol valerate injection 20 mg, 20 mg, Intramuscular, Q21 Days, Koby Brewer MD, 20 mg at 06/15/16 1400    testosterone cypionate injection 50 mg, 50 mg, Intramuscular, Q21 Days, Koby Brewer MD, 50 mg at 06/15/16 1401    REVIEW OF SYSTEMS:  General: No fevers or chills; ENT: No sore throat; Allergy and Immunology: no persistent infections; Hematological and Lymphatic: No history of bleeding or easy bruising; Endocrine: negative; Respiratory: no cough, shortness of breath, or wheezing; Cardiovascular: no chest pain or dyspnea on exertion; Gastrointestinal: no abdominal pain/back, change in bowel habits, or bloody stools; Genito-Urinary: no dysuria, trouble voiding, or hematuria; Musculoskeletal: negative; Neurological: no TIA or stroke symptoms; Psychiatric: no nervousness, anxiety or depression.    PHYSICAL EXAM:   Right Arm BP - Sittin/100 (18 0947)  Left Arm BP - Sittin/80 (18 0947)  Pulse: 85         General appearance:  Alert, well-appearing, and in no distress.  Oriented to person, place, and time                    Neurological: Normal speech, no focal findings noted; CN II - XII grossly intact. RLE with sensation to light touch, LLE with sensation to light touch.            Musculoskeletal: Digits/nail without cyanosis/clubbing.  Strength 5/5 BLE.                    Neck: Supple, no significant adenopathy, no carotid bruit can be auscultated                  Chest:  Clear to auscultation, no wheezes, rales or rhonchi, symmetric air entry. No use of accessory muscles               Cardiac: Normal rate and regular rhythm, S1 and S2 normal             Abdomen: Soft, nontender, nondistended, no masses or organomegaly, no hernia     No rebound tenderness noted; bowel sounds normal     No groin adenopathy      Extremities:   2+ R femoral pulse, 2+ L femoral pulse     2+ R popliteal pulse, 2+ L popliteal pulse     2+ R PT pulse, 2+ L PT pulse     2+ R DP pulse, 2+ L DP pulse     1+ RLE edema, 1+ LLE edema    Skin: RLE without ulcer; LLE without ulcer        LAB RESULTS:  No results found for: CBC  Lab Results   Component Value Date    LABPROT 11.4 04/17/2018    INR 1.1 04/17/2018     Lab Results   Component Value Date     04/17/2018    K 4.3 04/17/2018     04/17/2018    CO2 32 (H) 04/17/2018    GLU 90 04/17/2018    BUN 20 04/17/2018    CREATININE 0.9 04/17/2018    CALCIUM 10.3 04/17/2018    ANIONGAP 6 (L) 04/17/2018    EGFRNONAA >60 04/17/2018     Lab Results   Component Value Date    WBC 5.03 04/17/2018    RBC 4.40 04/17/2018    HGB 13.1 04/17/2018    HCT 39.4 04/17/2018    MCV 90 04/17/2018    MCH 29.8 04/17/2018    MCHC 33.2 04/17/2018    RDW 13.0 04/17/2018     04/17/2018    MPV 9.1 (L) 04/17/2018    GRAN 3.0 04/17/2018    GRAN 60.5 04/17/2018    LYMPH 1.5 04/17/2018    LYMPH 29.8 04/17/2018    MONO 0.4 04/17/2018    MONO 8.5 04/17/2018    EOS 0.1 04/17/2018    BASO 0.01 04/17/2018    EOSINOPHIL 1.0 04/17/2018    BASOPHIL 0.2 04/17/2018    DIFFMETHOD Automated 04/17/2018     .No results found for: HGBA1C    IMAGING:  All pertinent imaging has been reviewed and interpreted independently.    IMP/PLAN:  58 y.o. female with   Patient Active Problem List   Diagnosis    Menopausal state    Status post hysterectomy    Hormone replacement therapy (HRT)    Atrophic vaginitis    Essential hypertension    Coronary artery disease involving coronary bypass graft of native heart with angina pectoris    Encounter for gynecological examination without abnormal finding    Other hyperlipidemia    Hypokalemia    Palpitations    Paroxysmal  atrial fibrillation    Occlusion of left vertebral artery    Hematuria    PAD (peripheral artery disease)    being managed by PCP and specialists who is here today for evaluation of R foot/ankle edema s/p trauma 5 mo ago.    -Recommend warm compresses, NSAID and elevation for thrombophlebitis  -recommend compression with Rx stockings, elevation, dietary changes associated with water and sodium intake discussed at length with patient  -Will obtain RUE arterial US due to arm pain with exertion - if normal, will refer to Ortho  -RTC prn or if there are any further questions, concerns or new issues    I spent 30 minutes evaluating this patient and greater than 50% of the time was spent counseling, coordinator care and discussing the plan of care.  All questions were answered and patient stated understanding with agreement with the above treatment plan.    Edwin Healy MD [facility]  Vascular and Endovascular Surgery

## 2018-06-25 NOTE — PATIENT INSTRUCTIONS
Low-Salt Diet  This diet removes foods that are high in salt. It also limits the amount of salt you use when cooking. It is most often used for people with high blood pressure, edema (fluid retention), and kidney, liver, or heart disease.  Table salt contains the mineral sodium. Your body needs sodium to work normally. But too much sodium can make your health problems worse. Your healthcare provider is recommending a low-salt (also called low-sodium) diet for you. Your total daily allowance of salt is 1,500 to 2,300 milligrams (mg). It is less than 1 teaspoon of table salt. This means you can have only about 500 to 700 mg of sodium at each meal. People with certain health problems should limit salt intake to the lower end of the recommended range.    When you cook, dont add much salt. If you can cook without using salt, even better. Dont add salt to your food at the table.  When shopping, read food labels. Salt is often called sodium on the label. Choose foods that are salt-free, low salt, or very low salt. Note that foods with reduced salt may not lower your salt intake enough.    Beans, potatoes, and pasta  Ok: Dry beans, split peas, lentils, potatoes, rice, macaroni, pasta, spaghetti without added salt  Avoid: Potato chips, tortilla chips, and similar products  Breads and cereals  Ok: Low-sodium breads, rolls, cereals, and cakes; low-salt crackers, matzo crackers  Avoid: Salted crackers, pretzels, popcorn, Slovak toast, pancakes, muffins  Dairy  Ok: Milk, chocolate milk, hot chocolate mix, low-salt cheeses, and yogurt  Avoid: Processed cheese and cheese spreads; Roquefort, Camembert, and cottage cheese; buttermilk, instant breakfast drink  Desserts  Ok: Ice cream, frozen yogurt, juice bars, gelatin, cookies and pies, sugar, honey, jelly, hard candy  Avoid: Most pies, cakes and cookies prepared or processed with salt; instant pudding  Drinks  Ok: Tea, coffee, fizzy (carbonated) drinks, juices  Avoid: Flavored  coffees, electrolyte replacement drinks, sports drinks  Meats  Ok: All fresh meat, fish, poultry, low-salt tuna, eggs, egg substitute  Avoid: Smoked, pickled, brine-cured, or salted meats and fish. This includes jenkins, chipped beef, corned beef, hot dogs, deli meats, ham, kosher meats, salt pork, sausage, canned tuna, salted codfish, smoked salmon, herring, sardines, or anchovies.  Seasonings and spices  Ok: Most seasonings are okay. Good substitutes for salt include: fresh herb blends, hot sauce, lemon, garlic, sauceda, vinegar, dry mustard, parsley, cilantro, horseradish, tomato paste, regular margarine, mayonnaise, unsalted butter, cream cheese, vegetable oil, cream, low-salt salad dressing and gravy.  Avoid: Regular ketchup, relishes, pickles, soy sauce, teriyaki sauce, Worcestershire sauce, BBQ sauce, tartar sauce, meat tenderizer, chili sauce, regular gravy, regular salad dressing, salted butter  Soups  Ok: Low-salt soups and broths made with allowed foods  Avoid: Bouillon cubes, soups with smoked or salted meats, regular soup and broth  Vegetables  Ok: Most vegetables are okay; also low-salt tomato and vegetable juices  Avoid: Sauerkraut and other brine-soaked vegetables; pickles and other pickled vegetables; tomato juice, olives  Date Last Reviewed: 8/1/2016 © 2000-2017 [employer]. [address] [place] [address]. All rights reserved. This information is not intended as a substitute for professional medical care. Always follow your healthcare professional's instructions.        Tips for Using Less Salt    Most people with heart problems need to eat less salt (sodium). Reducing the amount of salt you eat may help control your blood pressure. The higher your blood pressure, the greater your risk for heart disease, stroke, blindness, and kidney problems.  At the store  · Make low-salt choices by reading labels carefully. Look for the total amount of sodium per serving.  · Use more fresh  food. Buy more fruits and vegetables. Select lean meats, fish, and poultry.  · Use fewer frozen, canned, and packaged foods which often contain a lot of sodium.  · Use plain frozen vegetables without sauces or toppings. These products are often low- or no-sodium.  · Opt for reduced-sodium or no-salt-added versions of canned vegetables and soups.  In the kitchen  · Don't add salt to food when you're cooking. Season with flavorings such as onion, garlic, pepper, salt-free herbal blends, and lemon or lime juice.  · Use a cookbook containing low-salt recipes. It can give you ideas for tasty meals that are healthy for your heart.  · Sprinkle salt-free herbal blends on vegetables and meat.  · Drain and rinse canned foods, such as canned beans and vegetables, before cooking or eating.  Eating out  · Tell the  you're on a low-salt diet. Ask questions about the menu.  · Order fish, chicken, and meat broiled, baked, poached, or grilled without salt, butter, or breading.  · Use lemon, pepper, and salt-free herb mixes to add flavor.  · Choose plain steamed rice, boiled noodles, and baked or boiled potatoes. Top potatoes with chives and a little sour cream.     Beware! Salt goes by many other names. Limit foods with these words listed as ingredients: salt, sodium, soy sauce, baking soda, baking powder, MSG, monosodium, Na (the chemical symbol for sodium). Some antacids are also high in salt.   Date Last Reviewed: 6/19/2015  © 9510-8769 [employer]. [address] [place] [address], [place] [address]. All rights reserved. This information is not intended as a substitute for professional medical care. Always follow your healthcare professional's instructions.        Putting on Compression Stockings     Turn the stocking inside-out, then fit it over your toes and heel.          Roll the stocking up your leg.            Once stockings are on, make sure the top of the stocking is about two fingers width below the crease of  the knee (or the groin if you wear thigh-high stockings).          Use equipment, such as a stocking harper, or wear rubber gloves to make it easier to put on compression stockings.         Elastic compression stockings are prescribed to treat many vein problems. Wearing them may be the most important thing you do to manage your symptoms. The stockings fit tightly around your ankle, gradually reducing in pressure as they go up your legs. This helps keep blood flowing to your heart. As a result, swelling is reduced. Your healthcare provider will prescribe stockings at a safe pressure for you. He or she will also tell you how often to wear and remove the stockings. Follow these instructions closely. Also, do not buy or wear compression stockings without first seeing your healthcare provider.  Tips for wear and care  To wear stockings safely and to get the most benefit:  · Wear the length prescribed by your healthcare provider.  · Pull them to the designated height and no farther. Dont let them bunch at the top. This can restrict blood flow and increase swelling.  · Wear the stockings for the amount of time your healthcare provider recommends. Replace them when they start to feel loose. This will likely be every 3 to 6 months.  · Remove them as your healthcare provider directs. When removed, wash your legs. Then check your legs and feet for sores. Call your healthcare provider if you find a sore. Dont put the stockings back on unless your healthcare provider directs.   · Wash the stockings as instructed. They may need to be hand-washed.  Date Last Reviewed: 5/1/2016  © 3498-0322 [employer]. [address] [place] [address], [place] [address]. All rights reserved. This information is not intended as a substitute for professional medical care. Always follow your healthcare professional's instructions.        Superficial Thrombophlebitis   The superficial veins are the veins near the surface of the skin.  Superficial thrombophlebitis is a problem that occurs when one or more of these veins become inflamed (red, irritated, and swollen). This is most often because of a blood clot.  Causes  The problem may occur after injury to a vein. It may also occur after having an intravenous (IV) line placed. Other factors that can make the problem more likely include:  · Varicose veins  · Venous insufficiency  · Bleeding disorders  · Prolonged periods of rest and not moving around  · IV drug abuse  Symptoms  Symptoms may appear in the affected area. They can include:  · Pain  · Tenderness  · Redness  · Warmth  · Swelling  · Hardening of the vein  In most cases, superficial thrombophlebitis resolves on its own with no problems. Treatment is focused on relieving symptoms.  Sometimes, there is a risk that the deep veins in the body may also be involved. This can lead to more serious problems. In such cases, further testing and treatments may be needed. Your healthcare provider can tell you more about this.  Home Care  To help relieve pain and swelling, you may be told to:  · Apply heat or cold to the affected area. Do this for up to 10 minutes as often as directed.  ¨ Heat: Use a warm compress, such as a heating pad.  ¨ Cold: Use a cold compress, such as a cold pack or bag of ice wrapped in a thin towel.  · Take nonsteroidal anti-inflammatory drugs (NSAIDS), such as ibuprofen. In some cases, other pain medicines may be prescribed.  · Keep the affected limb (arm or leg) raised above heart level as directed.  · Wear elastic compression stockings or bandages as directed.  · Avoid prolonged sitting or standing. Get up and walk often.  To help treat a blood clot, a blood thinner (anticoagulant) may be prescribed. If this is needed, be sure to take the medicine exactly as directed.  Follow-up care  Follow up with your healthcare provider as advised. If imaging tests are done, they will be reviewed by a doctor. Youll be told the results  and any new findings that may affect your treatment.  When to seek medical advice  Call your healthcare provider right away if any of these occur:  · Fever of 100.4°F (38ºC) or higher, or as directed by your provider  · Increasing pain, swelling, or tenderness in the affected area  · Spreading warmth or redness in the affected area  Call 911  Call 911 right away if any of these occur:  · Trouble breathing  · Chest pain or discomfort that worsens with deep breathing or coughing  · Coughing (may cough up blood)  · Fast or irregular heartbeat  · Sweating  · Anxiety  · Lightheadedness, dizziness, or fainting  · Extreme confusion  · Extreme drowsiness or trouble waking up  · New pain in the chest, arm, shoulder, neck, or upper back  Date Last Reviewed: 9/21/2015  © 2895-0404 [employer]. [address] [place] [address], [place] [address]. All rights reserved. This information is not intended as a substitute for professional medical care. Always follow your healthcare professional's instructions.

## 2018-06-25 NOTE — LETTER
June 25, 2018      Morgan Leger MD  [address] [facility] [address]           [facility]  [address] [facility] [address]  [facility] [address]  Phone: [phone]  Fax: [phone]          Patient: Keyonna Guillen   MR Number: [id number]   YOB: 1960   Date of Visit: 6/25/2018       Dear Dr. Morgan GUTIERREZ Page:    Thank you for referring Keyonna Guillen to me for evaluation. Attached you will find relevant portions of my assessment and plan of care.    If you have questions, please do not hesitate to call me. I look forward to following Keyonna Guillen along with you.    Sincerely,    Edwin Healy MD    Enclosure  CC:  No Recipients    If you would like to receive this communication electronically, please contact [email or web address] or [phone] to request more information on [employer] Link access.    For providers and/or their staff who would like to refer a patient to [facility], please contact us through our one-stop-shop provider referral line, [facility] , at [phone].    If you feel you have received this communication in error or would no longer like to receive these types of communications, please e-mail [email or web address]

## 2018-06-26 ENCOUNTER — HOSPITAL ENCOUNTER (OUTPATIENT)
Dept: CARDIOLOGY | Facility: HOSPITAL | Age: 58
Discharge: HOME | End: 2018-06-26
Attending: SURGERY
Payer: COMMERCIAL

## 2018-06-26 DIAGNOSIS — I80.9 THROMBOPHLEBITIS: ICD-10-CM

## 2018-06-26 DIAGNOSIS — R60.0 LEG EDEMA, RIGHT: ICD-10-CM

## 2018-06-26 PROCEDURE — 93931 UPPER EXTREMITY STUDY: CPT | Mod: 26,,, | Performed by: SURGERY

## 2018-06-26 PROCEDURE — 93931 UPPER EXTREMITY STUDY: CPT

## 2018-11-07 ENCOUNTER — PATIENT OUTREACH (OUTPATIENT)
Dept: ADMINISTRATIVE | Facility: HOSPITAL | Age: 58
End: 2018-11-07

## 2018-11-14 ENCOUNTER — PATIENT MESSAGE (OUTPATIENT)
Dept: ADMINISTRATIVE | Facility: OTHER | Age: 58
End: 2018-11-14

## 2018-11-14 ENCOUNTER — OFFICE VISIT (OUTPATIENT)
Dept: FAMILY MEDICINE | Facility: CLINIC | Age: 58
End: 2018-11-14
Payer: COMMERCIAL

## 2018-11-14 VITALS
WEIGHT: 200.63 LBS | BODY MASS INDEX: 35.55 KG/M2 | OXYGEN SATURATION: 98 % | HEART RATE: 64 BPM | HEIGHT: 63 IN | DIASTOLIC BLOOD PRESSURE: 94 MMHG | TEMPERATURE: 98 F | RESPIRATION RATE: 12 BRPM | SYSTOLIC BLOOD PRESSURE: 170 MMHG

## 2018-11-14 DIAGNOSIS — I25.709 CORONARY ARTERY DISEASE INVOLVING CORONARY BYPASS GRAFT OF NATIVE HEART WITH ANGINA PECTORIS: ICD-10-CM

## 2018-11-14 DIAGNOSIS — I10 BENIGN ESSENTIAL HTN: ICD-10-CM

## 2018-11-14 DIAGNOSIS — I48.0 PAROXYSMAL ATRIAL FIBRILLATION: Primary | ICD-10-CM

## 2018-11-14 DIAGNOSIS — R60.0 BILATERAL LEG EDEMA: ICD-10-CM

## 2018-11-14 DIAGNOSIS — Z12.11 COLON CANCER SCREENING: ICD-10-CM

## 2018-11-14 PROCEDURE — 99214 OFFICE O/P EST MOD 30 MIN: CPT | Mod: S$GLB,,, | Performed by: FAMILY MEDICINE

## 2018-11-14 PROCEDURE — 3008F BODY MASS INDEX DOCD: CPT | Mod: CPTII,S$GLB,, | Performed by: FAMILY MEDICINE

## 2018-11-14 PROCEDURE — 3080F DIAST BP >= 90 MM HG: CPT | Mod: CPTII,S$GLB,, | Performed by: FAMILY MEDICINE

## 2018-11-14 PROCEDURE — 3077F SYST BP >= 140 MM HG: CPT | Mod: CPTII,S$GLB,, | Performed by: FAMILY MEDICINE

## 2018-11-14 PROCEDURE — 99999 PR PBB SHADOW E&M-EST. PATIENT-LVL III: CPT | Mod: PBBFAC,,, | Performed by: FAMILY MEDICINE

## 2018-11-16 ENCOUNTER — LAB VISIT (OUTPATIENT)
Dept: LAB | Facility: HOSPITAL | Age: 58
End: 2018-11-16
Attending: FAMILY MEDICINE
Payer: COMMERCIAL

## 2018-11-16 DIAGNOSIS — R60.0 BILATERAL LEG EDEMA: ICD-10-CM

## 2018-11-16 DIAGNOSIS — I10 BENIGN ESSENTIAL HTN: ICD-10-CM

## 2018-11-16 DIAGNOSIS — Z11.59 NEED FOR HEPATITIS C SCREENING TEST: ICD-10-CM

## 2018-11-16 DIAGNOSIS — I25.709 CORONARY ARTERY DISEASE INVOLVING CORONARY BYPASS GRAFT OF NATIVE HEART WITH ANGINA PECTORIS: ICD-10-CM

## 2018-11-16 DIAGNOSIS — I48.0 PAROXYSMAL ATRIAL FIBRILLATION: ICD-10-CM

## 2018-11-16 LAB
ALBUMIN SERPL BCP-MCNC: 3.7 G/DL
ALP SERPL-CCNC: 131 U/L
ALT SERPL W/O P-5'-P-CCNC: 20 U/L
ANION GAP SERPL CALC-SCNC: 9 MMOL/L
AST SERPL-CCNC: 18 U/L
BASOPHILS # BLD AUTO: 0.01 K/UL
BASOPHILS NFR BLD: 0.2 %
BILIRUB SERPL-MCNC: 0.4 MG/DL
BUN SERPL-MCNC: 15 MG/DL
CALCIUM SERPL-MCNC: 9.5 MG/DL
CHLORIDE SERPL-SCNC: 108 MMOL/L
CO2 SERPL-SCNC: 27 MMOL/L
CREAT SERPL-MCNC: 0.8 MG/DL
DIFFERENTIAL METHOD: NORMAL
EOSINOPHIL # BLD AUTO: 0.1 K/UL
EOSINOPHIL NFR BLD: 0.8 %
ERYTHROCYTE [DISTWIDTH] IN BLOOD BY AUTOMATED COUNT: 13.1 %
EST. GFR  (AFRICAN AMERICAN): >60 ML/MIN/1.73 M^2
EST. GFR  (NON AFRICAN AMERICAN): >60 ML/MIN/1.73 M^2
GLUCOSE SERPL-MCNC: 88 MG/DL
HCT VFR BLD AUTO: 40.3 %
HGB BLD-MCNC: 13.6 G/DL
LYMPHOCYTES # BLD AUTO: 1.8 K/UL
LYMPHOCYTES NFR BLD: 27.4 %
MCH RBC QN AUTO: 29.9 PG
MCHC RBC AUTO-ENTMCNC: 33.7 G/DL
MCV RBC AUTO: 89 FL
MONOCYTES # BLD AUTO: 0.6 K/UL
MONOCYTES NFR BLD: 9.5 %
NEUTROPHILS # BLD AUTO: 4.1 K/UL
NEUTROPHILS NFR BLD: 61.9 %
PLATELET # BLD AUTO: 284 K/UL
PMV BLD AUTO: 9.9 FL
POTASSIUM SERPL-SCNC: 3.2 MMOL/L
PROT SERPL-MCNC: 7.1 G/DL
RBC # BLD AUTO: 4.55 M/UL
SODIUM SERPL-SCNC: 144 MMOL/L
TSH SERPL DL<=0.005 MIU/L-ACNC: 1.56 UIU/ML
WBC # BLD AUTO: 6.65 K/UL

## 2018-11-16 PROCEDURE — 86803 HEPATITIS C AB TEST: CPT

## 2018-11-16 PROCEDURE — 84443 ASSAY THYROID STIM HORMONE: CPT

## 2018-11-16 PROCEDURE — 80053 COMPREHEN METABOLIC PANEL: CPT

## 2018-11-16 PROCEDURE — 36415 COLL VENOUS BLD VENIPUNCTURE: CPT | Mod: PN

## 2018-11-16 PROCEDURE — 85025 COMPLETE CBC W/AUTO DIFF WBC: CPT

## 2018-11-19 LAB — HCV AB SERPL QL IA: NEGATIVE

## 2018-11-24 NOTE — PROGRESS NOTES
Routine Office Visit    Patient Name: Keyonna Guillen    : 1960  MRN: [id number]    Subjective:  Keyonna is a 58 y.o. female who presents today for:    1. htn and a-fib  Patient presenting today for follow up of HTN and medication refill of Xarelto for a-fib.  She has been taking all of her medications as prescribed.  She states that she has not had any complications from the medications.  No chest pain, weakness, numbness or shortness of breath.  She does get leg swelling, but states that it is in both lower extremities.  There is no change in skin color.  The swelling usually improves with rest and elevation.      Past Medical History  Past Medical History:   Diagnosis Date    Atrial fibrillation     Coronary artery disease     Hyperlipidemia     Hypertension        Past Surgical History  Past Surgical History:   Procedure Laterality Date    BREAST BIOPSY      CARDIAC SURGERY      CORONARY ARTERY BYPASS GRAFT      HYSTERECTOMY      Transesophageal Echocardiogram (FRANCISCO) N/A 3/9/2018    Performed by José Khalil MD at [facility] CATH LAB       Family History  Family History   Problem Relation Age of Onset    Cancer Mother     Breast cancer Maternal Uncle        Social History  Social History     Socioeconomic History    Marital status:      Spouse name: Not on file    Number of children: Not on file    Years of education: Not on file    Highest education level: Not on file   Social Needs    Financial resource strain: Not on file    Food insecurity - worry: Not on file    Food insecurity - inability: Not on file    Transportation needs - medical: Not on file    Transportation needs - non-medical: Not on file   Occupational History    Not on file   Tobacco Use    Smoking status: Never Smoker    Smokeless tobacco: Never Used   Substance and Sexual Activity    Alcohol use: No    Drug use: No    Sexual activity: Yes     Partners: Male     Birth control/protection: Surgical   Other  Topics Concern    Not on file   Social History Narrative    Not on file       Current Medications  Current Outpatient Medications on File Prior to Visit   Medication Sig Dispense Refill    amLODIPine (NORVASC) 5 MG tablet amlodipine 5 mg tablet      atorvastatin (LIPITOR) 40 MG tablet TAKE 1 TABLET BY MOUTH EVERY NIGHT AT BEDTIME 90 tablet 3    losartan (COZAAR) 25 MG tablet losartan 25 mg tablet      metoprolol succinate (TOPROL-XL) 50 MG 24 hr tablet Take 1 tablet (50 mg total) by mouth once daily. 90 tablet 3    mupirocin (BACTROBAN) 2 % ointment Apply topically 3 (three) times daily. 30 g 0    OXcarbazepine (OXTELLAR XR) 300 mg Tb24 Oxtellar  mg tablet,extended release   Take 1 tablet every day by oral route at dinner for 10 days.      triamterene-hydrochlorothiazide 37.5-25 mg (MAXZIDE-25) 37.5-25 mg per tablet Take 1 tablet by mouth once daily.  11    cloNIDine (CATAPRES) 0.2 MG tablet clonidine HCl 0.2 mg tablet      cyclobenzaprine (FLEXERIL) 10 MG tablet cyclobenzaprine 10 mg tablet      cyclobenzaprine (FLEXERIL) 5 MG tablet cyclobenzaprine 5 mg tablet      fluticasone (FLONASE) 50 mcg/actuation nasal spray 2 sprays (100 mcg total) by Each Nare route once daily. 1 Bottle 1    gabapentin (NEURONTIN) 300 MG capsule TAKE ONE CAPSULE BY MOUTH TWICE A  capsule 2    hydrocodone-acetaminophen 7.5-325mg (NORCO) 7.5-325 mg per tablet hydrocodone 7.5 mg-acetaminophen 325 mg tablet      OXcarbazepine (TRILEPTAL) 150 MG Tab oxcarbazepine 150 mg tablet   Take 1 tablet twice a day by oral route before meals for 30 days.       Current Facility-Administered Medications on File Prior to Visit   Medication Dose Route Frequency Provider Last Rate Last Dose    estradiol valerate injection 20 mg  20 mg Intramuscular Q21 Days Koby Brewer MD   20 mg at 06/15/16 1400    testosterone cypionate injection 50 mg  50 mg Intramuscular Q21 Days Koby Brewer MD   50 mg at 06/15/16 1401  "      Allergies   Review of patient's allergies indicates:  No Known Allergies    Review of Systems (Pertinent positives)  Review of Systems   Constitutional: Negative.    HENT: Negative.    Eyes: Negative.    Respiratory: Negative.    Cardiovascular: Positive for leg swelling. Negative for chest pain and palpitations.   Gastrointestinal: Negative.    Musculoskeletal: Negative.    Skin: Negative.    Neurological: Negative.          BP (!) 170/94 (BP Location: Left arm, Patient Position: Sitting, BP Method: Medium (Manual))   Pulse 64   Temp 97.6 °F (36.4 °C) (Oral)   Resp 12   Ht 5' 3" (1.6 m)   Wt 91 kg (200 lb 9.9 oz)   SpO2 98%   BMI 35.54 kg/m²     GENERAL APPEARANCE: in no apparent distress and well developed and well nourished  HEENT: PERRL, EOMI, Sclera clear, anicteric, Oropharynx clear, no lesions, Neck supple with midline trachea  NECK: normal, supple, no adenopathy, thyroid normal in size  RESPIRATORY: appears well, vitals normal, no respiratory distress, acyanotic, normal RR, chest clear, no wheezing, crepitations, rhonchi, normal symmetric air entry  HEART: regular rate and rhythm, S1, S2 normal, no murmur, click, rub or gallop.    ABDOMEN: abdomen is soft without tenderness, no masses, no hernias, no organomegaly, no rebound, no guarding. Suprapubic tenderness absent. No CVA tenderness.  NEUROLOGIC: normal without focal findings, CN II-XII are intact.    Extremities: warm/well perfused.  No abnormal hair patterns.  No clubbing, cyanosis; +1 edema in bilateral lower extremities  SKIN: no rashes, no wounds, no other lesions  PSYCH: Alert, oriented x 3, thought content appropriate, speech normal, pleasant and cooperative, good eye contact, well groomed,    Assessment/Plan:  Keyonna Guillen is a 58 y.o. female who presents today for :    Keyonna was seen today for leg swelling.    Diagnoses and all orders for this visit:    Paroxysmal atrial fibrillation  -     rivaroxaban (XARELTO) 20 mg Tab; Take 1 " tablet (20 mg total) by mouth daily with dinner or evening meal.  -     [place] Patient Entered Blood Pressure  -     Hypertension Digital Medicine ([facility]) Enrollment Order  -     Hypertension Digital Medicine ([facility]): Assign Onboarding Questionnaires  -     TSH; Future  -     CBC auto differential; Future    Coronary artery disease involving coronary bypass graft of native heart with angina pectoris  -     rivaroxaban (XARELTO) 20 mg Tab; Take 1 tablet (20 mg total) by mouth daily with dinner or evening meal.  -     [place] Patient Entered Blood Pressure  -     Hypertension Digital Medicine ([facility]) Enrollment Order  -     Hypertension Digital Medicine ([facility]): Assign Onboarding Questionnaires  -     Comprehensive metabolic panel; Future    Benign essential HTN  -     Hypertension Digital Medicine ([facility]) Enrollment Order  -     Hypertension Digital Medicine ([facility]): Assign Onboarding Questionnaires  -     Comprehensive metabolic panel; Future  -     CBC auto differential; Future    Bilateral leg edema  -     Comprehensive metabolic panel; Future    Colon cancer screening  -     Case request GI: COLONOSCOPY      1.  Patient to take medications as prescribed  2.  Labs to be done ASAP  3.  She agrees to do digital HTN program and will do questionaire when emailed to her  4.  Order for colonoscopy placed as she is due  5.  Follow up 4 weeks or sooner if neeeded    Morgan Leger MD

## 2018-11-26 ENCOUNTER — TELEPHONE (OUTPATIENT)
Dept: FAMILY MEDICINE | Facility: CLINIC | Age: 58
End: 2018-11-26

## 2018-11-26 NOTE — TELEPHONE ENCOUNTER
----- Message from Rebecca Blood sent at 11/23/2018 12:26 PM CST -----  Contact: Self  Pt is calling to speak with staff regarding results. Please call pt at [phone].

## 2018-11-26 NOTE — TELEPHONE ENCOUNTER
Pt. States yes she has been wearing compression stockings and they do help but she wants to know what is causing this swelling?

## 2018-11-28 NOTE — TELEPHONE ENCOUNTER
She will need to follow up with vascular as there is no other treatment I can do to help with the swelling    Dr. Leger

## 2018-12-07 ENCOUNTER — PATIENT OUTREACH (OUTPATIENT)
Dept: OTHER | Facility: OTHER | Age: 58
End: 2018-12-07

## 2018-12-07 NOTE — LETTER
Estefani High PA-C  [address] [place] [address]     Dear Keyonna Guillen,    Welcome to the [facility]!           My name is Estefani High PA-C and I am your dedicated Digital Medicine clinician.  As an expert in medication management, I will help ensure that the medications you are taking continue to provide you with the intended benefits.        I am Kash Dupree and I will be your health  for the duration of the program.  My  job is to help you identify lifestyle changes to improve your blood pressure control.  We will talk about nutrition, exercise, and other ways that you may be able to adjust your current habits to better your health. Together, we will work to improve your overall health and encourage you to meet your goals for a healthier lifestyle.    What we expect from YOU:    You will need to take blood pressure readings multiple times a week and no less than one reading per week.   It is important that you take your measurements at different times during the day, when possible.     What you should expect from your Digital Medicine Care Team:   We will provide you with education about high blood pressure, including lifestyle changes that could help you to control your blood pressure.   We will review your weekly readings and provide you with monthly blood pressure progress reports after you have been in the program for more than 30 days.   We will send monthly progress reports on your blood pressure control to your physician so they can follow along with your progress as well.    You will be able to reach me by phone at [phone] or through your [facility] account by clicking my name under Care Team on the right side of the home screen.    I look forward to working with you to achieve your blood pressure goals!    Sincerely,  Estefani High PA-C  Your personal clinician    Please visit  [email or web address] to learn more about high blood pressure and what you can do lower your blood pressure.                                                                                           Keyonna Guillen  [address] [facility] [address]

## 2018-12-07 NOTE — PROGRESS NOTES
Patient stated she has not picked up Hypertension Kit yet, has plans to pick kit up today from the [facility] on the [facility] and will begin taking readings.        Last 5 Patient Entered Readings                                      Current 30 Day Average: 145/85     Recent Readings 11/14/2018    SBP (mmHg) 145    DBP (mmHg) 85

## 2018-12-10 NOTE — PROGRESS NOTES
Digital Medicine Enrollment Call    Introduced Mrs. Keyonna Guillen to Digital Medicine.     Discussed program expectations and requirements.  Introduced digital medicine care team.   Reviewed the importance of self-monitoring and proper blood pressure technique.      Reviewed that the Digital Medicine team is not available for emergencies and instructed the patient to call 911 or [facility] ([phone] or [phone]) if one arises.    Patient stated she has not been able to make it to the [facility] but plans to go 11/13/19 after or before dr visit @1pm.        Last 5 Patient Entered Readings                                      Current 30 Day Average: 145/85     Recent Readings 11/14/2018    SBP (mmHg) 145    DBP (mmHg) 85

## 2018-12-13 ENCOUNTER — OFFICE VISIT (OUTPATIENT)
Dept: CARDIOLOGY | Facility: CLINIC | Age: 58
End: 2018-12-13
Payer: COMMERCIAL

## 2018-12-13 VITALS
SYSTOLIC BLOOD PRESSURE: 142 MMHG | RESPIRATION RATE: 15 BRPM | WEIGHT: 198 LBS | HEART RATE: 72 BPM | HEIGHT: 63 IN | OXYGEN SATURATION: 97 % | BODY MASS INDEX: 35.08 KG/M2 | DIASTOLIC BLOOD PRESSURE: 84 MMHG

## 2018-12-13 DIAGNOSIS — Z12.11 COLON CANCER SCREENING: Primary | ICD-10-CM

## 2018-12-13 DIAGNOSIS — E66.9 NON MORBID OBESITY, UNSPECIFIED OBESITY TYPE: ICD-10-CM

## 2018-12-13 DIAGNOSIS — I25.810 CORONARY ARTERY DISEASE INVOLVING CORONARY BYPASS GRAFT OF NATIVE HEART WITHOUT ANGINA PECTORIS: Primary | ICD-10-CM

## 2018-12-13 DIAGNOSIS — R06.09 DOE (DYSPNEA ON EXERTION): ICD-10-CM

## 2018-12-13 DIAGNOSIS — R60.9 EDEMA, UNSPECIFIED TYPE: ICD-10-CM

## 2018-12-13 DIAGNOSIS — E78.49 OTHER HYPERLIPIDEMIA: ICD-10-CM

## 2018-12-13 DIAGNOSIS — G47.33 OSA (OBSTRUCTIVE SLEEP APNEA): ICD-10-CM

## 2018-12-13 DIAGNOSIS — I10 ESSENTIAL HYPERTENSION: Chronic | ICD-10-CM

## 2018-12-13 DIAGNOSIS — I65.02 OCCLUSION OF LEFT VERTEBRAL ARTERY: ICD-10-CM

## 2018-12-13 DIAGNOSIS — I48.0 PAROXYSMAL ATRIAL FIBRILLATION: ICD-10-CM

## 2018-12-13 PROCEDURE — 3079F DIAST BP 80-89 MM HG: CPT | Mod: CPTII,S$GLB,, | Performed by: INTERNAL MEDICINE

## 2018-12-13 PROCEDURE — 3008F BODY MASS INDEX DOCD: CPT | Mod: CPTII,S$GLB,, | Performed by: INTERNAL MEDICINE

## 2018-12-13 PROCEDURE — 99214 OFFICE O/P EST MOD 30 MIN: CPT | Mod: S$GLB,,, | Performed by: INTERNAL MEDICINE

## 2018-12-13 PROCEDURE — 3077F SYST BP >= 140 MM HG: CPT | Mod: CPTII,S$GLB,, | Performed by: INTERNAL MEDICINE

## 2018-12-13 PROCEDURE — 93000 ELECTROCARDIOGRAM COMPLETE: CPT | Mod: S$GLB,,, | Performed by: INTERNAL MEDICINE

## 2018-12-13 PROCEDURE — 99999 PR PBB SHADOW E&M-EST. PATIENT-LVL III: CPT | Mod: PBBFAC,,, | Performed by: INTERNAL MEDICINE

## 2018-12-13 RX ORDER — LOSARTAN POTASSIUM 100 MG/1
100 TABLET ORAL DAILY
Qty: 90 TABLET | Refills: 3 | Status: SHIPPED | OUTPATIENT
Start: 2018-12-13 | End: 2019-01-08 | Stop reason: ALTCHOICE

## 2018-12-13 NOTE — PROGRESS NOTES
CARDIOVASCULAR PROGRESS NOTE    REASON FOR CONSULT:   Keyonna Guillen is a 58 y.o. female who presents for follow up of PAF, CAD/CABG, HTN.    PCP: Page  HISTORY OF PRESENT ILLNESS:   The patient comes in for follow-up.  She reports progressive dyspnea on exertion without angina as well as lower extremity swelling.  She is currently taking amlodipine 10 mg daily not 5 mg as listed below.  She otherwise denies palpitations, lightheadedness, dizziness, or syncope.  There has been no PND, orthopnea, melena, hematuria, or claudicant symptoms.  She denies any prior knowledge her history of sleep apnea, but is unsure if she snores.    CARDIOVASCULAR HISTORY:   CAD s/p CABG 4/5/11: LIMA-LAD, SVG-D, SVG-OM    PAF s/p FRANCISCO/DCCV 3/8/18, on Xarelto, CHADS VASC 2    Hx L vert art occlusion    PAST MEDICAL HISTORY:     Past Medical History:   Diagnosis Date    Atrial fibrillation     Coronary artery disease     Hyperlipidemia     Hypertension        PAST SURGICAL HISTORY:     Past Surgical History:   Procedure Laterality Date    BREAST BIOPSY      CARDIAC SURGERY      CORONARY ARTERY BYPASS GRAFT      HYSTERECTOMY      Transesophageal Echocardiogram (FRANCISCO) N/A 3/9/2018    Performed by José Khalil MD at [facility] CATH LAB       ALLERGIES AND MEDICATION:   Review of patient's allergies indicates:  No Known Allergies       Medication List           Accurate as of 12/13/18  1:26 PM. If you have any questions, ask your nurse or doctor.               CONTINUE taking these medications    amLODIPine 5 MG tablet  Commonly known as:  NORVASC     atorvastatin 40 MG tablet  Commonly known as:  LIPITOR  TAKE 1 TABLET BY MOUTH EVERY NIGHT AT BEDTIME     fluticasone 50 mcg/actuation nasal spray  Commonly known as:  FLONASE  2 sprays (100 mcg total) by Each Nare route once daily.     losartan 25 MG tablet  Commonly known as:  COZAAR     metoprolol succinate 50 MG 24 hr tablet  Commonly known as:  TOPROL-XL  Take 1 tablet (50 mg total)  by mouth once daily.     rivaroxaban 20 mg Tab  Commonly known as:  XARELTO  Take 1 tablet (20 mg total) by mouth daily with dinner or evening meal.     triamterene-hydrochlorothiazide 37.5-25 mg 37.5-25 mg per tablet  Commonly known as:  MAXZIDE-25        STOP taking these medications    cloNIDine 0.2 MG tablet  Commonly known as:  CATAPRES  Stopped by:  José Khalil MD     cyclobenzaprine 10 MG tablet  Commonly known as:  FLEXERIL  Stopped by:  José Khalil MD     cyclobenzaprine 5 MG tablet  Commonly known as:  FLEXERIL  Stopped by:  José Khalil MD     gabapentin 300 MG capsule  Commonly known as:  NEURONTIN  Stopped by:  José Khalil MD     HYDROcodone-acetaminophen 7.5-325 mg per tablet  Commonly known as:  NORCO  Stopped by:  José Khalil MD     mupirocin 2 % ointment  Commonly known as:  BACTROBAN  Stopped by:  José Khalil MD     OXcarbazepine 150 MG Tab  Commonly known as:  TRILEPTAL  Stopped by:  José Khalil MD     OXTELLAR  mg Tb24  Generic drug:  OXcarbazepine  Stopped by:  José Khalil MD              SOCIAL HISTORY:     Social History     Socioeconomic History    Marital status:      Spouse name: Not on file    Number of children: Not on file    Years of education: Not on file    Highest education level: Not on file   Social Needs    Financial resource strain: Not on file    Food insecurity - worry: Not on file    Food insecurity - inability: Not on file    Transportation needs - medical: Not on file    Transportation needs - non-medical: Not on file   Occupational History    Not on file   Tobacco Use    Smoking status: Never Smoker    Smokeless tobacco: Never Used   Substance and Sexual Activity    Alcohol use: No    Drug use: No    Sexual activity: Yes     Partners: Male     Birth control/protection: Surgical   Other Topics Concern    Not on file   Social History Narrative    Not on file       FAMILY HISTORY:     Family  "History   Problem Relation Age of Onset    Cancer Mother     Breast cancer Maternal Uncle        REVIEW OF SYSTEMS:   Review of Systems   Constitutional: Negative for chills, diaphoresis and fever.   HENT: Negative for nosebleeds.    Eyes: Negative for blurred vision, double vision and photophobia.   Respiratory: Positive for shortness of breath. Negative for hemoptysis and wheezing.    Cardiovascular: Positive for leg swelling (RLE related to injury). Negative for chest pain, palpitations, orthopnea, claudication and PND.   Gastrointestinal: Negative for abdominal pain, blood in stool, heartburn, melena, nausea and vomiting.   Genitourinary: Negative for flank pain and hematuria.   Musculoskeletal: Negative for falls, myalgias and neck pain.   Skin: Negative for rash.   Neurological: Negative for dizziness, seizures, loss of consciousness, weakness and headaches.   Endo/Heme/Allergies: Negative for polydipsia. Does not bruise/bleed easily.   Psychiatric/Behavioral: Negative for depression and memory loss. The patient is not nervous/anxious.        PHYSICAL EXAM:     Vitals:    12/13/18 1301   BP: (!) 142/84   Pulse: 72   Resp: 15    Body mass index is 35.07 kg/m².  Weight: 89.8 kg (198 lb)   Height: 5' 3" (160 cm)     Physical Exam   Constitutional: She is oriented to person, place, and time. She appears well-developed and well-nourished. She is cooperative.  Non-toxic appearance. No distress.   HENT:   Head: Normocephalic and atraumatic.   Eyes: Conjunctivae and EOM are normal. Pupils are equal, round, and reactive to light. No scleral icterus.   Neck: Trachea normal and normal range of motion. Neck supple. Normal carotid pulses and no JVD present. Carotid bruit is not present. No neck rigidity. No tracheal deviation and no edema present. No thyromegaly present.   Cardiovascular: Normal rate, regular rhythm, S1 normal and S2 normal. PMI is not displaced. Exam reveals no gallop and no friction rub.   No murmur " heard.  Pulses:       Carotid pulses are 2+ on the right side, and 2+ on the left side.  Pulmonary/Chest: Effort normal and breath sounds normal. No stridor. No respiratory distress. She has no wheezes. She has no rales. She exhibits no tenderness.   Abdominal: Soft. She exhibits no distension. There is no hepatosplenomegaly.   obese   Musculoskeletal: She exhibits edema. She exhibits no tenderness.   Feet:   Right Foot:   Skin Integrity: Negative for ulcer.   Left Foot:   Skin Integrity: Negative for ulcer.   Neurological: She is alert and oriented to person, place, and time. No cranial nerve deficit.   Skin: Skin is warm and dry. No rash noted. No erythema.   Psychiatric: She has a normal mood and affect. Her speech is normal and behavior is normal.   Vitals reviewed.      DATA:   EKG: (personally reviewed tracing)  12/13/18 SR 72, LAD, NSSTTW changes    Laboratory:  CBC:  Recent Labs   Lab 04/06/18  1103 04/17/18  1344 11/16/18  1403   WHITE BLOOD CELL COUNT 6.29 5.03 6.65   HEMOGLOBIN 12.8 13.1 13.6   HEMATOCRIT 39.7 39.4 40.3   PLATELETS 284 275 284       CHEMISTRIES:  Recent Labs   Lab 03/09/18  0615 04/17/18  1344 11/16/18  1403   GLUCOSE 116 H 90 88   SODIUM 143 144 144   POTASSIUM 3.0 L 4.3 3.2 L   BUN BLD 21 H 20 15   CREATININE 0.9 0.9 0.8   EGFR IF AFRICAN AMERICAN >60 >60 >60   EGFR IF NON- >60 >60 >60   CALCIUM 9.5 10.3 9.5   MAGNESIUM 2.0 2.3  --        CARDIAC BIOMARKERS:  Recent Labs   Lab 03/09/18  0150 03/09/18  0826 04/17/18  1344   CPK  --   --  63   TROPONIN I 0.027 H 0.065 H  --        COAGS:  Recent Labs   Lab 04/17/18  1344   INR 1.1       LIPIDS/LFTS:  Recent Labs   Lab 03/09/18  0150 03/09/18  0615 04/17/18  1344 11/16/18  1403   CHOLESTEROL  --  158  --   --    TRIGLYCERIDES  --  142  --   --    HDL  --  39 L  --   --    LDL CHOLESTEROL  --  90.6  --   --    NON-HDL CHOLESTEROL  --  119  --   --    AST 23  --  19 18   ALT 34  --  21 20     Lab Results   Component Value  Date    TSH 1.560 11/16/2018         Cardiovascular Testing:  Carotid US 4/4/18 (images pers rev, ?L vert occlusion)  TDS  There is 40 - 49% right Internal Carotid stenosis.  There is 0 - 19% left Internal Carotid stenosis.    Ofelia MPI 4/4/18  Nuclear Quantitative Functional Analysis:   LVEF: 69 %  LVED Volume: 88 ml  LVES Volume: 27 ml  Impression: NORMAL MYOCARDIAL PERFUSION  1. The perfusion scan is free of evidence for myocardial ischemia or injury.   2. Resting wall motion is physiologic.   3. Resting LV function is normal.   4. The ventricular volumes are normal at rest and stress.   5. The extracardiac distribution of radioactivity is normal.     FRANCISCO/DCCV 3/9/18  Normal bivent size/fxn, EF 55%, normal wall motion  Normal appearing valves.  Trace AI, mild MR, mild TR  No LA/LON thrombus  Succcessful DCCV af->NSR 360J x1.  Plan:  Cont med rx  Cont Xarelto 20mg qd  OK for discharge later on today and follow up with me in the office 1 week for outpatient stress test.    Cath 3/25/11 ([facility], subsequently had CABG)  LM: normal  LAD sequential 90% prox and 80% mid stenoses   D2 prox 80%  LCx: MLI   OM2 prox 80%  RCA: dom, mid-dist 40% sequential stenoses    ASSESSMENT:   # CAD s/p CABG x3V 4/2011.  MPI 4/2018 normal.  # PAF s/p FRANCISCO/DCCV 3/9/18.  CHADS VASC 2 on Xarelto.  Currently in SR.  # HTN, uncontrolled  # HLP on atorva 40mg  # hx L vert art occlusion  # LE edema    PLAN:   Cont med rx  BUCKY eval  Echo  Stop amlod  Inc losartan 100mg qd  Check BMP 2 weeks  Diet/exercise/weight loss, Na+ restriction  RTC 1 month      José Khalil MD, FACC

## 2018-12-17 ENCOUNTER — PATIENT OUTREACH (OUTPATIENT)
Dept: OTHER | Facility: OTHER | Age: 58
End: 2018-12-17

## 2018-12-17 NOTE — PROGRESS NOTES
Last 5 Patient Entered Readings                                      Current 30 Day Average:      Recent Readings 11/14/2018    SBP (mmHg) 145    DBP (mmHg) 85          12/17: LVM.  Will call in 1 week to complete new health  introduction.

## 2018-12-18 ENCOUNTER — PATIENT OUTREACH (OUTPATIENT)
Dept: OTHER | Facility: OTHER | Age: 58
End: 2018-12-18

## 2018-12-18 DIAGNOSIS — I10 ESSENTIAL HYPERTENSION: Primary | Chronic | ICD-10-CM

## 2018-12-18 RX ORDER — HYDRALAZINE HYDROCHLORIDE 100 MG/1
100 TABLET, FILM COATED ORAL DAILY
Qty: 30 TABLET | Refills: 11
Start: 2018-12-18 | End: 2019-01-10 | Stop reason: ALTCHOICE

## 2018-12-18 NOTE — PROGRESS NOTES
Last 5 Patient Entered Readings                                      Current 30 Day Average:      Recent Readings 11/14/2018    SBP (mmHg) 145    DBP (mmHg) 85          Digital Medicine: Health  Introduction    Introduced Mrs. Keyonna Guillen to Digital Medicine. Discussed health  role and recommended lifestyle modifications.    Lifestyle Assessment:  Current Dietary Habits(i.e. low sodium, food labels, dining out): Patient states she has been dealing with high BP for a long time.  States she would like recipes.  Will send to email.  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  Alcohol/Tobacco: deferred   Medication Adherence: has been compliant with the medicaiton regimen  Other goals: deferred    Reviewed AHA/AACE recommendations:  Limit sodium intake to <2000mg/day  Recommended CHO intake, 45-65% of daily caloric intake  Perform 150 minutes of physical activity per week    Reviewed the importance of self-monitoring, medication adherence, and that the health  can be used as a resource for lifestyle modifications to help reduce or maintain a healthy lifestyle.  Reviewed that the Digital Medicine team is not available for emergencies and instructed the patient to call 911 or [facility] On [facility] ([phone] or [phone]) if one arises.    Patient states she has been taking readings, but they are not sending through.  Patient logged into [employer] while on the phone.  Will place task for tech team.

## 2018-12-18 NOTE — PROGRESS NOTES
"Last 5 Patient Entered Readings                                      Current 30 Day Average: 170/101     Recent Readings 2018    SBP (mmHg) 194 194 195 195 166    DBP (mmHg) 116 116 112 112 105    Pulse 68 - 68 - 72          Called patient as received ALERT about elevated BP.  She denies signs or symptoms of elevated BP: headache, change in vision, numbness/tingling on one side, chest pain or shortness of breath.     Pt reports that her readings were much better before she stopped amlodipine on 2018 because her legs were so swollen she could hardly walk.  PCP stopped hydralazine on  when he increased the amlodipine.    Restarting hydralazine 100 mg daily today. May increase to twice daily as needed.  On BB and diuretic too.     From Profile:   Depression: pt states father  this year and she misses him - normal grieving, no need for counseling  Sleep Apnea: ask next call    - Answered "yes" to "Has anyone witnessed you as having very loud snoring or having stopped breathing when you are sleeping?"   - Neck circumference > 17 for male, > 15 for female increases risk       "

## 2018-12-18 NOTE — PROGRESS NOTES
Last 5 Patient Entered Readings                                      Current 30 Day Average:      Recent Readings 11/14/2018    SBP (mmHg) 145    DBP (mmHg) 85          12/18: Returning call.  LVM.  Will call next week.

## 2018-12-21 ENCOUNTER — PATIENT OUTREACH (OUTPATIENT)
Dept: OTHER | Facility: OTHER | Age: 58
End: 2018-12-21

## 2018-12-21 NOTE — PROGRESS NOTES
Last 5 Patient Entered Readings                                      Current 30 Day Average: 167/102     Recent Readings 12/20/2018 12/20/2018 12/19/2018 12/19/2018 12/18/2018    SBP (mmHg) 175 175 148 148 194    DBP (mmHg) 111 111 90 90 116    Pulse 70 - 64 - 68          Called patient as received ALERT about elevated BP.  She denies signs or symptoms of elevated BP: headache, change in vision, numbness/tingling on one side, chest pain or shortness of breath.     Told her to start taking hydralazine BID if readings remain high. She verbalized understanding.

## 2019-01-04 ENCOUNTER — PATIENT OUTREACH (OUTPATIENT)
Dept: OTHER | Facility: OTHER | Age: 59
End: 2019-01-04

## 2019-01-04 DIAGNOSIS — I10 ESSENTIAL HYPERTENSION: Primary | Chronic | ICD-10-CM

## 2019-01-04 NOTE — PROGRESS NOTES
"Last 5 Patient Entered Readings                                      Current 30 Day Average: 168/103     Recent Readings 1/8/2019 1/8/2019 1/7/2019 1/7/2019 1/7/2019    SBP (mmHg) 167 167 197 197 149    DBP (mmHg) 104 104 108 108 99    Pulse 58 - 73 - 64        Patient's BP average is above goal of <130/80. Pt was out of town without her cuff so no readings for 2 weeks.  Taking readings again and received HIGH ALERT.  States stopped taking Losartan until her Pharmacy confirmed it was in unaffected lot. However, she requested a change to "be sure".       Patient denies s/s of hypertension (SOB, CP, severe headaches, changes in vision) associated with high readings. Instructed patient to go to the ED if BP > 180/110 and accompanied by hypertensive s/s, patient confirms understanding.    Made the following changes: Switched Losartan to Olmesartan.     Will continue to monitor regularly. Will follow up in 2 weeks, sooner if BP begins to trend upward or downward.    Patient has my contact information and knows to call with any concerns or clinical changes.     Current HTN regimen:  Hypertension Medications             hydrALAZINE (APRESOLINE) 100 MG tablet Take 1 tablet (100 mg total) by mouth twice daily.    metoprolol succinate (TOPROL-XL) 50 MG 24 hr tablet Take 1 tablet (50 mg total) by mouth once daily.    olmesartan (BENICAR) 40 MG tablet Take 1 tablet (40 mg total) by mouth once daily.    triamterene-hydrochlorothiazide 37.5-25 mg (MAXZIDE-25) 37.5-25 mg per tablet Take 1 tablet by mouth once daily.                "

## 2019-01-08 RX ORDER — OLMESARTAN MEDOXOMIL 40 MG/1
40 TABLET ORAL DAILY
Qty: 90 TABLET | Refills: 3 | Status: SHIPPED | OUTPATIENT
Start: 2019-01-08 | End: 2019-03-13

## 2019-01-10 ENCOUNTER — PATIENT OUTREACH (OUTPATIENT)
Dept: OTHER | Facility: OTHER | Age: 59
End: 2019-01-10

## 2019-01-10 DIAGNOSIS — I10 ESSENTIAL HYPERTENSION: Primary | Chronic | ICD-10-CM

## 2019-01-10 RX ORDER — CLONIDINE HYDROCHLORIDE 0.2 MG/1
0.2 TABLET ORAL 2 TIMES DAILY
Qty: 60 TABLET | Refills: 1 | Status: SHIPPED | OUTPATIENT
Start: 2019-01-10 | End: 2019-01-14

## 2019-01-10 NOTE — PROGRESS NOTES
Last 5 Patient Entered Readings                                      Current 30 Day Average: 169/105     Recent Readings 1/10/2019 1/10/2019 1/10/2019 1/10/2019 1/9/2019    SBP (mmHg) 161 161 185 185 146    DBP (mmHg) 96 96 116 116 85    Pulse 59 - 59 - 72        Called patient as received ALERT about elevated BP.  She denies signs or symptoms of elevated BP: headache, change in vision, numbness/tingling on one side, chest pain or shortness of breath.     Pt taking leftover clonidine at home to get her BP down.  She states that this was previously prescribed by a Cardiologist at [facility] and Dr. Khalil changed her medications at the December 2018 appointment.  Prescribing clonidine for now and messaging Dr. Khalil about current condition.  He ordered an ECHO, referral to Sleep Medicine and labs in December which she did not do bc she had not met her deductible.  States she is willing to do it now if needed.  May also order renal ultrasound, Valdo/renin labs.     Pt with h/o CAD s/p CABG in 2011 wondering whether coronary blockages causing BP spike.  Defer to Cardiology for answer.

## 2019-01-14 ENCOUNTER — TELEPHONE (OUTPATIENT)
Dept: CARDIOLOGY | Facility: CLINIC | Age: 59
End: 2019-01-14

## 2019-01-14 ENCOUNTER — PATIENT OUTREACH (OUTPATIENT)
Dept: OTHER | Facility: OTHER | Age: 59
End: 2019-01-14

## 2019-01-14 ENCOUNTER — HOSPITAL ENCOUNTER (OUTPATIENT)
Dept: RADIOLOGY | Facility: HOSPITAL | Age: 59
Discharge: HOME | End: 2019-01-14
Attending: PHYSICIAN ASSISTANT
Payer: COMMERCIAL

## 2019-01-14 DIAGNOSIS — I10 ESSENTIAL HYPERTENSION: Chronic | ICD-10-CM

## 2019-01-14 PROCEDURE — 93975 VASCULAR STUDY: CPT | Mod: 26,,, | Performed by: RADIOLOGY

## 2019-01-14 PROCEDURE — 93975 US RENAL ARTERY STENOSIS HYPERTEN (XPD): ICD-10-PCS | Mod: 26,,, | Performed by: RADIOLOGY

## 2019-01-14 PROCEDURE — 93975 VASCULAR STUDY: CPT | Mod: TC

## 2019-01-14 RX ORDER — SPIRONOLACTONE 25 MG/1
25 TABLET ORAL DAILY
Qty: 30 TABLET | Refills: 11 | Status: SHIPPED | OUTPATIENT
Start: 2019-01-14 | End: 2019-01-17 | Stop reason: SDUPTHER

## 2019-01-14 RX ORDER — CLONIDINE HYDROCHLORIDE 0.2 MG/1
0.2 TABLET ORAL 2 TIMES DAILY PRN
Qty: 60 TABLET | Refills: 0
Start: 2019-01-14 | End: 2019-01-17

## 2019-01-14 RX ORDER — HYDRALAZINE HYDROCHLORIDE 100 MG/1
100 TABLET, FILM COATED ORAL 3 TIMES DAILY
COMMUNITY
End: 2019-01-17 | Stop reason: SDUPTHER

## 2019-01-14 NOTE — PROGRESS NOTES
Last 5 Patient Entered Readings                                      Current 30 Day Average: 169/104     Recent Readings 2019    SBP (mmHg) 159 159 175 175 184    DBP (mmHg) 103 103 101 101 106    Pulse 65 - 68 - 65        High alert over the weekend. Pt asymptomatic.  Pt contacted Cardiologist Dr. Khalil this morning to inquire about alternative medication to clonidine as it makes her sleepy.  He messaged me to ask me to handle.    She states that she contacted him bc she thinks he should have started her on another medication when he stopped the amlodipine.  She believes it may have prevented her from having these very elevated readings.  States Mother  at 26 of cancer. Uncles  age 40-50 of heart attack. Grandparents  in 60s of heart related problems.  She is wondering whether this may be related to her CAD - had CABG in .  Denies CP, SOB, nausea, orthopnea. Has Cardiology appt on .      Switching clonidine to PRN SBP > 200 or DBP > 110.  Restart Hydralazine 100 mg TID and starting spironolactone 25 mg daily.     Will f/u next week.

## 2019-01-16 ENCOUNTER — PATIENT OUTREACH (OUTPATIENT)
Dept: OTHER | Facility: OTHER | Age: 59
End: 2019-01-16

## 2019-01-16 NOTE — PROGRESS NOTES
"Last 5 Patient Entered Readings                                      Current 30 Day Average: 172/104     Recent Readings 1/16/2019 1/16/2019 1/16/2019 1/16/2019 1/16/2019    SBP (mmHg) 167 167 203 203 137    DBP (mmHg) 99 99 111 111 78    Pulse 68 - 62 - 62          Patient called me about her elevated BP.  I was reviewing her chart after receiving a High ALERT. She states that she feels her heart "fluttering".  Denies headache, change in vision, numbness/tingling on one side, or shortness of breath. She took a clonidine 0.2 mg tablet as I instructed her to do if SBP > 200.      Had renal ultrasound with questionable evidence of right renal artery stenosis.  Her renin/aldosterone ratio is pending.  She has an appt with Dr. Khalil, Cardiology, tomorrow at 1120.  Will f/u after that.      "

## 2019-01-17 ENCOUNTER — OFFICE VISIT (OUTPATIENT)
Dept: CARDIOLOGY | Facility: CLINIC | Age: 59
End: 2019-01-17
Payer: COMMERCIAL

## 2019-01-17 VITALS
RESPIRATION RATE: 15 BRPM | HEART RATE: 62 BPM | OXYGEN SATURATION: 97 % | BODY MASS INDEX: 35.44 KG/M2 | DIASTOLIC BLOOD PRESSURE: 68 MMHG | HEIGHT: 63 IN | SYSTOLIC BLOOD PRESSURE: 144 MMHG | WEIGHT: 200 LBS

## 2019-01-17 DIAGNOSIS — I25.810 CORONARY ARTERY DISEASE INVOLVING CORONARY BYPASS GRAFT OF NATIVE HEART WITHOUT ANGINA PECTORIS: ICD-10-CM

## 2019-01-17 DIAGNOSIS — I10 ESSENTIAL HYPERTENSION: Primary | ICD-10-CM

## 2019-01-17 DIAGNOSIS — E66.9 NON MORBID OBESITY, UNSPECIFIED OBESITY TYPE: ICD-10-CM

## 2019-01-17 DIAGNOSIS — E78.49 OTHER HYPERLIPIDEMIA: ICD-10-CM

## 2019-01-17 DIAGNOSIS — R60.9 EDEMA, UNSPECIFIED TYPE: ICD-10-CM

## 2019-01-17 DIAGNOSIS — G47.33 OSA (OBSTRUCTIVE SLEEP APNEA): ICD-10-CM

## 2019-01-17 DIAGNOSIS — I48.0 PAROXYSMAL ATRIAL FIBRILLATION: ICD-10-CM

## 2019-01-17 PROCEDURE — 3008F BODY MASS INDEX DOCD: CPT | Mod: CPTII,S$GLB,, | Performed by: INTERNAL MEDICINE

## 2019-01-17 PROCEDURE — 3078F PR MOST RECENT DIASTOLIC BLOOD PRESSURE < 80 MM HG: ICD-10-PCS | Mod: CPTII,S$GLB,, | Performed by: INTERNAL MEDICINE

## 2019-01-17 PROCEDURE — 3077F PR MOST RECENT SYSTOLIC BLOOD PRESSURE >= 140 MM HG: ICD-10-PCS | Mod: CPTII,S$GLB,, | Performed by: INTERNAL MEDICINE

## 2019-01-17 PROCEDURE — 99214 PR OFFICE/OUTPT VISIT, EST, LEVL IV, 30-39 MIN: ICD-10-PCS | Mod: S$GLB,,, | Performed by: INTERNAL MEDICINE

## 2019-01-17 PROCEDURE — 99999 PR PBB SHADOW E&M-EST. PATIENT-LVL III: CPT | Mod: PBBFAC,,, | Performed by: INTERNAL MEDICINE

## 2019-01-17 PROCEDURE — 3078F DIAST BP <80 MM HG: CPT | Mod: CPTII,S$GLB,, | Performed by: INTERNAL MEDICINE

## 2019-01-17 PROCEDURE — 99214 OFFICE O/P EST MOD 30 MIN: CPT | Mod: S$GLB,,, | Performed by: INTERNAL MEDICINE

## 2019-01-17 PROCEDURE — 3077F SYST BP >= 140 MM HG: CPT | Mod: CPTII,S$GLB,, | Performed by: INTERNAL MEDICINE

## 2019-01-17 PROCEDURE — 99999 PR PBB SHADOW E&M-EST. PATIENT-LVL III: ICD-10-PCS | Mod: PBBFAC,,, | Performed by: INTERNAL MEDICINE

## 2019-01-17 PROCEDURE — 3008F PR BODY MASS INDEX (BMI) DOCUMENTED: ICD-10-PCS | Mod: CPTII,S$GLB,, | Performed by: INTERNAL MEDICINE

## 2019-01-17 RX ORDER — HYDRALAZINE HYDROCHLORIDE 100 MG/1
150 TABLET, FILM COATED ORAL 2 TIMES DAILY
Qty: 270 TABLET | Refills: 3
Start: 2019-01-17 | End: 2020-03-16

## 2019-01-17 RX ORDER — HYDROCHLOROTHIAZIDE 25 MG/1
25 TABLET ORAL DAILY
Qty: 90 TABLET | Refills: 3 | Status: SHIPPED | OUTPATIENT
Start: 2019-01-17 | End: 2019-01-29 | Stop reason: SDUPTHER

## 2019-01-17 RX ORDER — SPIRONOLACTONE 50 MG/1
50 TABLET, FILM COATED ORAL DAILY
Qty: 90 TABLET | Refills: 3 | Status: SHIPPED | OUTPATIENT
Start: 2019-01-17 | End: 2020-01-16

## 2019-01-17 NOTE — PROGRESS NOTES
CARDIOVASCULAR PROGRESS NOTE    REASON FOR CONSULT:   Kyeonna Guillen is a 58 y.o. female who presents for follow up of PAF, CAD/CABG, HTN.    PCP: Page  HISTORY OF PRESENT ILLNESS:   The patient comes in for follow-up accompanied by family member.  She has been followed by the digital hypertension program with note made of episodic elevated blood pressures for which she has been told to take clonidine as needed.  She also had a Renin/Aldosterone ration level checked which was elevated, although the aldosterone level was within normal limits as well as a renal artery ultrasound which did not reveal significant renal artery stenosis.  She did report some jaw discomfort when her blood pressure was elevated, but has had no further chest discomfort.  She does also describe some mild dyspnea on exertion.  She has had no palpitations, lightheadedness, dizziness, syncope.  There has been no PND, orthopnea, melena, hematuria, or claudicant symptoms.  Her lower extremity edema has resolved after stopping amlodipine.  Unfortunately, the patient has yet to get her echocardiogram, nor follow-up with the recommendation for sleep apnea evaluation.    CARDIOVASCULAR HISTORY:   CAD s/p CABG 4/5/11: LIMA-LAD, SVG-D, SVG-OM    PAF s/p FRANCISCO/DCCV 3/8/18, on Xarelto, CHADS VASC 2    Hx L vert art occlusion    PAST MEDICAL HISTORY:     Past Medical History:   Diagnosis Date    Atrial fibrillation     Coronary artery disease     Hyperlipidemia     Hypertension        PAST SURGICAL HISTORY:     Past Surgical History:   Procedure Laterality Date    BREAST BIOPSY      CARDIAC SURGERY      CORONARY ARTERY BYPASS GRAFT      HYSTERECTOMY      Transesophageal Echocardiogram (FRANCISCO) N/A 3/9/2018    Performed by José Khalil MD at [facility] CATH LAB       ALLERGIES AND MEDICATION:   Review of patient's allergies indicates:  No Known Allergies       Medication List           Accurate as of 1/17/19 11:25 AM. If you have any questions, ask  your nurse or doctor.               CONTINUE taking these medications    atorvastatin 40 MG tablet  Commonly known as:  LIPITOR  TAKE 1 TABLET BY MOUTH EVERY NIGHT AT BEDTIME     cloNIDine 0.2 MG tablet  Commonly known as:  CATAPRES  Take 1 tablet (0.2 mg total) by mouth 2 (two) times daily as needed (for SBP > 200 or DBP > 110).     hydrALAZINE 100 MG tablet  Commonly known as:  APRESOLINE     metoprolol succinate 50 MG 24 hr tablet  Commonly known as:  TOPROL-XL  Take 1 tablet (50 mg total) by mouth once daily.     olmesartan 40 MG tablet  Commonly known as:  BENICAR  Take 1 tablet (40 mg total) by mouth once daily.     rivaroxaban 20 mg Tab  Commonly known as:  XARELTO  Take 1 tablet (20 mg total) by mouth daily with dinner or evening meal.     spironolactone 25 MG tablet  Commonly known as:  ALDACTONE  Take 1 tablet (25 mg total) by mouth once daily.     triamterene-hydrochlorothiazide 37.5-25 mg 37.5-25 mg per tablet  Commonly known as:  MAXZIDE-25        STOP taking these medications    fluticasone 50 mcg/actuation nasal spray  Commonly known as:  FLONASE  Stopped by:  José Khalil MD              SOCIAL HISTORY:     Social History     Socioeconomic History    Marital status:      Spouse name: Not on file    Number of children: Not on file    Years of education: Not on file    Highest education level: Not on file   Social Needs    Financial resource strain: Not on file    Food insecurity - worry: Not on file    Food insecurity - inability: Not on file    Transportation needs - medical: Not on file    Transportation needs - non-medical: Not on file   Occupational History    Not on file   Tobacco Use    Smoking status: Never Smoker    Smokeless tobacco: Never Used   Substance and Sexual Activity    Alcohol use: No    Drug use: No    Sexual activity: Yes     Partners: Male     Birth control/protection: Surgical   Other Topics Concern    Not on file   Social History Narrative    Not  "on file       FAMILY HISTORY:     Family History   Problem Relation Age of Onset    Cancer Mother     Breast cancer Maternal Uncle        REVIEW OF SYSTEMS:   Review of Systems   Constitutional: Negative for chills, diaphoresis and fever.   HENT: Negative for nosebleeds.    Eyes: Negative for blurred vision, double vision and photophobia.   Respiratory: Positive for shortness of breath. Negative for hemoptysis and wheezing.    Cardiovascular: Negative for chest pain, palpitations, orthopnea, claudication, leg swelling and PND.   Gastrointestinal: Negative for abdominal pain, blood in stool, heartburn, melena, nausea and vomiting.   Genitourinary: Negative for flank pain and hematuria.   Musculoskeletal: Negative for falls, myalgias and neck pain.   Skin: Negative for rash.   Neurological: Negative for dizziness, seizures, loss of consciousness, weakness and headaches.   Endo/Heme/Allergies: Negative for polydipsia. Does not bruise/bleed easily.   Psychiatric/Behavioral: Negative for depression and memory loss. The patient is not nervous/anxious.        PHYSICAL EXAM:     Vitals:    01/17/19 1119   BP: (!) 144/68   Pulse: 62   Resp: 15    Body mass index is 35.43 kg/m².  Weight: 90.7 kg (200 lb)   Height: 5' 3" (160 cm)     Physical Exam   Constitutional: She is oriented to person, place, and time. She appears well-developed and well-nourished. She is cooperative.  Non-toxic appearance. No distress.   HENT:   Head: Normocephalic and atraumatic.   Eyes: Conjunctivae and EOM are normal. Pupils are equal, round, and reactive to light. No scleral icterus.   Neck: Trachea normal and normal range of motion. Neck supple. Normal carotid pulses and no JVD present. Carotid bruit is not present. No neck rigidity. No tracheal deviation and no edema present. No thyromegaly present.   Cardiovascular: Normal rate, regular rhythm, S1 normal and S2 normal. PMI is not displaced. Exam reveals no gallop and no friction rub.   No " murmur heard.  Pulses:       Carotid pulses are 2+ on the right side, and 2+ on the left side.  Pulmonary/Chest: Effort normal and breath sounds normal. No stridor. No respiratory distress. She has no wheezes. She has no rales. She exhibits no tenderness.   Abdominal: Soft. She exhibits no distension. There is no hepatosplenomegaly.   obese   Musculoskeletal: Normal range of motion. She exhibits no edema or tenderness.   Feet:   Right Foot:   Skin Integrity: Negative for ulcer.   Left Foot:   Skin Integrity: Negative for ulcer.   Neurological: She is alert and oriented to person, place, and time. No cranial nerve deficit.   Skin: Skin is warm and dry. No rash noted. No erythema.   Psychiatric: She has a normal mood and affect. Her speech is normal and behavior is normal.   Vitals reviewed.      DATA:   EKG: (personally reviewed tracing)  12/13/18 SR 72, LAD, NSSTTW changes    Laboratory:  CBC:  Recent Labs   Lab 04/06/18  1103 04/17/18  1344 11/16/18  1403   WHITE BLOOD CELL COUNT 6.29 5.03 6.65   HEMOGLOBIN 12.8 13.1 13.6   HEMATOCRIT 39.7 39.4 40.3   PLATELETS 284 275 284       CHEMISTRIES:  Recent Labs   Lab 03/09/18  0615 04/17/18  1344 11/16/18  1403   GLUCOSE 116 H 90 88   SODIUM 143 144 144   POTASSIUM 3.0 L 4.3 3.2 L   BUN BLD 21 H 20 15   CREATININE 0.9 0.9 0.8   EGFR IF AFRICAN AMERICAN >60 >60 >60   EGFR IF NON- >60 >60 >60   CALCIUM 9.5 10.3 9.5   MAGNESIUM 2.0 2.3  --        CARDIAC BIOMARKERS:  Recent Labs   Lab 03/09/18  0150 03/09/18  0826 04/17/18  1344   CPK  --   --  63   TROPONIN I 0.027 H 0.065 H  --        COAGS:  Recent Labs   Lab 04/17/18  1344   INR 1.1       LIPIDS/LFTS:  Recent Labs   Lab 03/09/18  0150 03/09/18  0615 04/17/18  1344 11/16/18  1403   CHOLESTEROL  --  158  --   --    TRIGLYCERIDES  --  142  --   --    HDL  --  39 L  --   --    LDL CHOLESTEROL  --  90.6  --   --    NON-HDL CHOLESTEROL  --  119  --   --    AST 23  --  19 18   ALT 34  --  21 20     Lab Results    Component Value Date    TSH 1.560 11/16/2018         Cardiovascular Testing:  Renal art US 1/10/19  Normal sized kidneys without evidence for nephrolithiasis or hydronephrosis.  There is slight increased peak systolic velocity within the right renal artery from the proximal to mid aspect from 50 cm/sec to 158 cm/sec which is likely related to vessel tortuosity and turbulent flow in light of the absent elevated resistive indices or ratios however underlying right renal artery stenosis cannot be excluded.  This could be further evaluated with CTA or MRA imaging.  No evidence for left renal artery elevated resistive indices or renal artery velocities to suggest renal artery stenosis.    Carotid US 4/4/18 (images pers rev, ?L vert occlusion)  TDS  There is 40 - 49% right Internal Carotid stenosis.  There is 0 - 19% left Internal Carotid stenosis.    [facility] MPI 4/4/18  Nuclear Quantitative Functional Analysis:   LVEF: 69 %  LVED Volume: 88 ml  LVES Volume: 27 ml  Impression: NORMAL MYOCARDIAL PERFUSION  1. The perfusion scan is free of evidence for myocardial ischemia or injury.   2. Resting wall motion is physiologic.   3. Resting LV function is normal.   4. The ventricular volumes are normal at rest and stress.   5. The extracardiac distribution of radioactivity is normal.     FRANCISCO/DCCV 3/9/18  Normal bivent size/fxn, EF 55%, normal wall motion  Normal appearing valves.  Trace AI, mild MR, mild TR  No LA/LON thrombus  Succcessful DCCV af->NSR 360J x1.  Plan:  Cont med rx  Cont Xarelto 20mg qd  OK for discharge later on today and follow up with me in the office 1 week for outpatient stress test.    Cath 3/25/11 ([facility], subsequently had CABG)  LM: normal  LAD sequential 90% prox and 80% mid stenoses   D2 prox 80%  LCx: MLI   OM2 prox 80%  RCA: dom, mid-dist 40% sequential stenoses    ASSESSMENT:   # HTN, uncontrolled (?hyperaldosteronism seems unlikely based on normal valdo level despite elevated Valdo/renin ratio)  # CAD  s/p CABG x3V 4/2011.  MPI 4/2018 normal.  ABEL noted.  # PAF s/p FRANCISCO/DCCV 3/9/18.  CHADS VASC 2 on Xarelto.  Currently in SR.  # HLP on atorva 40mg  # hx L vert art occlusion  # LE edema resolved off amlod    PLAN:   Cont med rx  BUCKY eval  Echo  Stop Triam/HCTZ  Stop Clonidine prn  Change hydrala from 100mg tid to 150mg bid (improve compliance)  Inc Aldact 50mg qd  Start HCTZ 25mg qd  Check BMP 1 week  Diet/exercise/weight loss, Na+ restriction  RTC 2 weeks      José Khalil MD, FACC

## 2019-01-23 ENCOUNTER — PATIENT OUTREACH (OUTPATIENT)
Dept: OTHER | Facility: OTHER | Age: 59
End: 2019-01-23

## 2019-01-23 DIAGNOSIS — R79.89 ABNORMAL ALDOSTERONE TO RENIN RATIO: Primary | ICD-10-CM

## 2019-01-23 NOTE — PROGRESS NOTES
"Last 5 Patient Entered Readings                                      Current 30 Day Average: 166/101     Recent Readings 1/21/2019 1/21/2019 1/19/2019 1/19/2019 1/18/2019    SBP (mmHg) 155 155 148 148 156    DBP (mmHg) 97 97 93 93 91    Pulse 77 - 57 - 77          Patient feeling better about her lower BP but average still very high.  States she is taking all meds as prescribed.  ?R renal art stenosis - saw Dr. Khalil on 1/17 - not sig per him. Aldosterone/Renin Ratio = 206 he does not believe it is significant w/ normal aldosterone (20.6). From the Interpretive Info: "An Aldosterone/Renin Activity Ratio >25 is sugg of hyperaldo if audra > 15." I am sending a message to her PCP to ask him to order the next lab test to confirm this.    Denies symptoms of elevated blood pressure: severe HA, change in vision or speech, numbness/tingling/weakness on one side of body, CP, SOB.     Will f/u.    "

## 2019-01-24 ENCOUNTER — HOSPITAL ENCOUNTER (OUTPATIENT)
Dept: CARDIOLOGY | Facility: HOSPITAL | Age: 59
Discharge: HOME | End: 2019-01-24
Attending: INTERNAL MEDICINE
Payer: COMMERCIAL

## 2019-01-24 VITALS — WEIGHT: 200 LBS | BODY MASS INDEX: 35.44 KG/M2 | HEIGHT: 63 IN

## 2019-01-24 DIAGNOSIS — R06.09 DOE (DYSPNEA ON EXERTION): ICD-10-CM

## 2019-01-24 DIAGNOSIS — I25.810 CORONARY ARTERY DISEASE INVOLVING CORONARY BYPASS GRAFT OF NATIVE HEART WITHOUT ANGINA PECTORIS: ICD-10-CM

## 2019-01-24 LAB
AORTIC ROOT ANNULUS: 3.08 CM
AORTIC VALVE CUSP SEPERATION: 2.22 CM
ASCENDING AORTA: 3.18 CM
AV INDEX (PROSTH): 0.83
AV MEAN GRADIENT: 3.35 MMHG
AV PEAK GRADIENT: 6.35 MMHG
AV VALVE AREA: 3.4 CM2
AV VELOCITY RATIO: 0.7
BSA FOR ECHO PROCEDURE: 2.01 M2
CV ECHO LV RWT: 0.35 CM
DOP CALC AO PEAK VEL: 1.26 M/S
DOP CALC AO VTI: 27.02 CM
DOP CALC LVOT AREA: 4.12 CM2
DOP CALC LVOT DIAMETER: 2.29 CM
DOP CALC LVOT PEAK VEL: 0.88 M/S
DOP CALC LVOT STROKE VOLUME: 91.88 CM3
DOP CALCLVOT PEAK VEL VTI: 22.32 CM
E WAVE DECELERATION TIME: 143.95 MSEC
E/A RATIO: 1.61
ECHO LV POSTERIOR WALL: 1 CM (ref 0.6–1.1)
FRACTIONAL SHORTENING: 45 % (ref 28–44)
INTERVENTRICULAR SEPTUM: 0.86 CM (ref 0.6–1.1)
IVRT: 0.09 MSEC
LA MAJOR: 5.09 CM
LA MINOR: 5.14 CM
LA WIDTH: 4.2 CM
LEFT ATRIUM SIZE: 4.55 CM
LEFT ATRIUM VOLUME INDEX: 43 ML/M2
LEFT ATRIUM VOLUME: 83.08 CM3
LEFT INTERNAL DIMENSION IN SYSTOLE: 3.1 CM (ref 2.1–4)
LEFT VENTRICLE DIASTOLIC VOLUME INDEX: 81.81 ML/M2
LEFT VENTRICLE DIASTOLIC VOLUME: 158.2 ML
LEFT VENTRICLE MASS INDEX: 105.6 G/M2
LEFT VENTRICLE SYSTOLIC VOLUME INDEX: 19.6 ML/M2
LEFT VENTRICLE SYSTOLIC VOLUME: 37.82 ML
LEFT VENTRICULAR INTERNAL DIMENSION IN DIASTOLE: 5.67 CM (ref 3.5–6)
LEFT VENTRICULAR MASS: 204.17 G
MV PEAK A VEL: 0.61 M/S
MV PEAK E VEL: 0.98 M/S
PISA TR MAX VEL: 2.53 M/S
PV PEAK VELOCITY: 0.96 CM/S
RA MAJOR: 4.95 CM
RA PRESSURE: 8 MMHG
RA WIDTH: 4.21 CM
RIGHT VENTRICULAR END-DIASTOLIC DIMENSION: 3.66 CM
RV TISSUE DOPPLER FREE WALL SYSTOLIC VELOCITY 1 (APICAL 4 CHAMBER VIEW): 10.3 M/S
SINUS: 3.49 CM
STJ: 2.73 CM
TR MAX PG: 25.6 MMHG
TRICUSPID ANNULAR PLANE SYSTOLIC EXCURSION: 2.24 CM
TV REST PULMONARY ARTERY PRESSURE: 34 MMHG

## 2019-01-24 PROCEDURE — 93306 TTE W/DOPPLER COMPLETE: CPT

## 2019-01-24 PROCEDURE — 93306 TTE W/DOPPLER COMPLETE: CPT | Mod: 26,,, | Performed by: INTERNAL MEDICINE

## 2019-01-24 PROCEDURE — 93306 TRANSTHORACIC ECHO (TTE) COMPLETE (CUPID ONLY): ICD-10-PCS | Mod: 26,,, | Performed by: INTERNAL MEDICINE

## 2019-01-25 ENCOUNTER — PATIENT OUTREACH (OUTPATIENT)
Dept: OTHER | Facility: OTHER | Age: 59
End: 2019-01-25

## 2019-01-25 NOTE — PROGRESS NOTES
Last 5 Patient Entered Readings                                      Current 30 Day Average: 167/101     Recent Readings 1/25/2019 1/25/2019 1/24/2019 1/24/2019 1/24/2019    SBP (mmHg) 191 191 146 146 204    DBP (mmHg) 109 109 88 88 113    Pulse 74 - 74 - 63          Called patient as received ALERT about elevated BP.  She denies signs or symptoms of elevated BP: headache, change in vision, numbness/tingling on one side, chest pain or shortness of breath. Instructed patient to increase Spironolactone to 1.5 tablets daily.     Sending her the number to schedule an Endocrinology visit.     Will f/u

## 2019-01-28 NOTE — PROGRESS NOTES
Last 5 Patient Entered Readings                                      Current 30 Day Average: 165/101     Recent Readings 1/25/2019 1/25/2019 1/25/2019 1/25/2019 1/24/2019    SBP (mmHg) 166 166 191 191 146    DBP (mmHg) 96 96 109 109 88    Pulse 75 - 74 - 74          1/28: Clinician is working with patient on medication adjustments.  Pushing call back.

## 2019-01-29 ENCOUNTER — OFFICE VISIT (OUTPATIENT)
Dept: CARDIOLOGY | Facility: CLINIC | Age: 59
End: 2019-01-29
Payer: COMMERCIAL

## 2019-01-29 ENCOUNTER — PATIENT OUTREACH (OUTPATIENT)
Dept: OTHER | Facility: OTHER | Age: 59
End: 2019-01-29

## 2019-01-29 VITALS
DIASTOLIC BLOOD PRESSURE: 86 MMHG | OXYGEN SATURATION: 98 % | HEART RATE: 62 BPM | WEIGHT: 199.75 LBS | SYSTOLIC BLOOD PRESSURE: 182 MMHG | RESPIRATION RATE: 15 BRPM | HEIGHT: 63 IN | BODY MASS INDEX: 35.39 KG/M2

## 2019-01-29 DIAGNOSIS — I48.0 PAROXYSMAL ATRIAL FIBRILLATION: ICD-10-CM

## 2019-01-29 DIAGNOSIS — E66.9 NON MORBID OBESITY, UNSPECIFIED OBESITY TYPE: ICD-10-CM

## 2019-01-29 DIAGNOSIS — I10 ESSENTIAL HYPERTENSION: Primary | Chronic | ICD-10-CM

## 2019-01-29 DIAGNOSIS — I65.02 OCCLUSION OF LEFT VERTEBRAL ARTERY: ICD-10-CM

## 2019-01-29 DIAGNOSIS — I25.810 CORONARY ARTERY DISEASE INVOLVING CORONARY BYPASS GRAFT OF NATIVE HEART WITHOUT ANGINA PECTORIS: ICD-10-CM

## 2019-01-29 DIAGNOSIS — E78.49 OTHER HYPERLIPIDEMIA: ICD-10-CM

## 2019-01-29 PROCEDURE — 3079F DIAST BP 80-89 MM HG: CPT | Mod: CPTII,S$GLB,, | Performed by: INTERNAL MEDICINE

## 2019-01-29 PROCEDURE — 99214 OFFICE O/P EST MOD 30 MIN: CPT | Mod: S$GLB,,, | Performed by: INTERNAL MEDICINE

## 2019-01-29 PROCEDURE — 99214 PR OFFICE/OUTPT VISIT, EST, LEVL IV, 30-39 MIN: ICD-10-PCS | Mod: S$GLB,,, | Performed by: INTERNAL MEDICINE

## 2019-01-29 PROCEDURE — 99999 PR PBB SHADOW E&M-EST. PATIENT-LVL III: ICD-10-PCS | Mod: PBBFAC,,, | Performed by: INTERNAL MEDICINE

## 2019-01-29 PROCEDURE — 3077F SYST BP >= 140 MM HG: CPT | Mod: CPTII,S$GLB,, | Performed by: INTERNAL MEDICINE

## 2019-01-29 PROCEDURE — 99999 PR PBB SHADOW E&M-EST. PATIENT-LVL III: CPT | Mod: PBBFAC,,, | Performed by: INTERNAL MEDICINE

## 2019-01-29 PROCEDURE — 3008F PR BODY MASS INDEX (BMI) DOCUMENTED: ICD-10-PCS | Mod: CPTII,S$GLB,, | Performed by: INTERNAL MEDICINE

## 2019-01-29 PROCEDURE — 3008F BODY MASS INDEX DOCD: CPT | Mod: CPTII,S$GLB,, | Performed by: INTERNAL MEDICINE

## 2019-01-29 PROCEDURE — 3079F PR MOST RECENT DIASTOLIC BLOOD PRESSURE 80-89 MM HG: ICD-10-PCS | Mod: CPTII,S$GLB,, | Performed by: INTERNAL MEDICINE

## 2019-01-29 PROCEDURE — 3077F PR MOST RECENT SYSTOLIC BLOOD PRESSURE >= 140 MM HG: ICD-10-PCS | Mod: CPTII,S$GLB,, | Performed by: INTERNAL MEDICINE

## 2019-01-29 RX ORDER — HYDROCHLOROTHIAZIDE 50 MG/1
50 TABLET ORAL DAILY
Qty: 90 TABLET | Refills: 3 | Status: SHIPPED | OUTPATIENT
Start: 2019-01-29 | End: 2019-09-12

## 2019-01-29 RX ORDER — ISOSORBIDE MONONITRATE 60 MG/1
60 TABLET, EXTENDED RELEASE ORAL DAILY
Qty: 90 TABLET | Refills: 3 | Status: SHIPPED | OUTPATIENT
Start: 2019-01-29 | End: 2019-04-25

## 2019-01-29 NOTE — PROGRESS NOTES
Last 5 Patient Entered Readings                                      Current 30 Day Average: 166/101     Recent Readings 1/28/2019 1/28/2019 1/25/2019 1/25/2019 1/25/2019    SBP (mmHg) 183 183 166 166 191    DBP (mmHg) 107 107 96 96 109    Pulse 59 - 75 - 74          Called patient as received ALERT about elevated BP.  She denies signs or symptoms of elevated BP: headache, change in vision, numbness/tingling on one side, chest pain or shortness of breath.     She is currently taking Spironolactone 75 mg daily, up from 50 mg.  She has a Cardiology appt today at 1020. She reports that she has not had increased urination with increased spironolactone. She states instead her urine is more concentrated.      Will f/u after today's appt.

## 2019-01-29 NOTE — PROGRESS NOTES
CARDIOVASCULAR PROGRESS NOTE    REASON FOR CONSULT:   Keyonna Guillen is a 58 y.o. female who presents for follow up of PAF, CAD/CABG, HTN.    PCP: Page  HISTORY OF PRESENT ILLNESS:   The patient comes in for follow-up.  She reports a generally stable status, although his does describe occasional jaw discomfort.  This does not appear to be exertionally related, but may be related to uncontrolled high blood pressure.  She has had no shortness of breath, palpitations, lightheadedness, dizziness, or syncope.  There has been no PND, orthopnea, or lower extremity edema.  She denies melena, hematuria, or claudicant symptoms.  She notes that her blood pressure became uncontrolled when she was switched from losartan to olmesartan, as well as her amlodipine stopped for lower extremity edema.    Vitals - 1 value per visit 6/25/2018 11/14/2018 12/13/2018 1/17/2019   SYSTOLIC 150 170 142 144   DIASTOLIC 100 94 84 68     Vitals - 1 value per visit 1/24/2019 1/29/2019   SYSTOLIC  182   DIASTOLIC  86       CARDIOVASCULAR HISTORY:   CAD s/p CABG 4/5/11: LIMA-LAD, SVG-D, SVG-OM    PAF s/p FRANCISCO/DCCV 3/8/18, on Xarelto, CHADS VASC 2    Hx L vert art occlusion    PAST MEDICAL HISTORY:     Past Medical History:   Diagnosis Date    Atrial fibrillation     Coronary artery disease     Hyperlipidemia     Hypertension        PAST SURGICAL HISTORY:     Past Surgical History:   Procedure Laterality Date    BREAST BIOPSY      CARDIAC SURGERY      CORONARY ARTERY BYPASS GRAFT      HYSTERECTOMY      Transesophageal Echocardiogram (FRANCISCO) N/A 3/9/2018    Performed by José Khalil MD at [facility] CATH LAB       ALLERGIES AND MEDICATION:   Review of patient's allergies indicates:  No Known Allergies       Medication List           Accurate as of 1/29/19 10:02 AM. If you have any questions, ask your nurse or doctor.               CONTINUE taking these medications    atorvastatin 40 MG tablet  Commonly known as:  LIPITOR  TAKE 1 TABLET BY MOUTH  EVERY NIGHT AT BEDTIME     hydrALAZINE 100 MG tablet  Commonly known as:  APRESOLINE  Take 1.5 tablets (150 mg total) by mouth 2 (two) times daily.     hydroCHLOROthiazide 25 MG tablet  Commonly known as:  HYDRODIURIL  Take 1 tablet (25 mg total) by mouth once daily.     metoprolol succinate 50 MG 24 hr tablet  Commonly known as:  TOPROL-XL  Take 1 tablet (50 mg total) by mouth once daily.     olmesartan 40 MG tablet  Commonly known as:  BENICAR  Take 1 tablet (40 mg total) by mouth once daily.     rivaroxaban 20 mg Tab  Commonly known as:  XARELTO  Take 1 tablet (20 mg total) by mouth daily with dinner or evening meal.     spironolactone 50 MG tablet  Commonly known as:  ALDACTONE  Take 1 tablet (50 mg total) by mouth once daily.              SOCIAL HISTORY:     Social History     Socioeconomic History    Marital status:      Spouse name: Not on file    Number of children: Not on file    Years of education: Not on file    Highest education level: Not on file   Social Needs    Financial resource strain: Not on file    Food insecurity - worry: Not on file    Food insecurity - inability: Not on file    Transportation needs - medical: Not on file    Transportation needs - non-medical: Not on file   Occupational History    Not on file   Tobacco Use    Smoking status: Never Smoker    Smokeless tobacco: Never Used   Substance and Sexual Activity    Alcohol use: No    Drug use: No    Sexual activity: Yes     Partners: Male     Birth control/protection: Surgical   Other Topics Concern    Not on file   Social History Narrative    Not on file       FAMILY HISTORY:     Family History   Problem Relation Age of Onset    Cancer Mother     Breast cancer Maternal Uncle        REVIEW OF SYSTEMS:   Review of Systems   Constitutional: Negative for chills, diaphoresis and fever.   HENT: Negative for nosebleeds.    Eyes: Negative for blurred vision, double vision and photophobia.   Respiratory: Negative for  "hemoptysis, shortness of breath and wheezing.    Cardiovascular: Negative for chest pain, palpitations, orthopnea, claudication, leg swelling and PND.   Gastrointestinal: Negative for abdominal pain, blood in stool, heartburn, melena, nausea and vomiting.   Genitourinary: Negative for flank pain and hematuria.   Musculoskeletal: Negative for falls, myalgias and neck pain.   Skin: Negative for rash.   Neurological: Negative for dizziness, seizures, loss of consciousness, weakness and headaches.   Endo/Heme/Allergies: Negative for polydipsia. Does not bruise/bleed easily.   Psychiatric/Behavioral: Negative for depression and memory loss. The patient is not nervous/anxious.        PHYSICAL EXAM:     Vitals:    01/29/19 0955   BP: (!) 182/86   Pulse: 62   Resp: 15    Body mass index is 35.38 kg/m².  Weight: 90.6 kg (199 lb 11.8 oz)   Height: 5' 3" (160 cm)     Physical Exam   Constitutional: She is oriented to person, place, and time. She appears well-developed and well-nourished. She is cooperative.  Non-toxic appearance. No distress.   HENT:   Head: Normocephalic and atraumatic.   Eyes: Conjunctivae and EOM are normal. Pupils are equal, round, and reactive to light. No scleral icterus.   Neck: Trachea normal and normal range of motion. Neck supple. Normal carotid pulses and no JVD present. Carotid bruit is not present. No neck rigidity. No tracheal deviation and no edema present. No thyromegaly present.   Cardiovascular: Normal rate, regular rhythm, S1 normal and S2 normal. PMI is not displaced. Exam reveals no gallop and no friction rub.   No murmur heard.  Pulses:       Carotid pulses are 2+ on the right side, and 2+ on the left side.  Pulmonary/Chest: Effort normal and breath sounds normal. No stridor. No respiratory distress. She has no wheezes. She has no rales. She exhibits no tenderness.   Abdominal: Soft. She exhibits no distension. There is no hepatosplenomegaly.   obese   Musculoskeletal: Normal range of " motion. She exhibits no edema or tenderness.   Feet:   Right Foot:   Skin Integrity: Negative for ulcer.   Left Foot:   Skin Integrity: Negative for ulcer.   Neurological: She is alert and oriented to person, place, and time. No cranial nerve deficit.   Skin: Skin is warm and dry. No rash noted. No erythema.   Psychiatric: She has a normal mood and affect. Her speech is normal and behavior is normal.   Vitals reviewed.      DATA:   EKG: (personally reviewed tracing)  12/13/18 SR 72, LAD, NSSTTW changes    Laboratory:  CBC:  Recent Labs   Lab 04/06/18  1103 04/17/18  1344 11/16/18  1403   WHITE BLOOD CELL COUNT 6.29 5.03 6.65   HEMOGLOBIN 12.8 13.1 13.6   HEMATOCRIT 39.7 39.4 40.3   PLATELETS 284 275 284       CHEMISTRIES:  Recent Labs   Lab 03/09/18  0615 04/17/18  1344 11/16/18  1403 01/24/19  1125   GLUCOSE 116 H 90 88 96   SODIUM 143 144 144 143   POTASSIUM 3.0 L 4.3 3.2 L 4.5   BUN BLD 21 H 20 15 16   CREATININE 0.9 0.9 0.8 1.0   EGFR IF AFR[place] AMERICAN >60 >60 >60 >60   EGFR IF NON- >60 >60 >60 >60   CALCIUM 9.5 10.3 9.5 9.8   MAGNESIUM 2.0 2.3  --   --        CARDIAC BIOMARKERS:  Recent Labs   Lab 03/09/18  0150 03/09/18  0826 04/17/18  1344   CPK  --   --  63   TROPONIN I 0.027 H 0.065 H  --        COAGS:  Recent Labs   Lab 04/17/18  1344   INR 1.1       LIPIDS/LFTS:  Recent Labs   Lab 03/09/18  0150 03/09/18  0615 04/17/18  1344 11/16/18  1403   CHOLESTEROL  --  158  --   --    TRIGLYCERIDES  --  142  --   --    HDL  --  39 L  --   --    LDL CHOLESTEROL  --  90.6  --   --    NON-HDL CHOLESTEROL  --  119  --   --    AST 23  --  19 18   ALT 34  --  21 20     Lab Results   Component Value Date    TSH 1.560 11/16/2018         Cardiovascular Testing:  Echo 1/24/19  · Normal left ventricular systolic function. The estimated ejection fraction is 60%  · Mild left ventricular enlargement.  · Eccentric left ventricular hypertrophy.  · No wall motion abnormalities.  · Normal right ventricular systolic  function.  · Mild tricuspid regurgitation.  · The estimated PA systolic pressure is 34 mm Hg    Renal art US 1/10/19  Normal sized kidneys without evidence for nephrolithiasis or hydronephrosis.  There is slight increased peak systolic velocity within the right renal artery from the proximal to mid aspect from 50 cm/sec to 158 cm/sec which is likely related to vessel tortuosity and turbulent flow in light of the absent elevated resistive indices or ratios however underlying right renal artery stenosis cannot be excluded.  This could be further evaluated with CTA or MRA imaging.  No evidence for left renal artery elevated resistive indices or renal artery velocities to suggest renal artery stenosis.    Carotid US 4/4/18 (images pers rev, ?L vert occlusion)  TDS  There is 40 - 49% right Internal Carotid stenosis.  There is 0 - 19% left Internal Carotid stenosis.    [facility] MPI 4/4/18  Nuclear Quantitative Functional Analysis:   LVEF: 69 %  LVED Volume: 88 ml  LVES Volume: 27 ml  Impression: NORMAL MYOCARDIAL PERFUSION  1. The perfusion scan is free of evidence for myocardial ischemia or injury.   2. Resting wall motion is physiologic.   3. Resting LV function is normal.   4. The ventricular volumes are normal at rest and stress.   5. The extracardiac distribution of radioactivity is normal.     FRANCISCO/DCCV 3/9/18  Normal bivent size/fxn, EF 55%, normal wall motion  Normal appearing valves.  Trace AI, mild MR, mild TR  No LA/LON thrombus  Succcessful DCCV af->NSR 360J x1.  Plan:  Cont med rx  Cont Xarelto 20mg qd  OK for discharge later on today and follow up with me in the office 1 week for outpatient stress test.    Cath 3/25/11 ([facility], subsequently had CABG)  LM: normal  LAD sequential 90% prox and 80% mid stenoses   D2 prox 80%  LCx: MLI   OM2 prox 80%  RCA: dom, mid-dist 40% sequential stenoses    ASSESSMENT:   # HTN, uncontrolled (?hyperaldosteronism seems unlikely based on normal valdo level despite elevated Valdo/renin  ratio)  # CAD s/p CABG x3V 4/2011.  MPI 4/2018 normal.  ABEL noted.  # PAF s/p FRANCISCO/DCCV 3/9/18.  CHADS VASC 2 on Xarelto.  Currently in SR.  # HLP on atorva 40mg  # hx L vert art occlusion  # LE edema resolved off amlod  # BMI 35    PLAN:   Cont med rx  BUCKY eval planned 2/11/19  Inc HCTZ 50mg qd  Add Imdur 60mg qd  Check BMP 2 weeks  Diet/exercise/weight loss, Na+ restriction  Cont BP monitoring in digital HTN program  RTC 1 month  Seeing as how the patient's blood pressure was previously controlled on losartan and amlodipine, I may consider reinstituting the losartan in place of her current ARB assuming we can find an un recalled patch, as well as restarting a different dihydropyridine calcium channel blocker (?nifedipine).      José Khalil MD, FACC

## 2019-01-30 ENCOUNTER — PATIENT OUTREACH (OUTPATIENT)
Dept: OTHER | Facility: OTHER | Age: 59
End: 2019-01-30

## 2019-01-30 RX ORDER — LOSARTAN POTASSIUM 100 MG/1
100 TABLET ORAL DAILY
COMMUNITY
Start: 2019-01-31 | End: 2019-04-25 | Stop reason: ALTCHOICE

## 2019-01-30 NOTE — PROGRESS NOTES
Last 5 Patient Entered Readings                                      Current 30 Day Average: 168/102     Recent Readings 1/30/2019 1/30/2019 1/29/2019 1/29/2019 1/29/2019    SBP (mmHg) 193 193 173 173 205    DBP (mmHg) 107 107 100 100 109    Pulse 71 - 70 - 58        Called patient as received ALERT about elevated BP.  She denies signs or symptoms of elevated BP: headache, change in vision, numbness/tingling on one side, chest pain or shortness of breath.     Pt started on 2 new medications by Cardiology on 1/29/19.  Dr. Khalil indicated in his note that he would consider changing Olmesartan back to Losartan 100 mg as it seems patient was better controlled on that.  I instructed her to do that and checked to be sure it was not in the recalled lot from [facility].      Hypertension Medications             losartan (COZAAR) 100 MG tablet Starting on 1/31/2019. Take 100 mg by mouth once daily.    hydrALAZINE (APRESOLINE) 100 MG tablet Take 1.5 tablets (150 mg total) by mouth 2 (two) times daily.    hydroCHLOROthiazide (HYDRODIURIL) 50 MG tablet Take 1 tablet (50 mg total) by mouth once daily.    isosorbide mononitrate (IMDUR) 60 MG 24 hr tablet Take 1 tablet (60 mg total) by mouth once daily.    metoprolol succinate (TOPROL-XL) 50 MG 24 hr tablet Take 1 tablet (50 mg total) by mouth once daily.    olmesartan (BENICAR) 40 MG tablet Take 1 tablet (40 mg total) by mouth once daily. Stop on 1/31/2019    spironolactone (ALDACTONE) 50 MG tablet Take 1 tablet (50 mg total) by mouth once daily.

## 2019-01-31 PROBLEM — I1A.0 RESISTANT HYPERTENSION: Status: ACTIVE | Noted: 2019-01-31

## 2019-02-01 ENCOUNTER — PATIENT OUTREACH (OUTPATIENT)
Dept: OTHER | Facility: OTHER | Age: 59
End: 2019-02-01

## 2019-02-01 NOTE — PROGRESS NOTES
Last 5 Patient Entered Readings                                      Current 30 Day Average: 165/101     Recent Readings 1/31/2019 1/31/2019 1/31/2019 1/31/2019 1/31/2019    SBP (mmHg) 138 138 164 164 163    DBP (mmHg) 87 87 103 103 98    Pulse 84 - 73 - 54        Contacted patient to ask that she stop the spironolactone as requested by the Endocrinologist. See below:    MD Estefani Zuleta PA-C Hi Renee,   I looked at her chart and agree she should see us. This looks a lot like primary hyperaldosteronism with the difficult to control BP and hypokalemia. Unfortunately this is best tested for with patients off aldactone for at least 6 weeks. Would it be possible for you to substitute another medication so we can get her off for testing?     Our clinic is very full these days but let me look into a sooner appointment. It would be most efficient if I see her after she is off the aldactone so if you can do this please let me know so I can plan.   Best,   Carly Coulter      Will adjust other medication as needed for BP control.  Can also change metoprolol succinate to labetalol if needed.

## 2019-02-05 ENCOUNTER — PATIENT OUTREACH (OUTPATIENT)
Dept: OTHER | Facility: OTHER | Age: 59
End: 2019-02-05

## 2019-02-05 RX ORDER — CLONIDINE HYDROCHLORIDE 0.2 MG/1
0.2 TABLET ORAL NIGHTLY
COMMUNITY
End: 2019-05-03 | Stop reason: ALTCHOICE

## 2019-02-05 NOTE — PROGRESS NOTES
Last 5 Patient Entered Readings                                      Current 30 Day Average: 164/100     Recent Readings 2/5/2019 2/5/2019 2/5/2019 2/5/2019 2/3/2019    SBP (mmHg) 144 144 104 104 160    DBP (mmHg) 81 81 74 74 99    Pulse 67 - 59 - 77          Low reading this morning from taking too much medicine last night. Pt got confused and took BOTH the olmesartan and losartan along with clonidine 0.2 which she restarted on her own, hydralazine and metoprolol.  She realizes what she did and will move the olmesartan to a different room.  Spoke with her about using clonidine safely and not stopping abruptly or missing doses if she is committed to taking it nightly.  She verbalized understanding.    Will f/u.

## 2019-02-08 ENCOUNTER — PATIENT OUTREACH (OUTPATIENT)
Dept: OTHER | Facility: OTHER | Age: 59
End: 2019-02-08

## 2019-02-08 DIAGNOSIS — I10 ESSENTIAL HYPERTENSION: Primary | Chronic | ICD-10-CM

## 2019-02-08 RX ORDER — LABETALOL 200 MG/1
200 TABLET, FILM COATED ORAL 2 TIMES DAILY
Qty: 60 TABLET | Refills: 2 | Status: SHIPPED | OUTPATIENT
Start: 2019-02-08 | End: 2019-10-03

## 2019-02-08 NOTE — PROGRESS NOTES
Last 5 Patient Entered Readings                                      Current 30 Day Average: 164/99     Recent Readings 2/8/2019 2/8/2019 2/7/2019 2/7/2019 2/6/2019    SBP (mmHg) 205 205 175 175 154    DBP (mmHg) 109 109 94 94 90    Pulse 65 - 72 - 61          Called patient as received ALERT about elevated BP.  She denies signs or symptoms of elevated BP: headache, change in vision, numbness/tingling on one side, chest pain or shortness of breath.     Switching from metoprolol succinate to labetalol for better BP control.    Hypertension Medications             cloNIDine (CATAPRES) 0.2 MG tablet Take 0.2 mg by mouth every evening.    hydrALAZINE (APRESOLINE) 100 MG tablet Take 1.5 tablets (150 mg total) by mouth 2 (two) times daily.    hydroCHLOROthiazide (HYDRODIURIL) 50 MG tablet Take 1 tablet (50 mg total) by mouth once daily.    isosorbide mononitrate (IMDUR) 60 MG 24 hr tablet Take 1 tablet (60 mg total) by mouth once daily.    labetalol (NORMODYNE) 200 MG tablet Take 1 tablet (200 mg total) by mouth 2 (two) times daily.    losartan (COZAAR) 100 MG tablet Take 100 mg by mouth once daily.    olmesartan (BENICAR) 40 MG tablet Take 1 tablet (40 mg total) by mouth once daily.    spironolactone (ALDACTONE) 50 MG tablet Take 1 tablet (50 mg total) by mouth once daily.

## 2019-02-11 ENCOUNTER — PATIENT OUTREACH (OUTPATIENT)
Dept: OTHER | Facility: OTHER | Age: 59
End: 2019-02-11

## 2019-02-11 NOTE — PROGRESS NOTES
"Last 5 Patient Entered Readings                                      Current 30 Day Average: 163/97     Recent Readings 2/9/2019 2/9/2019 2/9/2019 2/9/2019 2/9/2019    SBP (mmHg) 133 133 141 141 188    DBP (mmHg) 83 83 79 79 109    Pulse 73 - 71 - 77          Patient called to report severe muscle cramping and pain after taking Labetalol for the past 2 days.  According to [employer], Labetalol can cause asthenia, muscle cramps and myopathy. Pt reports that it lasts for "about an hour after taking medicine" then resolves.  Pt was also taking BOTH olmesartan and losartan instead of just the Losartan that Cardiology restarted.  She will stop the olmesartan AND cut the Labetalol in half to make 100 mg BID instead of 200 mg BID.  She has a BMP scheduled for tomorrow so will check electrolytes and renal function then.      Will f/u later this week.   "

## 2019-02-12 ENCOUNTER — LAB VISIT (OUTPATIENT)
Dept: LAB | Facility: HOSPITAL | Age: 59
End: 2019-02-12
Attending: INTERNAL MEDICINE
Payer: COMMERCIAL

## 2019-02-12 DIAGNOSIS — I10 ESSENTIAL HYPERTENSION: Chronic | ICD-10-CM

## 2019-02-12 LAB
ANION GAP SERPL CALC-SCNC: 8 MMOL/L
BUN SERPL-MCNC: 21 MG/DL
CALCIUM SERPL-MCNC: 9.8 MG/DL
CHLORIDE SERPL-SCNC: 109 MMOL/L
CO2 SERPL-SCNC: 28 MMOL/L
CREAT SERPL-MCNC: 0.9 MG/DL
EST. GFR  (AFRICAN AMERICAN): >60 ML/MIN/1.73 M^2
EST. GFR  (NON AFRICAN AMERICAN): >60 ML/MIN/1.73 M^2
GLUCOSE SERPL-MCNC: 105 MG/DL
POTASSIUM SERPL-SCNC: 3.4 MMOL/L
SODIUM SERPL-SCNC: 145 MMOL/L

## 2019-02-12 PROCEDURE — 36415 COLL VENOUS BLD VENIPUNCTURE: CPT

## 2019-02-12 PROCEDURE — 80048 BASIC METABOLIC PNL TOTAL CA: CPT

## 2019-02-14 ENCOUNTER — PATIENT OUTREACH (OUTPATIENT)
Dept: OTHER | Facility: OTHER | Age: 59
End: 2019-02-14

## 2019-02-14 DIAGNOSIS — I10 ESSENTIAL HYPERTENSION: Primary | Chronic | ICD-10-CM

## 2019-02-14 NOTE — PROGRESS NOTES
Last 5 Patient Entered Readings                                      Current 30 Day Average: 161/97     Recent Readings 2/12/2019 2/12/2019 2/11/2019 2/11/2019 2/9/2019    SBP (mmHg) 143 143 166 166 133    DBP (mmHg) 92 92 95 95 83    Pulse 68 - 69 - 73        Readings improving.  Pt no longer having severe leg cramps on Labetalol 100 mg BID (down from 200 mg BID).  Encouraged her to drink a glass of orange juice daily to improve her potassium and increase her fluid intake after reviewing BMP from 2/12 with her.  She verbalized understanding.      Hypertension Medications             cloNIDine (CATAPRES) 0.2 MG tablet Take 0.2 mg by mouth every evening.    hydrALAZINE (APRESOLINE) 100 MG tablet Take 1.5 tablets (150 mg total) by mouth 2 (two) times daily.    hydroCHLOROthiazide (HYDRODIURIL) 50 MG tablet Take 1 tablet (50 mg total) by mouth once daily.    isosorbide mononitrate (IMDUR) 60 MG 24 hr tablet Take 1 tablet (60 mg total) by mouth once daily.    labetalol (NORMODYNE) 200 MG tablet Take 1 tablet (200 mg total) by mouth 2 (two) times daily. Taking only 100 mg BID     losartan (COZAAR) 100 MG tablet Take 100 mg by mouth once daily.    olmesartan (BENICAR) 40 MG tablet Take 1 tablet (40 mg total) by mouth once daily.    spironolactone (ALDACTONE) 50 MG tablet Take 1 tablet (50 mg total) by mouth once daily. On HOLD        Will f/u.

## 2019-02-15 ENCOUNTER — PATIENT OUTREACH (OUTPATIENT)
Dept: OTHER | Facility: OTHER | Age: 59
End: 2019-02-15

## 2019-02-15 DIAGNOSIS — I10 ESSENTIAL HYPERTENSION: Primary | Chronic | ICD-10-CM

## 2019-02-15 NOTE — PROGRESS NOTES
Last 5 Patient Entered Readings                                      Current 30 Day Average: 162/97     Recent Readings 2/18/2019 2/18/2019 2/18/2019 2/18/2019 2/17/2019    SBP (mmHg) 154 154 183 183 143    DBP (mmHg) 88 88 106 106 78    Pulse 59 - 66 - 79        Called patient as received ALERT about elevated BP.  She reports chest pressure and shortness of breath.  States that she sat quietly and it went away.  Instructed patient to call 911 if this happens again.  She states that she had taken a clonidine a few hours earlier that evening.   Denies headache, change in vision, numbness/tingling on one side.       Hypertension Medications             cloNIDine (CATAPRES) 0.2 MG tablet Take 0.1 mg by mouth every evening PRN SBP > 180    hydrALAZINE (APRESOLINE) 100 MG tablet Take 1.5 tablets (150 mg total) by mouth 2 (two) times daily.    hydroCHLOROthiazide (HYDRODIURIL) 50 MG tablet Take 1 tablet (50 mg total) by mouth once daily.    isosorbide mononitrate (IMDUR) 60 MG 24 hr tablet Take 1 tablet (60 mg total) by mouth once daily.    labetalol (NORMODYNE) 200 MG tablet Take 1 tablet (200 mg total) by mouth 2 (two) times daily.    losartan (COZAAR) 100 MG tablet Take 100 mg by mouth once daily.    olmesartan (BENICAR) 40 MG tablet Take 1 tablet (40 mg total) by mouth once daily. *On Hold.     spironolactone (ALDACTONE) 50 MG tablet Take 1 tablet (50 mg total) by mouth once daily. *On Hold.         F/U in 1 week.

## 2019-02-20 ENCOUNTER — TELEPHONE (OUTPATIENT)
Dept: ENDOCRINOLOGY | Facility: CLINIC | Age: 59
End: 2019-02-20

## 2019-02-20 NOTE — TELEPHONE ENCOUNTER
----- Message from Carly Coulter MD sent at 2/20/2019  7:59 AM CST -----  Regarding: FW: hyperaldosteronism   Can you look for new spot for this patient? March would be best  ----- Message -----  From: Estefani High PA-C  Sent: 2/19/2019   3:19 PM  To: Carly Coulter MD  Subject: hyperaldosteronism                               This patient has been holding Spironolactone since 2/6/2019. I am adjusting other BP meds to try and keep her controlled. Please check whether you can see her in mid-March for further evaluation and testing so that I can restart the Spironolactone soon.    Thank you,    Estefani High, MPH, JAMIL  [facility] Clinical Specialist   [phone]

## 2019-02-21 ENCOUNTER — PATIENT OUTREACH (OUTPATIENT)
Dept: OTHER | Facility: OTHER | Age: 59
End: 2019-02-21

## 2019-02-21 DIAGNOSIS — I10 ESSENTIAL HYPERTENSION: Primary | Chronic | ICD-10-CM

## 2019-02-21 NOTE — PROGRESS NOTES
Last 5 Patient Entered Readings                                      Current 30 Day Average: 165/98     Recent Readings 2/21/2019 2/21/2019 2/20/2019 2/20/2019 2/20/2019    SBP (mmHg) 182 182 139 139 218    DBP (mmHg) 108 108 84 84 128    Pulse 60 - 72 - 78        Called patient as received ALERT about elevated BP.  She denies signs or symptoms of elevated BP: headache, change in vision, numbness/tingling on one side, chest pain or shortness of breath. Pt took a clonidine yesterday and this morning as instructed when SBP > 180.  It improves the readings.  She will start back on Labetalol 200 tonight and continue 100 in the morning.  She reports that she is not taking the Isosorbide mononitrate.       Hypertension Medications             cloNIDine (CATAPRES) 0.2 MG tablet Take 0.2 mg by mouth every evening.    hydrALAZINE (APRESOLINE) 100 MG tablet Take 1.5 tablets (150 mg total) by mouth 2 (two) times daily.    hydroCHLOROthiazide (HYDRODIURIL) 50 MG tablet Take 1 tablet (50 mg total) by mouth once daily.    isosorbide mononitrate (IMDUR) 60 MG 24 hr tablet Take 1 tablet (60 mg total) by mouth once daily. Pt not taking     labetalol (NORMODYNE) 200 MG tablet Take 1 tablet (200 mg total) by mouth 2 (two) times daily. Was taking only 100 mg BID.     losartan (COZAAR) 100 MG tablet Take 100 mg by mouth once daily.        Will f/u.

## 2019-03-07 ENCOUNTER — PATIENT OUTREACH (OUTPATIENT)
Dept: OTHER | Facility: OTHER | Age: 59
End: 2019-03-07

## 2019-03-07 NOTE — PROGRESS NOTES
Last 5 Patient Entered Readings                                      Current 30 Day Average: 154/95     Recent Readings 3/14/2019 3/14/2019 3/13/2019 3/13/2019 3/11/2019    SBP (mmHg) 137 137 158 158 148    DBP (mmHg) 86 86 95 95 90    Pulse 63 - 71 - 74        3/15: Spoke to patient.  Had Endocrinology visit on 3/13 with plan for confirmation testing so will remain OFF of spironolactone..  Is using clonidine scheduled as below.  Called patient as received ALERT about elevated BP.  He/she denies signs or symptoms of elevated BP: headache, change in vision, numbness/tingling on one side, chest pain or shortness of breath.       Hypertension Medications             cloNIDine (CATAPRES) 0.2 MG tablet Take 0.1 mg in the morning and 0.2 mg by mouth every evening.    hydrALAZINE (APRESOLINE) 100 MG tablet Take 1.5 tablets (150 mg total) by mouth 2 (two) times daily.    hydroCHLOROthiazide (HYDRODIURIL) 50 MG tablet Take 1 tablet (50 mg total) by mouth once daily.    isosorbide mononitrate (IMDUR) 60 MG 24 hr tablet Take 1 tablet (60 mg total) by mouth once daily. NOT TAKING     labetalol (NORMODYNE) 200 MG tablet Take 1/2 tablet (100 mg total) by mouth 2 (two) times daily.    losartan (COZAAR) 100 MG tablet Take 100 mg by mouth once daily.    spironolactone (ALDACTONE) 50 MG tablet Take 1 tablet (50 mg total) by mouth once daily. HOLDING

## 2019-03-11 ENCOUNTER — PATIENT OUTREACH (OUTPATIENT)
Dept: OTHER | Facility: OTHER | Age: 59
End: 2019-03-11

## 2019-03-11 NOTE — PROGRESS NOTES
Last 5 Patient Entered Readings                                      Current 30 Day Average: 155/95     Recent Readings 3/8/2019 3/8/2019 3/8/2019 3/8/2019 3/8/2019    SBP (mmHg) 141 141 138 138 138    DBP (mmHg) 94 94 83 83 82    Pulse 75 - 60 - 71          3/11: LVM.  Will call in 1 week.  Clinician attempting to contact patient as well.

## 2019-03-11 NOTE — PROGRESS NOTES
"Last 5 Patient Entered Readings                                      Current 30 Day Average: 155/95     Recent Readings 3/8/2019 3/8/2019 3/8/2019 3/8/2019 3/8/2019    SBP (mmHg) 141 141 138 138 138    DBP (mmHg) 94 94 83 83 82    Pulse 75 - 60 - 71          Digital Medicine: Health  Follow Up    Lifestyle Modifications:    1.Dietary Modifications (Sodium intake <2,000mg/day, food labels, dining out): States she has been increasing vegetable intake.    2.Physical Activity: States she watches her 1 year old grandchild, so states this is her PA.  States daughter is a teacher, so hopes to find time to get more activity when daughter gets off work.    3.Medication Therapy: Patient has been compliant with the medication regimen.    4.Patient has the following medication side effects/concerns: none  (Frequency/Alleviating factors/Precipitating factors, etc.)     Follow up with Ms. Keyonna Guillen completed. No further questions or concerns. Will continue to follow up to achieve health goals.    States she is going to see a specialist on 3/13 because she states her "gland is producing wrong hormones."    "

## 2019-03-13 ENCOUNTER — OFFICE VISIT (OUTPATIENT)
Dept: ENDOCRINOLOGY | Facility: CLINIC | Age: 59
End: 2019-03-13
Payer: COMMERCIAL

## 2019-03-13 ENCOUNTER — LAB VISIT (OUTPATIENT)
Dept: LAB | Facility: HOSPITAL | Age: 59
End: 2019-03-13
Attending: INTERNAL MEDICINE
Payer: COMMERCIAL

## 2019-03-13 VITALS
BODY MASS INDEX: 34.27 KG/M2 | HEART RATE: 64 BPM | DIASTOLIC BLOOD PRESSURE: 86 MMHG | WEIGHT: 193.44 LBS | SYSTOLIC BLOOD PRESSURE: 130 MMHG | RESPIRATION RATE: 18 BRPM | HEIGHT: 63 IN

## 2019-03-13 DIAGNOSIS — I15.9 SECONDARY HYPERTENSION: ICD-10-CM

## 2019-03-13 DIAGNOSIS — I15.9 SECONDARY HYPERTENSION: Primary | ICD-10-CM

## 2019-03-13 DIAGNOSIS — E27.8 ADRENAL NODULE: ICD-10-CM

## 2019-03-13 DIAGNOSIS — E87.6 HYPOKALEMIA: ICD-10-CM

## 2019-03-13 LAB — POTASSIUM SERPL-SCNC: 3.8 MMOL/L

## 2019-03-13 PROCEDURE — 36415 COLL VENOUS BLD VENIPUNCTURE: CPT

## 2019-03-13 PROCEDURE — 84132 ASSAY OF SERUM POTASSIUM: CPT

## 2019-03-13 PROCEDURE — 99999 PR PBB SHADOW E&M-EST. PATIENT-LVL III: ICD-10-PCS | Mod: PBBFAC,,, | Performed by: INTERNAL MEDICINE

## 2019-03-13 PROCEDURE — 99999 PR PBB SHADOW E&M-EST. PATIENT-LVL III: CPT | Mod: PBBFAC,,, | Performed by: INTERNAL MEDICINE

## 2019-03-13 PROCEDURE — 99244 PR OFFICE CONSULTATION,LEVEL IV: ICD-10-PCS | Mod: S$GLB,,, | Performed by: INTERNAL MEDICINE

## 2019-03-13 PROCEDURE — 99244 OFF/OP CNSLTJ NEW/EST MOD 40: CPT | Mod: S$GLB,,, | Performed by: INTERNAL MEDICINE

## 2019-03-13 RX ORDER — POTASSIUM CHLORIDE 20 MEQ/1
TABLET, EXTENDED RELEASE ORAL
Qty: 3 TABLET | Refills: 0 | Status: SHIPPED | OUTPATIENT
Start: 2019-03-13 | End: 2019-03-28

## 2019-03-13 NOTE — LETTER
March 13, 2019      Estefani High PA-C  [address] [facility] [address]           [facility]  [address]  [place] [address]  Phone: [phone]          Patient: Keyonna Guillen   MR Number: [id number]   YOB: 1960   Date of Visit: 3/13/2019       Dear Estefani High:    Thank you for referring Keyonna Guillen to me for evaluation. Attached you will find relevant portions of my assessment and plan of care.    If you have questions, please do not hesitate to call me. I look forward to following Keyonna Guillen along with you.    Sincerely,    Carly Coulter MD    Enclosure  CC:  No Recipients    If you would like to receive this communication electronically, please contact [email or web address] or [phone] to request more information on [employer] Link access.    For providers and/or their staff who would like to refer a patient to [facility], please contact us through our one-stop-shop provider referral line, [facility], at [phone].    If you feel you have received this communication in error or would no longer like to receive these types of communications, please e-mail [email or web address]

## 2019-03-13 NOTE — PROGRESS NOTES
ENDOCRINOLOGY INITIAL VISIT    Keyonna Guillen is a 58 y.o. female referred by Estefani High for evaluation of resistant hypertension    HTN diagnosed 20+ years ago and has always been difficult to control    Currently taking:  HCTZ 25 mg daily  Losartan 50 mg  Hydralazine 100 mg BID  Labetalol 100 mg daily  Clonidine 1 mg AM, 2 mg PM    BP at goal today but she reports that this is not always case  Feels better with elevated BP than when it's normal    Holding spirnolactone 50 mg since 2/1/19  BP spiked up after stopping aldactone  Energy worse after stopping aldactone.    Hypokalemia present for at least a couple of years  Currently not on potassium but Lele horses at night    ROS  Headache: recently a little but not typical  Sweating: not often  Palpitations: sometimes  Weakness: always  Panic attacks: +  Orthostatic hypotension: +  Weight loss: denies  Known history of paroxysmal elevations in blood pressure or heart rate during diagnostic procedures/induction of anesthesia/surgery: none known    H/o osteoporosis/osteopenia:normal DXA 2015  H/o fractures: denies  H/o DM/glucose intolerance: denies      MEDICATION    Current Outpatient Medications:     atorvastatin (LIPITOR) 40 MG tablet, TAKE 1 TABLET BY MOUTH EVERY NIGHT AT BEDTIME, Disp: 90 tablet, Rfl: 3    cloNIDine (CATAPRES) 0.2 MG tablet, Take 0.2 mg by mouth every evening., Disp: , Rfl:     hydrALAZINE (APRESOLINE) 100 MG tablet, Take 1.5 tablets (150 mg total) by mouth 2 (two) times daily., Disp: 270 tablet, Rfl: 3    hydroCHLOROthiazide (HYDRODIURIL) 50 MG tablet, Take 1 tablet (50 mg total) by mouth once daily., Disp: 90 tablet, Rfl: 3    isosorbide mononitrate (IMDUR) 60 MG 24 hr tablet, Take 1 tablet (60 mg total) by mouth once daily., Disp: 90 tablet, Rfl: 3    labetalol (NORMODYNE) 200 MG tablet, Take 1 tablet (200 mg total) by mouth 2 (two) times daily., Disp: 60 tablet, Rfl: 2    losartan (COZAAR) 100 MG tablet, Take 100 mg by mouth  "once daily., Disp: , Rfl:     olmesartan (BENICAR) 40 MG tablet, Take 1 tablet (40 mg total) by mouth once daily., Disp: 90 tablet, Rfl: 3    rivaroxaban (XARELTO) 20 mg Tab, Take 1 tablet (20 mg total) by mouth daily with dinner or evening meal., Disp: 90 tablet, Rfl: 3    spironolactone (ALDACTONE) 50 MG tablet, Take 1 tablet (50 mg total) by mouth once daily., Disp: 90 tablet, Rfl: 3    Current Facility-Administered Medications:     estradiol valerate injection 20 mg, 20 mg, Intramuscular, Q21 Days, Koby Brewer MD, 20 mg at 06/15/16 1400    ALLERGIES  Review of patient's allergies indicates:   Allergen Reactions    Amlodipine Swelling         PHYSICAL EXAM  /86   Pulse 64   Resp 18   Ht 5' 3" (1.6 m)   Wt 87.8 kg (193 lb 7.3 oz)   BMI 34.27 kg/m²   Body mass index is 34.27 kg/m².  General Appearance:  well appearing, pleasant, NAD  Skin:  no rashes or lesions  HEENT:  EOMI, MMM  +buffalo hump  Abdomen:  soft, nontender, nondistended, bowel sounds normoactive  Extremities:  no lower extremity edema  Neurologic:  A&O x3  Psychiatric:  normal mood and affect    LABORATORY  Checked while on aldactone, no potassium checked  Component      Latest Ref Rng & Units 1/14/2019   Aldosterone      ng/dL 20.6   Renin      ng/mL/hr 0.1   Aldosterone/Renin Activity Calculation      <=25.0 ratio 206.0 (H)       IMAGING STUDIES  CT abdomen 4/17/18:  There is low attenuating thickening of the adrenal glands, measuring up to 1.1 cm on the right, and 0.7 cm on the left, attenuation of which suggests adenoma.     ASSESSMENT/PLAN  1. Secondary hypertension    2. Hypokalemia    3. Adrenal nodule      Presentation overall suggestive of primary hyperaldosteronism given resistant HTN on multiple agents, small bilateral adrenal nodules and hypokalemia  Previous aldosterone elevated with suppressed renin however on spironolactone at that time without associated potassium available  Now off aldactone for 5 weeks  Repeat " potassium today to ensure normal and will start repletion as needed  Once ensure K normal and off aldactone 6 weeks will check 8 AM renin/audra, K+  24hr urine for cortisol and metanephrines although lower suspicion for this    Discussed possible treatment if hyperaldosteronism confirmed  Would repeat imaging if biochemical evidence of hyperaldo as last over a year ago  Would then likely refer to endocrine surgery as she is on fence about if she would want surgery vs medical management and pending this discussion can consider need for AVS vs resumption of aldactone    RTC 6 months    Carly Coulter MD

## 2019-03-14 ENCOUNTER — TELEPHONE (OUTPATIENT)
Dept: ENDOCRINOLOGY | Facility: CLINIC | Age: 59
End: 2019-03-14

## 2019-03-14 NOTE — TELEPHONE ENCOUNTER
----- Message from Carly Coulter MD sent at 3/14/2019  8:53 AM CDT -----  Contact: pt  Please let her know that her potassium has been low despite being on the losartan and that we will only give potassium for the day before to make sure normal for the labs. If she would prefer to not take we could get the labs off potassium but if it is low we would have to repeat.    She can do the urine collection whenever is best for her but if she does it the day before the bloodwork she could drop it off when she gets labs done  Thanks,SK  ----- Message -----  From: Jason Mcfadden MA  Sent: 3/14/2019   8:45 AM  To: Carly Coulter MD    Please advise   ----- Message -----  From: Martha Garcias  Sent: 3/14/2019   8:21 AM  To: Marylin Carroll Staff    1) Pt is calling   The pharmacy said her potassium pills  Taking lorsatin potassium     Concerned it's going to spick the potassium too high     2)  Do you want her to do the 24 hr urine after the lab work   Or does the timing matter?    Please call  [phone]    Thanks

## 2019-03-18 ENCOUNTER — TELEPHONE (OUTPATIENT)
Dept: ENDOCRINOLOGY | Facility: CLINIC | Age: 59
End: 2019-03-18

## 2019-03-19 ENCOUNTER — PATIENT OUTREACH (OUTPATIENT)
Dept: OTHER | Facility: OTHER | Age: 59
End: 2019-03-19

## 2019-03-19 NOTE — PROGRESS NOTES
Last 5 Patient Entered Readings                                      Current 30 Day Average: 161/97     Recent Readings 3/18/2019 3/18/2019 3/18/2019 3/18/2019 3/17/2019    SBP (mmHg) 192 192 164 164 227    DBP (mmHg) 116 116 98 98 124    Pulse 80 - 74 - 72          3/19: LM re: High Alert   4/2: LM for patient.  Dr. Marcum, Endocrinologist, wants her to do a salt-loading test for further workup for primary hyperaldosteronism.  Needs better BP control to do that. Can increase clonidine and labetalol.  Can switch losartan to olmesartan.  She should start taking isosorbide mononitrate.     4/2: LM for pt's , George. Pt called me back and said that she has NO missed calls on her cell phone. Discussed medication regimen and need for better BP control before salt-loading test.  Pt will start taking clonidine 0.2 mg BID and Labetalol 200 mg BID (was taking only 100 mg BID).  Next steps as listed above.     Denies symptoms of elevated blood pressure: severe HA, change in vision or speech, numbness/tingling/weakness on one side of body, CP, SOB.        Hypertension Medications             cloNIDine (CATAPRES) 0.2 MG tablet Take 0.2 mg by mouth every evening.    hydrALAZINE (APRESOLINE) 100 MG tablet Take 1.5 tablets (150 mg total) by mouth 2 (two) times daily.    hydroCHLOROthiazide (HYDRODIURIL) 50 MG tablet Take 1 tablet (50 mg total) by mouth once daily.    isosorbide mononitrate (IMDUR) 60 MG 24 hr tablet Take 1 tablet (60 mg total) by mouth once daily.    labetalol (NORMODYNE) 200 MG tablet Take 1 tablet (200 mg total) by mouth 2 (two) times daily.    losartan (COZAAR) 100 MG tablet Take 100 mg by mouth once daily.    spironolactone (ALDACTONE) 50 MG tablet Take 1 tablet (50 mg total) by mouth once daily.

## 2019-03-22 ENCOUNTER — LAB VISIT (OUTPATIENT)
Dept: LAB | Facility: HOSPITAL | Age: 59
End: 2019-03-22
Attending: INTERNAL MEDICINE
Payer: COMMERCIAL

## 2019-03-22 DIAGNOSIS — I15.9 SECONDARY HYPERTENSION: ICD-10-CM

## 2019-03-22 LAB — POTASSIUM SERPL-SCNC: 4.1 MMOL/L

## 2019-03-22 PROCEDURE — 83835 ASSAY OF METANEPHRINES: CPT

## 2019-03-22 PROCEDURE — 84244 ASSAY OF RENIN: CPT

## 2019-03-22 PROCEDURE — 82088 ASSAY OF ALDOSTERONE: CPT

## 2019-03-22 PROCEDURE — 36415 COLL VENOUS BLD VENIPUNCTURE: CPT

## 2019-03-22 PROCEDURE — 84132 ASSAY OF SERUM POTASSIUM: CPT

## 2019-03-22 PROCEDURE — 82530 CORTISOL FREE: CPT

## 2019-03-25 LAB — ALDOST SERPL-MCNC: 28.5 NG/DL

## 2019-03-26 ENCOUNTER — TELEPHONE (OUTPATIENT)
Dept: FAMILY MEDICINE | Facility: CLINIC | Age: 59
End: 2019-03-26

## 2019-03-26 LAB
COLLECT DURATION TIME UR: 24 H
CORTIS 24H UR-MRATE: 11 MCG/24 H (ref 3.5–45)
RENIN PLAS-CCNC: 1.6 NG/ML/H
SPECIMEN VOL ?TM UR: 2750 ML

## 2019-03-26 NOTE — TELEPHONE ENCOUNTER
----- Message from Brenda Cornejo sent at 3/26/2019  1:56 PM CDT -----  ?        03/26/2019  Medical Record # [id number]     Dear Morgan Leger MD,    An order for the following procedure, Colonoscopy, was placed for Keyonna Guillen. Three attempts have been made to reach your patient at [phone] (home) . Please follow up with the patient regarding scheduling this procedure.        Sincerely,  Brenda Cornejo [phone]

## 2019-03-26 NOTE — TELEPHONE ENCOUNTER
I called the pt and she reports that she is having blood pressure issues with her blood pressure fluctuating high and low. She is having some other medical testing done from outside sources. She will call Endoscopy to schedule when she completes her other tests and her BP is under control.

## 2019-03-27 LAB
COLLECT DURATION TIME SPEC: 24 HR
CREAT 24H UR-MRATE: 908 MG/D (ref 500–1400)
CREAT UR-MCNC: 33 MG/DL
METANEPH 24H UR-MCNC: 51 UG/L
METANEPH 24H UR-MRATE: 140 UG/D (ref 39–143)
METANEPH+NORMETANEPH UR-IMP: ABNORMAL
METANEPH/CREAT 24H UR: 155 UG/G CRT (ref 0–300)
NORMETANEPHRINE 24H UR-MCNC: 203 UG/L
NORMETANEPHRINE 24H UR-MRATE: 558 UG/D (ref 109–393)
NORMETANEPHRINE/CREAT 24H UR: 615 UG/G CRT (ref 0–400)
SPECIMEN VOL ?TM UR: 2750 ML

## 2019-03-28 ENCOUNTER — PATIENT MESSAGE (OUTPATIENT)
Dept: ENDOCRINOLOGY | Facility: CLINIC | Age: 59
End: 2019-03-28

## 2019-03-28 DIAGNOSIS — E27.8 ADRENAL NODULE: ICD-10-CM

## 2019-03-28 DIAGNOSIS — I15.9 SECONDARY HYPERTENSION: Primary | ICD-10-CM

## 2019-03-28 RX ORDER — POTASSIUM CHLORIDE 750 MG/1
10 TABLET, EXTENDED RELEASE ORAL 2 TIMES DAILY
Qty: 6 TABLET | Refills: 0 | Status: SHIPPED | OUTPATIENT
Start: 2019-03-28 | End: 2019-03-31

## 2019-03-28 RX ORDER — SODIUM BICARBONATE 650 MG/1
650 TABLET ORAL 4 TIMES DAILY
Qty: 120 TABLET | Refills: 11 | Status: CANCELLED | COMMUNITY
Start: 2019-03-28 | End: 2020-03-27

## 2019-04-03 ENCOUNTER — PATIENT OUTREACH (OUTPATIENT)
Dept: OTHER | Facility: OTHER | Age: 59
End: 2019-04-03

## 2019-04-03 NOTE — PROGRESS NOTES
Last 5 Patient Entered Readings                                      Current 30 Day Average: 161/96     Recent Readings 4/3/2019 4/3/2019 4/3/2019 4/3/2019 4/3/2019    SBP (mmHg) 107 107 155 155 147    DBP (mmHg) 69 69 87 87 87    Pulse 55 - 63 - 63          4/3: Pt called me to report feeling very sluggish and having severe back pain.  This is likely 2/2 drop in BP likely from the Labetalol.  I instructed her to HOLD the labetalol and just continue to use the clonidine either 0.1 or 0.2 mg BID for better BP control without dropping her too low.  She verbalized understanding.    Hypertension Medications             cloNIDine (CATAPRES) 0.2 MG tablet Take 0.1 or 0.2 mg by mouth BID    hydrALAZINE (APRESOLINE) 100 MG tablet Take 1.5 tablets (150 mg total) by mouth 2 (two) times daily.    hydroCHLOROthiazide (HYDRODIURIL) 50 MG tablet Take 1 tablet (50 mg total) by mouth once daily.    isosorbide mononitrate (IMDUR) 60 MG 24 hr tablet Take 1 tablet (60 mg total) by mouth once daily. Not taking this.     labetalol (NORMODYNE) 200 MG tablet Take 1 tablet (200 mg total) by mouth 2 (two) times daily. Holding for now.     losartan (COZAAR) 100 MG tablet Take 100 mg by mouth once daily.    spironolactone (ALDACTONE) 50 MG tablet Take 1 tablet (50 mg total) by mouth once daily. Holding this.

## 2019-04-05 ENCOUNTER — PATIENT OUTREACH (OUTPATIENT)
Dept: OTHER | Facility: OTHER | Age: 59
End: 2019-04-05

## 2019-04-05 NOTE — PROGRESS NOTES
Last 5 Patient Entered Readings                                      Current 30 Day Average: 164/93     Recent Readings 4/5/2019 4/5/2019 4/4/2019 4/4/2019 4/4/2019    SBP (mmHg) 183 183 169 169 167    DBP (mmHg) 85 85 90 90 93    Pulse 62 - 79 - 70        4/5: Called patient as received ALERT about elevated BP.  She denies signs or symptoms of elevated BP: headache, change in vision, numbness/tingling on one side, chest pain or shortness of breath.  Patient states that she took 1/2 labetalol tablet. Instructed her to continue to take 1/2 tablet twice daily if SBP > 150.          Hypertension Medications             cloNIDine (CATAPRES) 0.2 MG tablet Take 0.1 or 0.2 mg by mouth BID depending on SBP.    hydrALAZINE (APRESOLINE) 100 MG tablet Take 1.5 tablets (150 mg total) by mouth 2 (two) times daily.    hydroCHLOROthiazide (HYDRODIURIL) 50 MG tablet Take 1 tablet (50 mg total) by mouth once daily.    isosorbide mononitrate (IMDUR) 60 MG 24 hr tablet Take 1 tablet (60 mg total) by mouth once daily. Not taking this.     labetalol (NORMODYNE) 200 MG tablet Take 1/2 tablet (100 mg total) by mouth 2 (two) times daily. Taking as needed if SBP > 150.     losartan (COZAAR) 100 MG tablet Take 100 mg by mouth once daily.    spironolactone (ALDACTONE) 50 MG tablet Take 1 tablet (50 mg total) by mouth once daily. Holding for now.

## 2019-04-08 ENCOUNTER — PATIENT OUTREACH (OUTPATIENT)
Dept: OTHER | Facility: OTHER | Age: 59
End: 2019-04-08

## 2019-04-08 NOTE — PROGRESS NOTES
Last 5 Patient Entered Readings                                      Current 30 Day Average: 172/94     Recent Readings 4/8/2019 4/8/2019 4/7/2019 4/7/2019 4/6/2019    SBP (mmHg) 185 185 143 143 197    DBP (mmHg) 101 101 84 84 106    Pulse 55 - 65 - 64          4/8: Spoke with clinician today due to high reading alert.  Will push call back.

## 2019-04-08 NOTE — PROGRESS NOTES
Last 5 Patient Entered Readings                                      Current 30 Day Average: 172/94     Recent Readings 4/8/2019 4/8/2019 4/7/2019 4/7/2019 4/6/2019    SBP (mmHg) 185 185 143 143 197    DBP (mmHg) 101 101 84 84 106    Pulse 55 - 65 - 64          4/8:  Called patient as received ALERT about elevated BP.  She denies signs or symptoms of elevated BP: headache, change in vision, numbness/tingling on one side, chest pain or shortness of breath. States that she took the reading BEFORE her medication this morning so she has now taken her medication.    Hypertension Medications                 cloNIDine (CATAPRES) 0.2 MG tablet Take 0.1 or 0.2 mg by mouth BID depending on SBP.     hydrALAZINE (APRESOLINE) 100 MG tablet Take 1.5 tablets (150 mg total) by mouth 2 (two) times daily.     hydroCHLOROthiazide (HYDRODIURIL) 50 MG tablet Take 1 tablet (50 mg total) by mouth once daily.     isosorbide mononitrate (IMDUR) 60 MG 24 hr tablet Take 1 tablet (60 mg total) by mouth once daily. Not taking this.      labetalol (NORMODYNE) 200 MG tablet Take 1/2 tablet (100 mg total) by mouth 2 (two) times daily. Taking as needed if SBP > 150.      losartan (COZAAR) 100 MG tablet Take 100 mg by mouth once daily.     spironolactone (ALDACTONE) 50 MG tablet Take 1 tablet (50 mg total) by mouth once daily. Holding for now.

## 2019-04-11 RX ORDER — ATORVASTATIN CALCIUM 40 MG/1
TABLET, FILM COATED ORAL
Qty: 90 TABLET | Refills: 3 | Status: SHIPPED | OUTPATIENT
Start: 2019-04-11 | End: 2020-03-16

## 2019-04-15 ENCOUNTER — PATIENT OUTREACH (OUTPATIENT)
Dept: OTHER | Facility: OTHER | Age: 59
End: 2019-04-15

## 2019-04-15 DIAGNOSIS — I10 ESSENTIAL HYPERTENSION: Primary | Chronic | ICD-10-CM

## 2019-04-15 RX ORDER — POTASSIUM CHLORIDE 750 MG/1
10 TABLET, EXTENDED RELEASE ORAL 2 TIMES DAILY
Qty: 6 TABLET | Refills: 0 | Status: SHIPPED | OUTPATIENT
Start: 2019-04-15 | End: 2019-04-18

## 2019-04-15 NOTE — PROGRESS NOTES
Last 5 Patient Entered Readings                                      Current 30 Day Average: 171/94     Recent Readings 4/15/2019 4/15/2019 4/15/2019 4/15/2019 4/12/2019    SBP (mmHg) 190 190 179 179 165    DBP (mmHg) 107 107 97 97 95    Pulse 60 - 54 - 72          4/15: Called patient as received ALERT about elevated BP.  She denies signs or symptoms of elevated BP: headache, change in vision, numbness/tingling on one side, chest pain or shortness of breath.     She started the salt loading test today for 3 days total. Will start urine collection after the last dose on Wednesday night.  Has f/u with Endo in May.

## 2019-04-16 ENCOUNTER — PATIENT OUTREACH (OUTPATIENT)
Dept: OTHER | Facility: OTHER | Age: 59
End: 2019-04-16

## 2019-04-16 DIAGNOSIS — I10 ESSENTIAL HYPERTENSION: Primary | Chronic | ICD-10-CM

## 2019-04-16 RX ORDER — CLONIDINE HYDROCHLORIDE 0.1 MG/1
0.1 TABLET ORAL DAILY PRN
Qty: 30 TABLET | Refills: 11 | Status: SHIPPED | OUTPATIENT
Start: 2019-04-16 | End: 2020-04-29 | Stop reason: SDUPTHER

## 2019-04-16 RX ORDER — OLMESARTAN MEDOXOMIL 40 MG/1
40 TABLET ORAL DAILY
COMMUNITY
Start: 2019-04-17 | End: 2020-03-16

## 2019-04-16 NOTE — PROGRESS NOTES
Last 5 Patient Entered Readings                                      Current 30 Day Average: 173/95     Recent Readings 4/16/2019 4/16/2019 4/15/2019 4/15/2019 4/15/2019    SBP (mmHg) 189 189 215 215 150    DBP (mmHg) 100 100 111 111 81    Pulse 59 - 74 - 68          4/16: Spoke to patient Called patient as received ALERT about elevated BP.  She denies signs or symptoms of elevated BP: headache, change in vision, numbness/tingling on one side, chest pain or shortness of breath. Repeat reading improved.       Confirm medication regimen. Takes BP medicine at 9am, 3pm and 9pm because taking clonidine at 3 pm too. Sending refill of clonidine 0.1 mg tablets to [facility] and adding Olmesartan 40 mg back onto med list (pt has at home) to replace Losartan.         Hypertension Medications             cloNIDine (CATAPRES) 0.2 MG tablet Take 0.2 mg by mouth every evening.    hydrALAZINE (APRESOLINE) 100 MG tablet Take 1.5 tablets (150 mg total) by mouth 2 (two) times daily.    hydroCHLOROthiazide (HYDRODIURIL) 50 MG tablet Take 1 tablet (50 mg total) by mouth once daily.    isosorbide mononitrate (IMDUR) 60 MG 24 hr tablet Take 1 tablet (60 mg total) by mouth once daily. Patient not taking.      labetalol (NORMODYNE) 200 MG tablet Take 1 tablet (200 mg total) by mouth 2 (two) times daily.  Taking 1/2 tablet in AM and 1 tablet in PM.      losartan (COZAAR) 100 MG tablet Take 100 mg by mouth once daily. Switching to Olmesartan 40 mg daily instead.      spironolactone (ALDACTONE) 50 MG tablet Take 1 tablet (50 mg total) by mouth once daily. On hold

## 2019-04-18 ENCOUNTER — LAB VISIT (OUTPATIENT)
Dept: LAB | Facility: HOSPITAL | Age: 59
End: 2019-04-18
Attending: INTERNAL MEDICINE
Payer: COMMERCIAL

## 2019-04-18 DIAGNOSIS — E27.8 ADRENAL NODULE: ICD-10-CM

## 2019-04-18 DIAGNOSIS — I15.9 SECONDARY HYPERTENSION: ICD-10-CM

## 2019-04-18 LAB
ANION GAP SERPL CALC-SCNC: 9 MMOL/L (ref 8–16)
BUN SERPL-MCNC: 15 MG/DL (ref 6–20)
CALCIUM SERPL-MCNC: 9.7 MG/DL (ref 8.7–10.5)
CHLORIDE SERPL-SCNC: 109 MMOL/L (ref 95–110)
CO2 SERPL-SCNC: 26 MMOL/L (ref 23–29)
CREAT SERPL-MCNC: 0.9 MG/DL (ref 0.5–1.4)
EST. GFR  (AFRICAN AMERICAN): >60 ML/MIN/1.73 M^2
EST. GFR  (NON AFRICAN AMERICAN): >60 ML/MIN/1.73 M^2
GLUCOSE SERPL-MCNC: 110 MG/DL (ref 70–110)
POTASSIUM SERPL-SCNC: 4.2 MMOL/L (ref 3.5–5.1)
SODIUM SERPL-SCNC: 144 MMOL/L (ref 136–145)

## 2019-04-18 PROCEDURE — 36415 COLL VENOUS BLD VENIPUNCTURE: CPT

## 2019-04-18 PROCEDURE — 80048 BASIC METABOLIC PNL TOTAL CA: CPT

## 2019-04-22 ENCOUNTER — PATIENT OUTREACH (OUTPATIENT)
Dept: OTHER | Facility: OTHER | Age: 59
End: 2019-04-22

## 2019-04-22 NOTE — PROGRESS NOTES
Last 5 Patient Entered Readings                                      Current 30 Day Average: 175/94     Recent Readings 4/23/2019 4/23/2019 4/19/2019 4/19/2019 4/18/2019    SBP (mmHg) 169 169 214 214 215    DBP (mmHg) 96 96 110 110 109    Pulse 65 - 50 - 69            4/22: LM re: High Alert  4/23: Spoke to patient.  Denies symptoms of elevated blood pressure: severe HA, change in vision or speech, numbness/tingling/weakness on one side of body, CP, SOB.     She completed the 3 day salt loading test and the associated lab work.  I sent a message to Endocrinologist, Dr. Coulter, to ask whether she can resume the spironolactone at this point.

## 2019-04-24 ENCOUNTER — PATIENT MESSAGE (OUTPATIENT)
Dept: FAMILY MEDICINE | Facility: CLINIC | Age: 59
End: 2019-04-24

## 2019-04-24 ENCOUNTER — PATIENT MESSAGE (OUTPATIENT)
Dept: ENDOCRINOLOGY | Facility: CLINIC | Age: 59
End: 2019-04-24

## 2019-04-25 ENCOUNTER — PATIENT OUTREACH (OUTPATIENT)
Dept: OTHER | Facility: OTHER | Age: 59
End: 2019-04-25

## 2019-04-25 DIAGNOSIS — I1A.0 RESISTANT HYPERTENSION: ICD-10-CM

## 2019-04-25 DIAGNOSIS — I10 ESSENTIAL HYPERTENSION: Primary | Chronic | ICD-10-CM

## 2019-04-25 DIAGNOSIS — E87.6 HYPOKALEMIA: ICD-10-CM

## 2019-04-25 NOTE — PROGRESS NOTES
Last 5 Patient Entered Readings                                      Current 30 Day Average: 175/94     Recent Readings 4/23/2019 4/23/2019 4/19/2019 4/19/2019 4/18/2019    SBP (mmHg) 169 169 214 214 215    DBP (mmHg) 96 96 110 110 109    Pulse 65 - 50 - 69        4/25:  Spoke to patient.  Dr. Coulter, Endocrinology, confirmed that she does NOT have primary aldosteronism so she is able to resume Spironolactone today.  Adjusting other BP meds accordingly.  Will refer patient to Dr. Rosario at [facility] for evaluation of resistant hypertension.    Denies symptoms of elevated blood pressure: severe HA, change in vision or speech, numbness/tingling/weakness on one side of body, CP, SOB.     Hypertension Medications             cloNIDine (CATAPRES) 0.1 MG tablet Take 1 tablet (0.1 mg total) by mouth daily as needed (If SBP > 170.).    cloNIDine (CATAPRES) 0.2 MG tablet Take 0.2 mg by mouth every evening.  *Starting taper today    hydrALAZINE (APRESOLINE) 100 MG tablet Take 1.5 tablets (150 mg total) by mouth 2 (two) times daily. *Cut to 1 tablet twice daily    hydroCHLOROthiazide (HYDRODIURIL) 50 MG tablet Take 1 tablet (50 mg total) by mouth once daily.    labetalol (NORMODYNE) 200 MG tablet Take 1 tablet (200 mg total) by mouth 2 (two) times daily. *cut to 1 tablet twice daily     olmesartan (BENICAR) 40 MG tablet Take 40 mg by mouth once daily.    spironolactone (ALDACTONE) 50 MG tablet Take 1 tablet (50 mg total) by mouth once daily. *Resume today        Clonidine taper:   To prevent withdrawal symptoms, these drugs should be slowly discontinued over a 6-to-10-day interval, cutting the dose by one-half every two to three days. However, the protection is not absolute since rebound hypertension has occurred after gradual withdrawal of clonidine.  If such withdrawal symptoms occur, reinstitution of clonidine will usually provide nearly immediate relief.  4/25: Take 0.2 mg at dinner time  4/26: Take 0.2 mg at dinner  "time  4/27: Take 0.2 mg at dinner time  4/28: Take 0.1 mg at dinner time  4/29: Take 0.1 mg at dinner time  4/30: Take 0.1 mg at dinner time    5/1:   Start using up to twice daily only "as needed" for systolic blood pressure (top number) greater than 170 or diastolic blood pressure (bottom number) greater than 100.           "

## 2019-04-29 ENCOUNTER — PATIENT OUTREACH (OUTPATIENT)
Dept: OTHER | Facility: OTHER | Age: 59
End: 2019-04-29

## 2019-04-29 NOTE — PROGRESS NOTES
"Last 5 Patient Entered Readings                                      Current 30 Day Average: 173/93     Recent Readings 4/27/2019 4/27/2019 4/26/2019 4/26/2019 4/23/2019    SBP (mmHg) 155 155 147 147 169    DBP (mmHg) 96 96 88 88 96    Pulse 59 - 65 - 65          Digital Medicine: Health  Follow Up    Lifestyle Modifications:    1.Dietary Modifications (Sodium intake <2,000mg/day, food labels, dining out): Reports she is "watching what I eat."  States she and her friend are looking into doing the [employer] diet.  States it is costly, but would like to try to lose weight.  Asked if patient likes to cook at home and she states she does but she does not like meat.  Offered recipes and patient requested recipes be sent to email.  Will check out [employer] diet and send resources.    2.Physical Activity: States she baby sits every day.    3.Medication Therapy: Patient has been compliant with the medication regimen.    4.Patient has the following medication side effects/concerns: none  (Frequency/Alleviating factors/Precipitating factors, etc.)     Follow up with Mrs. Keyonna MarinQueen completed. No further questions or concerns. Will continue to follow up to achieve health goals.  "

## 2019-05-01 DIAGNOSIS — R11.2 NAUSEA AND VOMITING, INTRACTABILITY OF VOMITING NOT SPECIFIED, UNSPECIFIED VOMITING TYPE: Primary | ICD-10-CM

## 2019-05-03 ENCOUNTER — PATIENT OUTREACH (OUTPATIENT)
Dept: OTHER | Facility: OTHER | Age: 59
End: 2019-05-03

## 2019-05-03 DIAGNOSIS — I10 ESSENTIAL HYPERTENSION: Primary | Chronic | ICD-10-CM

## 2019-05-03 NOTE — PROGRESS NOTES
Last 5 Patient Entered Readings                                      Current 30 Day Average: 167/90     Recent Readings 5/3/2019 5/3/2019 5/3/2019 5/3/2019 5/3/2019    SBP (mmHg) 153 153 128 128 146    DBP (mmHg) 88 88 91 91 93    Pulse 80 - 74 - 88            5/3: Spoke to patient.  She had a hypotensive episode yesterday mid-day which was preceded by severe upper-mid-back pain.  Denies CP, SOB. Pt had NM stress test in early April showing no evidence of cardiac ischemia or wall motion abnormalities.  Suspect perhaps took too much medication as pt resumed spironolactone this week.  Adjusting medication as below and will f/u in 2 weeks:    Hypertension Medications             cloNIDine (CATAPRES) 0.1 MG tablet Take 1 tablet (0.1 mg total) by mouth daily as needed (If SBP > 170.).    hydrALAZINE (APRESOLINE) 100 MG tablet Take 1 tablets (100 mg total) by mouth 2 (two) times daily.    hydroCHLOROthiazide (HYDRODIURIL) 50 MG tablet Take 1 tablet (50 mg total) by mouth once daily.    labetalol (NORMODYNE) 200 MG tablet Take 1/2 tablet (100 mg total) by mouth 2 (two) times daily.    olmesartan (BENICAR) 40 MG tablet Take 40 mg by mouth once daily.    spironolactone (ALDACTONE) 50 MG tablet Take 1/2 tablet (25 mg total) by mouth once daily.

## 2019-05-08 ENCOUNTER — PATIENT OUTREACH (OUTPATIENT)
Dept: OTHER | Facility: OTHER | Age: 59
End: 2019-05-08

## 2019-05-08 DIAGNOSIS — I10 ESSENTIAL HYPERTENSION: Primary | Chronic | ICD-10-CM

## 2019-05-08 NOTE — PROGRESS NOTES
Last 5 Patient Entered Readings                                      Current 30 Day Average: 168/93     Recent Readings 5/8/2019 5/8/2019 5/8/2019 5/8/2019 5/8/2019    SBP (mmHg) 98 98 104 104 221    DBP (mmHg) 59 59 63 63 123    Pulse 83 - 76 - 70          5/8: Pt called me to report that she is feeling severe muscle cramps and her BP is now very low.  Recommended that she drink as much fluid as she can tolerate and eat something with salt in it.  Recommended that she AVOID clonidine for now and spread the rest of the medication throughout the day.  She verbalized understanding.

## 2019-05-08 NOTE — PROGRESS NOTES
Last 5 Patient Entered Readings                                      Current 30 Day Average: 171/93     Recent Readings 5/8/2019 5/8/2019 5/6/2019 5/6/2019 5/5/2019    SBP (mmHg) 221 221 169 169 138    DBP (mmHg) 123 123 108 108 99    Pulse 70 - 83 - 89        5/8: Called patient as received ALERT about elevated BP.  She denies signs or symptoms of elevated BP: headache, change in vision, numbness/tingling on one side, chest pain or shortness of breath. Suspect an error but pt took a clonidine anyway.  Instructed her to INCREASE spironolactone back to 1 whole tablet daily.  Offered referral to Interventional Cardiology, Dr. Collado, for CALM-2 trial but she has not had sleep study yet.  Will discuss with her on next call.        Hypertension Medications             cloNIDine (CATAPRES) 0.1 MG tablet Take 1 tablet (0.1 mg total) by mouth daily as needed (If SBP > 170.).    hydrALAZINE (APRESOLINE) 100 MG tablet Take 1 tablet (100 mg total) by mouth 2 (two) times daily.    hydroCHLOROthiazide (HYDRODIURIL) 50 MG tablet Take 1 tablet (50 mg total) by mouth once daily.    labetalol (NORMODYNE) 200 MG tablet Take 1/2 tablet (100 mg total) by mouth 2 (two) times daily.    olmesartan (BENICAR) 40 MG tablet Take 40 mg by mouth once daily.    spironolactone (ALDACTONE) 50 MG tablet Take 1 tablet (50 mg total) by mouth once daily.

## 2019-05-13 ENCOUNTER — PATIENT OUTREACH (OUTPATIENT)
Dept: OTHER | Facility: OTHER | Age: 59
End: 2019-05-13

## 2019-05-13 DIAGNOSIS — I10 ESSENTIAL HYPERTENSION: Primary | Chronic | ICD-10-CM

## 2019-05-13 NOTE — PROGRESS NOTES
Last 5 Patient Entered Readings                                      Current 30 Day Average: 167/91     Recent Readings 5/14/2019 5/14/2019 5/13/2019 5/13/2019 5/11/2019    SBP (mmHg) 140 140 126 126 182    DBP (mmHg) 86 86 80 80 110    Pulse 96 - 92 - 77        5/13: LM re: High Alert.  Ask about sleep apnea testing.     5/14: Pt called me back.  She denies signs or symptoms of elevated BP: headache, change in vision, numbness/tingling on one side, chest pain or shortness of breath. Readings much better yesterday and today.  Pt admits to symptoms of BUCKY.  Referring for Sleep Study.     Continue current med regimen for now.

## 2019-05-20 ENCOUNTER — PATIENT OUTREACH (OUTPATIENT)
Dept: OTHER | Facility: OTHER | Age: 59
End: 2019-05-20

## 2019-05-20 DIAGNOSIS — I10 ESSENTIAL HYPERTENSION: Primary | Chronic | ICD-10-CM

## 2019-05-20 NOTE — PROGRESS NOTES
Last 5 Patient Entered Readings                                      Current 30 Day Average: 159/90     Recent Readings 5/20/2019 5/20/2019 5/19/2019 5/19/2019 5/18/2019    SBP (mmHg) 156 156 179 179 166    DBP (mmHg) 99 99 103 103 98    Pulse 77 - 74 - 87          5/20: Pt called to report that she has been nauseated all day every day since 5/13.  States that she is going to see her PCP tomorrow (5/21).  BP average is better overall on current regimen.  She confirms that she is not having a daily bowel movement since restarting the spironolactone so that may be contributing to her nausea.  Recommended that she take a laxative today.  She verbalized understanding.    Hypertension Medications             cloNIDine (CATAPRES) 0.1 MG tablet Take 1 tablet (0.1 mg total) by mouth daily as needed (If SBP > 170.).    hydrALAZINE (APRESOLINE) 100 MG tablet Take 1 tablet (100 mg total) by mouth 2 (two) times daily.    hydroCHLOROthiazide (HYDRODIURIL) 50 MG tablet Take 1 tablet (50 mg total) by mouth once daily.    labetalol (NORMODYNE) 200 MG tablet Take 1/2 tablet (100 mg total) by mouth 2 (two) times daily.    olmesartan (BENICAR) 40 MG tablet Take 40 mg by mouth once daily.    spironolactone (ALDACTONE) 50 MG tablet Take 1 tablet (50 mg total) by mouth once daily.

## 2019-05-21 ENCOUNTER — OFFICE VISIT (OUTPATIENT)
Dept: FAMILY MEDICINE | Facility: CLINIC | Age: 59
End: 2019-05-21
Payer: COMMERCIAL

## 2019-05-21 VITALS
WEIGHT: 183 LBS | HEIGHT: 63 IN | DIASTOLIC BLOOD PRESSURE: 90 MMHG | BODY MASS INDEX: 32.43 KG/M2 | SYSTOLIC BLOOD PRESSURE: 160 MMHG | RESPIRATION RATE: 17 BRPM | OXYGEN SATURATION: 96 % | HEART RATE: 88 BPM | TEMPERATURE: 98 F

## 2019-05-21 DIAGNOSIS — Z00.00 HEALTHCARE MAINTENANCE: ICD-10-CM

## 2019-05-21 DIAGNOSIS — R63.4 WEIGHT LOSS: ICD-10-CM

## 2019-05-21 DIAGNOSIS — R11.0 NAUSEA: Primary | ICD-10-CM

## 2019-05-21 DIAGNOSIS — N30.00 ACUTE CYSTITIS WITHOUT HEMATURIA: ICD-10-CM

## 2019-05-21 LAB
BILIRUB SERPL-MCNC: NEGATIVE MG/DL
BLOOD URINE, POC: NORMAL
COLOR, POC UA: NORMAL
GLUCOSE UR QL STRIP: NORMAL
KETONES UR QL STRIP: NEGATIVE
LEUKOCYTE ESTERASE URINE, POC: NORMAL
NITRITE, POC UA: NEGATIVE
PH, POC UA: 5
PROTEIN, POC: POSITIVE
SPECIFIC GRAVITY, POC UA: 1.01
UROBILINOGEN, POC UA: NORMAL

## 2019-05-21 PROCEDURE — 99999 PR PBB SHADOW E&M-EST. PATIENT-LVL V: CPT | Mod: PBBFAC,,, | Performed by: INTERNAL MEDICINE

## 2019-05-21 PROCEDURE — 99214 OFFICE O/P EST MOD 30 MIN: CPT | Mod: 25,S$GLB,, | Performed by: INTERNAL MEDICINE

## 2019-05-21 PROCEDURE — 96372 PR INJECTION,THERAP/PROPH/DIAG2ST, IM OR SUBCUT: ICD-10-PCS | Mod: S$GLB,,, | Performed by: INTERNAL MEDICINE

## 2019-05-21 PROCEDURE — 3077F SYST BP >= 140 MM HG: CPT | Mod: CPTII,S$GLB,, | Performed by: INTERNAL MEDICINE

## 2019-05-21 PROCEDURE — 3008F PR BODY MASS INDEX (BMI) DOCUMENTED: ICD-10-PCS | Mod: CPTII,S$GLB,, | Performed by: INTERNAL MEDICINE

## 2019-05-21 PROCEDURE — 99999 PR PBB SHADOW E&M-EST. PATIENT-LVL V: ICD-10-PCS | Mod: PBBFAC,,, | Performed by: INTERNAL MEDICINE

## 2019-05-21 PROCEDURE — 81001 URINALYSIS AUTO W/SCOPE: CPT | Mod: S$GLB,,, | Performed by: INTERNAL MEDICINE

## 2019-05-21 PROCEDURE — 3080F DIAST BP >= 90 MM HG: CPT | Mod: CPTII,S$GLB,, | Performed by: INTERNAL MEDICINE

## 2019-05-21 PROCEDURE — 3077F PR MOST RECENT SYSTOLIC BLOOD PRESSURE >= 140 MM HG: ICD-10-PCS | Mod: CPTII,S$GLB,, | Performed by: INTERNAL MEDICINE

## 2019-05-21 PROCEDURE — 96372 THER/PROPH/DIAG INJ SC/IM: CPT | Mod: S$GLB,,, | Performed by: INTERNAL MEDICINE

## 2019-05-21 PROCEDURE — 99214 PR OFFICE/OUTPT VISIT, EST, LEVL IV, 30-39 MIN: ICD-10-PCS | Mod: 25,S$GLB,, | Performed by: INTERNAL MEDICINE

## 2019-05-21 PROCEDURE — 81001 POCT URINALYSIS, DIPSTICK OR TABLET REAGENT, AUTOMATED, WITH MICROSCOP: ICD-10-PCS | Mod: S$GLB,,, | Performed by: INTERNAL MEDICINE

## 2019-05-21 PROCEDURE — 3080F PR MOST RECENT DIASTOLIC BLOOD PRESSURE >= 90 MM HG: ICD-10-PCS | Mod: CPTII,S$GLB,, | Performed by: INTERNAL MEDICINE

## 2019-05-21 PROCEDURE — 3008F BODY MASS INDEX DOCD: CPT | Mod: CPTII,S$GLB,, | Performed by: INTERNAL MEDICINE

## 2019-05-21 RX ORDER — FLUTICASONE PROPIONATE 50 MCG
SPRAY, SUSPENSION (ML) NASAL
COMMUNITY
End: 2019-10-29

## 2019-05-21 RX ORDER — ONDANSETRON 4 MG/1
4 TABLET, ORALLY DISINTEGRATING ORAL EVERY 8 HOURS PRN
Qty: 30 TABLET | Refills: 0 | Status: SHIPPED | OUTPATIENT
Start: 2019-05-21 | End: 2019-05-28 | Stop reason: SDUPTHER

## 2019-05-21 RX ORDER — HYDROCHLOROTHIAZIDE 25 MG/1
25 TABLET ORAL DAILY
Refills: 3 | COMMUNITY
Start: 2019-04-19 | End: 2019-07-01 | Stop reason: SDUPTHER

## 2019-05-21 RX ORDER — METOPROLOL SUCCINATE 50 MG/1
TABLET, EXTENDED RELEASE ORAL
COMMUNITY
Start: 2019-03-21 | End: 2019-06-07

## 2019-05-21 RX ORDER — PROMETHAZINE HYDROCHLORIDE 25 MG/ML
25 INJECTION, SOLUTION INTRAMUSCULAR; INTRAVENOUS ONCE
Status: COMPLETED | OUTPATIENT
Start: 2019-05-21 | End: 2019-05-21

## 2019-05-21 RX ORDER — NITROFURANTOIN 25; 75 MG/1; MG/1
100 CAPSULE ORAL 2 TIMES DAILY
Qty: 10 CAPSULE | Refills: 0 | Status: SHIPPED | OUTPATIENT
Start: 2019-05-21 | End: 2019-05-26

## 2019-05-21 RX ORDER — OMEPRAZOLE 20 MG/1
40 CAPSULE, DELAYED RELEASE ORAL DAILY
Qty: 60 CAPSULE | Refills: 1 | Status: SHIPPED | OUTPATIENT
Start: 2019-05-21 | End: 2019-06-05

## 2019-05-21 RX ADMIN — PROMETHAZINE HYDROCHLORIDE 25 MG: 25 INJECTION, SOLUTION INTRAMUSCULAR; INTRAVENOUS at 04:05

## 2019-05-21 NOTE — PROGRESS NOTES
HISTORY OF PRESENT ILLNESS:  Keyonna Guillen is a 58 y.o. female who presents to the clinic today for Nausea    Nausea since after mother's day.  Having bloating.  Bowel movements, no diarrhea, no blood in stool.  Urination is normal.  No blood in the urine.  No fever or chills.  Symptoms unaffected by food.  Weight is down to 182 from baseline of 200.    PAST MEDICAL HISTORY:  Past Medical History:   Diagnosis Date    Atrial fibrillation     Coronary artery disease     Hyperlipidemia     Hypertension        PAST SURGICAL HISTORY:  Past Surgical History:   Procedure Laterality Date    BREAST BIOPSY      CARDIAC SURGERY      CORONARY ARTERY BYPASS GRAFT      HYSTERECTOMY      Transesophageal Echocardiogram ([facility]) N/A 3/9/2018    Performed by José Khalil MD at [facility] CATH LAB       SOCIAL HISTORY:  Social History     Socioeconomic History    Marital status:      Spouse name: Not on file    Number of children: Not on file    Years of education: Not on file    Highest education level: Not on file   Occupational History    Not on file   Social Needs    Financial resource strain: Not on file    Food insecurity:     Worry: Not on file     Inability: Not on file    Transportation needs:     Medical: Not on file     Non-medical: Not on file   Tobacco Use    Smoking status: Never Smoker    Smokeless tobacco: Never Used   Substance and Sexual Activity    Alcohol use: No    Drug use: No    Sexual activity: Yes     Partners: Male     Birth control/protection: Surgical   Lifestyle    Physical activity:     Days per week: Not on file     Minutes per session: Not on file    Stress: Not on file   Relationships    Social connections:     Talks on phone: Not on file     Gets together: Not on file     Attends [employer] service: Not on file     Active member of club or organization: Not on file     Attends meetings of clubs or organizations: Not on file     Relationship status: Not on file   Other  Topics Concern    Not on file   Social History Narrative    Not on file       FAMILY HISTORY:  Family History   Problem Relation Age of Onset    Cancer Mother     Breast cancer Maternal Uncle        ALLERGIES AND MEDICATIONS: updated and reviewed.  Review of patient's allergies indicates:   Allergen Reactions    Amlodipine Swelling     Medication List with Changes/Refills   Current Medications    ATORVASTATIN (LIPITOR) 40 MG TABLET    TAKE 1 TABLET BY MOUTH EVERYDAY AT BEDTIME    CLONIDINE (CATAPRES) 0.1 MG TABLET    Take 1 tablet (0.1 mg total) by mouth daily as needed (If SBP > 170.).    HYDRALAZINE (APRESOLINE) 100 MG TABLET    Take 1.5 tablets (150 mg total) by mouth 2 (two) times daily.    HYDROCHLOROTHIAZIDE (HYDRODIURIL) 50 MG TABLET    Take 1 tablet (50 mg total) by mouth once daily.    LABETALOL (NORMODYNE) 200 MG TABLET    Take 1 tablet (200 mg total) by mouth 2 (two) times daily.    OLMESARTAN (BENICAR) 40 MG TABLET    Take 40 mg by mouth once daily.    RIVAROXABAN (XARELTO) 20 MG TAB    Take 1 tablet (20 mg total) by mouth daily with dinner or evening meal.    SPIRONOLACTONE (ALDACTONE) 50 MG TABLET    Take 1 tablet (50 mg total) by mouth once daily.          CARE TEAM:  Patient Care Team:  Morgan Leger MD as PCP - General (Family Medicine)  Morgan Leger MD as Hypertension Digital Medicine Responsible Provider (Family Medicine)  Kash Dupree as Digital Medicine Health   Estefani High PA-C as Hypertension Digital Medicine Clinician (Physician Assistant)         REVIEW OF SYSTEMS:  Review of Systems   Constitutional: Positive for appetite change and unexpected weight change. Negative for fatigue and fever.   HENT: Negative for congestion and postnasal drip.    Eyes: Negative for photophobia and visual disturbance.   Respiratory: Negative for cough and shortness of breath.    Cardiovascular: Negative for chest pain and palpitations.   Gastrointestinal: Positive for nausea. Negative  for abdominal pain, constipation and diarrhea.   Genitourinary: Negative for dysuria, flank pain and frequency.   Musculoskeletal: Negative for back pain and neck pain.   Neurological: Negative for light-headedness and headaches.   Psychiatric/Behavioral: The patient is not nervous/anxious.          PHYSICAL EXAM:   Vitals:    05/21/19 1516   BP: (!) 160/90   Pulse: 88   Resp: 17   Temp: 98.2 °F (36.8 °C)             Body mass index is 32.41 kg/m².     General appearance - alert, well appearing, and in no distress and uncomfortable from nausea  Mental status - normal mood, behavior, speech, dress, motor activity, and thought processes  Eyes - sclera anicteric, left eye normal, right eye normal  Chest - clear to auscultation, no wheezes, rales or rhonchi, symmetric air entry  Heart - normal rate and regular rhythm  Abdomen - soft, nontender, nondistended, no masses or organomegaly  no rebound tenderness noted  bowel sounds normal  Neurological - motor and sensory grossly normal bilaterally  Extremities - no pedal edema noted      ASSESSMENT AND PLAN:  1. Nausea  Weight loss  - POCT URINE DIPSTICK WITH MICROSCOPE, AUTOMATED  - Comprehensive metabolic panel; Future  - Hemoglobin A1c; Future  - CBC auto differential; Future  - Lipase; Future  - TSH; Future  - T4, free; Future  - ondansetron (ZOFRAN-ODT) 4 MG TbDL; Take 1 tablet (4 mg total) by mouth every 8 (eight) hours as needed (nausea).  Dispense: 30 tablet; Refill: 0  - omeprazole (PRILOSEC) 20 MG capsule; Take 2 capsules (40 mg total) by mouth once daily.  Dispense: 60 capsule; Refill: 1  - promethazine injection 25 mg IM x 1 today  - Based on results, will assess if further imaging such as CT or U/S or GI consultation is necessary.  - Advised that if symptoms fail to improve, report to ED.    2. Healthcare maintenance  - Case request GI: COLONOSCOPY  - Lipid panel; Future  - Mammo Digital Screening Bilat; Future  - Ambulatory referral to Obstetrics / Gynecology      3. Acute cystitis  Will send rx Nitrofurantoin to pharmacy.    Follow up in 1 week if no improvement  or sooner as needed.

## 2019-05-22 ENCOUNTER — LAB VISIT (OUTPATIENT)
Dept: LAB | Facility: HOSPITAL | Age: 59
End: 2019-05-22
Attending: INTERNAL MEDICINE
Payer: COMMERCIAL

## 2019-05-22 DIAGNOSIS — Z00.00 HEALTHCARE MAINTENANCE: ICD-10-CM

## 2019-05-22 DIAGNOSIS — R11.0 NAUSEA: ICD-10-CM

## 2019-05-22 LAB
ALBUMIN SERPL BCP-MCNC: 3.9 G/DL (ref 3.5–5.2)
ALP SERPL-CCNC: 113 U/L (ref 55–135)
ALT SERPL W/O P-5'-P-CCNC: 16 U/L (ref 10–44)
ANION GAP SERPL CALC-SCNC: 9 MMOL/L (ref 8–16)
AST SERPL-CCNC: 15 U/L (ref 10–40)
BASOPHILS # BLD AUTO: 0.01 K/UL (ref 0–0.2)
BASOPHILS NFR BLD: 0.2 % (ref 0–1.9)
BILIRUB SERPL-MCNC: 0.5 MG/DL (ref 0.1–1)
BUN SERPL-MCNC: 25 MG/DL (ref 6–20)
CALCIUM SERPL-MCNC: 10.4 MG/DL (ref 8.7–10.5)
CHLORIDE SERPL-SCNC: 108 MMOL/L (ref 95–110)
CHOLEST SERPL-MCNC: 170 MG/DL (ref 120–199)
CHOLEST/HDLC SERPL: 4.1 {RATIO} (ref 2–5)
CO2 SERPL-SCNC: 26 MMOL/L (ref 23–29)
CREAT SERPL-MCNC: 1.2 MG/DL (ref 0.5–1.4)
DIFFERENTIAL METHOD: NORMAL
EOSINOPHIL # BLD AUTO: 0.1 K/UL (ref 0–0.5)
EOSINOPHIL NFR BLD: 1.2 % (ref 0–8)
ERYTHROCYTE [DISTWIDTH] IN BLOOD BY AUTOMATED COUNT: 13 % (ref 11.5–14.5)
EST. GFR  (AFRICAN AMERICAN): 57 ML/MIN/1.73 M^2
EST. GFR  (NON AFRICAN AMERICAN): 50 ML/MIN/1.73 M^2
ESTIMATED AVG GLUCOSE: 114 MG/DL (ref 68–131)
GLUCOSE SERPL-MCNC: 101 MG/DL (ref 70–110)
HBA1C MFR BLD HPLC: 5.6 % (ref 4–5.6)
HCT VFR BLD AUTO: 40 % (ref 37–48.5)
HDLC SERPL-MCNC: 41 MG/DL (ref 40–75)
HDLC SERPL: 24.1 % (ref 20–50)
HGB BLD-MCNC: 13 G/DL (ref 12–16)
LDLC SERPL CALC-MCNC: 103.8 MG/DL (ref 63–159)
LIPASE SERPL-CCNC: 29 U/L (ref 4–60)
LYMPHOCYTES # BLD AUTO: 1.4 K/UL (ref 1–4.8)
LYMPHOCYTES NFR BLD: 25.4 % (ref 18–48)
MCH RBC QN AUTO: 29.8 PG (ref 27–31)
MCHC RBC AUTO-ENTMCNC: 32.5 G/DL (ref 32–36)
MCV RBC AUTO: 92 FL (ref 82–98)
MONOCYTES # BLD AUTO: 0.4 K/UL (ref 0.3–1)
MONOCYTES NFR BLD: 6.9 % (ref 4–15)
NEUTROPHILS # BLD AUTO: 3.8 K/UL (ref 1.8–7.7)
NEUTROPHILS NFR BLD: 66.3 % (ref 38–73)
NONHDLC SERPL-MCNC: 129 MG/DL
PLATELET # BLD AUTO: 259 K/UL (ref 150–350)
PMV BLD AUTO: 9.6 FL (ref 9.2–12.9)
POTASSIUM SERPL-SCNC: 4.4 MMOL/L (ref 3.5–5.1)
PROT SERPL-MCNC: 7.3 G/DL (ref 6–8.4)
RBC # BLD AUTO: 4.36 M/UL (ref 4–5.4)
SODIUM SERPL-SCNC: 143 MMOL/L (ref 136–145)
T4 FREE SERPL-MCNC: 0.99 NG/DL (ref 0.71–1.51)
TRIGL SERPL-MCNC: 126 MG/DL (ref 30–150)
TSH SERPL DL<=0.005 MIU/L-ACNC: 1.96 UIU/ML (ref 0.4–4)
WBC # BLD AUTO: 5.68 K/UL (ref 3.9–12.7)

## 2019-05-22 PROCEDURE — 80061 LIPID PANEL: CPT

## 2019-05-22 PROCEDURE — 85025 COMPLETE CBC W/AUTO DIFF WBC: CPT

## 2019-05-22 PROCEDURE — 36415 COLL VENOUS BLD VENIPUNCTURE: CPT | Mod: PN

## 2019-05-22 PROCEDURE — 83690 ASSAY OF LIPASE: CPT

## 2019-05-22 PROCEDURE — 80053 COMPREHEN METABOLIC PANEL: CPT

## 2019-05-22 PROCEDURE — 84443 ASSAY THYROID STIM HORMONE: CPT

## 2019-05-22 PROCEDURE — 84439 ASSAY OF FREE THYROXINE: CPT

## 2019-05-22 PROCEDURE — 83036 HEMOGLOBIN GLYCOSYLATED A1C: CPT

## 2019-05-23 ENCOUNTER — PATIENT MESSAGE (OUTPATIENT)
Dept: FAMILY MEDICINE | Facility: CLINIC | Age: 59
End: 2019-05-23

## 2019-05-28 ENCOUNTER — HOSPITAL ENCOUNTER (OUTPATIENT)
Dept: RADIOLOGY | Facility: HOSPITAL | Age: 59
Discharge: HOME | End: 2019-05-28
Attending: INTERNAL MEDICINE
Payer: COMMERCIAL

## 2019-05-28 VITALS — WEIGHT: 182 LBS | HEIGHT: 63 IN | BODY MASS INDEX: 32.25 KG/M2

## 2019-05-28 DIAGNOSIS — Z00.00 HEALTHCARE MAINTENANCE: ICD-10-CM

## 2019-05-28 DIAGNOSIS — R11.0 NAUSEA: ICD-10-CM

## 2019-05-28 DIAGNOSIS — Z12.39 BREAST CANCER SCREENING: ICD-10-CM

## 2019-05-28 PROCEDURE — 77067 MAMMO DIGITAL SCREENING BILAT WITH TOMOSYNTHESIS_CAD: ICD-10-PCS | Mod: 26,,, | Performed by: RADIOLOGY

## 2019-05-28 PROCEDURE — 77067 SCR MAMMO BI INCL CAD: CPT | Mod: TC

## 2019-05-28 PROCEDURE — 77063 MAMMO DIGITAL SCREENING BILAT WITH TOMOSYNTHESIS_CAD: ICD-10-PCS | Mod: 26,,, | Performed by: RADIOLOGY

## 2019-05-28 PROCEDURE — 77063 BREAST TOMOSYNTHESIS BI: CPT | Mod: 26,,, | Performed by: RADIOLOGY

## 2019-05-28 PROCEDURE — 77067 SCR MAMMO BI INCL CAD: CPT | Mod: 26,,, | Performed by: RADIOLOGY

## 2019-05-28 RX ORDER — ONDANSETRON 4 MG/1
4 TABLET, ORALLY DISINTEGRATING ORAL EVERY 8 HOURS PRN
Qty: 40 TABLET | Refills: 0 | Status: SHIPPED | OUTPATIENT
Start: 2019-05-28 | End: 2019-06-05

## 2019-05-28 NOTE — PROGRESS NOTES
Last 5 Patient Entered Readings                                      Current 30 Day Average: 157/90     Recent Readings 5/27/2019 5/27/2019 5/20/2019 5/20/2019 5/20/2019    SBP (mmHg) 133 133 139 139 156    DBP (mmHg) 89 89 86 86 99    Pulse 75 - 88 - 77          5/28: Pt has appt today.  Pushing call back 1 week.

## 2019-05-29 ENCOUNTER — TELEPHONE (OUTPATIENT)
Dept: FAMILY MEDICINE | Facility: CLINIC | Age: 59
End: 2019-05-29

## 2019-05-29 NOTE — TELEPHONE ENCOUNTER
No answer, LVM for patient to contact the clinic to schedule her ULTRA SOUND.    *PATIENT NEEDS TO BE SCHEDULED FOR ULTRASOUND, ORDER PLACED.

## 2019-05-29 NOTE — TELEPHONE ENCOUNTER
----- Message from Clifton Jaffe MD sent at 5/28/2019  4:51 PM CDT -----  Call Ms Guillen and schedule patient for abdominal ultrasound

## 2019-05-31 ENCOUNTER — HOSPITAL ENCOUNTER (OUTPATIENT)
Dept: RADIOLOGY | Facility: HOSPITAL | Age: 59
Discharge: HOME | End: 2019-05-31
Attending: INTERNAL MEDICINE
Payer: COMMERCIAL

## 2019-05-31 ENCOUNTER — PATIENT MESSAGE (OUTPATIENT)
Dept: FAMILY MEDICINE | Facility: CLINIC | Age: 59
End: 2019-05-31

## 2019-05-31 DIAGNOSIS — R11.2 NAUSEA AND VOMITING, INTRACTABILITY OF VOMITING NOT SPECIFIED, UNSPECIFIED VOMITING TYPE: ICD-10-CM

## 2019-05-31 PROCEDURE — 76700 US ABDOMEN COMPLETE: ICD-10-PCS | Mod: 26,,, | Performed by: RADIOLOGY

## 2019-05-31 PROCEDURE — 76700 US EXAM ABDOM COMPLETE: CPT | Mod: TC

## 2019-05-31 PROCEDURE — 76700 US EXAM ABDOM COMPLETE: CPT | Mod: 26,,, | Performed by: RADIOLOGY

## 2019-06-03 ENCOUNTER — PATIENT OUTREACH (OUTPATIENT)
Dept: OTHER | Facility: OTHER | Age: 59
End: 2019-06-03

## 2019-06-03 DIAGNOSIS — I10 ESSENTIAL HYPERTENSION: Primary | Chronic | ICD-10-CM

## 2019-06-03 NOTE — PROGRESS NOTES
Last 5 Patient Entered Readings                                      Current 30 Day Average: 157/92     Recent Readings 6/7/2019 6/7/2019 6/6/2019 6/6/2019 6/4/2019    SBP (mmHg) 170 170 165 165 144    DBP (mmHg) 102 102 96 96 90    Pulse 71 - 81 - 71        6/7: Spoke to patient. No longer having nausea.  States reading this morning was BEFORE medication.  Denies symptoms of elevated blood pressure: severe HA, change in vision or speech, numbness/tingling/weakness on one side of body, CP, SOB.     Need to clarify dose of HCTZ on next call.

## 2019-06-05 ENCOUNTER — OFFICE VISIT (OUTPATIENT)
Dept: OBSTETRICS AND GYNECOLOGY | Facility: CLINIC | Age: 59
End: 2019-06-05
Payer: COMMERCIAL

## 2019-06-05 ENCOUNTER — LAB VISIT (OUTPATIENT)
Dept: LAB | Facility: HOSPITAL | Age: 59
End: 2019-06-05
Attending: OBSTETRICS & GYNECOLOGY
Payer: COMMERCIAL

## 2019-06-05 VITALS — BODY MASS INDEX: 34.38 KG/M2 | WEIGHT: 194 LBS | HEIGHT: 63 IN

## 2019-06-05 DIAGNOSIS — R31.9 URINARY TRACT INFECTION WITH HEMATURIA, SITE UNSPECIFIED: ICD-10-CM

## 2019-06-05 DIAGNOSIS — Z00.00 ANNUAL PHYSICAL EXAM: Primary | ICD-10-CM

## 2019-06-05 DIAGNOSIS — N39.0 URINARY TRACT INFECTION WITH HEMATURIA, SITE UNSPECIFIED: ICD-10-CM

## 2019-06-05 DIAGNOSIS — N95.1 MENOPAUSAL STATE: ICD-10-CM

## 2019-06-05 DIAGNOSIS — N39.0 URINARY TRACT INFECTION WITH HEMATURIA, SITE UNSPECIFIED: Primary | ICD-10-CM

## 2019-06-05 DIAGNOSIS — N95.2 ATROPHIC VAGINITIS: ICD-10-CM

## 2019-06-05 DIAGNOSIS — R31.9 URINARY TRACT INFECTION WITH HEMATURIA, SITE UNSPECIFIED: Primary | ICD-10-CM

## 2019-06-05 DIAGNOSIS — Z01.419 ENCOUNTER FOR GYNECOLOGICAL EXAMINATION WITHOUT ABNORMAL FINDING: ICD-10-CM

## 2019-06-05 DIAGNOSIS — Z90.710 STATUS POST HYSTERECTOMY: ICD-10-CM

## 2019-06-05 DIAGNOSIS — Z79.890 HORMONE REPLACEMENT THERAPY (HRT): ICD-10-CM

## 2019-06-05 PROCEDURE — 99396 PR PREVENTIVE VISIT,EST,40-64: ICD-10-PCS | Mod: S$GLB,,, | Performed by: OBSTETRICS & GYNECOLOGY

## 2019-06-05 PROCEDURE — 99999 PR PBB SHADOW E&M-EST. PATIENT-LVL III: ICD-10-PCS | Mod: PBBFAC,,, | Performed by: OBSTETRICS & GYNECOLOGY

## 2019-06-05 PROCEDURE — 87086 URINE CULTURE/COLONY COUNT: CPT

## 2019-06-05 PROCEDURE — 99999 PR PBB SHADOW E&M-EST. PATIENT-LVL III: CPT | Mod: PBBFAC,,, | Performed by: OBSTETRICS & GYNECOLOGY

## 2019-06-05 PROCEDURE — 99396 PREV VISIT EST AGE 40-64: CPT | Mod: S$GLB,,, | Performed by: OBSTETRICS & GYNECOLOGY

## 2019-06-05 PROCEDURE — 3008F BODY MASS INDEX DOCD: CPT | Mod: CPTII,S$GLB,, | Performed by: OBSTETRICS & GYNECOLOGY

## 2019-06-05 PROCEDURE — 3008F PR BODY MASS INDEX (BMI) DOCUMENTED: ICD-10-PCS | Mod: CPTII,S$GLB,, | Performed by: OBSTETRICS & GYNECOLOGY

## 2019-06-05 RX ORDER — ESTRADIOL 0.1 MG/G
2 CREAM VAGINAL DAILY
Qty: 42.5 G | Refills: 6 | Status: SHIPPED | OUTPATIENT
Start: 2019-06-05 | End: 2020-04-29 | Stop reason: ALTCHOICE

## 2019-06-05 NOTE — LETTER
June 5, 2019      Clifton Jaffe MD  [address] [facility] [address]           [facility] - OB/ GYN  [address] [facility] [address]  [facility] [address]  Phone: [phone]          Patient: Keyonna Guillen   MR Number: [id number]   YOB: 1960   Date of Visit: 6/5/2019       Dear Dr. Clifton Jaffe:    Thank you for referring Keyonna Guillen to me for evaluation. Attached you will find relevant portions of my assessment and plan of care.    If you have questions, please do not hesitate to call me. I look forward to following Keyonna Guillen along with you.    Sincerely,    Koby Brewer MD    Enclosure  CC:  No Recipients    If you would like to receive this communication electronically, please contact [email or web address] or [phone] to request more information on [employer] access.    For providers and/or their staff who would like to refer a patient to [facility], please contact us through our one-stop-shop provider referral line, [facility], at [phone].    If you feel you have received this communication in error or would no longer like to receive these types of communications, please e-mail [email or web address]

## 2019-06-05 NOTE — PATIENT INSTRUCTIONS

## 2019-06-05 NOTE — PROGRESS NOTES
Subjective:      Chief Complaint:    Chief Complaint   Patient presents with    Gynecologic Exam       Menstrual History:    OB History        4    Para   4    Term   4            AB        Living   4       SAB        TAB        Ectopic        Multiple        Live Births                     Menarche age: 12     No LMP recorded. Patient has had a hysterectomy.                Objective:      HISTORY OF PRESENT ILLNESS:  The patient is 58 years of age.  Here for annual   exam.  She is a  4, para 4.  Pap smear in 2015 was normal.  Mammogram   this year is negative.    PAST MEDICAL HISTORY:  Atrial fibrillation, coronary artery disease,   dyslipidemia, hypertension, recent E. coli infection in kidneys.    SURGERY:  Breast biopsy, bypass surgery, coronary bypass, hysterectomy, and BSO.    The patient was on Premarin, until recently discontinued, and now she is having   hot flashes, menopausal symptoms, extreme dryness and discomfort with sexual   activity.    PHYSICAL EXAMINATION:  VITAL SIGNS:  Weight is 194, blood pressure is 160/90.  BREASTS:  No lumps, mass, discharge, skin changes, retraction.  Mammogram   negative.  PELVIC:  External atrophic, thin, dry.  B[place], [place], and urethral glands   are negative.  Vagina, very thin, dry, very tight and painful introitus.  Vagina   is thin and dry.  Cervix, uterus, adnexa is absent.  Good apical support.  RECTAL:  Negative.    IMPRESSION:  Menopausal with vulvar genital atrophy.    PLAN:  We will reinstitute vaginal estrogen 2 g every third day.  We will see   the patient back in three months.  Continue with multivitamin, calcium, vitamin   D, and as stated return in three months to reevaluate the patient's genital   area.      CIERRA  dd: 2019 10:51:53 (CDT)  td: 2019 03:16:47 (CDT)  Doc ID   #[id number]  Job ID #[id number]    CC:       History of Present Illness AND  Examination detailed DICTATE:        Physical Exam   Constitutional: She is  oriented to person, place, and time. She appears well-developed and well-nourished. No distress.   HENT:   Head: Normoceph.  Eyes: Pupils are equal, round, and reactive to light.   Neck: Neck supple. .   Cardiovascular: Normal rate, regular rhythm and normal heart sounds. No murmur heard.  Pulmonary/Chest: Effort normal and breath sounds normal. No respiratory distress. She has no wheezes. She has no rales. She exhibits no tenderness.   Abdominal: Bowel sounds are normal. She exhibits no distension and no mass. There is no tenderness. There is no rebound and no guardin  Musculoskeletal: Normal range of motion.   Lymphadenopathy:        Right: No inguinal adenopathy present.        Left: No inguinal adenopathy present.   Neurological: She is alert and oriented.  Skin: Skin is warm. No rash noted.        Review of Systems  Review of Systems   Normal ROS:   Constitutional: Negative for fever, chills, activity change fatigue and unexpected weight change.   HENT: Negative for nosebleeds, congestion.  Eyes: Negative for visual disturbance.   Respiratory: Negative for shortness of breath and wheezing.    Cardiovascular: Negative for chest pain, palpitations.   Gastrointestinal: Negative for abdominal pain, diarrhea, constipation, blood in stool and abdominal distention.   Musculoskeletal: Negative for back pain.   Allergic/Immunologic: Negative   Neurological: Negative    Hematological: Negative f.   Psychiatric/Behavioral: Negative     Assessment:      Diagnosis: MENOPAUSAL    VAG   ATROPHY    PAINFULL   SEX       Plan:      Return in 3  months

## 2019-06-07 LAB — BACTERIA UR CULT: NORMAL

## 2019-06-11 ENCOUNTER — PATIENT OUTREACH (OUTPATIENT)
Dept: OTHER | Facility: OTHER | Age: 59
End: 2019-06-11

## 2019-06-11 NOTE — PROGRESS NOTES
Last 5 Patient Entered Readings                                      Current 30 Day Average: 157/95     Recent Readings 6/7/2019 6/7/2019 6/6/2019 6/6/2019 6/4/2019    SBP (mmHg) 170 170 165 165 144    DBP (mmHg) 102 102 96 96 90    Pulse 71 - 81 - 71        6/11: LVM.  Will 1 week

## 2019-06-18 NOTE — PROGRESS NOTES
Last 5 Patient Entered Readings                                      Current 30 Day Average: 157/96     Recent Readings 6/18/2019 6/18/2019 6/7/2019 6/7/2019 6/6/2019    SBP (mmHg) 165 165 170 170 165    DBP (mmHg) 100 100 102 102 96    Pulse 76 - 71 - 81          6/18: LVM.  [employer].  Will call in 1 week.

## 2019-06-25 NOTE — PROGRESS NOTES
Last 5 Patient Entered Readings                                      Current 30 Day Average: 146/91     Recent Readings 6/20/2019 6/20/2019 6/19/2019 6/19/2019 6/19/2019    SBP (mmHg) 117 117 105 105 150    DBP (mmHg) 78 78 62 62 97    Pulse 67 - 77 - 85          6/25: LVM.  Will call in 2 weeks.

## 2019-06-28 ENCOUNTER — PATIENT OUTREACH (OUTPATIENT)
Dept: OTHER | Facility: OTHER | Age: 59
End: 2019-06-28

## 2019-06-28 DIAGNOSIS — I10 ESSENTIAL HYPERTENSION: Primary | Chronic | ICD-10-CM
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
## 2019-07-09 NOTE — PROGRESS NOTES
Last 5 Patient Entered Readings                                      Current 30 Day Average: 151/93     Recent Readings 7/5/2019 7/5/2019 6/30/2019 6/30/2019 6/28/2019    SBP (mmHg) 124 124 188 188 175    DBP (mmHg) 78 78 109 109 106    Pulse 97 - 72 - 74          7/9: LVM.  Will call in 2 weeks.

## 2019-07-12 NOTE — PROGRESS NOTES
Last 5 Patient Entered Readings                                      Current 30 Day Average: 151/93     Recent Readings 7/5/2019 7/5/2019 6/30/2019 6/30/2019 6/28/2019    SBP (mmHg) 124 124 188 188 175    DBP (mmHg) 78 78 109 109 106    Pulse 97 - 72 - 74          7/12: Reviewed chart. Only 1 reading since last call and it is good.  Postponing call.

## 2019-07-19 ENCOUNTER — PATIENT OUTREACH (OUTPATIENT)
Dept: OTHER | Facility: OTHER | Age: 59
End: 2019-07-19

## 2019-07-19 NOTE — PROGRESS NOTES
Last 5 Patient Entered Readings                                      Current 30 Day Average: 137/85     Recent Readings 7/29/2019 7/29/2019 7/25/2019 7/25/2019 7/25/2019    SBP (mmHg) 140 140 164 164 139    DBP (mmHg) 89 89 103 103 82    Pulse 80 - 78 - 83        7/31: Spoke to patient.  Readings still fluctuating but much improved. Continue on current regimen for now.  Pt admits to feeling depressed with no motivation, lack of interest in life.  Encouraged her to see PCP for consideration of anti-depressant.  She states that she will make an appointment.      Hypertension Medications             cloNIDine (CATAPRES) 0.1 MG tablet Take 1 tablet (0.1 mg total) by mouth daily as needed (If SBP > 170.).    hydrALAZINE (APRESOLINE) 100 MG tablet Take 1 tablet (100 mg total) by mouth 2 (two) times daily.    labetalol (NORMODYNE) 200 MG tablet Take 1/2 tablet (100 mg total) by mouth 2 (two) times daily.    olmesartan (BENICAR) 40 MG tablet Take 40 mg by mouth once daily.    spironolactone (ALDACTONE) 50 MG tablet Take 1 tablet (50 mg total) by mouth once daily.

## 2019-07-23 NOTE — PROGRESS NOTES
Last 5 Patient Entered Readings                                      Current 30 Day Average: 148/89     Recent Readings 7/22/2019 7/22/2019 7/20/2019 7/20/2019 7/19/2019    SBP (mmHg) 134 134 158 158 113    DBP (mmHg) 81 81 89 89 66    Pulse 75 - 87 - 80          7/23: LVM. Deferred NC protocol until further notice

## 2019-08-20 NOTE — PROGRESS NOTES
"Last 5 Patient Entered Readings                                      Current 30 Day Average: 138/88     Recent Readings 8/13/2019 8/13/2019 8/13/2019 8/13/2019 8/11/2019    SBP (mmHg) 158 158 138 138 135    DBP (mmHg) 95 95 92 92 86    Pulse 80 - 79 - 83            Digital Medicine: Health  Follow Up    Left voicemail to follow up with Mrs. Keyonna Guillen.  Current BP average 138/88 mmHg is not at goal, 130/80 mmHg.    Will call in 2 weeks.  Per previous clinician note, "Pt admits to feeling depressed with no motivation, lack of interest in life.  Encouraged her to see PCP for consideration of anti-depressant"  "

## 2019-08-23 ENCOUNTER — PATIENT OUTREACH (OUTPATIENT)
Dept: OTHER | Facility: OTHER | Age: 59
End: 2019-08-23

## 2019-08-23 NOTE — PROGRESS NOTES
Last 5 Patient Entered Readings                                      Current 30 Day Average: 138/81     Recent Readings 8/23/2019 8/23/2019 8/22/2019 8/22/2019 8/22/2019    SBP (mmHg) 144 144 111 111 95    DBP (mmHg) 84 84 66 66 59    Pulse 68 - 75 - 72        8/23/19: Received an ALERT for LOW blood pressure reading, submitted a reading of 84/48 at 8/22/2019  8:29 PM. Patient was vibrant and stated she felt fine. Patient reports that she took spironolactone yesterday morning and only took labetalol and hydralazine last night when low reading occurred. She drank a Gatorade to recover from the low reading. She reports history of high blood pressure all her life. This morning, she took spironolactone only. Other medications are typically spread out across the day. She is concerned about the low blood pressure reading that occurred and plans to now measure blood pressure before taking medications. She will record what medications she is taking in the comment section of rafael. Counseled her to be sure she is adequately hydrated to prevent dehydration which will also result in hypotension. She will inform me if low blood pressure readings continue to occur as there may be an opportunity to reduce some of her medications.     Hypertension Medications             cloNIDine (CATAPRES) 0.1 MG tablet Take 1 tablet (0.1 mg total) by mouth daily as needed (If SBP > 170.).    hydrALAZINE (APRESOLINE) 100 MG tablet Take 1.5 tablets (150 mg total) by mouth 2 (two) times daily.    hydroCHLOROthiazide (HYDRODIURIL) 50 MG tablet Take 1 tablet (50 mg total) by mouth once daily. 7/1: Confirmed this is on HOLD at present     labetalol (NORMODYNE) 200 MG tablet Take 1 tablet (200 mg total) by mouth 2 (two) times daily.    olmesartan (BENICAR) 40 MG tablet Take 40 mg by mouth once daily.    spironolactone (ALDACTONE) 50 MG tablet Take 1 tablet (50 mg total) by mouth once daily.

## 2019-08-26 ENCOUNTER — PATIENT OUTREACH (OUTPATIENT)
Dept: OTHER | Facility: OTHER | Age: 59
End: 2019-08-26

## 2019-08-26 PROBLEM — Z00.00 HEALTHCARE MAINTENANCE: Status: RESOLVED | Noted: 2019-05-21 | Resolved: 2019-08-26

## 2019-08-26 NOTE — PROGRESS NOTES
Last 5 Patient Entered Readings                                      Current 30 Day Average: 142/86     Recent Readings 8/26/2019 8/26/2019 8/25/2019 8/25/2019 8/24/2019    SBP (mmHg) 160 160 158 158 187    DBP (mmHg) 104 104 101 101 100    Pulse 90 - 77 - 82        Received two high blood pressure ALERTS for patient over the weekend. Patient was out shopping. Reports no s/sx of hypertension: denies SOB, CP, headaches, blurred vision. Readings reported were BEFORE medication was taken. Due to low blood pressures occurring last week, she is now measuring her blood pressure BEFORE taking her medications to ensure that her blood pressure is not too low before she takes her blood pressure medications. Reading today of 160/104 was WITHOUT blood pressure medications onboard. She took medications after reading was recorded but did not re-measure her blood pressure. She will measure again when she gets home. Will continue to monitor.    Hypertension Medications             cloNIDine (CATAPRES) 0.1 MG tablet Take 1 tablet (0.1 mg total) by mouth daily as needed (If SBP > 170.).    hydrALAZINE (APRESOLINE) 100 MG tablet Take 1.5 tablets (150 mg total) by mouth 2 (two) times daily.    hydroCHLOROthiazide (HYDRODIURIL) 50 MG tablet Take 1 tablet (50 mg total) by mouth once daily.    labetalol (NORMODYNE) 200 MG tablet Take 1 tablet (200 mg total) by mouth 2 (two) times daily.    olmesartan (BENICAR) 40 MG tablet Take 40 mg by mouth once daily.    spironolactone (ALDACTONE) 50 MG tablet Take 1 tablet (50 mg total) by mouth once daily.

## 2019-08-30 ENCOUNTER — OFFICE VISIT (OUTPATIENT)
Dept: FAMILY MEDICINE | Facility: CLINIC | Age: 59
End: 2019-08-30
Payer: COMMERCIAL

## 2019-08-30 VITALS
SYSTOLIC BLOOD PRESSURE: 112 MMHG | DIASTOLIC BLOOD PRESSURE: 70 MMHG | RESPIRATION RATE: 16 BRPM | OXYGEN SATURATION: 97 % | HEIGHT: 63 IN | BODY MASS INDEX: 34.14 KG/M2 | HEART RATE: 65 BPM | WEIGHT: 192.69 LBS | TEMPERATURE: 98 F

## 2019-08-30 DIAGNOSIS — L03.213 PERIORBITAL CELLULITIS OF LEFT EYE: Primary | ICD-10-CM

## 2019-08-30 PROCEDURE — 99999 PR PBB SHADOW E&M-EST. PATIENT-LVL III: ICD-10-PCS | Mod: PBBFAC,,, | Performed by: NURSE PRACTITIONER

## 2019-08-30 PROCEDURE — 3074F PR MOST RECENT SYSTOLIC BLOOD PRESSURE < 130 MM HG: ICD-10-PCS | Mod: CPTII,S$GLB,, | Performed by: NURSE PRACTITIONER

## 2019-08-30 PROCEDURE — 99214 PR OFFICE/OUTPT VISIT, EST, LEVL IV, 30-39 MIN: ICD-10-PCS | Mod: S$GLB,,, | Performed by: NURSE PRACTITIONER

## 2019-08-30 PROCEDURE — 99999 PR PBB SHADOW E&M-EST. PATIENT-LVL III: CPT | Mod: PBBFAC,,, | Performed by: NURSE PRACTITIONER

## 2019-08-30 PROCEDURE — 3078F DIAST BP <80 MM HG: CPT | Mod: CPTII,S$GLB,, | Performed by: NURSE PRACTITIONER

## 2019-08-30 PROCEDURE — 3008F PR BODY MASS INDEX (BMI) DOCUMENTED: ICD-10-PCS | Mod: CPTII,S$GLB,, | Performed by: NURSE PRACTITIONER

## 2019-08-30 PROCEDURE — 3074F SYST BP LT 130 MM HG: CPT | Mod: CPTII,S$GLB,, | Performed by: NURSE PRACTITIONER

## 2019-08-30 PROCEDURE — 3008F BODY MASS INDEX DOCD: CPT | Mod: CPTII,S$GLB,, | Performed by: NURSE PRACTITIONER

## 2019-08-30 PROCEDURE — 99214 OFFICE O/P EST MOD 30 MIN: CPT | Mod: S$GLB,,, | Performed by: NURSE PRACTITIONER

## 2019-08-30 PROCEDURE — 3078F PR MOST RECENT DIASTOLIC BLOOD PRESSURE < 80 MM HG: ICD-10-PCS | Mod: CPTII,S$GLB,, | Performed by: NURSE PRACTITIONER

## 2019-08-30 RX ORDER — HYDROCHLOROTHIAZIDE 25 MG/1
25 TABLET ORAL DAILY
Refills: 3 | COMMUNITY
Start: 2019-07-17 | End: 2020-04-29 | Stop reason: SDUPTHER

## 2019-08-30 RX ORDER — CLINDAMYCIN HYDROCHLORIDE 300 MG/1
300 CAPSULE ORAL EVERY 8 HOURS
Qty: 21 CAPSULE | Refills: 0 | Status: SHIPPED | OUTPATIENT
Start: 2019-08-30 | End: 2019-10-29

## 2019-08-30 RX ORDER — CEPHALEXIN 500 MG/1
CAPSULE ORAL 2 TIMES DAILY
Refills: 0 | COMMUNITY
Start: 2019-08-13 | End: 2019-10-29

## 2019-08-30 NOTE — PROGRESS NOTES
Routine Office Visit    Patient Name: Keyonna Guillen    : 1960  MRN: [id number]    Chief Complaint:  Facial swelling    Subjective:  Keyonna is a 59 y.o. female who presents today for:    1.  Facial swelling - patient reports today for evaluation of facial swelling. She reports facial swelling under the left eyelid.  She states that 3 weeks ago she did have a cyst on her right upper eyelid which was treated with antibiotics by her ophthalmologist and the cyst went away.  She does note that yesterday she woke up and her right lower eyelid was swollen, reddened, and painful.  She states that it is sore around her eye but she denies any blurry vision, fevers, or chills.  Denies any pain with ocular eye movements.  Denies any abdominal pain, nausea, vomiting, or diarrhea.  She states that she has a headache along with the symptoms.  She currently is wearing contacts today.  Patient denies any trauma to the eye no specific inciting event or injury for these eye symptoms.    Past Medical History  Past Medical History:   Diagnosis Date    Atrial fibrillation     Coronary artery disease     Hyperlipidemia     Hypertension        Past Surgical History  Past Surgical History:   Procedure Laterality Date    BREAST BIOPSY      CARDIAC SURGERY      CORONARY ARTERY BYPASS GRAFT      HYSTERECTOMY      Transesophageal Echocardiogram (FRANCISCO) N/A 3/9/2018    Performed by José Khalil MD at [facility] CATH LAB       Family History  Family History   Problem Relation Age of Onset    Cancer Mother     Breast cancer Maternal Uncle        Social History  Social History     Socioeconomic History    Marital status:      Spouse name: Not on file    Number of children: Not on file    Years of education: Not on file    Highest education level: Not on file   Occupational History    Not on file   Social Needs    Financial resource strain: Not on file    Food insecurity:     Worry: Not on file     Inability: Not on file     Transportation needs:     Medical: Not on file     Non-medical: Not on file   Tobacco Use    Smoking status: Never Smoker    Smokeless tobacco: Never Used   Substance and Sexual Activity    Alcohol use: No    Drug use: No    Sexual activity: Yes     Partners: Male     Birth control/protection: Surgical   Lifestyle    Physical activity:     Days per week: Not on file     Minutes per session: Not on file    Stress: Not on file   Relationships    Social connections:     Talks on phone: Not on file     Gets together: Not on file     Attends Restorationism service: Not on file     Active member of club or organization: Not on file     Attends meetings of clubs or organizations: Not on file     Relationship status: Not on file   Other Topics Concern    Not on file   Social History Narrative    Not on file       Current Medications  Current Outpatient Medications on File Prior to Visit   Medication Sig Dispense Refill    atorvastatin (LIPITOR) 40 MG tablet TAKE 1 TABLET BY MOUTH EVERYDAY AT BEDTIME 90 tablet 3    cephALEXin (KEFLEX) 500 MG capsule Take by mouth 2 (two) times daily.  0    cloNIDine (CATAPRES) 0.1 MG tablet Take 1 tablet (0.1 mg total) by mouth daily as needed (If SBP > 170.). 30 tablet 11    estradiol (ESTRACE) 0.01 % (0.1 mg/gram) vaginal cream Place 2 g vaginally once daily. Apply daily 42.5 g 6    fluticasone propionate (FLONASE) 50 mcg/actuation nasal spray fluticasone propionate 50 mcg/actuation nasal spray,suspension      hydrALAZINE (APRESOLINE) 100 MG tablet Take 1.5 tablets (150 mg total) by mouth 2 (two) times daily. 270 tablet 3    hydroCHLOROthiazide (HYDRODIURIL) 25 MG tablet Take 25 mg by mouth once daily.  3    hydroCHLOROthiazide (HYDRODIURIL) 50 MG tablet Take 1 tablet (50 mg total) by mouth once daily. 90 tablet 3    labetalol (NORMODYNE) 200 MG tablet Take 1 tablet (200 mg total) by mouth 2 (two) times daily. 60 tablet 2    olmesartan (BENICAR) 40 MG tablet Take 40 mg  "by mouth once daily.      rivaroxaban (XARELTO) 20 mg Tab Take 1 tablet (20 mg total) by mouth daily with dinner or evening meal. 90 tablet 3    spironolactone (ALDACTONE) 50 MG tablet Take 1 tablet (50 mg total) by mouth once daily. 90 tablet 3     Current Facility-Administered Medications on File Prior to Visit   Medication Dose Route Frequency Provider Last Rate Last Dose    estradiol valerate injection 20 mg  20 mg Intramuscular Q21 Days Koby Brewer MD   20 mg at 06/15/16 1400       Allergies   Review of patient's allergies indicates:   Allergen Reactions    Amlodipine Swelling       Review of Systems (Pertinent positives)  Review of Systems   Constitutional: Negative for chills, diaphoresis, fever, malaise/fatigue and weight loss.   HENT: Negative for congestion, ear discharge, ear pain, hearing loss, nosebleeds, sinus pain, sore throat and tinnitus.         Facial swelling under left eye   Eyes: Positive for pain and redness. Negative for blurred vision, double vision, photophobia and discharge.   Respiratory: Negative.  Negative for stridor.    Cardiovascular: Negative for chest pain, palpitations, orthopnea, claudication, leg swelling and PND.   Gastrointestinal: Negative.    Genitourinary: Negative.    Musculoskeletal: Negative.  Negative for back pain, falls, joint pain, myalgias and neck pain.   Skin: Negative.    Neurological: Positive for headaches. Negative for dizziness, tingling, tremors, sensory change, speech change, focal weakness, seizures, loss of consciousness and weakness.   Endo/Heme/Allergies: Negative.    Psychiatric/Behavioral: Negative.        /70 (BP Location: Right arm, Patient Position: Sitting, BP Method: Small (Manual))   Pulse 65   Temp 98.1 °F (36.7 °C) (Oral)   Resp 16   Ht 5' 3" (1.6 m)   Wt 87.4 kg (192 lb 10.9 oz)   SpO2 97%   BMI 34.13 kg/m²     Physical Exam   Constitutional: She is oriented to person, place, and time. She appears well-developed and " well-nourished. No distress.   Eyes: Pupils are equal, round, and reactive to light. Conjunctivae and EOM are normal. Right eye exhibits no chemosis, no discharge and no exudate. Left eye exhibits no chemosis, no discharge and no exudate. Right conjunctiva is not injected. Right conjunctiva has no hemorrhage. Left conjunctiva is not injected. Left conjunctiva has no hemorrhage. Right eye exhibits normal extraocular motion and no nystagmus. Left eye exhibits normal extraocular motion and no nystagmus.       Neck: Normal range of motion. Neck supple. No JVD present.   Cardiovascular: Normal rate, regular rhythm, normal heart sounds and intact distal pulses. Exam reveals no gallop and no friction rub.   No murmur heard.  Pulmonary/Chest: Effort normal and breath sounds normal. No stridor. No respiratory distress. She has no wheezes. She has no rales. She exhibits no tenderness.   Musculoskeletal: Normal range of motion.   Lymphadenopathy:        Head (right side): No preauricular, no posterior auricular and no occipital adenopathy present.        Head (left side): No preauricular, no posterior auricular and no occipital adenopathy present.     She has no cervical adenopathy.   Neurological: She is alert and oriented to person, place, and time.   Skin: Skin is warm and dry. Capillary refill takes less than 2 seconds. She is not diaphoretic.            Assessment/Plan:  Keyonna Guillen is a 59 y.o. female who presents today for :    Keyonna was seen today for headache and facial swelling.    Diagnoses and all orders for this visit:    Periorbital cellulitis of left eye  -     clindamycin (CLEOCIN) 300 MG capsule; Take 1 capsule (300 mg total) by mouth every 8 (eight) hours.     No pain with eye movements.  Bilateral Conjunctiva is not injected.  Some TTP around left eye under the eye but no swelling or redness noted to the top of the eye.  Patient has normal EOMs.  Will treat with clinda for periorbital cellulitis.  Can take  Tylenol or ibuprofen for any pain. Patient was strongly encouraged to contact her eye doctor to follow up in the next week.  She should seek emergency care in the meantime if she develops any worsening pain, pain with eye movements, fevers, worsening swelling, worsening redness, or other signs of systemic infection such as fevers, chills, nausea, vomiting, or diarrhea.  Patient was instructed not to wear contacts while she has the symptoms and to wear eyeglasses instead.  Potential side effects of clindamycin were discussed.  Patient verbalized understanding of instructions.        This office note has been dictated.  This dictation has been generated using M-Modal Fluency Direct dictation; some phonetic errors may occur.   My collaborating physician is Dr. Jeses Ledbetter.

## 2019-09-05 ENCOUNTER — PATIENT OUTREACH (OUTPATIENT)
Dept: ADMINISTRATIVE | Facility: HOSPITAL | Age: 59
End: 2019-09-05

## 2019-09-05 NOTE — LETTER
AUTHORIZATION FOR RELEASE OF   CONFIDENTIAL INFORMATION    Dear [facility],    We are seeing Keyonna Guillen, date of birth 1960, in the clinic at [facility]. Morgan Leger MD is the patient's PCP. Keyonna Guillen has an outstanding lab/procedure at the time we reviewed her chart. In order to help keep her health information updated, she has authorized us to request the following medical record(s):        (  )  MAMMOGRAM                                      ( X )  COLONOSCOPY      (  )  PAP SMEAR                                          (  )  OUTSIDE LAB RESULTS     (  )  DEXA SCAN                                          (  )  EYE EXAM            (  )  FOOT EXAM                                          (  )  ENTIRE RECORD     (  )  OUTSIDE IMMUNIZATIONS                 (  )  _______________         Please fax records to [facility], Brandon A Page, MD,  [phone]   [address] [place] [address]. [address] [facility] [address]    If you have any questions, please contact Yoli Juárez MA,[facility] at [phone].           Patient Name: Keyonna Guillen  : 1960  Patient Phone #: [phone]

## 2019-09-12 ENCOUNTER — PATIENT OUTREACH (OUTPATIENT)
Dept: OTHER | Facility: OTHER | Age: 59
End: 2019-09-12

## 2019-09-12 DIAGNOSIS — I10 ESSENTIAL HYPERTENSION: Primary | Chronic | ICD-10-CM

## 2019-09-12 RX ORDER — HYDROCHLOROTHIAZIDE 50 MG/1
TABLET ORAL
Qty: 90 TABLET | Refills: 3 | Status: SHIPPED | OUTPATIENT
Start: 2019-09-12 | End: 2020-04-29 | Stop reason: SDUPTHER

## 2019-09-12 NOTE — PROGRESS NOTES
Digital Medicine: Clinician Follow-Up    Keyonna Guillen submitted a reading of 207/125 at 9/12/2019  8:19 AM. Called patient to follow-up due to high BP alert received. Patient reports that she feels fine, denies s/sx of hypertension - no SOB. CP, headaches, blurred vision. She reports that high reading was due to taking reading immediately after coming in from outside without resting, cooling off or relaxing before measuring. She also reports that she had not had her blood pressure medications before measuring.     The history is provided by the patient. No  was used.     Follow Up  Follow-up reason(s): reading review and responding to task      Alert received.   Care Team received high BP alert.  Patient is not experiencing symptoms.  Reading was invalid because Patient measured BP inappropriately - no medications were on board; measured immediately after coming inside from the heat outdoors without cooling off or relaxing. Patient reports that she is taking labetalol ONCE daily due to twice daily causes hypotension, low pulse.  HCTZ 50 mg is on HOLD since 7/1/19.  HCTZ 25 mg was initiated by Fito Izaguirre NP on 8/30/19 but she is not currently taking.   History of taking blood pressure readings without medications on board due to monitoring for hypotension.          Sleep Apnea  Patient not previously diagnosed with BUCKY and     Medication Affordability  Patient is currently not having problems affording medications    Medication Adherence:   She misses doses: never    Taking medications but finds it hard to keep everything straight.  Patient is not selectively taking diuretics.    She does not wonder if medications are working.  She does not know purpose of medications.      Adherence tools used: medication list and pill box    Reviewed hypertension medications with patient and instructed not to take all of these medications at one time. Advised to spread medications out across the day where  HCTZ and spironolactone should be taken in the morning or early afternoon to prevent disrupting night time sleep.    Intervention(s): patient education     Assigning patient goal(s)/task(s): Purchase Pill Box      INTERVENTION(S)  reviewed appropriate dose schedule, reviewed monitoring technique, recommended med change and encouragement/support    PLAN  patient verbalizes understanding, patient amenable to changes and additional monitoring needed    30-day average is uncontrolled, not at goal of <130/80.  Reviewed blood pressure readings submitted. Patient confirmed other readings were appropriately measured with medications on board with exception to the elevated measure from today 9/12/19. Elevated SBP and DBP readings still occurring regularly. She has a history of measuring blood pressure first then taking medications. Discussed readings with patient.   Instructed her to take HCTZ 25 mg once daily for better blood pressure control. If hypotension occurs with symptoms, she will decrease dose to 12.5 mg once daily by cutting tablet in half.  Measure blood pressure once daily for monitoring effects of HCTZ to ensure hypotension does not occur.  Follow-up in 2 weeks.      There are no preventive care reminders to display for this patient.    Last 5 Patient Entered Readings                                      Current 30 Day Average: 156/92     Recent Readings 9/12/2019 9/12/2019 9/12/2019 9/12/2019 9/3/2019    SBP (mmHg) 173 173 207 207 133    DBP (mmHg) 109 109 125 125 86    Pulse 70 - 73 - 73             Hypertension Medications             cloNIDine (CATAPRES) 0.1 MG tablet Take 1 tablet (0.1 mg total) by mouth daily as needed (If SBP > 170.).    hydrALAZINE (APRESOLINE) 100 MG tablet Take 1.5 tablets (150 mg total) by mouth 2 (two) times daily.    hydroCHLOROthiazide (HYDRODIURIL) 25 MG tablet Take 25 mg by mouth once daily.    hydroCHLOROthiazide (HYDRODIURIL) 50 MG tablet Take 1 tablet (50 mg total) by mouth  once daily.    labetalol (NORMODYNE) 200 MG tablet Take 1 tablet (200 mg total) by mouth 2 (two) times daily.    olmesartan (BENICAR) 40 MG tablet Take 40 mg by mouth once daily.    spironolactone (ALDACTONE) 50 MG tablet Take 1 tablet (50 mg total) by mouth once daily.

## 2019-09-19 ENCOUNTER — OFFICE VISIT (OUTPATIENT)
Dept: FAMILY MEDICINE | Facility: CLINIC | Age: 59
End: 2019-09-19
Payer: COMMERCIAL

## 2019-09-19 VITALS
HEART RATE: 71 BPM | TEMPERATURE: 98 F | BODY MASS INDEX: 34.22 KG/M2 | WEIGHT: 193.13 LBS | HEIGHT: 63 IN | OXYGEN SATURATION: 96 % | SYSTOLIC BLOOD PRESSURE: 127 MMHG | RESPIRATION RATE: 17 BRPM | DIASTOLIC BLOOD PRESSURE: 83 MMHG

## 2019-09-19 DIAGNOSIS — I73.9 PAD (PERIPHERAL ARTERY DISEASE): ICD-10-CM

## 2019-09-19 DIAGNOSIS — G57.01 PYRIFORMIS SYNDROME, RIGHT: ICD-10-CM

## 2019-09-19 DIAGNOSIS — Z12.11 COLON CANCER SCREENING: ICD-10-CM

## 2019-09-19 DIAGNOSIS — F32.A DEPRESSION, UNSPECIFIED DEPRESSION TYPE: Primary | ICD-10-CM

## 2019-09-19 PROCEDURE — 99214 OFFICE O/P EST MOD 30 MIN: CPT | Mod: S$GLB,,, | Performed by: FAMILY MEDICINE

## 2019-09-19 PROCEDURE — 3008F PR BODY MASS INDEX (BMI) DOCUMENTED: ICD-10-PCS | Mod: CPTII,S$GLB,, | Performed by: FAMILY MEDICINE

## 2019-09-19 PROCEDURE — 99999 PR PBB SHADOW E&M-EST. PATIENT-LVL IV: ICD-10-PCS | Mod: PBBFAC,,, | Performed by: FAMILY MEDICINE

## 2019-09-19 PROCEDURE — 3074F PR MOST RECENT SYSTOLIC BLOOD PRESSURE < 130 MM HG: ICD-10-PCS | Mod: CPTII,S$GLB,, | Performed by: FAMILY MEDICINE

## 2019-09-19 PROCEDURE — 99214 PR OFFICE/OUTPT VISIT, EST, LEVL IV, 30-39 MIN: ICD-10-PCS | Mod: S$GLB,,, | Performed by: FAMILY MEDICINE

## 2019-09-19 PROCEDURE — 3008F BODY MASS INDEX DOCD: CPT | Mod: CPTII,S$GLB,, | Performed by: FAMILY MEDICINE

## 2019-09-19 PROCEDURE — 99999 PR PBB SHADOW E&M-EST. PATIENT-LVL IV: CPT | Mod: PBBFAC,,, | Performed by: FAMILY MEDICINE

## 2019-09-19 PROCEDURE — 3079F DIAST BP 80-89 MM HG: CPT | Mod: CPTII,S$GLB,, | Performed by: FAMILY MEDICINE

## 2019-09-19 PROCEDURE — 3074F SYST BP LT 130 MM HG: CPT | Mod: CPTII,S$GLB,, | Performed by: FAMILY MEDICINE

## 2019-09-19 PROCEDURE — 3079F PR MOST RECENT DIASTOLIC BLOOD PRESSURE 80-89 MM HG: ICD-10-PCS | Mod: CPTII,S$GLB,, | Performed by: FAMILY MEDICINE

## 2019-09-19 RX ORDER — SERTRALINE HYDROCHLORIDE 25 MG/1
25 TABLET, FILM COATED ORAL DAILY
Qty: 30 TABLET | Refills: 2 | Status: SHIPPED | OUTPATIENT
Start: 2019-09-19 | End: 2019-12-12 | Stop reason: SDUPTHER

## 2019-09-26 ENCOUNTER — TELEPHONE (OUTPATIENT)
Dept: FAMILY MEDICINE | Facility: CLINIC | Age: 59
End: 2019-09-26

## 2019-09-26 NOTE — TELEPHONE ENCOUNTER
Patient requested to see Dr. Khalil instead of Dr. Rosario. Patient scheduled on 10/23 @ 3:40p. Thanks.

## 2019-10-03 DIAGNOSIS — I10 ESSENTIAL HYPERTENSION: Chronic | ICD-10-CM

## 2019-10-03 RX ORDER — LABETALOL 200 MG/1
200 TABLET, FILM COATED ORAL 2 TIMES DAILY
Qty: 60 TABLET | Refills: 5 | Status: SHIPPED | OUTPATIENT
Start: 2019-10-03 | End: 2020-03-16

## 2019-10-19 DIAGNOSIS — I48.0 PAROXYSMAL ATRIAL FIBRILLATION: ICD-10-CM

## 2019-10-19 DIAGNOSIS — I25.709 CORONARY ARTERY DISEASE INVOLVING CORONARY BYPASS GRAFT OF NATIVE HEART WITH ANGINA PECTORIS: ICD-10-CM

## 2019-10-19 RX ORDER — RIVAROXABAN 20 MG/1
TABLET, FILM COATED ORAL
Qty: 90 TABLET | Refills: 3 | Status: SHIPPED | OUTPATIENT
Start: 2019-10-19 | End: 2020-12-22

## 2019-10-21 ENCOUNTER — PATIENT OUTREACH (OUTPATIENT)
Dept: ADMINISTRATIVE | Facility: OTHER | Age: 59
End: 2019-10-21

## 2019-10-23 ENCOUNTER — OFFICE VISIT (OUTPATIENT)
Dept: CARDIOLOGY | Facility: CLINIC | Age: 59
End: 2019-10-23
Payer: COMMERCIAL

## 2019-10-23 VITALS
HEART RATE: 76 BPM | RESPIRATION RATE: 15 BRPM | HEIGHT: 62 IN | WEIGHT: 191 LBS | OXYGEN SATURATION: 99 % | DIASTOLIC BLOOD PRESSURE: 74 MMHG | SYSTOLIC BLOOD PRESSURE: 132 MMHG | BODY MASS INDEX: 35.15 KG/M2

## 2019-10-23 DIAGNOSIS — I10 ESSENTIAL HYPERTENSION: Chronic | ICD-10-CM

## 2019-10-23 DIAGNOSIS — Z01.810 PREOP CARDIOVASCULAR EXAM: ICD-10-CM

## 2019-10-23 DIAGNOSIS — E66.9 NON MORBID OBESITY, UNSPECIFIED OBESITY TYPE: ICD-10-CM

## 2019-10-23 DIAGNOSIS — I73.9 PAD (PERIPHERAL ARTERY DISEASE): ICD-10-CM

## 2019-10-23 DIAGNOSIS — I48.0 PAROXYSMAL ATRIAL FIBRILLATION: ICD-10-CM

## 2019-10-23 DIAGNOSIS — E78.49 OTHER HYPERLIPIDEMIA: ICD-10-CM

## 2019-10-23 DIAGNOSIS — I25.810 CORONARY ARTERY DISEASE INVOLVING CORONARY BYPASS GRAFT OF NATIVE HEART WITHOUT ANGINA PECTORIS: Primary | ICD-10-CM

## 2019-10-23 PROCEDURE — 3008F BODY MASS INDEX DOCD: CPT | Mod: CPTII,S$GLB,, | Performed by: INTERNAL MEDICINE

## 2019-10-23 PROCEDURE — 3078F PR MOST RECENT DIASTOLIC BLOOD PRESSURE < 80 MM HG: ICD-10-PCS | Mod: CPTII,S$GLB,, | Performed by: INTERNAL MEDICINE

## 2019-10-23 PROCEDURE — 3075F PR MOST RECENT SYSTOLIC BLOOD PRESS GE 130-139MM HG: ICD-10-PCS | Mod: CPTII,S$GLB,, | Performed by: INTERNAL MEDICINE

## 2019-10-23 PROCEDURE — 93000 ELECTROCARDIOGRAM COMPLETE: CPT | Mod: S$GLB,,, | Performed by: INTERNAL MEDICINE

## 2019-10-23 PROCEDURE — 3075F SYST BP GE 130 - 139MM HG: CPT | Mod: CPTII,S$GLB,, | Performed by: INTERNAL MEDICINE

## 2019-10-23 PROCEDURE — 3008F PR BODY MASS INDEX (BMI) DOCUMENTED: ICD-10-PCS | Mod: CPTII,S$GLB,, | Performed by: INTERNAL MEDICINE

## 2019-10-23 PROCEDURE — 99214 PR OFFICE/OUTPT VISIT, EST, LEVL IV, 30-39 MIN: ICD-10-PCS | Mod: S$GLB,,, | Performed by: INTERNAL MEDICINE

## 2019-10-23 PROCEDURE — 93000 EKG 12-LEAD: ICD-10-PCS | Mod: S$GLB,,, | Performed by: INTERNAL MEDICINE

## 2019-10-23 PROCEDURE — 3078F DIAST BP <80 MM HG: CPT | Mod: CPTII,S$GLB,, | Performed by: INTERNAL MEDICINE

## 2019-10-23 PROCEDURE — 99214 OFFICE O/P EST MOD 30 MIN: CPT | Mod: S$GLB,,, | Performed by: INTERNAL MEDICINE

## 2019-10-23 PROCEDURE — 99999 PR PBB SHADOW E&M-EST. PATIENT-LVL III: ICD-10-PCS | Mod: PBBFAC,,, | Performed by: INTERNAL MEDICINE

## 2019-10-23 PROCEDURE — 99999 PR PBB SHADOW E&M-EST. PATIENT-LVL III: CPT | Mod: PBBFAC,,, | Performed by: INTERNAL MEDICINE

## 2019-10-23 RX ORDER — CILOSTAZOL 50 MG/1
50 TABLET ORAL 2 TIMES DAILY
Qty: 180 TABLET | Refills: 3 | Status: SHIPPED | OUTPATIENT
Start: 2019-10-23 | End: 2020-12-11

## 2019-10-23 NOTE — PROGRESS NOTES
CARDIOVASCULAR PROGRESS NOTE    REASON FOR CONSULT:   Keyonna Guillen is a 59 y.o. female who presents for follow up of PAF, CAD/CABG, HTN, PAD.    PCP: Page  HISTORY OF PRESENT ILLNESS:   Last seen January 2019.    The patient comes in for follow-up.  She denies intercurrent angina or dyspnea.  She does report limiting bilateral buttock and right calf claudication.  She states she is only able to walk about half a block before having to stop.  She also tells me she is due to have a colonoscopy and needs to get cardiac clearance for this.  She is also on Xarelto for paroxysmal atrial fibrillation.  She denies any PND, orthopnea, melena or hematuria.    Records below reviewed noting a lower extremity arterial ultrasound suggesting significant right SFA stenosis.    CARDIOVASCULAR HISTORY:   CAD s/p CABG 4/5/11: LIMA-LAD, SVG-D, SVG-OM  PAF s/p FRANCISCO/DCCV 3/8/18, on Xarelto, CHADS VASC 2  Hx L vert art occlusion  PAD, ?R SFA stenosis (US 5/2018)    PAST MEDICAL HISTORY:     Past Medical History:   Diagnosis Date    Atrial fibrillation     Colon polyp     Coronary artery disease     Hyperlipidemia     Hypertension        PAST SURGICAL HISTORY:     Past Surgical History:   Procedure Laterality Date    BREAST BIOPSY      CARDIAC SURGERY      CORONARY ARTERY BYPASS GRAFT      HYSTERECTOMY         ALLERGIES AND MEDICATION:   Review of patient's allergies indicates:  No Known Allergies       Medication List           Accurate as of October 23, 2019  3:27 PM. If you have any questions, ask your nurse or doctor.               CONTINUE taking these medications    atorvastatin 40 MG tablet  Commonly known as:  LIPITOR  TAKE 1 TABLET BY MOUTH EVERYDAY AT BEDTIME     cephALEXin 500 MG capsule  Commonly known as:  KEFLEX     clindamycin 300 MG capsule  Commonly known as:  CLEOCIN  Take 1 capsule (300 mg total) by mouth every 8 (eight) hours.     cloNIDine 0.1 MG tablet  Commonly known as:  CATAPRES  Take 1 tablet (0.1 mg  total) by mouth daily as needed (If SBP > 170.).     estradiol 0.01 % (0.1 mg/gram) vaginal cream  Commonly known as:  ESTRACE  Place 2 g vaginally once daily. Apply daily     fluticasone propionate 50 mcg/actuation nasal spray  Commonly known as:  FLONASE     hydrALAZINE 100 MG tablet  Commonly known as:  APRESOLINE  Take 1.5 tablets (150 mg total) by mouth 2 (two) times daily.     * hydroCHLOROthiazide 25 MG tablet  Commonly known as:  HYDRODIURIL     * hydroCHLOROthiazide 50 MG tablet  Commonly known as:  HYDRODIURIL  ON HOLD - Take 1 tablet (50 mg) by mouth once daily.     labetalol 200 MG tablet  Commonly known as:  NORMODYNE  Take 1 tablet (200 mg total) by mouth 2 (two) times daily.     olmesartan 40 MG tablet  Commonly known as:  BENICAR     sertraline 25 MG tablet  Commonly known as:  ZOLOFT  Take 1 tablet (25 mg total) by mouth once daily.     spironolactone 50 MG tablet  Commonly known as:  ALDACTONE  Take 1 tablet (50 mg total) by mouth once daily.     XARELTO 20 mg Tab  Generic drug:  rivaroxaban  TAKE 1 TABLET (20 MG TOTAL) BY MOUTH DAILY WITH DINNER OR EVENING MEAL.         * This list has 2 medication(s) that are the same as other medications prescribed for you. Read the directions carefully, and ask your doctor or other care provider to review them with you.                  SOCIAL HISTORY:     Social History     Socioeconomic History    Marital status:      Spouse name: Not on file    Number of children: Not on file    Years of education: Not on file    Highest education level: Not on file   Occupational History    Not on file   Social Needs    Financial resource strain: Not on file    Food insecurity:     Worry: Not on file     Inability: Not on file    Transportation needs:     Medical: Not on file     Non-medical: Not on file   Tobacco Use    Smoking status: Never Smoker    Smokeless tobacco: Never Used   Substance and Sexual Activity    Alcohol use: No    Drug use: No     "Sexual activity: Yes     Partners: Male     Birth control/protection: Surgical   Lifestyle    Physical activity:     Days per week: Not on file     Minutes per session: Not on file    Stress: Not on file   Relationships    Social connections:     Talks on phone: Not on file     Gets together: Not on file     Attends [employer] service: Not on file     Active member of club or organization: Not on file     Attends meetings of clubs or organizations: Not on file     Relationship status: Not on file   Other Topics Concern    Not on file   Social History Narrative    Not on file       FAMILY HISTORY:     Family History   Problem Relation Age of Onset    Cancer Mother     Breast cancer Maternal Uncle        REVIEW OF SYSTEMS:   Review of Systems   Constitutional: Negative for chills, diaphoresis and fever.   HENT: Negative for nosebleeds.    Eyes: Negative for blurred vision, double vision and photophobia.   Respiratory: Negative for hemoptysis, shortness of breath and wheezing.    Cardiovascular: Positive for claudication (bilat buttock and RLE). Negative for chest pain, palpitations, orthopnea, leg swelling and PND.   Gastrointestinal: Negative for abdominal pain, blood in stool, heartburn, melena, nausea and vomiting.   Genitourinary: Negative for flank pain and hematuria.   Musculoskeletal: Negative for falls, myalgias and neck pain.   Skin: Negative for rash.   Neurological: Negative for dizziness, seizures, loss of consciousness, weakness and headaches.   Endo/Heme/Allergies: Negative for polydipsia. Does not bruise/bleed easily.   Psychiatric/Behavioral: Negative for depression and memory loss. The patient is not nervous/anxious.        PHYSICAL EXAM:     Vitals:    10/23/19 1520   BP: 132/74   Pulse: 76   Resp: 15    Body mass index is 34.93 kg/m².  Weight: 86.6 kg (191 lb)   Height: 5' 2" (157.5 cm)     Physical Exam   Constitutional: She is oriented to person, place, and time. She appears well-developed " and well-nourished. She is cooperative.  Non-toxic appearance. No distress.   HENT:   Head: Normocephalic and atraumatic.   Eyes: Pupils are equal, round, and reactive to light. Conjunctivae and EOM are normal. No scleral icterus.   Neck: Trachea normal and normal range of motion. Neck supple. Normal carotid pulses and no JVD present. Carotid bruit is not present. No neck rigidity. No tracheal deviation and no edema present. No thyromegaly present.   Cardiovascular: Normal rate, regular rhythm, S1 normal and S2 normal. PMI is not displaced. Exam reveals no gallop and no friction rub.   No murmur heard.  Pulses:       Carotid pulses are 2+ on the right side, and 2+ on the left side.  Pulmonary/Chest: Effort normal and breath sounds normal. No stridor. No respiratory distress. She has no wheezes. She has no rales. She exhibits no tenderness.   Abdominal: Soft. She exhibits no distension. There is no hepatosplenomegaly.   obese   Musculoskeletal: Normal range of motion. She exhibits no edema or tenderness.   Feet:   Right Foot:   Skin Integrity: Negative for ulcer.   Left Foot:   Skin Integrity: Negative for ulcer.   Neurological: She is alert and oriented to person, place, and time. No cranial nerve deficit.   Skin: Skin is warm and dry. No rash noted. No erythema.   Psychiatric: She has a normal mood and affect. Her speech is normal and behavior is normal.   Vitals reviewed.      DATA:   EKG: (personally reviewed tracing)  10/23/19 SR 76    Laboratory:  CBC:  Recent Labs   Lab 04/17/18  1344 11/16/18  1403 05/22/19  0843   WBC 5.03 6.65 5.68   Hemoglobin 13.1 13.6 13.0   Hematocrit 39.4 40.3 40.0   Platelets 275 284 259       CHEMISTRIES:  Recent Labs   Lab 03/09/18  0615 04/17/18  1344  02/12/19  1030  03/22/19  0807 04/18/19  0830 05/22/19  0843   Glucose 116 H 90   < > 105  --   --  110 101   Sodium 143 144   < > 145  --   --  144 143   Potassium 3.0 L 4.3   < > 3.4 L   < > 4.1 4.2 4.4   BUN, Bld 21 H 20   < > 21  H  --   --  15 25 H   Creatinine 0.9 0.9   < > 0.9  --   --  0.9 1.2   eGFR if African American >60 >60   < > >60  --   --  >60 57 A   eGFR if non African American >60 >60   < > >60  --   --  >60 50 A   Calcium 9.5 10.3   < > 9.8  --   --  9.7 10.4   Magnesium 2.0 2.3  --   --   --   --   --   --     < > = values in this interval not displayed.       CARDIAC BIOMARKERS:  Recent Labs   Lab 03/09/18  0150 03/09/18  0826 04/17/18  1344   CPK  --   --  63   Troponin I 0.027 H 0.065 H  --        COAGS:  Recent Labs   Lab 04/17/18  1344   INR 1.1       LIPIDS/LFTS:  Recent Labs   Lab 03/09/18  0615 04/17/18  1344 11/16/18  1403 05/22/19  0843   Cholesterol 158  --   --  170   Triglycerides 142  --   --  126   HDL 39 L  --   --  41   LDL Cholesterol 90.6  --   --  103.8   Non-HDL Cholesterol 119  --   --  129   AST  --  19 18 15   ALT  --  21 20 16     Lab Results   Component Value Date    TSH 1.958 05/22/2019         Cardiovascular Testing:  Echo 1/24/19  · Normal left ventricular systolic function. The estimated ejection fraction is 60%  · Mild left ventricular enlargement.  · Eccentric left ventricular hypertrophy.  · No wall motion abnormalities.  · Normal right ventricular systolic function.  · Mild tricuspid regurgitation.  · The estimated PA systolic pressure is 34 mm Hg    Renal art US 1/10/19  -Normal sized kidneys without evidence for nephrolithiasis or hydronephrosis.  -There is slight increased peak systolic velocity within the right renal artery from the proximal to mid aspect from 50 cm/sec to 158 cm/sec which is likely related to vessel tortuosity and turbulent flow in light of the absent elevated resistive indices or ratios however underlying right renal artery stenosis cannot be excluded.  This could be further evaluated with CTA or MRA imaging.  -No evidence for left renal artery elevated resistive indices or renal artery velocities to suggest renal artery stenosis.    LE art US 5/31/18  R STEFANI 1.09  L STEFANI  1.08  Elevated velocities within the mid to distal right superficial femoral artery strongly suggestive of hemodynamically significant stenoses within the mid to distal right superficial femoral artery.    LE venous US/reflux 5/31/18  No evidence for deep venous thrombosis within either lower extremity.  No evidence for venous insufficiency within the greater or lesser saphenous veins bilaterally.    Carotid US 4/4/18 (images pers rev, ?L vert occlusion)  TDS  There is 40 - 49% right Internal Carotid stenosis.  There is 0 - 19% left Internal Carotid stenosis.    [place] MPI 4/4/18  Nuclear Quantitative Functional Analysis:   LVEF: 69 %  LVED Volume: 88 ml  LVES Volume: 27 ml  Impression: NORMAL MYOCARDIAL PERFUSION  1. The perfusion scan is free of evidence for myocardial ischemia or injury.   2. Resting wall motion is physiologic.   3. Resting LV function is normal.   4. The ventricular volumes are normal at rest and stress.   5. The extracardiac distribution of radioactivity is normal.     FRANCISCO/DCCV 3/9/18  Normal bivent size/fxn, EF 55%, normal wall motion  Normal appearing valves.  Trace AI, mild MR, mild TR  No LA/LON thrombus  Succcessful DCCV af->NSR 360J x1.  Plan:  Cont med rx  Cont Xarelto 20mg qd  OK for discharge later on today and follow up with me in the office 1 week for outpatient stress test.    Cath 3/25/11 ([facility], subsequently had CABG)  LM: normal  LAD sequential 90% prox and 80% mid stenoses   D2 prox 80%  LCx: MLI   OM2 prox 80%  RCA: dom, mid-dist 40% sequential stenoses    ASSESSMENT:   # HTN, controlled  # CAD s/p CABG x3V 4/2011.  MPI 4/2018 normal.   # PAD, bilat buttock and RLE claudication, R SFA stenosis noted on US 5/2018  # PAF s/p FRANCISCO/DCCV 3/9/18.  CHADS VASC 2 on Xarelto.  Currently in SR.  # HLP on atorva 40mg  # hx L vert art occlusion  # BMI 35, stable vs last OV  # preop for colonoscopy    PLAN:   Cont med rx  The patient is at acceptable cardiac risk for the colonoscopy and  anesthesia as deemed necessary.  No preoperative cardiac testing is required.  Cont Xarelto 20mg qd, OK to hold 3 days for colonoscopy  Startr pletal 50mg bid, OK to hold 3 days for colonoscopy  Diet/exercise/weight loss  RTC 2 months, consider angio at that time for eval of LE PAD if claudication still limiting      José Kahlil MD, FACC

## 2019-10-23 NOTE — LETTER
October 23, 2019      Morgan Leger MD  [address] [facility] [place] [address]           [facility] [place] - Cardiology  [address]  [employer] [address]  Phone: [phone]          Patient: Keyonna Guillen   MR Number: [id number]   YOB: 1960   Date of Visit: 10/23/2019       Dear Dr. Morgan GUTIERREZ Page:    Thank you for referring Keyonna Guillen to me for evaluation. Attached you will find relevant portions of my assessment and plan of care.    If you have questions, please do not hesitate to call me. I look forward to following Keyonna Guillen along with you.    Sincerely,    José Khalil MD    Enclosure  CC:  No Recipients    If you would like to receive this communication electronically, please contact [email or web address] or [phone] to request more information on [facility] access.    For providers and/or their staff who would like to refer a patient to [facility], please contact us through our one-stop-shop provider referral line, [facility] , at [phone].    If you feel you have received this communication in error or would no longer like to receive these types of communications, please e-mail [email or web address]

## 2019-10-28 ENCOUNTER — NURSE TRIAGE (OUTPATIENT)
Dept: ADMINISTRATIVE | Facility: CLINIC | Age: 59
End: 2019-10-28

## 2019-10-28 NOTE — TELEPHONE ENCOUNTER
"  Reason for Disposition   [1] Shingles rash (matches SYMPTOMS) AND [2] onset within past 72 hours    Additional Information   Negative: Difficult to awaken or acting confused (e.g., disoriented, slurred speech)   Negative: Sounds like a life-threatening emergency to the triager   Negative: Patient sounds very sick or weak to the triager   Negative: [1] Shingles rash (matches SYMPTOMS) AND [2] weak immune system (e.g., HIV positive,  cancer chemotherapy, chronic steroid treatment, splenectomy) AND [3] NOT taking antiviral medication   Negative: Shingles rash on the eyelid or tip of the nose   Negative: [1] Shingles rash of face AND [2] eye pain or blurred vision   Negative: [1] Shingles rash of face AND [2] facial weakness   Negative: [1] Shingles rash of face or ear AND [2] earache or ringing in the ear   Negative: [1] Shingles rash AND [2] spots start appearing other places on body   Negative: Fever > 100.5 F (38.1 C)   Negative: SEVERE pain (e.g., excruciating)    Protocols used: SHINGLES-A-AH    Pt stated yesterday she had severe pain left side that shoot around from the back to stomach yesterday felt like "I was having a baby".  Today back pain is 3/10. Pt stated she felt faint like 3 times and her  BP was low it was 109/73 hr 56. Pt stated she has been having shingles for 2 days now on the left side under breast area. "Reddish bumps, it looks like a blister and comes to a little head. Very itchy and it feels like you're being stuck with a pen". Care advice recommends pt see Md within 24 hours.   "

## 2019-10-29 ENCOUNTER — HOSPITAL ENCOUNTER (EMERGENCY)
Facility: HOSPITAL | Age: 59
Discharge: HOME | End: 2019-10-29
Attending: EMERGENCY MEDICINE
Payer: COMMERCIAL

## 2019-10-29 VITALS
DIASTOLIC BLOOD PRESSURE: 73 MMHG | BODY MASS INDEX: 33.66 KG/M2 | SYSTOLIC BLOOD PRESSURE: 150 MMHG | HEART RATE: 62 BPM | TEMPERATURE: 98 F | WEIGHT: 190 LBS | RESPIRATION RATE: 16 BRPM | OXYGEN SATURATION: 95 % | HEIGHT: 63 IN

## 2019-10-29 DIAGNOSIS — R10.9 LEFT FLANK PAIN: ICD-10-CM

## 2019-10-29 DIAGNOSIS — B02.8 HERPES ZOSTER WITH OTHER COMPLICATION: ICD-10-CM

## 2019-10-29 DIAGNOSIS — N39.0 URINARY TRACT INFECTION WITHOUT HEMATURIA, SITE UNSPECIFIED: Primary | ICD-10-CM

## 2019-10-29 LAB
ALBUMIN SERPL BCP-MCNC: 4.1 G/DL (ref 3.5–5.2)
ALP SERPL-CCNC: 116 U/L (ref 55–135)
ALT SERPL W/O P-5'-P-CCNC: 28 U/L (ref 10–44)
ANION GAP SERPL CALC-SCNC: 7 MMOL/L (ref 8–16)
AST SERPL-CCNC: 20 U/L (ref 10–40)
BACTERIA #/AREA URNS HPF: ABNORMAL /HPF
BASOPHILS # BLD AUTO: 0.01 K/UL (ref 0–0.2)
BASOPHILS NFR BLD: 0.2 % (ref 0–1.9)
BILIRUB SERPL-MCNC: 0.5 MG/DL (ref 0.1–1)
BILIRUB UR QL STRIP: NEGATIVE
BUN SERPL-MCNC: 36 MG/DL (ref 6–20)
CALCIUM SERPL-MCNC: 9.6 MG/DL (ref 8.7–10.5)
CHLORIDE SERPL-SCNC: 111 MMOL/L (ref 95–110)
CLARITY UR: ABNORMAL
CO2 SERPL-SCNC: 20 MMOL/L (ref 23–29)
COLOR UR: YELLOW
CREAT SERPL-MCNC: 1.3 MG/DL (ref 0.5–1.4)
DIFFERENTIAL METHOD: NORMAL
EOSINOPHIL # BLD AUTO: 0.1 K/UL (ref 0–0.5)
EOSINOPHIL NFR BLD: 1.4 % (ref 0–8)
ERYTHROCYTE [DISTWIDTH] IN BLOOD BY AUTOMATED COUNT: 12.4 % (ref 11.5–14.5)
EST. GFR  (AFRICAN AMERICAN): 52 ML/MIN/1.73 M^2
EST. GFR  (NON AFRICAN AMERICAN): 45 ML/MIN/1.73 M^2
GLUCOSE SERPL-MCNC: 123 MG/DL (ref 70–110)
GLUCOSE UR QL STRIP: NEGATIVE
HCT VFR BLD AUTO: 38 % (ref 37–48.5)
HGB BLD-MCNC: 12.8 G/DL (ref 12–16)
HGB UR QL STRIP: NEGATIVE
IMM GRANULOCYTES # BLD AUTO: 0.01 K/UL (ref 0–0.04)
IMM GRANULOCYTES NFR BLD AUTO: 0.2 % (ref 0–0.5)
KETONES UR QL STRIP: NEGATIVE
LEUKOCYTE ESTERASE UR QL STRIP: ABNORMAL
LIPASE SERPL-CCNC: 41 U/L (ref 4–60)
LYMPHOCYTES # BLD AUTO: 1.6 K/UL (ref 1–4.8)
LYMPHOCYTES NFR BLD: 36.3 % (ref 18–48)
MCH RBC QN AUTO: 30.6 PG (ref 27–31)
MCHC RBC AUTO-ENTMCNC: 33.7 G/DL (ref 32–36)
MCV RBC AUTO: 91 FL (ref 82–98)
MICROSCOPIC COMMENT: ABNORMAL
MONOCYTES # BLD AUTO: 0.4 K/UL (ref 0.3–1)
MONOCYTES NFR BLD: 9.1 % (ref 4–15)
NEUTROPHILS # BLD AUTO: 2.3 K/UL (ref 1.8–7.7)
NEUTROPHILS NFR BLD: 52.8 % (ref 38–73)
NITRITE UR QL STRIP: NEGATIVE
NRBC BLD-RTO: 0 /100 WBC
PH UR STRIP: 5 [PH] (ref 5–8)
PLATELET # BLD AUTO: 231 K/UL (ref 150–350)
PMV BLD AUTO: 9.7 FL (ref 9.2–12.9)
POTASSIUM SERPL-SCNC: 4.9 MMOL/L (ref 3.5–5.1)
PROT SERPL-MCNC: 7 G/DL (ref 6–8.4)
PROT UR QL STRIP: NEGATIVE
RBC # BLD AUTO: 4.18 M/UL (ref 4–5.4)
SODIUM SERPL-SCNC: 138 MMOL/L (ref 136–145)
SP GR UR STRIP: 1.02 (ref 1–1.03)
SQUAMOUS #/AREA URNS HPF: 10 /HPF
URN SPEC COLLECT METH UR: ABNORMAL
UROBILINOGEN UR STRIP-ACNC: NEGATIVE EU/DL
WBC # BLD AUTO: 4.3 K/UL (ref 3.9–12.7)
WBC #/AREA URNS HPF: 25 /HPF (ref 0–5)
YEAST URNS QL MICRO: ABNORMAL

## 2019-10-29 PROCEDURE — 87077 CULTURE AEROBIC IDENTIFY: CPT

## 2019-10-29 PROCEDURE — 96365 THER/PROPH/DIAG IV INF INIT: CPT

## 2019-10-29 PROCEDURE — 87186 SC STD MICRODIL/AGAR DIL: CPT | Mod: 59

## 2019-10-29 PROCEDURE — 96361 HYDRATE IV INFUSION ADD-ON: CPT

## 2019-10-29 PROCEDURE — 63600175 PHARM REV CODE 636 W HCPCS: Performed by: NURSE PRACTITIONER

## 2019-10-29 PROCEDURE — 99284 EMERGENCY DEPT VISIT MOD MDM: CPT | Mod: 25

## 2019-10-29 PROCEDURE — 83690 ASSAY OF LIPASE: CPT

## 2019-10-29 PROCEDURE — 81000 URINALYSIS NONAUTO W/SCOPE: CPT

## 2019-10-29 PROCEDURE — 87086 URINE CULTURE/COLONY COUNT: CPT

## 2019-10-29 PROCEDURE — 96376 TX/PRO/DX INJ SAME DRUG ADON: CPT

## 2019-10-29 PROCEDURE — 96375 TX/PRO/DX INJ NEW DRUG ADDON: CPT

## 2019-10-29 PROCEDURE — 80053 COMPREHEN METABOLIC PANEL: CPT

## 2019-10-29 PROCEDURE — 85025 COMPLETE CBC W/AUTO DIFF WBC: CPT

## 2019-10-29 PROCEDURE — 87088 URINE BACTERIA CULTURE: CPT

## 2019-10-29 RX ORDER — HYDROMORPHONE HYDROCHLORIDE 2 MG/ML
0.5 INJECTION, SOLUTION INTRAMUSCULAR; INTRAVENOUS; SUBCUTANEOUS
Status: COMPLETED | OUTPATIENT
Start: 2019-10-29 | End: 2019-10-29

## 2019-10-29 RX ORDER — ONDANSETRON 2 MG/ML
8 INJECTION INTRAMUSCULAR; INTRAVENOUS
Status: COMPLETED | OUTPATIENT
Start: 2019-10-29 | End: 2019-10-29

## 2019-10-29 RX ORDER — HYDROMORPHONE HYDROCHLORIDE 2 MG/ML
1 INJECTION, SOLUTION INTRAMUSCULAR; INTRAVENOUS; SUBCUTANEOUS
Status: COMPLETED | OUTPATIENT
Start: 2019-10-29 | End: 2019-10-29

## 2019-10-29 RX ORDER — CEPHALEXIN 500 MG/1
500 CAPSULE ORAL EVERY 8 HOURS
Qty: 42 CAPSULE | Refills: 0 | Status: SHIPPED | OUTPATIENT
Start: 2019-10-29 | End: 2019-11-12

## 2019-10-29 RX ORDER — VALACYCLOVIR HYDROCHLORIDE 1 G/1
1000 TABLET, FILM COATED ORAL 3 TIMES DAILY
Qty: 21 TABLET | Refills: 0 | Status: SHIPPED | OUTPATIENT
Start: 2019-10-29 | End: 2020-04-29 | Stop reason: ALTCHOICE

## 2019-10-29 RX ORDER — KETOROLAC TROMETHAMINE 30 MG/ML
15 INJECTION, SOLUTION INTRAMUSCULAR; INTRAVENOUS
Status: COMPLETED | OUTPATIENT
Start: 2019-10-29 | End: 2019-10-29

## 2019-10-29 RX ADMIN — HYDROMORPHONE HYDROCHLORIDE 1 MG: 2 INJECTION, SOLUTION INTRAMUSCULAR; INTRAVENOUS; SUBCUTANEOUS at 08:10

## 2019-10-29 RX ADMIN — ONDANSETRON HYDROCHLORIDE 8 MG: 2 SOLUTION INTRAMUSCULAR; INTRAVENOUS at 08:10

## 2019-10-29 RX ADMIN — SODIUM CHLORIDE 1000 ML: 0.9 INJECTION, SOLUTION INTRAVENOUS at 08:10

## 2019-10-29 RX ADMIN — KETOROLAC TROMETHAMINE 15 MG: 30 INJECTION, SOLUTION INTRAMUSCULAR; INTRAVENOUS at 08:10

## 2019-10-29 RX ADMIN — HYDROMORPHONE HYDROCHLORIDE 0.5 MG: 2 INJECTION, SOLUTION INTRAMUSCULAR; INTRAVENOUS; SUBCUTANEOUS at 10:10

## 2019-10-29 RX ADMIN — CEFTRIAXONE 1 G: 1 INJECTION, SOLUTION INTRAVENOUS at 10:10

## 2019-10-29 NOTE — ED PROVIDER NOTES
Encounter Date: 10/29/2019    SCRIBE #1 NOTE: I, Charly Issa, am scribing for, and in the presence of,  Mata Marsh NP. I have scribed the following portions of the note - Other sections scribed: HPI, ROS, PE.       History     Chief Complaint   Patient presents with    Flank Pain     Reports left sided falnk pain that started a x4 days ago that worsened on yesterday; pain radiates to lower abd; c/o nausea     59 y.o F with a hx of Atrial fibrillation, Colon polyp, CAD, HLD, and HTN presents to the ED c/o acute onset of constant and moderate (6/10) left flank pain and LLQ since yesterday. She also reports associated nausea and headache. She does report a hx of kidney stones, but denies any previous episodes of similar pain. Her pain is worse when sitting and improved when ambulation. She denies hematuria, emesis, diarrhea, constipation and leg pain. She attempted tx with ibuprofen with no relief.     The history is provided by the patient. No  was used.     Review of patient's allergies indicates:   Allergen Reactions    Amlodipine Swelling     Past Medical History:   Diagnosis Date    Atrial fibrillation     Colon polyp     Coronary artery disease     Hyperlipidemia     Hypertension      Past Surgical History:   Procedure Laterality Date    BREAST BIOPSY      CARDIAC SURGERY      CORONARY ARTERY BYPASS GRAFT      HYSTERECTOMY       Family History   Problem Relation Age of Onset    Cancer Mother     Breast cancer Maternal Uncle      Social History     Tobacco Use    Smoking status: Never Smoker    Smokeless tobacco: Never Used   Substance Use Topics    Alcohol use: No    Drug use: No     Review of Systems   Constitutional: Negative for chills and fever.   HENT: Negative for congestion and sore throat.    Eyes: Negative for visual disturbance.   Respiratory: Negative for cough and shortness of breath.    Cardiovascular: Negative for chest pain.   Gastrointestinal: Positive for  abdominal pain and nausea. Negative for constipation, diarrhea and vomiting.   Genitourinary: Positive for flank pain (L). Negative for dysuria, hematuria and vaginal discharge.   Musculoskeletal:        (-) leg pain   Skin: Negative for rash.   Allergic/Immunologic: Negative for immunocompromised state.   Neurological: Positive for headaches.       Physical Exam     Initial Vitals [10/29/19 0745]   BP Pulse Resp Temp SpO2   122/76 66 18 97.7 °F (36.5 °C) 97 %      MAP       --         Physical Exam    Nursing note and vitals reviewed.  Constitutional: She appears well-developed and well-nourished. She is not diaphoretic. She is active and cooperative.  Non-toxic appearance. She does not have a sickly appearance. She does not appear ill. No distress.   Afebrile, pleasant, well appearing, no distress   HENT:   Head: Normocephalic and atraumatic.   Mouth/Throat: Oropharynx is clear and moist.   Eyes: EOM are normal. Pupils are equal, round, and reactive to light. No scleral icterus.   Neck: Normal range of motion. Neck supple. No JVD present.   Cardiovascular: Normal rate, regular rhythm and intact distal pulses.   Pulmonary/Chest: Breath sounds normal. No stridor. No respiratory distress.   Abdominal: Soft. Bowel sounds are normal. She exhibits no distension. There is no tenderness. There is no rigidity, no rebound, no guarding, no CVA tenderness, no tenderness at McBurney's point and negative Garcia's sign.   Abdomen soft and nontender without rigidity or guarding. No CVA tenderness. Normal abdominal exam.   Musculoskeletal: Normal range of motion. She exhibits no edema or tenderness.   Neurological: She is alert and oriented to person, place, and time. She has normal strength. No cranial nerve deficit or sensory deficit.   Skin: Skin is warm and dry. Rash noted. Rash is vesicular.   Vesicular painful rash along dermatome to left flank consistent with herpes zoster   Psychiatric: She has a normal mood and affect.          ED Course   Procedures  Labs Reviewed   COMPREHENSIVE METABOLIC PANEL - Abnormal; Notable for the following components:       Result Value    Chloride 111 (*)     CO2 20 (*)     Glucose 123 (*)     BUN, Bld 36 (*)     Anion Gap 7 (*)     eGFR if  52 (*)     eGFR if non  45 (*)     All other components within normal limits   URINALYSIS, REFLEX TO URINE CULTURE - Abnormal; Notable for the following components:    Appearance, UA Hazy (*)     Leukocytes, UA 1+ (*)     All other components within normal limits    Narrative:     Preferred Collection Type->Urine, Clean Catch   URINALYSIS MICROSCOPIC - Abnormal; Notable for the following components:    WBC, UA 25 (*)     Bacteria Few (*)     All other components within normal limits    Narrative:     Preferred Collection Type->Urine, Clean Catch   CULTURE, URINE   CBC W/ AUTO DIFFERENTIAL   LIPASE          Imaging Results          CT Abdomen Pelvis  Without Contrast (Final result)  Result time 10/29/19 08:46:40    Final result by Benji Freeman MD (10/29/19 08:46:40)                 Impression:      1. Nonobstructing bilateral renal calculi as above.  2. Small low-attenuation nodule in the right adrenal gland stable and unchanged when compared to the previous study and likely represents an adrenal adenoma.  3. Diverticulosis without definite signs for acute diverticulitis.  4. Calcific atherosclerotic changes of the coronary arteries.      Electronically signed by: Benji Freeman MD  Date:    10/29/2019  Time:    08:46             Narrative:    EXAMINATION:  CT ABDOMEN PELVIS WITHOUT CONTRAST    CLINICAL HISTORY:  Flank pain, stone disease suspected;Left flank pain radiating to LLQ;    TECHNIQUE:  Low dose axial images, sagittal and coronal reformations were obtained from the lung bases to the pubic symphysis.    COMPARISON:  CT scan of the abdomen and pelvis 04/17/2018    FINDINGS:  Lung bases are clear.  There is no pleural effusion.   There is calcific atherosclerotic changes of the coronary arteries.    Limited evaluation of the solid organs of the abdomen without intravenous contrast.  There is no gross abnormalities of the liver, the spleen, the pancreas and the gallbladder.  Slight thickening of the soft tissues of the right adrenal gland (image 34 series 2 remains without significant change from previous study and likely representing a adenoma.    There are 2 small speck of calcifications in the lower pole of the right kidney representing nonobstructing right renal calculi.  No hydronephrosis on the right.  There is no stranding of the perinephric fat.    In the left kidney there is also a small speck of calcification in the mid to upper pole representing a non obstructing left renal calculus.  No hydronephrosis or hydroureter.  The urinary bladder is incompletely distended.  Evaluation of the wall of the urinary bladder therefore limited.    Uterus is absent.  No adnexal masses.  There is no free fluid in the cul-de-sac.    There are numerous diverticula in the sigmoid colon without definite signs for acute diverticulitis.  No abnormal fluid or air collections within the peritoneum to suggest any abscesses.  Appendix is visualized without definite signs for acute appendicitis.  There is no small bowel obstruction or perforation or ileus.    There are spondylotic changes in the lumbar spine.  No neoplastic processes of the lumbar spine or the pelvis.  Ischiorectal fossa are symmetric and within normal limits.  No abdominal wall hernias identified.    There is no significant intra-abdominal lymphadenopathy.                                 Medical Decision Making:   History:   Old Medical Records: I decided to obtain old medical records.  Differential Diagnosis:   Pyelonephritis, obstructing urolithiasis, diverticulitis, constipation, herpes zoster, contact dermatitis, SJS, others  Clinical Tests:   Lab Tests: Ordered and  Reviewed  Radiological Study: Ordered and Reviewed  ED Management:  HPI and physical exam as above.    Urine with evidence of infection but no hematuria.  Urine culture in process.  CT without evidence of obstructive urolithiasis, diverticulitis, or other concerning acute abnormality to explain the patient's symptoms. There is also a rash near the affected area consistent with shingles, however the rash does not involve the entire painful area.  Possible herpetic neuralgia versus pyelonephritis.  Renal function is consistent with the patient's previous labs.  All other labs are unremarkable. Patient treated with pain medications, Zofran, a normal saline bolus, and a g of Rocephin in the ED.  Her symptoms have greatly improved following treatment.  Discharged with valacyclovir and Keflex. Advised patient to follow up with her PCP for re-evaluation and further management.  ED return precautions given. All questions regarding diagnosis and plan were answered to the patient's fullest possible satisfaction. Patient expressed understanding of diagnosis, discharge instructions, and return precautions.    Other:   I have discussed this case with another health care provider.       <> Summary of the Discussion: This case was discussed with Dr. Lopez who agreed with the assessment and plan above.            Scribe Attestation:   Scribe #1: I performed the above scribed service and the documentation accurately describes the services I performed. I attest to the accuracy of the note.    I, Mata Marsh NP, personally performed the services described in this documentation. All medical record entries made by the scribe were at my direction and in my presence. I have reviewed the chart and agree that the record reflects my personal performance and is accurate and complete.           Clinical Impression:       ICD-10-CM ICD-9-CM   1. Urinary tract infection without hematuria, site unspecified N39.0 599.0   2. Left flank pain  R10.9 789.09   3. Herpes zoster with other complication B02.8 053.79         Disposition:   Disposition: Discharged  Condition: Stable                        Mata Marsh NP  10/30/19 0639

## 2019-10-29 NOTE — ED TRIAGE NOTES
The pt states she had been having left sided back pain for a week. Reports lightheadedness, nausea that started yesterday.

## 2019-10-29 NOTE — DISCHARGE INSTRUCTIONS
Take both medications as prescribed until they are gone.  You should not have any pills left over.  Drink plenty of water.    Schedule a follow-up appointment with your regular doctor in 1 week or last as discussed.  Return to the emergency department immediately for any new or worsening symptoms.    Thank you for coming to our Emergency Department today. It is important to remember that some problems are difficult to diagnose and may not be found during your first visit. Be sure to follow up with your primary care doctor.  If you do not have one, you may contact the one listed on your discharge paperwork or you may also call the [facility] Appointment Desk at [phone] to schedule an appointment with one.     Return to the ER with any questions/concerns, new/concerning symptoms, worsening or failure to improve. Do not drive or make any important decisions for 24 hours if you have received any pain medications, sedatives or mood altering drugs during your ER visit.

## 2019-11-01 LAB
BACTERIA UR CULT: ABNORMAL
BACTERIA UR CULT: ABNORMAL

## 2019-11-08 ENCOUNTER — TELEPHONE (OUTPATIENT)
Dept: FAMILY MEDICINE | Facility: CLINIC | Age: 59
End: 2019-11-08

## 2019-11-08 NOTE — TELEPHONE ENCOUNTER
----- Message from Ambar Smith sent at 11/8/2019  1:55 PM CST -----  Contact: Self   Type: Patient Call Back    Who called: Self     What is the request in detail:  Patient is asking to speak with the office in ref to her ER visit 10/29  Can the clinic reply by [facility]?  Call  Would the patient rather a call back or a response via [facility]?  Call     Best call back number: [phone]    Additional Information:

## 2019-12-12 DIAGNOSIS — F32.A DEPRESSION, UNSPECIFIED DEPRESSION TYPE: ICD-10-CM

## 2019-12-12 RX ORDER — SERTRALINE HYDROCHLORIDE 25 MG/1
TABLET, FILM COATED ORAL
Qty: 90 TABLET | Refills: 0 | Status: SHIPPED | OUTPATIENT
Start: 2019-12-12 | End: 2020-03-15

## 2020-01-08 NOTE — PROGRESS NOTES
1/8: No response from patient since sending discharge letter on 12/18/19.  Messaging enrolling provider and discharging patient from [facility].

## 2020-01-16 DIAGNOSIS — I10 ESSENTIAL HYPERTENSION: ICD-10-CM

## 2020-01-16 RX ORDER — SPIRONOLACTONE 50 MG/1
TABLET, FILM COATED ORAL
Qty: 90 TABLET | Refills: 3 | Status: SHIPPED | OUTPATIENT
Start: 2020-01-16 | End: 2021-03-03

## 2020-01-29 ENCOUNTER — TELEPHONE (OUTPATIENT)
Dept: FAMILY MEDICINE | Facility: CLINIC | Age: 60
End: 2020-01-29

## 2020-01-29 DIAGNOSIS — Z12.11 COLON CANCER SCREENING: Primary | ICD-10-CM

## 2020-01-29 NOTE — TELEPHONE ENCOUNTER
----- Message from Morteza Chao sent at 1/29/2020  2:28 PM CST -----  Contact: KRISTA MOODY [[id number]]  Name of Who is Calling: KRISTA MOODY [[id number]]      What is the request in detail: Would like to speak with staff in regards to getting new colonoscopy orders. Please advise      Can the clinic reply by [facility]: yes      What Number to Call Back if not in [facility]: [phone]

## 2020-01-30 ENCOUNTER — PATIENT MESSAGE (OUTPATIENT)
Dept: PAIN MEDICINE | Facility: CLINIC | Age: 60
End: 2020-01-30

## 2020-01-30 NOTE — TELEPHONE ENCOUNTER
Called pt to schedule her colonoscopy that Dr. Leger ordered . NA , lvm for pt to rt office call . Also sent pt through pt portal

## 2020-02-10 DIAGNOSIS — Z12.11 COLON CANCER SCREENING: Primary | ICD-10-CM

## 2020-02-19 NOTE — TELEPHONE ENCOUNTER
----- Message from Katie Nichols sent at 3/18/2019  8:06 AM CDT -----  Contact: Self [phone]   PT wants to know if 24 hour urine should have an additive. PT can be reached at [phone].   none

## 2020-03-03 DIAGNOSIS — Z12.11 COLON CANCER SCREENING: ICD-10-CM

## 2020-03-05 ENCOUNTER — OFFICE VISIT (OUTPATIENT)
Dept: FAMILY MEDICINE | Facility: CLINIC | Age: 60
End: 2020-03-05
Payer: COMMERCIAL

## 2020-03-05 VITALS
HEART RATE: 78 BPM | BODY MASS INDEX: 35.24 KG/M2 | OXYGEN SATURATION: 97 % | HEIGHT: 63 IN | DIASTOLIC BLOOD PRESSURE: 88 MMHG | SYSTOLIC BLOOD PRESSURE: 134 MMHG | WEIGHT: 198.88 LBS | TEMPERATURE: 98 F | RESPIRATION RATE: 17 BRPM

## 2020-03-05 DIAGNOSIS — R10.32 LEFT INGUINAL PAIN: Primary | ICD-10-CM

## 2020-03-05 DIAGNOSIS — M54.50 ACUTE LEFT-SIDED LOW BACK PAIN WITHOUT SCIATICA: ICD-10-CM

## 2020-03-05 PROCEDURE — 3079F DIAST BP 80-89 MM HG: CPT | Mod: CPTII,S$GLB,, | Performed by: NURSE PRACTITIONER

## 2020-03-05 PROCEDURE — 3075F PR MOST RECENT SYSTOLIC BLOOD PRESS GE 130-139MM HG: ICD-10-PCS | Mod: CPTII,S$GLB,, | Performed by: NURSE PRACTITIONER

## 2020-03-05 PROCEDURE — 3008F BODY MASS INDEX DOCD: CPT | Mod: CPTII,S$GLB,, | Performed by: NURSE PRACTITIONER

## 2020-03-05 PROCEDURE — 3075F SYST BP GE 130 - 139MM HG: CPT | Mod: CPTII,S$GLB,, | Performed by: NURSE PRACTITIONER

## 2020-03-05 PROCEDURE — 99999 PR PBB SHADOW E&M-EST. PATIENT-LVL III: CPT | Mod: PBBFAC,,, | Performed by: NURSE PRACTITIONER

## 2020-03-05 PROCEDURE — 99999 PR PBB SHADOW E&M-EST. PATIENT-LVL III: ICD-10-PCS | Mod: PBBFAC,,, | Performed by: NURSE PRACTITIONER

## 2020-03-05 PROCEDURE — 3079F PR MOST RECENT DIASTOLIC BLOOD PRESSURE 80-89 MM HG: ICD-10-PCS | Mod: CPTII,S$GLB,, | Performed by: NURSE PRACTITIONER

## 2020-03-05 PROCEDURE — 99215 OFFICE O/P EST HI 40 MIN: CPT | Mod: S$GLB,,, | Performed by: NURSE PRACTITIONER

## 2020-03-05 PROCEDURE — 99215 PR OFFICE/OUTPT VISIT, EST, LEVL V, 40-54 MIN: ICD-10-PCS | Mod: S$GLB,,, | Performed by: NURSE PRACTITIONER

## 2020-03-05 PROCEDURE — 3008F PR BODY MASS INDEX (BMI) DOCUMENTED: ICD-10-PCS | Mod: CPTII,S$GLB,, | Performed by: NURSE PRACTITIONER

## 2020-03-05 NOTE — PROGRESS NOTES
Routine Office Visit    Patient Name: Keyonna Guillen    : 1960  MRN: [id number]    Chief Complaint:  Back and groin pain    Subjective:  Keyonna is a 59 y.o. female who presents today for:    1.  Back and groin pain - patient reports today for evaluation of back and groin pain. Two weeks ago she developed left-sided lower back pain which she describes as a dull tightness without any specific inciting event or injury.  She states that over time the pain started to radiate to her left groin and now she is having intermittent left groin and back pain sometimes together and sometimes on their own.  When the pain comes on she describes it as severe and excruciating.  She states that the pain was so bad at 1 point she thought about taking herself to the emergency room, but she did not.  She denies any associated fevers or chills.  Denies any shortness of breath, wheezing, chest pain, or palpitations.  Endorses some nausea but denies any vomiting, diarrhea, abdominal pain, melena, or BRBPR.  Denies any hematuria, dysuria, foul smell to the urine, urinary frequency, or other urinary symptoms.  The back pain does not radiate down the legs.  She denies any acute onset incontinence, or involuntary weight loss.  She has tried over-the-counter medications for the symptoms with only modest relief at best.    Past Medical History  Past Medical History:   Diagnosis Date    Atrial fibrillation     Colon polyp     Coronary artery disease     Hyperlipidemia     Hypertension        Past Surgical History  Past Surgical History:   Procedure Laterality Date    BREAST BIOPSY      CARDIAC SURGERY      CORONARY ARTERY BYPASS GRAFT      HYSTERECTOMY         Family History  Family History   Problem Relation Age of Onset    Cancer Mother     Breast cancer Maternal Uncle        Social History  Social History     Socioeconomic History    Marital status:      Spouse name: Not on file    Number of children: Not on file     Years of education: Not on file    Highest education level: Not on file   Occupational History    Not on file   Social Needs    Financial resource strain: Not on file    Food insecurity:     Worry: Not on file     Inability: Not on file    Transportation needs:     Medical: Not on file     Non-medical: Not on file   Tobacco Use    Smoking status: Never Smoker    Smokeless tobacco: Never Used   Substance and Sexual Activity    Alcohol use: No    Drug use: No    Sexual activity: Yes     Partners: Male     Birth control/protection: Surgical   Lifestyle    Physical activity:     Days per week: Not on file     Minutes per session: Not on file    Stress: Not on file   Relationships    Social connections:     Talks on phone: Not on file     Gets together: Not on file     Attends Anabaptist service: Not on file     Active member of club or organization: Not on file     Attends meetings of clubs or organizations: Not on file     Relationship status: Not on file   Other Topics Concern    Not on file   Social History Narrative    Not on file       Current Medications  Current Outpatient Medications on File Prior to Visit   Medication Sig Dispense Refill    atorvastatin (LIPITOR) 40 MG tablet TAKE 1 TABLET BY MOUTH EVERYDAY AT BEDTIME 90 tablet 3    cilostazol (PLETAL) 50 MG Tab Take 1 tablet (50 mg total) by mouth 2 (two) times daily. 180 tablet 3    cloNIDine (CATAPRES) 0.1 MG tablet Take 1 tablet (0.1 mg total) by mouth daily as needed (If SBP > 170.). 30 tablet 11    estradiol (ESTRACE) 0.01 % (0.1 mg/gram) vaginal cream Place 2 g vaginally once daily. Apply daily 42.5 g 6    hydrALAZINE (APRESOLINE) 100 MG tablet Take 1.5 tablets (150 mg total) by mouth 2 (two) times daily. 270 tablet 3    hydroCHLOROthiazide (HYDRODIURIL) 25 MG tablet Take 25 mg by mouth once daily.  3    hydroCHLOROthiazide (HYDRODIURIL) 50 MG tablet ON HOLD - Take 1 tablet (50 mg) by mouth once daily. 90 tablet 3    labetalol  (NORMODYNE) 200 MG tablet Take 1 tablet (200 mg total) by mouth 2 (two) times daily. 60 tablet 5    olmesartan (BENICAR) 40 MG tablet Take 40 mg by mouth once daily.      [START ON 3/30/2020] polyethylene glycol (COLYTE) 240-22.72-6.72 -5.84 gram SolR Take 4,000 mLs (4 L total) by mouth once. for 1 dose 4000 mL 0    sertraline (ZOLOFT) 25 MG tablet TAKE 1 TABLET BY MOUTH EVERY DAY 90 tablet 0    spironolactone (ALDACTONE) 50 MG tablet TAKE 1 TABLET BY MOUTH EVERY DAY 90 tablet 3    valACYclovir (VALTREX) 1000 MG tablet Take 1 tablet (1,000 mg total) by mouth 3 (three) times daily. for 7 days 21 tablet 0    XARELTO 20 mg Tab TAKE 1 TABLET (20 MG TOTAL) BY MOUTH DAILY WITH DINNER OR EVENING MEAL. 90 tablet 3     Current Facility-Administered Medications on File Prior to Visit   Medication Dose Route Frequency Provider Last Rate Last Dose    estradiol valerate injection 20 mg  20 mg Intramuscular Q21 Days Koby Brewer MD   20 mg at 06/15/16 1400       Allergies   Review of patient's allergies indicates:   Allergen Reactions    Amlodipine Swelling       Review of Systems (Pertinent positives)  Review of Systems   Constitutional: Negative for chills, diaphoresis, fever, malaise/fatigue and weight loss.   HENT: Negative.    Eyes: Negative.    Respiratory: Negative.    Cardiovascular: Negative.  Negative for chest pain, palpitations, orthopnea, claudication, leg swelling and PND.   Gastrointestinal: Positive for nausea. Negative for abdominal pain, blood in stool, constipation, diarrhea, heartburn, melena and vomiting.   Genitourinary: Negative for dysuria, flank pain, frequency, hematuria and urgency.        +groin pain   Musculoskeletal: Positive for back pain and myalgias. Negative for falls, joint pain and neck pain.   Skin: Negative.    Neurological: Negative.    Endo/Heme/Allergies: Negative.    Psychiatric/Behavioral: Negative.        /88 (BP Location: Left arm)   Pulse 78   Temp 97.6 °F (36.4 °C)  "(Oral)   Resp 17   Ht 5' 3" (1.6 m)   Wt 90.2 kg (198 lb 13.7 oz)   SpO2 97%   BMI 35.23 kg/m²     Physical Exam   Constitutional: She is oriented to person, place, and time. She appears well-developed and well-nourished.  Non-toxic appearance. She does not have a sickly appearance. She does not appear ill. No distress.   Patient resting comfortably in examination room in no acute distress, conversing appropriately in full sentences   Eyes: Pupils are equal, round, and reactive to light. Conjunctivae and EOM are normal.   Neck: Normal range of motion. Neck supple. No JVD present.   Cardiovascular: Normal rate, regular rhythm, normal heart sounds and intact distal pulses. Exam reveals no gallop and no friction rub.   No murmur heard.  Clinically euvolemic no JVD no lower extremity swelling   Pulmonary/Chest: Effort normal and breath sounds normal. No accessory muscle usage or stridor. No tachypnea. No respiratory distress. She has no decreased breath sounds. She has no wheezes. She has no rhonchi. She has no rales. She exhibits no tenderness.   Even respirations with normal effort, no adventitious sounds on examination   Abdominal: Soft. Bowel sounds are normal. She exhibits no distension and no mass. There is no hepatosplenomegaly. There is no tenderness. There is no rigidity, no rebound, no guarding, no CVA tenderness, no tenderness at McBurney's point and negative Garcia's sign. No hernia.       Musculoskeletal: Normal range of motion. She exhibits no edema or tenderness.        Left hip: Normal.        Back:    Lumbar spine with normal alignment no bony deformity no bruising no skin changes no bony tenderness   Lymphadenopathy:     She has no cervical adenopathy.   Neurological: She is alert and oriented to person, place, and time. She displays normal reflexes. No cranial nerve deficit or sensory deficit. She exhibits normal muscle tone. Coordination normal.   Skin: Skin is warm and dry. Capillary refill " takes less than 2 seconds. She is not diaphoretic.   Vitals reviewed.       Assessment/Plan:  Keyonna Guillen is a 59 y.o. female who presents today for :    Keyonna was seen today for low-back pain and groin pain.    Diagnoses and all orders for this visit:    Left inguinal pain    Acute left-sided low back pain without sciatica     I had a lengthy discussion with the patient regarding her symptoms and my findings.  She has a history of diverticulosis but she has no abdominal tenderness and minimal abdominal complaints.  Examination of the left hip is unremarkable.  Examination of the lumbar spine normal outside of some paraspinous muscle tenderness. Patient is asking for stat imaging to be done but I see no indications for stat imaging at this time.  She is adamant that her pain when it does happen is severe and excruciating and she strongly believes something is seriously wrong.    We had a discussion about the potential treatments moving forward.  I offered to treat her with antibiotics for potential diverticulitis, however she is convinced that something else is seriously going wrong.  I acknowledged the patient's concerns and recommended that if her symptoms are this severe and concerning she should seek evaluation at the nearest emergency room.  Patient agrees with this.    Patient states that she will go home and go to the [facility] emergency room when her  gets home from work in a short period.  Patient left the clinic in no acute distress.  Report was called to ALFREDO Martinez at [facility] emergency room.        This office note has been dictated.  This dictation has been generated using M-Modal Fluency Direct dictation; some phonetic errors may occur.   My collaborating physician is Dr. Jesse Ledbetter.

## 2020-03-14 DIAGNOSIS — I10 ESSENTIAL HYPERTENSION: Chronic | ICD-10-CM

## 2020-03-15 DIAGNOSIS — F32.A DEPRESSION, UNSPECIFIED DEPRESSION TYPE: ICD-10-CM

## 2020-03-15 RX ORDER — SERTRALINE HYDROCHLORIDE 25 MG/1
TABLET, FILM COATED ORAL
Qty: 90 TABLET | Refills: 0 | Status: SHIPPED | OUTPATIENT
Start: 2020-03-15 | End: 2020-06-08

## 2020-03-16 RX ORDER — HYDRALAZINE HYDROCHLORIDE 100 MG/1
150 TABLET, FILM COATED ORAL 2 TIMES DAILY
Qty: 270 TABLET | Refills: 3 | Status: SHIPPED | OUTPATIENT
Start: 2020-03-16 | End: 2020-03-17

## 2020-03-16 RX ORDER — ATORVASTATIN CALCIUM 40 MG/1
TABLET, FILM COATED ORAL
Qty: 90 TABLET | Refills: 3 | Status: SHIPPED | OUTPATIENT
Start: 2020-03-16 | End: 2021-03-01

## 2020-03-16 RX ORDER — LABETALOL 200 MG/1
200 TABLET, FILM COATED ORAL 2 TIMES DAILY
Qty: 180 TABLET | Refills: 3 | Status: SHIPPED | OUTPATIENT
Start: 2020-03-16 | End: 2020-12-11 | Stop reason: SDUPTHER

## 2020-03-16 RX ORDER — OLMESARTAN MEDOXOMIL 40 MG/1
TABLET ORAL
Qty: 90 TABLET | Refills: 3 | Status: SHIPPED | OUTPATIENT
Start: 2020-03-16 | End: 2021-02-23

## 2020-03-17 ENCOUNTER — PATIENT OUTREACH (OUTPATIENT)
Dept: OTHER | Facility: OTHER | Age: 60
End: 2020-03-17

## 2020-03-17 ENCOUNTER — PATIENT MESSAGE (OUTPATIENT)
Dept: OTHER | Facility: OTHER | Age: 60
End: 2020-03-17

## 2020-03-17 RX ORDER — HYDRALAZINE HYDROCHLORIDE 100 MG/1
150 TABLET, FILM COATED ORAL 2 TIMES DAILY
Qty: 270 TABLET | Refills: 3 | Status: SHIPPED | OUTPATIENT
Start: 2020-03-17 | End: 2021-04-09

## 2020-03-17 NOTE — PROGRESS NOTES
Digital Medicine: Health  Introduction    Introduced Keyonna Guillen to Digital Medicine. Discussed health  role and recommended lifestyle modifications.    Re enrolling patient in HDM since she was previously removed due to non compliance.  States she had blocked my number, but has it saved in her phone.    The history is provided by the patient.     HYPERTENSION  Our goal is to get BP to consistently below 130/80mmHg and make the process convenient so patient can avoid extra trips to the office. Getting your blood pressure below 130/80mmHg (definition of control) will reduce your risk for heart attack, kidney failure, stroke and death (as well as kidney failure, eye disease, & dementia)      Reviewed that the Digital Medicine care team - consisting of a clinician and a health  - will follow the most current evidence-based national guidelines for treating your condition.  The health  will focus on lifestyle modifications and motivation while the clinician will focus on medication therapy.  The care team will review all data on a regular basis and reach out as needed.      Explained that one of the key parts of the program is communication with the care team.  Asked patient to respond to outreach attempts and complete questionnaires.  Stressed importance of medication adherence.    Explained that we expect patient to obtain several blood pressures per week at random times of day.  Instructed patient not to allow anyone else to use phone and monitoring device.  Confirmed appropriate BP monitoring technique.      Patient's BP goal is 130/80.Patient's BP average is 159/97 mmHg, which is above goal, per 2017 ACC/AHA Hypertension Guidelines.    Patient reports elevated BP reading due to changing the timing of her medication.  States this reading was before she took her medication.  Reviewed to patient to wait at least 1 hour after taking BP medication to take BP reading.      Last 5 Patient Entered  Readings                                      Current 30 Day Average: 159/97     Recent Readings 3/14/2020 3/8/2020 3/3/2020 2/28/2020 2/26/2020    SBP (mmHg) 177 146 151 153 166    DBP (mmHg) 101 91 97 96 100    Pulse 87 80 74 78 64            INTERVENTION(S)  reviewed monitoring technique and encouragement/support    PLAN  patient verbalizes understanding and continue monitoring    Reminded patient to take at least 2-3 readings per week.  Will follow up in 2 weeks to assess lifestyle goals.      There are no preventive care reminders to display for this patient.    Reviewed the importance of self-monitoring, medication adherence, and that the health  can be used as a resource for lifestyle modifications to help reduce or maintain a healthy lifestyle.    Sent link to [facility] [employer] webpages and my contact information via [employer] for future questions. Follow up scheduled.         Screenings    SDOH

## 2020-03-17 NOTE — TELEPHONE ENCOUNTER
Patient called requesting to remain in the [facility] program for hypertension management. She was removed from the program in January 2020 due to noncompliance for failure to maintain contact with Health . She explained that she does not check her [employer] emails often and does not recall receiving a letter in the mail concerning remaining active in the program. She admits to not answering calls due to trying to avoid spam calls. She is requesting to be re-enrolled in program. Phone number for Health  Kash Dupree was provided in order for patient to include in her contacts to identify calls. Health  will reach out to her to help complete re-enrollment process.

## 2020-03-17 NOTE — TELEPHONE ENCOUNTER
3/17: Patient returned called and apologized for blocking my number.  Informed patient to check MyChart to see if she has the consent questionnaire for the HDMP.  States there are no questionnaires available.      Sent message to tech team to push consent questionnaire to patient so we are able to enroll her in HDMP.  Shows that patient is active in HDMP.  Encouraged patient to check MyChart tonight and tomorrow for questionnaire.    Will call patient tomorrow to follow up and complete re enrollment.

## 2020-03-20 ENCOUNTER — TELEPHONE (OUTPATIENT)
Dept: FAMILY MEDICINE | Facility: CLINIC | Age: 60
End: 2020-03-20

## 2020-03-20 NOTE — TELEPHONE ENCOUNTER
----- Message from Luz Mas sent at 3/20/2020 10:42 AM CDT -----  Contact: Self   Type: Patient Call Back    Who called: Self     What is the request in detail:patient would like to speak with the doctor about her medication . Please call     Can the clinic reply by [facility]? No     Would the patient rather a call back or a response via [facility]?  Call     Best call back number:[phone]

## 2020-04-07 ENCOUNTER — PATIENT OUTREACH (OUTPATIENT)
Dept: OTHER | Facility: OTHER | Age: 60
End: 2020-04-07

## 2020-04-13 NOTE — PROGRESS NOTES
Digital Medicine: Health  Follow-Up    States she has been stressed during COVID 19 and misses her children and grandchildren.  States they talk often and come by to visit, but are not allowed to go inside.  States she is busy taking care of 5 dogs.    The history is provided by the patient.     Follow Up  Follow-up reason(s): reading review and routine education      Readings are trending up due to medication adherence and inaccurate technique.    Routine Education Topics: physical activity  Patient reports she is out of her routine, so she forgets to take her medications at the same time and forgets to take her BP readings.  Reviewed monitoring technique.      INTERVENTION(S)  recommend physical activity, reviewed monitoring technique, encouragement/support and denied questions    PLAN  patient verbalizes understanding and continue monitoring      There are no preventive care reminders to display for this patient.    Last 5 Patient Entered Readings                                      Current 30 Day Average: 160/99     Recent Readings 4/5/2020 3/28/2020 3/22/2020 3/22/2020 3/14/2020    SBP (mmHg) 162 132 167 178 177    DBP (mmHg) 99 85 100 108 101    Pulse 86 76 87 92 87                      Physical Activity Screening   When asked if exercising, patient responded: yes    Patient participates in the following activities: yard/housework          Social Isolation Screening       Intervention: talk to friend or family member in the next week

## 2020-04-14 ENCOUNTER — OFFICE VISIT (OUTPATIENT)
Dept: FAMILY MEDICINE | Facility: CLINIC | Age: 60
End: 2020-04-14
Payer: COMMERCIAL

## 2020-04-14 ENCOUNTER — LAB VISIT (OUTPATIENT)
Dept: FAMILY MEDICINE | Facility: CLINIC | Age: 60
End: 2020-04-14
Payer: COMMERCIAL

## 2020-04-14 ENCOUNTER — TELEPHONE (OUTPATIENT)
Dept: FAMILY MEDICINE | Facility: CLINIC | Age: 60
End: 2020-04-14

## 2020-04-14 DIAGNOSIS — R06.00 DYSPNEA, UNSPECIFIED TYPE: ICD-10-CM

## 2020-04-14 DIAGNOSIS — R05.9 COUGH: Primary | ICD-10-CM

## 2020-04-14 DIAGNOSIS — R06.02 SHORTNESS OF BREATH: ICD-10-CM

## 2020-04-14 DIAGNOSIS — M79.10 MYALGIA: ICD-10-CM

## 2020-04-14 DIAGNOSIS — R05.9 COUGH: ICD-10-CM

## 2020-04-14 PROCEDURE — 99214 OFFICE O/P EST MOD 30 MIN: CPT | Mod: 95,,, | Performed by: FAMILY MEDICINE

## 2020-04-14 PROCEDURE — U0002 COVID-19 LAB TEST NON-CDC: HCPCS

## 2020-04-14 PROCEDURE — 99214 PR OFFICE/OUTPT VISIT, EST, LEVL IV, 30-39 MIN: ICD-10-PCS | Mod: 95,,, | Performed by: FAMILY MEDICINE

## 2020-04-14 NOTE — PROGRESS NOTES
Routine Office Visit    Patient Name: Keyonna Guillen    : 1960  MRN: [id number]    Subjective:  Keyonna is a 59 y.o. female who presents today for:    1. Cough  Patient presenting today with dry cough x 3 weeks with sudden onset of palptiations, body aches, and shortness of breath yesterday.  She states she is fine at rest, but then gets short of breath when walking.  She has a history of a-fib, but states it has been well controlled for years.  She states that she went to [facility] to get tested, but thought they would do it outside.  When they were going to make her go inside, she left.  She denies nay wheezing at this time.  She has variably controlled HTN.  She denies any chest pain, weakness, or slurred speech.  She has felt dizzy off and on.  No documented fevers stating she doesn't have a thermometer at home.  There have been no sweats, but has had chills.  She states she had a sore throat yesterday, but resolved today    Past Medical History  Past Medical History:   Diagnosis Date    Atrial fibrillation     Colon polyp     Coronary artery disease     Hyperlipidemia     Hypertension        Past Surgical History  Past Surgical History:   Procedure Laterality Date    BREAST BIOPSY      CARDIAC SURGERY      CORONARY ARTERY BYPASS GRAFT      HYSTERECTOMY         Family History  Family History   Problem Relation Age of Onset    Cancer Mother     Breast cancer Maternal Uncle        Social History  Social History     Socioeconomic History    Marital status:      Spouse name: Not on file    Number of children: Not on file    Years of education: Not on file    Highest education level: Not on file   Occupational History    Not on file   Social Needs    Financial resource strain: Not on file    Food insecurity:     Worry: Not on file     Inability: Not on file    Transportation needs:     Medical: Not on file     Non-medical: Not on file   Tobacco Use    Smoking status: Never Smoker     Smokeless tobacco: Never Used   Substance and Sexual Activity    Alcohol use: No    Drug use: No    Sexual activity: Yes     Partners: Male     Birth control/protection: Surgical   Lifestyle    Physical activity:     Days per week: Not on file     Minutes per session: Not on file    Stress: Not on file   Relationships    Social connections:     Talks on phone: Not on file     Gets together: Not on file     Attends Rastafarian service: Not on file     Active member of club or organization: Not on file     Attends meetings of clubs or organizations: Not on file     Relationship status: Not on file   Other Topics Concern    Not on file   Social History Narrative    Not on file       Current Medications  Current Outpatient Medications on File Prior to Visit   Medication Sig Dispense Refill    atorvastatin (LIPITOR) 40 MG tablet TAKE 1 TABLET BY MOUTH EVERYDAY AT BEDTIME 90 tablet 3    cilostazol (PLETAL) 50 MG Tab Take 1 tablet (50 mg total) by mouth 2 (two) times daily. 180 tablet 3    cloNIDine (CATAPRES) 0.1 MG tablet Take 1 tablet (0.1 mg total) by mouth daily as needed (If SBP > 170.). 30 tablet 11    estradiol (ESTRACE) 0.01 % (0.1 mg/gram) vaginal cream Place 2 g vaginally once daily. Apply daily 42.5 g 6    hydrALAZINE (APRESOLINE) 100 MG tablet Take 1.5 tablets (150 mg total) by mouth 2 (two) times daily. 270 tablet 3    hydroCHLOROthiazide (HYDRODIURIL) 25 MG tablet Take 25 mg by mouth once daily.  3    hydroCHLOROthiazide (HYDRODIURIL) 50 MG tablet ON HOLD - Take 1 tablet (50 mg) by mouth once daily. 90 tablet 3    labetaloL (NORMODYNE) 200 MG tablet Take 1 tablet (200 mg total) by mouth 2 (two) times daily. 180 tablet 3    olmesartan (BENICAR) 40 MG tablet TAKE 1 TABLET BY MOUTH EVERY DAY 90 tablet 3    sertraline (ZOLOFT) 25 MG tablet TAKE 1 TABLET BY MOUTH EVERY DAY 90 tablet 0    spironolactone (ALDACTONE) 50 MG tablet TAKE 1 TABLET BY MOUTH EVERY DAY 90 tablet 3    valACYclovir  (VALTREX) 1000 MG tablet Take 1 tablet (1,000 mg total) by mouth 3 (three) times daily. for 7 days 21 tablet 0    XARELTO 20 mg Tab TAKE 1 TABLET (20 MG TOTAL) BY MOUTH DAILY WITH DINNER OR EVENING MEAL. 90 tablet 3     Current Facility-Administered Medications on File Prior to Visit   Medication Dose Route Frequency Provider Last Rate Last Dose    estradiol valerate injection 20 mg  20 mg Intramuscular Q21 Days Koby Brewer MD   20 mg at 06/15/16 1400       Allergies   Review of patient's allergies indicates:   Allergen Reactions    Amlodipine Swelling       Review of Systems (Pertinent positives)  Review of Systems   Constitutional: Positive for chills. Negative for diaphoresis, fever and malaise/fatigue.   HENT: Negative.    Eyes: Negative.    Respiratory: Positive for cough and shortness of breath. Negative for sputum production and wheezing.    Cardiovascular: Negative.    Gastrointestinal: Negative.    Musculoskeletal: Positive for myalgias.   Skin: Negative.    Neurological: Positive for dizziness.         There were no vitals taken for this visit.    GENERAL APPEARANCE: in no apparent distress and well developed and well nourished  HEENT: PERRL, EOMI, Sclera clear, anicteric  NECK: normal, supple, no adenopathy, thyroid normal in size  RESPIRATORY: appears well, vitals normal, no respiratory distress, acyanotic, normal RR  HEART: spontaneously beating heart.    SKIN: no rashes, no wounds, no other lesions  PSYCH: Alert, oriented x 3, thought content appropriate, speech normal, pleasant and cooperative, good eye contact, well groomed    Assessment/Plan:  Keyonna Guillen is a 59 y.o. female who presents today for :    Diagnoses and all orders for this visit:    Cough  -     COVID-19 Routine Screening    Shortness of breath  -     COVID-19 Routine Screening    Myalgia  -     COVID-19 Routine Screening      1.  Covid testing ordered  2.  Will schedule at [facility]  3.  Follow up as needed  4.  Told  patient to go to the ED if shortness of breath worsens, she has chest pain, or should she have palptiations again given history of A-fib  5.  Call with any concerns    The patient location is: home  The chief complaint leading to consultation is: body aches and shortness of breath  Visit type: audiovisual  Total time spent with patient: 15  Each patient to whom he or she provides medical services by telemedicine is:  (1) informed of the relationship between the physician and patient and the respective role of any other health care provider with respect to management of the patient; and (2) notified that he or she may decline to receive medical services by telemedicine and may withdraw from such care at any time.      Morgan Leger MD

## 2020-04-14 NOTE — TELEPHONE ENCOUNTER
----- Message from Bindu Angel sent at 4/14/2020  2:31 PM CDT -----  Contact: Keyonna [phone]  Type: Patient Call Back    Who called:Keyonna    What is the request in detail: The patient is requesting a call back from the staff. She stated that she had an appt earlier today where it was discussed with Dr. Leger about getting tested for covid19. The patient stated that she was never called back and given instructions on what to do or what time to go get tested. If possible, the patient would like call back from the staff     Can the clinic reply by [facility]?no    Would the patient rather a call back or a response via [facility]? Call back     Best call back number: [phone]

## 2020-04-14 NOTE — PROGRESS NOTES
Patient saw PCP today. Suspected COVID-19, test ordered. Will follow-up in one week for hypertension.

## 2020-04-15 LAB — SARS-COV-2 RNA RESP QL NAA+PROBE: NOT DETECTED

## 2020-04-23 ENCOUNTER — PATIENT OUTREACH (OUTPATIENT)
Dept: OTHER | Facility: OTHER | Age: 60
End: 2020-04-23

## 2020-04-23 DIAGNOSIS — I10 ESSENTIAL HYPERTENSION: Chronic | ICD-10-CM

## 2020-04-29 DIAGNOSIS — I10 ESSENTIAL HYPERTENSION: Chronic | ICD-10-CM

## 2020-04-29 RX ORDER — CLONIDINE HYDROCHLORIDE 0.1 MG/1
0.1 TABLET ORAL DAILY PRN
Qty: 30 TABLET | Refills: 11 | Status: SHIPPED | OUTPATIENT
Start: 2020-04-29 | End: 2021-05-21

## 2020-04-29 RX ORDER — HYDROCHLOROTHIAZIDE 50 MG/1
50 TABLET ORAL DAILY
Qty: 90 TABLET | Refills: 3 | Status: SHIPPED | OUTPATIENT
Start: 2020-04-29 | End: 2021-04-14

## 2020-04-29 NOTE — PROGRESS NOTES
"Digital Medicine: Clinician Follow-Up    Called patient for hypertension follow-up. Mrs. Newby reports that things are going fine. In reviewing her medication list, she reports that she is only taking labetalol once daily in the evening, she is taking HCTZ 50 mg every morning along with spironolactone and taking all other BP medications as prescribed. She has medications that need to be removed - HCTZ 25 (duplicate), Valtrex, Estrace cream. She is not taking Pletal as prescribed due to fear of using medication. "I'm already taking so much medication. Is it really needed?" Although her clonidine prescription is  at her pharmacy, she is not in need of a refill at this time. She reports that she has not had to use this medication for her BP.     The history is provided by the patient. No  was used.     Follow Up  Follow-up reason(s): reading review and routine education      Readings are trending down       INTERVENTION(S)  reviewed appropriate dose schedule and encouragement/support    PLAN  patient verbalizes understanding, patient amenable to changes and continue monitoring    Current 30-day BP avg of 144/86 is uncontrolled, does not meet goal of <130/80.  Reviewed readings: trending downwards, consistently above goal. Needs to measure more frequently for monitoring.  Counseled on Pletal due to patient questions. Offered to reach out to MD Simran to contact patient, patient refused. Stated she has medication at home, will begin taking and see how she feels. She will contact MD Simran if needed.     Continue current regimen - labetalol has not been taken BID as prescribed. Patient will correct.  Counseled on s/sx of hypotension. Patient to alert [facility] if it occurs in order to reduce HCTZ to half-tablet (25 mg).  Requested more frequent readings for monitoring.     Follow-up in 2 weeks.       There are no preventive care reminders to display for this patient.    Last 5 Patient Entered " Readings                                      Current 30 Day Average: 144/86     Recent Readings 4/23/2020 4/14/2020 4/13/2020 4/13/2020 4/5/2020    SBP (mmHg) 124 149 141 140 162    DBP (mmHg) 84 91 81 75 99    Pulse 83 66 79 83 86        Hypertension Medications             cloNIDine (CATAPRES) 0.1 MG tablet Take 1 tablet (0.1 mg total) by mouth daily as needed (If SBP > 170.).    hydrALAZINE (APRESOLINE) 100 MG tablet Take 1.5 tablets (150 mg total) by mouth 2 (two) times daily.    hydroCHLOROthiazide (HYDRODIURIL) 50 MG tablet Take 1 tablet (50 mg total) by mouth once daily.    labetaloL (NORMODYNE) 200 MG tablet Take 1 tablet (200 mg total) by mouth 2 (two) times daily.    olmesartan (BENICAR) 40 MG tablet TAKE 1 TABLET BY MOUTH EVERY DAY    spironolactone (ALDACTONE) 50 MG tablet TAKE 1 TABLET BY MOUTH EVERY DAY             Screenings

## 2020-05-15 ENCOUNTER — PATIENT OUTREACH (OUTPATIENT)
Dept: OTHER | Facility: OTHER | Age: 60
End: 2020-05-15

## 2020-05-15 NOTE — PROGRESS NOTES
Digital Medicine: Health  Follow-Up    Patient had high BP reading, but care team was not alerted. Placed IS ticket. BP readings on 5/13 were 207/121 mmHg and 162/103 mmHg.  Patient states she may have had a stomach virus.  Reports having a headache but believes this is from the stomach virus.  States her stomach virus lasted for 2 days, but is feeling better today.    Patient states her daughter is going to sign up for the [facility] as well and would like her daughter to have same care team that she has.  Will attempt to assign myself and clinician to daughter's care team.    The history is provided by the patient.     Follow Up  Follow-up reason(s): reading review          INTERVENTION(S)  reviewed monitoring technique, encouragement/support and denied questions    PLAN  patient verbalizes understanding and continue monitoring      There are no preventive care reminders to display for this patient.    Last 5 Patient Entered Readings                                      Current 30 Day Average: 138/93     Recent Readings 5/14/2020 5/13/2020 5/13/2020 5/13/2020 5/10/2020    SBP (mmHg) 143 134 162 207 120    DBP (mmHg) 90 87 103 121 79    Pulse 83 80 77 71 69                  Screenings    SDOH

## 2020-05-19 DIAGNOSIS — Z12.11 COLON CANCER SCREENING: Primary | ICD-10-CM

## 2020-05-23 ENCOUNTER — CLINICAL SUPPORT (OUTPATIENT)
Dept: URGENT CARE | Facility: CLINIC | Age: 60
End: 2020-05-23
Payer: COMMERCIAL

## 2020-05-23 DIAGNOSIS — Z12.11 COLON CANCER SCREENING: ICD-10-CM

## 2020-05-23 PROCEDURE — 99211 OFF/OP EST MAY X REQ PHY/QHP: CPT | Mod: S$GLB,,, | Performed by: PHYSICIAN ASSISTANT

## 2020-05-23 PROCEDURE — 99211 PR OFFICE/OUTPT VISIT, EST, LEVL I: ICD-10-PCS | Mod: S$GLB,,, | Performed by: PHYSICIAN ASSISTANT

## 2020-05-24 ENCOUNTER — ANESTHESIA EVENT (OUTPATIENT)
Dept: ENDOSCOPY | Facility: HOSPITAL | Age: 60
End: 2020-05-24
Payer: COMMERCIAL

## 2020-05-25 ENCOUNTER — ANESTHESIA (OUTPATIENT)
Dept: ENDOSCOPY | Facility: HOSPITAL | Age: 60
End: 2020-05-25
Payer: COMMERCIAL

## 2020-05-25 ENCOUNTER — TELEPHONE (OUTPATIENT)
Dept: URGENT CARE | Facility: CLINIC | Age: 60
End: 2020-05-25

## 2020-05-25 ENCOUNTER — HOSPITAL ENCOUNTER (OUTPATIENT)
Facility: HOSPITAL | Age: 60
Discharge: HOME | End: 2020-05-25
Attending: INTERNAL MEDICINE | Admitting: INTERNAL MEDICINE
Payer: COMMERCIAL

## 2020-05-25 VITALS
OXYGEN SATURATION: 98 % | DIASTOLIC BLOOD PRESSURE: 74 MMHG | HEART RATE: 64 BPM | TEMPERATURE: 98 F | RESPIRATION RATE: 18 BRPM | SYSTOLIC BLOOD PRESSURE: 122 MMHG

## 2020-05-25 DIAGNOSIS — Z86.010 HISTORY OF COLON POLYPS: Primary | ICD-10-CM

## 2020-05-25 LAB — SARS-COV-2 RDRP RESP QL NAA+PROBE: NEGATIVE

## 2020-05-25 PROCEDURE — 45385 PR COLONOSCOPY,REMV LESN,SNARE: ICD-10-PCS | Mod: 33,,, | Performed by: INTERNAL MEDICINE

## 2020-05-25 PROCEDURE — 25000003 PHARM REV CODE 250: Performed by: REGISTERED NURSE

## 2020-05-25 PROCEDURE — D9220A PRA ANESTHESIA: ICD-10-PCS | Mod: 33,ANES,, | Performed by: ANESTHESIOLOGY

## 2020-05-25 PROCEDURE — 45385 COLONOSCOPY W/LESION REMOVAL: CPT | Mod: 33,,, | Performed by: INTERNAL MEDICINE

## 2020-05-25 PROCEDURE — 45381 PR COLONOSCPY,FLEX,W/DIR SUBMUC INJECT: ICD-10-PCS | Mod: 51,,, | Performed by: INTERNAL MEDICINE

## 2020-05-25 PROCEDURE — 45380 PR COLONOSCOPY,BIOPSY: ICD-10-PCS | Mod: 59,,, | Performed by: INTERNAL MEDICINE

## 2020-05-25 PROCEDURE — 88305 TISSUE EXAM BY PATHOLOGIST: ICD-10-PCS | Mod: 26,,, | Performed by: PATHOLOGY

## 2020-05-25 PROCEDURE — 45380 COLONOSCOPY AND BIOPSY: CPT | Performed by: INTERNAL MEDICINE

## 2020-05-25 PROCEDURE — 88305 TISSUE EXAM BY PATHOLOGIST: CPT | Mod: 59 | Performed by: PATHOLOGY

## 2020-05-25 PROCEDURE — U0002 COVID-19 LAB TEST NON-CDC: HCPCS

## 2020-05-25 PROCEDURE — 63600175 PHARM REV CODE 636 W HCPCS: Performed by: REGISTERED NURSE

## 2020-05-25 PROCEDURE — 45385 COLONOSCOPY W/LESION REMOVAL: CPT | Performed by: INTERNAL MEDICINE

## 2020-05-25 PROCEDURE — D9220A PRA ANESTHESIA: ICD-10-PCS | Mod: 33,CRNA,, | Performed by: REGISTERED NURSE

## 2020-05-25 PROCEDURE — 88305 TISSUE EXAM BY PATHOLOGIST: CPT | Mod: 26,,, | Performed by: PATHOLOGY

## 2020-05-25 PROCEDURE — 37000009 HC ANESTHESIA EA ADD 15 MINS: Performed by: INTERNAL MEDICINE

## 2020-05-25 PROCEDURE — 45381 COLONOSCOPY SUBMUCOUS NJX: CPT | Mod: 51,,, | Performed by: INTERNAL MEDICINE

## 2020-05-25 PROCEDURE — 45380 COLONOSCOPY AND BIOPSY: CPT | Mod: 59,,, | Performed by: INTERNAL MEDICINE

## 2020-05-25 PROCEDURE — D9220A PRA ANESTHESIA: Mod: 33,CRNA,, | Performed by: REGISTERED NURSE

## 2020-05-25 PROCEDURE — 27201089 HC SNARE, DISP (ANY): Performed by: INTERNAL MEDICINE

## 2020-05-25 PROCEDURE — 37000008 HC ANESTHESIA 1ST 15 MINUTES: Performed by: INTERNAL MEDICINE

## 2020-05-25 PROCEDURE — D9220A PRA ANESTHESIA: Mod: 33,ANES,, | Performed by: ANESTHESIOLOGY

## 2020-05-25 PROCEDURE — 25000003 PHARM REV CODE 250: Performed by: ANESTHESIOLOGY

## 2020-05-25 PROCEDURE — 27201012 HC FORCEPS, HOT/COLD, DISP: Performed by: INTERNAL MEDICINE

## 2020-05-25 RX ORDER — LIDOCAINE HYDROCHLORIDE 20 MG/ML
INJECTION, SOLUTION EPIDURAL; INFILTRATION; INTRACAUDAL; PERINEURAL
Status: DISCONTINUED
Start: 2020-05-25 | End: 2020-05-25 | Stop reason: HOSPADM

## 2020-05-25 RX ORDER — PHENYLEPHRINE HCL IN 0.9% NACL 1 MG/10 ML
SYRINGE (ML) INTRAVENOUS
Status: DISCONTINUED
Start: 2020-05-25 | End: 2020-05-25 | Stop reason: HOSPADM

## 2020-05-25 RX ORDER — PROPOFOL 10 MG/ML
INJECTION, EMULSION INTRAVENOUS
Status: COMPLETED
Start: 2020-05-25 | End: 2020-05-25

## 2020-05-25 RX ORDER — SODIUM CHLORIDE 0.9 % (FLUSH) 0.9 %
10 SYRINGE (ML) INJECTION
Status: DISCONTINUED | OUTPATIENT
Start: 2020-05-25 | End: 2020-05-25 | Stop reason: HOSPADM

## 2020-05-25 RX ORDER — LIDOCAINE HYDROCHLORIDE 10 MG/ML
1 INJECTION, SOLUTION EPIDURAL; INFILTRATION; INTRACAUDAL; PERINEURAL ONCE
Status: DISCONTINUED | OUTPATIENT
Start: 2020-05-25 | End: 2020-05-25 | Stop reason: HOSPADM

## 2020-05-25 RX ORDER — LIDOCAINE HYDROCHLORIDE 20 MG/ML
INJECTION INTRAVENOUS
Status: DISCONTINUED | OUTPATIENT
Start: 2020-05-25 | End: 2020-05-25

## 2020-05-25 RX ORDER — PHENYLEPHRINE HYDROCHLORIDE 10 MG/ML
INJECTION INTRAVENOUS
Status: DISCONTINUED | OUTPATIENT
Start: 2020-05-25 | End: 2020-05-25

## 2020-05-25 RX ORDER — GLYCOPYRROLATE 0.2 MG/ML
INJECTION INTRAMUSCULAR; INTRAVENOUS
Status: DISCONTINUED | OUTPATIENT
Start: 2020-05-25 | End: 2020-05-25

## 2020-05-25 RX ORDER — PROPOFOL 10 MG/ML
VIAL (ML) INTRAVENOUS
Status: DISCONTINUED | OUTPATIENT
Start: 2020-05-25 | End: 2020-05-25

## 2020-05-25 RX ORDER — SODIUM CHLORIDE 9 MG/ML
INJECTION, SOLUTION INTRAVENOUS CONTINUOUS
Status: DISCONTINUED | OUTPATIENT
Start: 2020-05-25 | End: 2020-05-25 | Stop reason: HOSPADM

## 2020-05-25 RX ORDER — GLYCOPYRROLATE 0.2 MG/ML
INJECTION INTRAMUSCULAR; INTRAVENOUS
Status: DISCONTINUED
Start: 2020-05-25 | End: 2020-05-25 | Stop reason: HOSPADM

## 2020-05-25 RX ADMIN — PROPOFOL 20 MG: 10 INJECTION, EMULSION INTRAVENOUS at 08:05

## 2020-05-25 RX ADMIN — Medication 100 MG: at 08:05

## 2020-05-25 RX ADMIN — PROPOFOL 30 MG: 10 INJECTION, EMULSION INTRAVENOUS at 09:05

## 2020-05-25 RX ADMIN — PROPOFOL 50 MG: 10 INJECTION, EMULSION INTRAVENOUS at 08:05

## 2020-05-25 RX ADMIN — PROPOFOL 30 MG: 10 INJECTION, EMULSION INTRAVENOUS at 08:05

## 2020-05-25 RX ADMIN — GLYCOPYRROLATE 0.2 MG: 0.2 INJECTION, SOLUTION INTRAMUSCULAR; INTRAVENOUS at 08:05

## 2020-05-25 RX ADMIN — PHENYLEPHRINE HYDROCHLORIDE 100 MCG: 10 INJECTION INTRAVENOUS at 08:05

## 2020-05-25 RX ADMIN — SODIUM CHLORIDE: 0.9 INJECTION, SOLUTION INTRAVENOUS at 08:05

## 2020-05-25 NOTE — DISCHARGE SUMMARY
[facility]  Discharge Summary      Admit Date: 5/25/2020    Discharge Date and Time:  05/25/2020 9:10 AM    Attending Physician: Maria Elena Barney MD     Reason for Admission: Surveillance colonoscopy    Procedures Performed: Procedure(s) (LRB):  COLONOSCOPY (N/A)    Hospital Course (synopsis of major diagnoses, care, treatment, and services provided during the course of the hospital stay): Outpatient colonoscopy    Consults: none    Significant Diagnostic Studies: Colonoscopy    Final Diagnoses:    Principal Problem: <principal problem not specified>   Secondary Diagnoses: Colonoscopy    Discharged Condition: good    Disposition: Home or Self Care    Follow Up/Patient Instructions: Follow-up with referring physician             Resume previous diet and activity.    Medications:  Reconciled Home Medications:      Medication List      ASK your doctor about these medications    atorvastatin 40 MG tablet  Commonly known as:  LIPITOR  TAKE 1 TABLET BY MOUTH EVERYDAY AT BEDTIME     cilostazoL 50 MG Tab  Commonly known as:  PLETAL  Take 1 tablet (50 mg total) by mouth 2 (two) times daily.     cloNIDine 0.1 MG tablet  Commonly known as:  CATAPRES  Take 1 tablet (0.1 mg total) by mouth daily as needed (If SBP > 170.).     hydrALAZINE 100 MG tablet  Commonly known as:  APRESOLINE  Take 1.5 tablets (150 mg total) by mouth 2 (two) times daily.     hydroCHLOROthiazide 50 MG tablet  Commonly known as:  HYDRODIURIL  Take 1 tablet (50 mg total) by mouth once daily.     labetaloL 200 MG tablet  Commonly known as:  NORMODYNE  Take 1 tablet (200 mg total) by mouth 2 (two) times daily.     olmesartan 40 MG tablet  Commonly known as:  BENICAR  TAKE 1 TABLET BY MOUTH EVERY DAY     sertraline 25 MG tablet  Commonly known as:  ZOLOFT  TAKE 1 TABLET BY MOUTH EVERY DAY     spironolactone 50 MG tablet  Commonly known as:  ALDACTONE  TAKE 1 TABLET BY MOUTH EVERY DAY     XARELTO 20 mg Tab  Generic drug:  rivaroxaban  TAKE 1  TABLET (20 MG TOTAL) BY MOUTH DAILY WITH DINNER OR EVENING MEAL.          No discharge procedures on file.

## 2020-05-25 NOTE — TRANSFER OF CARE
Anesthesia Transfer of Care Note    Patient: Keyonna Guillen    Procedure(s) Performed: Procedure(s) (LRB):  COLONOSCOPY (N/A)    Patient location: GI    Anesthesia Type: general    Transport from OR: Transported from OR on room air with adequate spontaneous ventilation    Post pain: adequate analgesia    Post assessment: no apparent anesthetic complications and tolerated procedure well    Post vital signs: stable    Level of consciousness: awake, alert and oriented    Nausea/Vomiting: no nausea/vomiting    Complications: none    Transfer of care protocol was followed      Last vitals:   Visit Vitals  /70 (BP Location: Left arm, Patient Position: Lying)   Pulse 70   Temp 36.5 °C (97.7 °F) (Oral)   Resp 16   SpO2 97%   Breastfeeding? No

## 2020-05-25 NOTE — ANESTHESIA PREPROCEDURE EVALUATION
05/25/2020  Keyonna Guillen is a 59 y.o., female.    Anesthesia Evaluation         Review of Systems  Anesthesia Hx:  No previous Anesthesia   Social:  Non-Smoker    Hematology/Oncology:  Hematology Normal   Oncology Normal     EENT/Dental:EENT/Dental Normal   Cardiovascular:   Hypertension CAD  Dysrhythmias (a fib)  By pass 10 years ago   Pulmonary:  Pulmonary Normal    Renal/:  Renal/ Normal     Hepatic/GI:  Hepatic/GI Normal    Musculoskeletal:  Musculoskeletal Normal    Neurological:  Neurology Normal    Endocrine:  Endocrine Normal    Dermatological:  Skin Normal    Psych:  Psychiatric Normal           Physical Exam  General:  Well nourished    Airway/Jaw/Neck:  Airway Findings: Mallampati: II TM Distance: < 4 cm      Dental:  DENTAL FINDINGS: Normal   Chest/Lungs:  Chest/Lungs Clear    Heart/Vascular:  Heart Findings: Normal       Mental Status:  Mental Status Findings:  Cooperative, Alert and Oriented         Anesthesia Plan  Type of Anesthesia, risks & benefits discussed:  Anesthesia Type:  general  Patient's Preference:   Intra-op Monitoring Plan: standard ASA monitors  Intra-op Monitoring Plan Comments:   Post Op Pain Control Plan: multimodal analgesia, IV/PO Opioids PRN and per primary service following discharge from PACU  Post Op Pain Control Plan Comments:   Induction:    Beta Blocker:  Patient is not currently on a Beta-Blocker (No further documentation required).       Informed Consent: Patient understands risks and agrees with Anesthesia plan.  Questions answered. Anesthesia consent signed with patient.  ASA Score: 3     Day of Surgery Review of History & Physical:    H&P update referred to the provider.  H&P completed by Anesthesiologist.   Anesthesia Plan Notes: Npo  Delay due to covied testing not received from urgent care        Ready For Surgery From Anesthesia Perspective.

## 2020-05-25 NOTE — H&P
"Chief Complaint:  "I need a colonoscopy."    HPI:  The patient is a 59 year old woman presenting for a surveillance colonoscopy.  She had a normal surveillance colonoscopy in 2014.  The patient denies any abdominal pain, weight loss, nausea, emesis, diarrhea, constipation, melena, or hematochezia.  The patient also denies a family history of colon cancer.    Past Medical History:   Diagnosis Date    Atrial fibrillation     Colon polyp     Coronary artery disease     Hyperlipidemia     Hypertension      Past Surgical History:   Procedure Laterality Date    BREAST BIOPSY      CARDIAC SURGERY      CORONARY ARTERY BYPASS GRAFT      HYSTERECTOMY       Family History   Problem Relation Age of Onset    Cancer Mother     Breast cancer Maternal Uncle      Social History     Socioeconomic History    Marital status:      Spouse name: Not on file    Number of children: Not on file    Years of education: Not on file    Highest education level: Not on file   Occupational History    Not on file   Social Needs    Financial resource strain: Not on file    Food insecurity:     Worry: Not on file     Inability: Not on file    Transportation needs:     Medical: Not on file     Non-medical: Not on file   Tobacco Use    Smoking status: Never Smoker    Smokeless tobacco: Never Used   Substance and Sexual Activity    Alcohol use: No    Drug use: No    Sexual activity: Yes     Partners: Male     Birth control/protection: Surgical   Lifestyle    Physical activity:     Days per week: Not on file     Minutes per session: Not on file    Stress: Not on file   Relationships    Social connections:     Talks on phone: Not on file     Gets together: Not on file     Attends [employer] service: Not on file     Active member of club or organization: Not on file     Attends meetings of clubs or organizations: Not on file     Relationship status: Not on file   Other Topics Concern    Not on file   Social History " Narrative    Not on file     No current facility-administered medications for this encounter.     Review of patient's allergies indicates:   Allergen Reactions    Amlodipine Swelling     ROS:  No chest pain or dyspnea.  No dysuria.  No heartburn or dysphagia.  Otherwise as stated above.  Ten other systems negative.    There were no vitals filed for this visit.    P.E.:  GEN: A x O x 3, NAD  SKIN: No jaundice  HEENT: EOMI, PERRL, anicteric sclera  CV: RRR, no M/R/G  Chest: CTA B  Abdomen: soft, NTND, normoactive BS  Ext: No C/C/E.  2+ dorsalis pedis pulses B  Neuro: No asterixes or tremors.  CN II-XII intact  Musculoskeletal: 5/5 strength bilaterally    Labs:  Lab Results   Component Value Date    WBC 4.30 10/29/2019    HGB 12.8 10/29/2019    HCT 38.0 10/29/2019    MCV 91 10/29/2019     10/29/2019       CMP  Sodium   Date Value Ref Range Status   10/29/2019 138 136 - 145 mmol/L Final     Potassium   Date Value Ref Range Status   10/29/2019 4.9 3.5 - 5.1 mmol/L Final     Chloride   Date Value Ref Range Status   10/29/2019 111 (H) 95 - 110 mmol/L Final     CO2   Date Value Ref Range Status   10/29/2019 20 (L) 23 - 29 mmol/L Final     Glucose   Date Value Ref Range Status   10/29/2019 123 (H) 70 - 110 mg/dL Final     BUN, Bld   Date Value Ref Range Status   10/29/2019 36 (H) 6 - 20 mg/dL Final     Creatinine   Date Value Ref Range Status   10/29/2019 1.3 0.5 - 1.4 mg/dL Final     Calcium   Date Value Ref Range Status   10/29/2019 9.6 8.7 - 10.5 mg/dL Final     Total Protein   Date Value Ref Range Status   10/29/2019 7.0 6.0 - 8.4 g/dL Final     Albumin   Date Value Ref Range Status   10/29/2019 4.1 3.5 - 5.2 g/dL Final     Total Bilirubin   Date Value Ref Range Status   10/29/2019 0.5 0.1 - 1.0 mg/dL Final     Comment:     For infants and newborns, interpretation of results should be based  on gestational age, weight and in agreement with clinical  observations.  Premature Infant recommended reference  ranges:  Up to 24 hours.............<8.0 mg/dL  Up to 48 hours............<12.0 mg/dL  3-5 days..................<15.0 mg/dL  6-29 days.................<15.0 mg/dL       Alkaline Phosphatase   Date Value Ref Range Status   10/29/2019 116 55 - 135 U/L Final     AST   Date Value Ref Range Status   10/29/2019 20 10 - 40 U/L Final     ALT   Date Value Ref Range Status   10/29/2019 28 10 - 44 U/L Final     Anion Gap   Date Value Ref Range Status   10/29/2019 7 (L) 8 - 16 mmol/L Final     eGFR if    Date Value Ref Range Status   10/29/2019 52 (A) >60 mL/min/1.73 m^2 Final     eGFR if non    Date Value Ref Range Status   10/29/2019 45 (A) >60 mL/min/1.73 m^2 Final     Comment:     Calculation used to obtain the estimated glomerular filtration  rate (eGFR) is the CKD-EPI equation.          No results for input(s): PT, INR, APTT in the last 24 hours.    A/P:  The patient is a 59 year old woman presenting for a colonoscopy.  1.  Colonoscopy - she can undergo a colonoscopy.  I have explained the risks, benefits, and alternatives of the procedure in detail.  The patient voices understanding and all questions have been answered.  The patient agrees to proceed as planned.

## 2020-05-25 NOTE — ANESTHESIA POSTPROCEDURE EVALUATION
Anesthesia Post Evaluation    Patient: Keyonna Guillen    Procedure(s) Performed: Procedure(s) (LRB):  COLONOSCOPY (N/A)    Final Anesthesia Type: general    Patient location during evaluation: PACU  Patient participation: Yes- Able to Participate  Level of consciousness: awake and alert, oriented and awake  Post-procedure vital signs: reviewed and stable  Pain management: adequate  Airway patency: patent    PONV status at discharge: No PONV  Anesthetic complications: no      Cardiovascular status: blood pressure returned to baseline, hemodynamically stable and stable  Respiratory status: unassisted and spontaneous ventilation  Hydration status: euvolemic  Follow-up not needed.          Vitals Value Taken Time   /74 5/25/2020  9:40 AM   Temp 36.5 °C (97.7 °F) 5/25/2020  9:14 AM   Pulse 64 5/25/2020  9:40 AM   Resp 18 5/25/2020  9:40 AM   SpO2 98 % 5/25/2020  9:40 AM         Event Time     Out of Recovery 09:40:00          Pain/Hilario Score: Hilario Score: 10 (5/25/2020  9:40 AM)

## 2020-05-26 DIAGNOSIS — C18.9 MALIGNANT NEOPLASM OF COLON, UNSPECIFIED: Primary | ICD-10-CM

## 2020-05-26 LAB
FINAL PATHOLOGIC DIAGNOSIS: NORMAL
GROSS: NORMAL

## 2020-05-27 ENCOUNTER — TELEPHONE (OUTPATIENT)
Dept: CARDIOLOGY | Facility: CLINIC | Age: 60
End: 2020-05-27

## 2020-05-29 ENCOUNTER — HOSPITAL ENCOUNTER (OUTPATIENT)
Dept: RADIOLOGY | Facility: HOSPITAL | Age: 60
Discharge: HOME | End: 2020-05-29
Attending: INTERNAL MEDICINE
Payer: COMMERCIAL

## 2020-05-29 DIAGNOSIS — C18.9 MALIGNANT NEOPLASM OF COLON, UNSPECIFIED: ICD-10-CM

## 2020-05-29 PROCEDURE — 74177 CT ABD & PELVIS W/CONTRAST: CPT | Mod: 26,,, | Performed by: RADIOLOGY

## 2020-05-29 PROCEDURE — 74177 CT ABD & PELVIS W/CONTRAST: CPT | Mod: TC

## 2020-05-29 PROCEDURE — 25500020 PHARM REV CODE 255: Performed by: INTERNAL MEDICINE

## 2020-05-29 PROCEDURE — 74177 CT ABDOMEN PELVIS WITH CONTRAST: ICD-10-PCS | Mod: 26,,, | Performed by: RADIOLOGY

## 2020-05-29 RX ADMIN — IOHEXOL 85 ML: 350 INJECTION, SOLUTION INTRAVENOUS at 09:05

## 2020-06-07 ENCOUNTER — NURSE TRIAGE (OUTPATIENT)
Dept: ADMINISTRATIVE | Facility: CLINIC | Age: 60
End: 2020-06-07

## 2020-06-07 NOTE — TELEPHONE ENCOUNTER
Attempted to contact the pt x1 at [phone] regarding day 13 of [facility] Post Procedural Symptom Tracker.  Unable to reach patient.      Reason for Disposition   No answer.  First attempt to contact caller.  Follow-up call scheduled within 15 minutes.    Protocols used: NO CONTACT OR DUPLICATE CONTACT CALL-A-AH

## 2020-06-08 NOTE — TELEPHONE ENCOUNTER
Contacted for post procedural symptom tracker. Pt denies any sob, cough, or fever since procedure.    Reason for Disposition   [1] Follow-up call to recent contact AND [2] information only call, no triage required    Additional Information   Negative: [1] Caller is not with the adult (patient) AND [2] reporting urgent symptoms   Negative: Lab result questions   Negative: Medication questions   Negative: Caller can't be reached by phone   Negative: Caller has already spoken to PCP or another triager   Negative: RN needs further essential information from caller in order to complete triage   Negative: Requesting regular office appointment   Negative: [1] Caller requesting NON-URGENT health information AND [2] PCP's office is the best resource   Negative: Health Information question, no triage required and triager able to answer question   Negative: General information question, no triage required and triager able to answer question   Negative: Question about upcoming scheduled test, no triage required and triager able to answer question   Negative: [1] Caller is not with the adult (patient) AND [2] probable NON-URGENT symptoms    Protocols used: INFORMATION ONLY CALL-A-

## 2020-06-10 ENCOUNTER — PATIENT OUTREACH (OUTPATIENT)
Dept: ADMINISTRATIVE | Facility: OTHER | Age: 60
End: 2020-06-10

## 2020-06-11 ENCOUNTER — OFFICE VISIT (OUTPATIENT)
Dept: CARDIOLOGY | Facility: CLINIC | Age: 60
End: 2020-06-11
Payer: COMMERCIAL

## 2020-06-11 VITALS
BODY MASS INDEX: 33.59 KG/M2 | OXYGEN SATURATION: 98 % | HEART RATE: 69 BPM | SYSTOLIC BLOOD PRESSURE: 124 MMHG | RESPIRATION RATE: 16 BRPM | WEIGHT: 189.63 LBS | DIASTOLIC BLOOD PRESSURE: 74 MMHG

## 2020-06-11 DIAGNOSIS — I25.810 CORONARY ARTERY DISEASE INVOLVING CORONARY BYPASS GRAFT OF NATIVE HEART WITHOUT ANGINA PECTORIS: Primary | ICD-10-CM

## 2020-06-11 DIAGNOSIS — E66.9 NON MORBID OBESITY, UNSPECIFIED OBESITY TYPE: ICD-10-CM

## 2020-06-11 DIAGNOSIS — I65.02 OCCLUSION OF LEFT VERTEBRAL ARTERY: ICD-10-CM

## 2020-06-11 DIAGNOSIS — I48.0 PAROXYSMAL ATRIAL FIBRILLATION: ICD-10-CM

## 2020-06-11 DIAGNOSIS — I73.9 PAD (PERIPHERAL ARTERY DISEASE): ICD-10-CM

## 2020-06-11 DIAGNOSIS — Z01.810 PREOP CARDIOVASCULAR EXAM: ICD-10-CM

## 2020-06-11 DIAGNOSIS — E78.49 OTHER HYPERLIPIDEMIA: ICD-10-CM

## 2020-06-11 DIAGNOSIS — I10 ESSENTIAL HYPERTENSION: Chronic | ICD-10-CM

## 2020-06-11 PROCEDURE — 3078F PR MOST RECENT DIASTOLIC BLOOD PRESSURE < 80 MM HG: ICD-10-PCS | Mod: CPTII,S$GLB,, | Performed by: INTERNAL MEDICINE

## 2020-06-11 PROCEDURE — 99999 PR PBB SHADOW E&M-EST. PATIENT-LVL III: ICD-10-PCS | Mod: PBBFAC,,, | Performed by: INTERNAL MEDICINE

## 2020-06-11 PROCEDURE — 3078F DIAST BP <80 MM HG: CPT | Mod: CPTII,S$GLB,, | Performed by: INTERNAL MEDICINE

## 2020-06-11 PROCEDURE — 3074F PR MOST RECENT SYSTOLIC BLOOD PRESSURE < 130 MM HG: ICD-10-PCS | Mod: CPTII,S$GLB,, | Performed by: INTERNAL MEDICINE

## 2020-06-11 PROCEDURE — 93000 ELECTROCARDIOGRAM COMPLETE: CPT | Mod: S$GLB,,, | Performed by: INTERNAL MEDICINE

## 2020-06-11 PROCEDURE — 93000 EKG 12-LEAD: ICD-10-PCS | Mod: S$GLB,,, | Performed by: INTERNAL MEDICINE

## 2020-06-11 PROCEDURE — 3008F PR BODY MASS INDEX (BMI) DOCUMENTED: ICD-10-PCS | Mod: CPTII,S$GLB,, | Performed by: INTERNAL MEDICINE

## 2020-06-11 PROCEDURE — 99214 PR OFFICE/OUTPT VISIT, EST, LEVL IV, 30-39 MIN: ICD-10-PCS | Mod: S$GLB,,, | Performed by: INTERNAL MEDICINE

## 2020-06-11 PROCEDURE — 99999 PR PBB SHADOW E&M-EST. PATIENT-LVL III: CPT | Mod: PBBFAC,,, | Performed by: INTERNAL MEDICINE

## 2020-06-11 PROCEDURE — 3008F BODY MASS INDEX DOCD: CPT | Mod: CPTII,S$GLB,, | Performed by: INTERNAL MEDICINE

## 2020-06-11 PROCEDURE — 99214 OFFICE O/P EST MOD 30 MIN: CPT | Mod: S$GLB,,, | Performed by: INTERNAL MEDICINE

## 2020-06-11 PROCEDURE — 3074F SYST BP LT 130 MM HG: CPT | Mod: CPTII,S$GLB,, | Performed by: INTERNAL MEDICINE

## 2020-06-11 NOTE — PROGRESS NOTES
CARDIOVASCULAR PROGRESS NOTE    REASON FOR CONSULT:   Keyonna Guillen is a 60 y.o. female who presents for follow up of PAF, CAD/CABG, HTN, PAD, preop CV eval.    PCP: [place]  Surg: Rob  HISTORY OF PRESENT ILLNESS:   The patient returns for follow-up.  In the interim since her last office visit she underwent colonoscopy noting ileocecal mass for which operative resection is planned.  She denies intercurrent angina or dyspnea.  She is able to climb up a flight of stairs in the office today without any symptoms.  She otherwise has had no palpitations or syncope.  She denies PND, orthopnea, lower extremity edema.  There has been no melena, hematuria, or claudicant symptoms.  Previous complaints of right hip and calf claudication seem to have resolved.  The patient thinks that the claudication and her calf was likely related to the hip pain.  She did not note any improvement with the pletal.  She does continue to take Xarelto anticoagulation.    CARDIOVASCULAR HISTORY:   CAD s/p CABG 4/5/11: LIMA-LAD, SVG-D, SVG-OM  PAF s/p FRANCISCO/DCCV 3/8/18, on Xarelto, CHADS VASC 2  Hx L vert art occlusion  PAD, ?R SFA stenosis (US 5/2018)    PAST MEDICAL HISTORY:     Past Medical History:   Diagnosis Date    Atrial fibrillation     Colon polyp     Coronary artery disease     Hyperlipidemia     Hypertension        PAST SURGICAL HISTORY:     Past Surgical History:   Procedure Laterality Date    BREAST BIOPSY      CARDIAC SURGERY      COLONOSCOPY N/A 5/25/2020    Procedure: COLONOSCOPY;  Surgeon: José Luis Fonseca MD;  [facility];  Service: Endoscopy;  Laterality: N/A;  XARELTO/PLETAL ok    CORONARY ARTERY BYPASS GRAFT      HYSTERECTOMY         ALLERGIES AND MEDICATION:   Review of patient's allergies indicates:  No Known Allergies       Medication List           Accurate as of June 11, 2020 11:03 AM. If you have any questions, ask your nurse or doctor.               CONTINUE taking these medications    atorvastatin 40  MG tablet  Commonly known as:  LIPITOR  TAKE 1 TABLET BY MOUTH EVERYDAY AT BEDTIME     cilostazoL 50 MG Tab  Commonly known as:  PLETAL  Take 1 tablet (50 mg total) by mouth 2 (two) times daily.     cloNIDine 0.1 MG tablet  Commonly known as:  CATAPRES  Take 1 tablet (0.1 mg total) by mouth daily as needed (If SBP > 170.).     hydrALAZINE 100 MG tablet  Commonly known as:  APRESOLINE  Take 1.5 tablets (150 mg total) by mouth 2 (two) times daily.     hydroCHLOROthiazide 50 MG tablet  Commonly known as:  HYDRODIURIL  Take 1 tablet (50 mg total) by mouth once daily.     labetaloL 200 MG tablet  Commonly known as:  NORMODYNE  Take 1 tablet (200 mg total) by mouth 2 (two) times daily.     olmesartan 40 MG tablet  Commonly known as:  BENICAR  TAKE 1 TABLET BY MOUTH EVERY DAY     sertraline 25 MG tablet  Commonly known as:  ZOLOFT  TAKE 1 TABLET BY MOUTH EVERY DAY     spironolactone 50 MG tablet  Commonly known as:  ALDACTONE  TAKE 1 TABLET BY MOUTH EVERY DAY     XARELTO 20 mg Tab  Generic drug:  rivaroxaban  TAKE 1 TABLET (20 MG TOTAL) BY MOUTH DAILY WITH DINNER OR EVENING MEAL.              SOCIAL HISTORY:     Social History     Socioeconomic History    Marital status:      Spouse name: Not on file    Number of children: Not on file    Years of education: Not on file    Highest education level: Not on file   Occupational History    Not on file   Social Needs    Financial resource strain: Not on file    Food insecurity:     Worry: Not on file     Inability: Not on file    Transportation needs:     Medical: Not on file     Non-medical: Not on file   Tobacco Use    Smoking status: Never Smoker    Smokeless tobacco: Never Used   Substance and Sexual Activity    Alcohol use: No    Drug use: No    Sexual activity: Yes     Partners: Male     Birth control/protection: Surgical   Lifestyle    Physical activity:     Days per week: Not on file     Minutes per session: Not on file    Stress: Not on file    Relationships    Social connections:     Talks on phone: Not on file     Gets together: Not on file     Attends Congregation service: Not on file     Active member of club or organization: Not on file     Attends meetings of clubs or organizations: Not on file     Relationship status: Not on file   Other Topics Concern    Not on file   Social History Narrative    Not on file       FAMILY HISTORY:     Family History   Problem Relation Age of Onset    Cancer Mother     Breast cancer Maternal Uncle        REVIEW OF SYSTEMS:   Review of Systems   Constitutional: Negative for chills, diaphoresis and fever.   HENT: Negative for nosebleeds.    Eyes: Negative for blurred vision, double vision and photophobia.   Respiratory: Negative for hemoptysis, shortness of breath and wheezing.    Cardiovascular: Negative for chest pain, palpitations, orthopnea, claudication, leg swelling and PND.   Gastrointestinal: Negative for abdominal pain, blood in stool, heartburn, melena, nausea and vomiting.   Genitourinary: Negative for flank pain and hematuria.   Musculoskeletal: Negative for falls, myalgias and neck pain.   Skin: Negative for rash.   Neurological: Negative for dizziness, seizures, loss of consciousness, weakness and headaches.   Endo/Heme/Allergies: Negative for polydipsia. Does not bruise/bleed easily.   Psychiatric/Behavioral: Negative for depression and memory loss. The patient is not nervous/anxious.        PHYSICAL EXAM:     Vitals:    06/11/20 1038   BP: 124/74   Pulse: 69   Resp: 16    Body mass index is 33.59 kg/m².  Weight: 86 kg (189 lb 9.5 oz)         Physical Exam   Constitutional: She is oriented to person, place, and time. She appears well-developed and well-nourished. She is cooperative.  Non-toxic appearance. No distress.   HENT:   Head: Normocephalic and atraumatic.   Eyes: Pupils are equal, round, and reactive to light. Conjunctivae and EOM are normal. No scleral icterus.   Neck: Trachea normal and  normal range of motion. Neck supple. Normal carotid pulses and no JVD present. Carotid bruit is not present. No neck rigidity. No tracheal deviation and no edema present. No thyromegaly present.   Cardiovascular: Normal rate, regular rhythm, S1 normal and S2 normal. PMI is not displaced. Exam reveals no gallop and no friction rub.   No murmur heard.  Pulses:       Carotid pulses are 2+ on the right side, and 2+ on the left side.  Pulmonary/Chest: Effort normal and breath sounds normal. No stridor. No respiratory distress. She has no wheezes. She has no rales. She exhibits no tenderness.   Abdominal: Soft. She exhibits no distension. There is no hepatosplenomegaly.   obese   Musculoskeletal: Normal range of motion. She exhibits no edema or tenderness.   Feet:   Right Foot:   Skin Integrity: Negative for ulcer.   Left Foot:   Skin Integrity: Negative for ulcer.   Neurological: She is alert and oriented to person, place, and time. No cranial nerve deficit.   Skin: Skin is warm and dry. No rash noted. No erythema.   Psychiatric: She has a normal mood and affect. Her speech is normal and behavior is normal.   Vitals reviewed.      DATA:   EKG: (personally reviewed tracing)  6/11/20 SR 68    Laboratory:  CBC:  Recent Labs   Lab 11/16/18  1403 05/22/19  0843 10/29/19  0755   WBC 6.65 5.68 4.30   Hemoglobin 13.6 13.0 12.8   Hematocrit 40.3 40.0 38.0   Platelets 284 259 231       CHEMISTRIES:  Recent Labs   Lab 03/09/18  0615 04/17/18  1344  04/18/19  0830 05/22/19  0843 10/29/19  0755 05/29/20  0801   Glucose 116 H 90   < > 110 101 123 H  --    Sodium 143 144   < > 144 143 138  --    Potassium 3.0 L 4.3   < > 4.2 4.4 4.9  --    BUN, Bld 21 H 20   < > 15 25 H 36 H  --    Creatinine 0.9 0.9   < > 0.9 1.2 1.3 1.4   eGFR if African American >60 >60   < > >60 57 A 52 A 47 A   eGFR if non African American >60 >60   < > >60 50 A 45 A 41 A   Calcium 9.5 10.3   < > 9.7 10.4 9.6  --    Magnesium 2.0 2.3  --   --   --   --   --     <  > = values in this interval not displayed.       CARDIAC BIOMARKERS:  Recent Labs   Lab 03/09/18  0150 03/09/18  0826 04/17/18  1344   CPK  --   --  63   Troponin I 0.027 H 0.065 H  --        COAGS:  Recent Labs   Lab 04/17/18  1344   INR 1.1       LIPIDS/LFTS:  Recent Labs   Lab 03/09/18  0615  11/16/18  1403 05/22/19  0843 10/29/19  0755   Cholesterol 158  --   --  170  --    Triglycerides 142  --   --  126  --    HDL 39 L  --   --  41  --    LDL Cholesterol 90.6  --   --  103.8  --    Non-HDL Cholesterol 119  --   --  129  --    AST  --    < > 18 15 20   ALT  --    < > 20 16 28    < > = values in this interval not displayed.     Lab Results   Component Value Date    TSH 1.958 05/22/2019         Cardiovascular Testing:  Echo 1/24/19  · Normal left ventricular systolic function. The estimated ejection fraction is 60%  · Mild left ventricular enlargement.  · Eccentric left ventricular hypertrophy.  · No wall motion abnormalities.  · Normal right ventricular systolic function.  · Mild tricuspid regurgitation.  · The estimated PA systolic pressure is 34 mm Hg    Renal art US 1/10/19  -Normal sized kidneys without evidence for nephrolithiasis or hydronephrosis.  -There is slight increased peak systolic velocity within the right renal artery from the proximal to mid aspect from 50 cm/sec to 158 cm/sec which is likely related to vessel tortuosity and turbulent flow in light of the absent elevated resistive indices or ratios however underlying right renal artery stenosis cannot be excluded.  This could be further evaluated with CTA or MRA imaging.  -No evidence for left renal artery elevated resistive indices or renal artery velocities to suggest renal artery stenosis.    LE art US 5/31/18  R STEFANI 1.09  L STEFANI 1.08  Elevated velocities within the mid to distal right superficial femoral artery strongly suggestive of hemodynamically significant stenoses within the mid to distal right superficial femoral artery.    LE venous  US/reflux 5/31/18  No evidence for deep venous thrombosis within either lower extremity.  No evidence for venous insufficiency within the greater or lesser saphenous veins bilaterally.    Carotid US 4/4/18 (images prev pers rev, ?L vert occlusion)  TDS  There is 40 - 49% right Internal Carotid stenosis.  There is 0 - 19% left Internal Carotid stenosis.    [place] MPI 4/4/18   Nuclear Quantitative Functional Analysis:   LVEF: 69 %  LVED Volume: 88 ml  LVES Volume: 27 ml  Impression: NORMAL MYOCARDIAL PERFUSION  1. The perfusion scan is free of evidence for myocardial ischemia or injury.   2. Resting wall motion is physiologic.   3. Resting LV function is normal.   4. The ventricular volumes are normal at rest and stress.   5. The extracardiac distribution of radioactivity is normal.     FRANCISCO/DCCV 3/9/18  Normal bivent size/fxn, EF 55%, normal wall motion  Normal appearing valves.  Trace AI, mild MR, mild TR  No LA/LON thrombus  Succcessful DCCV af->NSR 360J x1.  Plan:  Cont med rx  Cont Xarelto 20mg qd  OK for discharge later on today and follow up with me in the office 1 week for outpatient stress test.    Cath 3/25/11 ([facility], subsequently had CABG)  LM: normal  LAD sequential 90% prox and 80% mid stenoses   D2 prox 80%  LCx: MLI   OM2 prox 80%  RCA: dom, mid-dist 40% sequential stenoses    ASSESSMENT:   # HTN, controlled  # CAD s/p CABG x3V 4/2011.  MPI 4/2018 normal.   # PAD, bilat buttock and RLE claudication, R SFA stenosis noted on US 5/2018.  Sxs seemd to have resolved (pt thinks it was more related to R hip pain, did not notice much of a difference with pletal rx)  # PAF s/p FRANCISCO/DCCV 3/9/18.  CHADS VASC 2 on Xarelto.  Currently in SR.  # HLP on atorva 40mg  # hx L vert art occlusion  # BMI 34, down 1 unit vs last OV  # preop for colon resection    PLAN:   Cont med rx  The patient is at acceptable cardiac risk for the colon resection and anesthesia as deemed necessary.  No preoperative cardiac testing is  required.  Cont Xarelto 20mg qd, OK to hold 3-5 days for surgery and resume asap thereafter.  Stop pletal  Diet/exercise/weight loss  RTC 6 months with carotid US (Dec 2020)  Consider LE angio in future for eval of LE PAD if limiting claudication recurs      José Khalil MD, FACC

## 2020-06-11 NOTE — LETTER
June 11, 2020    Keyonna Guillen  [address] [address]             [facility]  [address]  [facility] [address]  Phone: [phone]   Mrs. Keyonna Guillen was seen by me on 6/11/2020 and is cleared atlow cardiac risk for the planned surgery and anesthesia as deemed necessary.  Ok to hold Xerelto 3-5 days as needed and resume after procedure.      If you have any questions or concerns, please don't hesitate to call.    Sincerely,      José Khalil M.D., [facility]

## 2020-06-15 ENCOUNTER — OFFICE VISIT (OUTPATIENT)
Dept: UROLOGY | Facility: CLINIC | Age: 60
End: 2020-06-15
Payer: COMMERCIAL

## 2020-06-15 VITALS
SYSTOLIC BLOOD PRESSURE: 124 MMHG | WEIGHT: 196 LBS | DIASTOLIC BLOOD PRESSURE: 80 MMHG | HEIGHT: 63 IN | BODY MASS INDEX: 34.73 KG/M2

## 2020-06-15 DIAGNOSIS — R31.0 GROSS HEMATURIA: ICD-10-CM

## 2020-06-15 DIAGNOSIS — C18.9 MALIGNANT NEOPLASM OF COLON, UNSPECIFIED PART OF COLON: Primary | ICD-10-CM

## 2020-06-15 PROCEDURE — 3079F DIAST BP 80-89 MM HG: CPT | Mod: CPTII,S$GLB,, | Performed by: NURSE PRACTITIONER

## 2020-06-15 PROCEDURE — 3074F PR MOST RECENT SYSTOLIC BLOOD PRESSURE < 130 MM HG: ICD-10-PCS | Mod: CPTII,S$GLB,, | Performed by: NURSE PRACTITIONER

## 2020-06-15 PROCEDURE — 99213 OFFICE O/P EST LOW 20 MIN: CPT | Mod: 25,S$GLB,, | Performed by: NURSE PRACTITIONER

## 2020-06-15 PROCEDURE — 81001 POCT URINALYSIS, DIPSTICK OR TABLET REAGENT, AUTOMATED, WITH MICROSCOP: ICD-10-PCS | Mod: S$GLB,,, | Performed by: NURSE PRACTITIONER

## 2020-06-15 PROCEDURE — 3008F BODY MASS INDEX DOCD: CPT | Mod: CPTII,S$GLB,, | Performed by: NURSE PRACTITIONER

## 2020-06-15 PROCEDURE — 3079F PR MOST RECENT DIASTOLIC BLOOD PRESSURE 80-89 MM HG: ICD-10-PCS | Mod: CPTII,S$GLB,, | Performed by: NURSE PRACTITIONER

## 2020-06-15 PROCEDURE — 99213 PR OFFICE/OUTPT VISIT, EST, LEVL III, 20-29 MIN: ICD-10-PCS | Mod: 25,S$GLB,, | Performed by: NURSE PRACTITIONER

## 2020-06-15 PROCEDURE — 3008F PR BODY MASS INDEX (BMI) DOCUMENTED: ICD-10-PCS | Mod: CPTII,S$GLB,, | Performed by: NURSE PRACTITIONER

## 2020-06-15 PROCEDURE — 3074F SYST BP LT 130 MM HG: CPT | Mod: CPTII,S$GLB,, | Performed by: NURSE PRACTITIONER

## 2020-06-15 PROCEDURE — 99999 PR PBB SHADOW E&M-EST. PATIENT-LVL III: CPT | Mod: PBBFAC,,, | Performed by: NURSE PRACTITIONER

## 2020-06-15 PROCEDURE — 99999 PR PBB SHADOW E&M-EST. PATIENT-LVL III: ICD-10-PCS | Mod: PBBFAC,,, | Performed by: NURSE PRACTITIONER

## 2020-06-15 PROCEDURE — 81001 URINALYSIS AUTO W/SCOPE: CPT | Mod: S$GLB,,, | Performed by: NURSE PRACTITIONER

## 2020-06-15 NOTE — PROGRESS NOTES
Subjective:       Patient ID: Keyonna Guillen is a 60 y.o. female who was last seen in this office 4/18/18    Chief Complaint:   Chief Complaint   Patient presents with    Other     Pt. is here to sign consents.. and may have some questions in reference .. states no other issues      Patient recently underwent colonoscopy which showed a ileocecal mass. She is planned for a colon resection by Dr Espinoza on 6/25/20. Urology consulted for bilateral ureteral stent placement for intra-operative identification of ureters. She is here today to sign consents. Denies dysuria, gross hematuria or flank pain. No urological complaint    ACTIVE MEDICAL ISSUES:  Patient Active Problem List   Diagnosis    Menopausal state    Status post hysterectomy    Hormone replacement therapy (HRT)    Atrophic vaginitis    Essential hypertension    Coronary artery disease involving coronary bypass graft of native heart without angina pectoris    Encounter for gynecological examination without abnormal finding    Other hyperlipidemia    Palpitations    Paroxysmal atrial fibrillation    Occlusion of left vertebral artery    Hematuria    PAD (peripheral artery disease)    Non morbid obesity, unspecified obesity type    Resistant hypertension    Nausea       ALLERGIES AND MEDICATIONS: updated and reviewed.  Review of patient's allergies indicates:   Allergen Reactions    Amlodipine Swelling     Current Outpatient Medications   Medication Sig    atorvastatin (LIPITOR) 40 MG tablet TAKE 1 TABLET BY MOUTH EVERYDAY AT BEDTIME    cilostazol (PLETAL) 50 MG Tab Take 1 tablet (50 mg total) by mouth 2 (two) times daily.    cloNIDine (CATAPRES) 0.1 MG tablet Take 1 tablet (0.1 mg total) by mouth daily as needed (If SBP > 170.).    hydrALAZINE (APRESOLINE) 100 MG tablet Take 1.5 tablets (150 mg total) by mouth 2 (two) times daily.    hydroCHLOROthiazide (HYDRODIURIL) 50 MG tablet Take 1 tablet (50 mg total) by mouth once daily.     "labetaloL (NORMODYNE) 200 MG tablet Take 1 tablet (200 mg total) by mouth 2 (two) times daily.    olmesartan (BENICAR) 40 MG tablet TAKE 1 TABLET BY MOUTH EVERY DAY    sertraline (ZOLOFT) 25 MG tablet TAKE 1 TABLET BY MOUTH EVERY DAY    spironolactone (ALDACTONE) 50 MG tablet TAKE 1 TABLET BY MOUTH EVERY DAY    XARELTO 20 mg Tab TAKE 1 TABLET (20 MG TOTAL) BY MOUTH DAILY WITH DINNER OR EVENING MEAL.     Current Facility-Administered Medications   Medication    estradiol valerate injection 20 mg       Review of Systems   Constitutional: Negative for activity change, chills, fatigue, fever and unexpected weight change.   Eyes: Negative for discharge, redness and visual disturbance.   Respiratory: Negative for cough, shortness of breath and wheezing.    Cardiovascular: Negative for chest pain and leg swelling.   Gastrointestinal: Negative for abdominal distention, abdominal pain, constipation, diarrhea, nausea and vomiting.   Genitourinary: Negative for decreased urine volume, difficulty urinating, dysuria, flank pain, frequency, hematuria, pelvic pain and urgency.   Musculoskeletal: Negative for arthralgias, joint swelling and myalgias.   Skin: Negative for color change and rash.   Neurological: Negative for dizziness and light-headedness.   Psychiatric/Behavioral: Negative for behavioral problems and confusion. The patient is not nervous/anxious.        Objective:      Vitals:    06/15/20 1544   BP: 124/80   Weight: 88.9 kg (196 lb)   Height: 5' 3" (1.6 m)     Physical Exam   Constitutional: She is oriented to person, place, and time. She appears well-developed.   HENT:   Head: Normocephalic and atraumatic.   Nose: Nose normal.   Eyes: Conjunctivae are normal. Right eye exhibits no discharge. Left eye exhibits no discharge.   Neck: Normal range of motion. Neck supple. No tracheal deviation present. No thyromegaly present.   Cardiovascular: Normal rate and regular rhythm.    Pulmonary/Chest: Effort normal. No " respiratory distress. She has no wheezes.   Abdominal: Soft. She exhibits no distension. There is no abdominal tenderness. No hernia.   Genitourinary:    Genitourinary Comments: Patient declined exam     Musculoskeletal: Normal range of motion.   Neurological: She is alert and oriented to person, place, and time.   Skin: Skin is warm and dry. No rash noted. No erythema.     Psychiatric: Her behavior is normal. Judgment normal.       Urine dipstick shows negative for all components.  Micro exam: negative for WBC's or RBC's.    Assessment:       1. Malignant neoplasm of colon, unspecified part of colon          Plan:       1. Malignant neoplasm of colon, unspecified part of colon  -Plan for bilateral ureteral stent placement by Dr Smyth on 6/25/20. Patient consented  - POCT urinalysis, dipstick or tablet reag  - Urine culture            Follow up if symptoms worsen or fail to improve.

## 2020-06-16 ENCOUNTER — LAB VISIT (OUTPATIENT)
Dept: LAB | Facility: HOSPITAL | Age: 60
End: 2020-06-16
Attending: NURSE PRACTITIONER
Payer: COMMERCIAL

## 2020-06-16 DIAGNOSIS — C18.9 COLON CANCER: Primary | ICD-10-CM

## 2020-06-16 LAB
BILIRUB SERPL-MCNC: NORMAL MG/DL
BLOOD URINE, POC: NORMAL
COLOR, POC UA: YELLOW
GLUCOSE UR QL STRIP: NORMAL
KETONES UR QL STRIP: NORMAL
LEUKOCYTE ESTERASE URINE, POC: NORMAL
NITRITE, POC UA: NORMAL
PH, POC UA: 6
PROTEIN, POC: NORMAL
SPECIFIC GRAVITY, POC UA: 1010
UROBILINOGEN, POC UA: NORMAL

## 2020-06-16 PROCEDURE — 87086 URINE CULTURE/COLONY COUNT: CPT

## 2020-06-16 PROCEDURE — 87186 SC STD MICRODIL/AGAR DIL: CPT

## 2020-06-16 PROCEDURE — 87088 URINE BACTERIA CULTURE: CPT

## 2020-06-16 PROCEDURE — 87077 CULTURE AEROBIC IDENTIFY: CPT | Mod: 59

## 2020-06-19 ENCOUNTER — TELEPHONE (OUTPATIENT)
Dept: UROLOGY | Facility: CLINIC | Age: 60
End: 2020-06-19

## 2020-06-19 ENCOUNTER — HOSPITAL ENCOUNTER (OUTPATIENT)
Dept: PREADMISSION TESTING | Facility: HOSPITAL | Age: 60
Discharge: HOME | End: 2020-06-19
Attending: SURGERY
Payer: COMMERCIAL

## 2020-06-19 VITALS
TEMPERATURE: 98 F | SYSTOLIC BLOOD PRESSURE: 118 MMHG | HEIGHT: 63 IN | DIASTOLIC BLOOD PRESSURE: 75 MMHG | RESPIRATION RATE: 18 BRPM | OXYGEN SATURATION: 99 % | WEIGHT: 197.75 LBS | BODY MASS INDEX: 35.04 KG/M2 | HEART RATE: 59 BPM

## 2020-06-19 DIAGNOSIS — Z01.818 PREOP TESTING: Primary | ICD-10-CM

## 2020-06-19 LAB
ALBUMIN SERPL BCP-MCNC: 4 G/DL (ref 3.5–5.2)
ALP SERPL-CCNC: 120 U/L (ref 55–135)
ALT SERPL W/O P-5'-P-CCNC: 40 U/L (ref 10–44)
ANION GAP SERPL CALC-SCNC: 6 MMOL/L (ref 8–16)
AST SERPL-CCNC: 22 U/L (ref 10–40)
BACTERIA UR CULT: ABNORMAL
BASOPHILS # BLD AUTO: 0.01 K/UL (ref 0–0.2)
BASOPHILS NFR BLD: 0.2 % (ref 0–1.9)
BILIRUB SERPL-MCNC: 0.6 MG/DL (ref 0.1–1)
BUN SERPL-MCNC: 24 MG/DL (ref 8–23)
CALCIUM SERPL-MCNC: 9.6 MG/DL (ref 8.7–10.5)
CHLORIDE SERPL-SCNC: 107 MMOL/L (ref 95–110)
CO2 SERPL-SCNC: 26 MMOL/L (ref 23–29)
CREAT SERPL-MCNC: 1.3 MG/DL (ref 0.5–1.4)
DIFFERENTIAL METHOD: ABNORMAL
EOSINOPHIL # BLD AUTO: 0.1 K/UL (ref 0–0.5)
EOSINOPHIL NFR BLD: 1.3 % (ref 0–8)
ERYTHROCYTE [DISTWIDTH] IN BLOOD BY AUTOMATED COUNT: 13.5 % (ref 11.5–14.5)
EST. GFR  (AFRICAN AMERICAN): 51 ML/MIN/1.73 M^2
EST. GFR  (NON AFRICAN AMERICAN): 44 ML/MIN/1.73 M^2
GLUCOSE SERPL-MCNC: 85 MG/DL (ref 70–110)
HCT VFR BLD AUTO: 36.5 % (ref 37–48.5)
HGB BLD-MCNC: 12.1 G/DL (ref 12–16)
IMM GRANULOCYTES # BLD AUTO: 0.02 K/UL (ref 0–0.04)
IMM GRANULOCYTES NFR BLD AUTO: 0.4 % (ref 0–0.5)
LYMPHOCYTES # BLD AUTO: 1.6 K/UL (ref 1–4.8)
LYMPHOCYTES NFR BLD: 33.7 % (ref 18–48)
MCH RBC QN AUTO: 30.8 PG (ref 27–31)
MCHC RBC AUTO-ENTMCNC: 33.2 G/DL (ref 32–36)
MCV RBC AUTO: 93 FL (ref 82–98)
MONOCYTES # BLD AUTO: 0.4 K/UL (ref 0.3–1)
MONOCYTES NFR BLD: 9.3 % (ref 4–15)
NEUTROPHILS # BLD AUTO: 2.6 K/UL (ref 1.8–7.7)
NEUTROPHILS NFR BLD: 55.1 % (ref 38–73)
NRBC BLD-RTO: 0 /100 WBC
PLATELET # BLD AUTO: 218 K/UL (ref 150–350)
PMV BLD AUTO: 9.7 FL (ref 9.2–12.9)
POTASSIUM SERPL-SCNC: 5.1 MMOL/L (ref 3.5–5.1)
PROT SERPL-MCNC: 7 G/DL (ref 6–8.4)
RBC # BLD AUTO: 3.93 M/UL (ref 4–5.4)
SODIUM SERPL-SCNC: 139 MMOL/L (ref 136–145)
WBC # BLD AUTO: 4.72 K/UL (ref 3.9–12.7)

## 2020-06-19 PROCEDURE — 36415 COLL VENOUS BLD VENIPUNCTURE: CPT

## 2020-06-19 PROCEDURE — 85025 COMPLETE CBC W/AUTO DIFF WBC: CPT

## 2020-06-19 PROCEDURE — 80053 COMPREHEN METABOLIC PANEL: CPT

## 2020-06-19 RX ORDER — DOXYCYCLINE HYCLATE 100 MG
100 TABLET ORAL 2 TIMES DAILY
Qty: 14 TABLET | Refills: 0 | Status: ON HOLD | OUTPATIENT
Start: 2020-06-19 | End: 2020-06-29 | Stop reason: HOSPADM

## 2020-06-19 NOTE — DISCHARGE INSTRUCTIONS
Your procedure  is scheduled for __Thursday, 6/25/2020________.    Call [phone] between 2pm and 5pm on __Wednesday, 6/24/2020__________to find out your arrival time for the day of surgery. Time:__________. Report to the Emergency Department on the day of your surgery.  You will be escorted to the admitting unit.      *****Return to patient registration through the Emergency Room as previously on_Tuessday, 6/23/2020 at 10:00AM________ for  Covid test and blood test type and screen..      Important instructions:   Do not eat or drink after 12 midnight, including water.  It is okay to brush your teeth.  Do not have gum, candy or mints.     Take only these medications with a small sip of water on the morning of your surgery: __Hydralazine, Labetolol_______.    DO NOT take any diabetic medication on the morning of surgery unless instructed to do so by your doctor or pre op nurse.    STOP taking Aspirin, Ibuprofen, Motrin, Mobic, Advil, Aleve, Fish oil, and Vitamin E for at least 7 days before your surgery. You may use Tylenol unless otherwise instructed by your doctor.  Follow your doctor and surgeon's instructions concerning prescribed blood thinners such as:  Xarelto.     Prep instructions:   SHOWER   OTHER __All of your surgeons directions for preop.__________________     Please shower the night before and the morning of your surgery.      Use Hibiclens soap as instructed by your pre op nurse (do not use on face or genital area).   Please place clean linens on your bed the night before surgery and wear fresh clean clothing after each shower.     DO NOT shave procedural area at least 5 days before surgery due to increased risk of skin irritation and/or possible infection.     You may wear deodorant only. Do not wear make- up, mascara, powder, lotion, perfume, or cologne.     Do not wear any jewelry or have anything metal on your body (hairpins, clips, etc.).     You will be asked to remove any dentures or  partials for the procedure.     Please bring any documents given to you by your doctor.     If you are going home on the same day of surgery, you must arrange for a family member or a friend to drive you home.  Public transportation is prohibited.  You will not be able to drive home if you were given anesthesia or sedation.     Children under 18 years of age require a parent/guardian present the entire time they are here.     Wear loose fitting clothes allowing for bandages.     You may leave money and valuables home.  You may bring a cell phone.     Important: Call your surgeon/doctor if fever, chills, or illness should occur before your surgery.    Call us at the Pre-op Center at [phone]  if needed.

## 2020-06-19 NOTE — PLAN OF CARE
Pt coming back for T/S and covid testing on 6/23/2020.  She also has preop of bowel prep/antibiotic instructions from  as well.    Pre operative instructions, medication directives and pain scales/post op pain management discussed with patient. All questions were answered. Patient re-assured regarding surgical procedure and day of surgery routine as needed. Patient verbalized understanding of pre-op instructions.

## 2020-06-19 NOTE — TELEPHONE ENCOUNTER
Recent urine culture was positive for a UTI. I recommend treatment of bacteria due to upcoming surgery. Appropriate antibiotics (doxycyline) were sent to the pharmacy

## 2020-06-19 NOTE — TELEPHONE ENCOUNTER
Spoke to pt advised do to positive urine culture doxycycline was sent to the pharmacy. Pt states she understands an will start today.-man

## 2020-06-22 NOTE — PROGRESS NOTES
6/22/20 chart review:  Current BP avg of 128/82 is near goal of <130/80. Will continue to monitor and f/u in 8-12 weeks.

## 2020-06-23 ENCOUNTER — HOSPITAL ENCOUNTER (OUTPATIENT)
Dept: PREADMISSION TESTING | Facility: HOSPITAL | Age: 60
Discharge: HOME | DRG: 331 | End: 2020-06-23
Attending: SURGERY
Payer: COMMERCIAL

## 2020-06-23 DIAGNOSIS — Z01.818 PREOP TESTING: ICD-10-CM

## 2020-06-23 LAB
ABO + RH BLD: NORMAL
BLD GP AB SCN CELLS X3 SERPL QL: NORMAL
SARS-COV-2 RDRP RESP QL NAA+PROBE: NEGATIVE

## 2020-06-23 PROCEDURE — 36415 COLL VENOUS BLD VENIPUNCTURE: CPT

## 2020-06-23 PROCEDURE — U0002 COVID-19 LAB TEST NON-CDC: HCPCS

## 2020-06-23 PROCEDURE — 86901 BLOOD TYPING SEROLOGIC RH(D): CPT

## 2020-06-25 ENCOUNTER — HOSPITAL ENCOUNTER (INPATIENT)
Facility: HOSPITAL | Age: 60
LOS: 4 days | Discharge: HOME | DRG: 331 | End: 2020-06-29
Attending: SURGERY | Admitting: SURGERY
Payer: COMMERCIAL

## 2020-06-25 ENCOUNTER — ANESTHESIA EVENT (OUTPATIENT)
Dept: SURGERY | Facility: HOSPITAL | Age: 60
DRG: 331 | End: 2020-06-25
Payer: COMMERCIAL

## 2020-06-25 ENCOUNTER — ANESTHESIA (OUTPATIENT)
Dept: SURGERY | Facility: HOSPITAL | Age: 60
DRG: 331 | End: 2020-06-25
Payer: COMMERCIAL

## 2020-06-25 DIAGNOSIS — K63.89 MASS OF COLON: Primary | ICD-10-CM

## 2020-06-25 DIAGNOSIS — Z01.818 PREOP TESTING: ICD-10-CM

## 2020-06-25 LAB
BASOPHILS # BLD AUTO: 0.01 K/UL (ref 0–0.2)
BASOPHILS NFR BLD: 0.1 % (ref 0–1.9)
DIFFERENTIAL METHOD: ABNORMAL
EOSINOPHIL # BLD AUTO: 0 K/UL (ref 0–0.5)
EOSINOPHIL NFR BLD: 0 % (ref 0–8)
ERYTHROCYTE [DISTWIDTH] IN BLOOD BY AUTOMATED COUNT: 13.8 % (ref 11.5–14.5)
HCT VFR BLD AUTO: 33.2 % (ref 37–48.5)
HGB BLD-MCNC: 10.8 G/DL (ref 12–16)
IMM GRANULOCYTES # BLD AUTO: 0.03 K/UL (ref 0–0.04)
IMM GRANULOCYTES NFR BLD AUTO: 0.3 % (ref 0–0.5)
LYMPHOCYTES # BLD AUTO: 0.6 K/UL (ref 1–4.8)
LYMPHOCYTES NFR BLD: 4.8 % (ref 18–48)
MCH RBC QN AUTO: 30.9 PG (ref 27–31)
MCHC RBC AUTO-ENTMCNC: 32.5 G/DL (ref 32–36)
MCV RBC AUTO: 95 FL (ref 82–98)
MONOCYTES # BLD AUTO: 0.8 K/UL (ref 0.3–1)
MONOCYTES NFR BLD: 6.7 % (ref 4–15)
NEUTROPHILS # BLD AUTO: 10.1 K/UL (ref 1.8–7.7)
NEUTROPHILS NFR BLD: 88.1 % (ref 38–73)
NRBC BLD-RTO: 0 /100 WBC
PLATELET # BLD AUTO: 193 K/UL (ref 150–350)
PMV BLD AUTO: 9.8 FL (ref 9.2–12.9)
RBC # BLD AUTO: 3.49 M/UL (ref 4–5.4)
WBC # BLD AUTO: 11.5 K/UL (ref 3.9–12.7)

## 2020-06-25 PROCEDURE — D9220A PRA ANESTHESIA: Mod: ANES,,, | Performed by: ANESTHESIOLOGY

## 2020-06-25 PROCEDURE — 85025 COMPLETE CBC W/AUTO DIFF WBC: CPT

## 2020-06-25 PROCEDURE — 63600175 PHARM REV CODE 636 W HCPCS: Performed by: SURGERY

## 2020-06-25 PROCEDURE — 25000003 PHARM REV CODE 250: Performed by: SURGERY

## 2020-06-25 PROCEDURE — 36000710: Performed by: SURGERY

## 2020-06-25 PROCEDURE — 37000008 HC ANESTHESIA 1ST 15 MINUTES: Performed by: SURGERY

## 2020-06-25 PROCEDURE — 88304 TISSUE EXAM BY PATHOLOGIST: CPT | Mod: 26,,, | Performed by: PATHOLOGY

## 2020-06-25 PROCEDURE — 88304 TISSUE EXAM BY PATHOLOGIST: CPT | Performed by: PATHOLOGY

## 2020-06-25 PROCEDURE — D9220A PRA ANESTHESIA: ICD-10-PCS | Mod: ANES,,, | Performed by: ANESTHESIOLOGY

## 2020-06-25 PROCEDURE — 63600175 PHARM REV CODE 636 W HCPCS: Performed by: REGISTERED NURSE

## 2020-06-25 PROCEDURE — 25000003 PHARM REV CODE 250: Performed by: REGISTERED NURSE

## 2020-06-25 PROCEDURE — 52005 CYSTO W/URTRL CATHJ: CPT | Mod: ,,, | Performed by: UROLOGY

## 2020-06-25 PROCEDURE — 52005 PR CYSTOURETHROSCOPY,URETER CATHETER: ICD-10-PCS | Mod: ,,, | Performed by: UROLOGY

## 2020-06-25 PROCEDURE — D9220A PRA ANESTHESIA: Mod: CRNA,,, | Performed by: REGISTERED NURSE

## 2020-06-25 PROCEDURE — D9220A PRA ANESTHESIA: ICD-10-PCS | Mod: CRNA,,, | Performed by: REGISTERED NURSE

## 2020-06-25 PROCEDURE — 27201423 OPTIME MED/SURG SUP & DEVICES STERILE SUPPLY: Performed by: SURGERY

## 2020-06-25 PROCEDURE — 94761 N-INVAS EAR/PLS OXIMETRY MLT: CPT

## 2020-06-25 PROCEDURE — 71000039 HC RECOVERY, EACH ADD'L HOUR: Performed by: SURGERY

## 2020-06-25 PROCEDURE — 36000711: Performed by: SURGERY

## 2020-06-25 PROCEDURE — 88307 TISSUE EXAM BY PATHOLOGIST: CPT | Performed by: PATHOLOGY

## 2020-06-25 PROCEDURE — 37000009 HC ANESTHESIA EA ADD 15 MINS: Performed by: SURGERY

## 2020-06-25 PROCEDURE — 25000242 PHARM REV CODE 250 ALT 637 W/ HCPCS: Performed by: SURGERY

## 2020-06-25 PROCEDURE — 88307 PR  SURG PATH,LEVEL V: ICD-10-PCS | Mod: 26,,, | Performed by: PATHOLOGY

## 2020-06-25 PROCEDURE — C1758 CATHETER, URETERAL: HCPCS | Performed by: SURGERY

## 2020-06-25 PROCEDURE — 11000001 HC ACUTE MED/SURG PRIVATE ROOM

## 2020-06-25 PROCEDURE — 94799 UNLISTED PULMONARY SVC/PX: CPT

## 2020-06-25 PROCEDURE — 88304 PR  SURG PATH,LEVEL III: ICD-10-PCS | Mod: 26,,, | Performed by: PATHOLOGY

## 2020-06-25 PROCEDURE — 63600175 PHARM REV CODE 636 W HCPCS: Performed by: ANESTHESIOLOGY

## 2020-06-25 PROCEDURE — 99900035 HC TECH TIME PER 15 MIN (STAT)

## 2020-06-25 PROCEDURE — 36415 COLL VENOUS BLD VENIPUNCTURE: CPT

## 2020-06-25 PROCEDURE — 27000221 HC OXYGEN, UP TO 24 HOURS

## 2020-06-25 PROCEDURE — 88307 TISSUE EXAM BY PATHOLOGIST: CPT | Mod: 26,,, | Performed by: PATHOLOGY

## 2020-06-25 PROCEDURE — 71000033 HC RECOVERY, INTIAL HOUR: Performed by: SURGERY

## 2020-06-25 RX ORDER — CLONIDINE HYDROCHLORIDE 0.1 MG/1
0.1 TABLET ORAL DAILY PRN
Status: DISCONTINUED | OUTPATIENT
Start: 2020-06-25 | End: 2020-06-29 | Stop reason: HOSPADM

## 2020-06-25 RX ORDER — LIDOCAINE HYDROCHLORIDE 20 MG/ML
INJECTION INTRAVENOUS
Status: DISCONTINUED | OUTPATIENT
Start: 2020-06-25 | End: 2020-06-25

## 2020-06-25 RX ORDER — ONDANSETRON 2 MG/ML
INJECTION INTRAMUSCULAR; INTRAVENOUS
Status: DISCONTINUED | OUTPATIENT
Start: 2020-06-25 | End: 2020-06-25

## 2020-06-25 RX ORDER — METRONIDAZOLE 500 MG/1
500 TABLET ORAL
Status: ON HOLD | COMMUNITY
End: 2020-06-29 | Stop reason: HOSPADM

## 2020-06-25 RX ORDER — OLMESARTAN MEDOXOMIL 20 MG/1
40 TABLET ORAL DAILY
Status: DISCONTINUED | OUTPATIENT
Start: 2020-06-25 | End: 2020-06-29 | Stop reason: HOSPADM

## 2020-06-25 RX ORDER — FENTANYL CITRATE 50 UG/ML
25 INJECTION, SOLUTION INTRAMUSCULAR; INTRAVENOUS EVERY 5 MIN PRN
Status: COMPLETED | OUTPATIENT
Start: 2020-06-25 | End: 2020-06-25

## 2020-06-25 RX ORDER — HYDROMORPHONE HYDROCHLORIDE 2 MG/ML
1 INJECTION, SOLUTION INTRAMUSCULAR; INTRAVENOUS; SUBCUTANEOUS
Status: DISCONTINUED | OUTPATIENT
Start: 2020-06-25 | End: 2020-06-26

## 2020-06-25 RX ORDER — ACETAMINOPHEN 10 MG/ML
1000 INJECTION, SOLUTION INTRAVENOUS ONCE
Status: COMPLETED | OUTPATIENT
Start: 2020-06-25 | End: 2020-06-25

## 2020-06-25 RX ORDER — HYDRALAZINE HYDROCHLORIDE 25 MG/1
150 TABLET, FILM COATED ORAL 2 TIMES DAILY
Status: DISCONTINUED | OUTPATIENT
Start: 2020-06-25 | End: 2020-06-29 | Stop reason: HOSPADM

## 2020-06-25 RX ORDER — PROPOFOL 10 MG/ML
VIAL (ML) INTRAVENOUS
Status: DISCONTINUED | OUTPATIENT
Start: 2020-06-25 | End: 2020-06-25

## 2020-06-25 RX ORDER — SODIUM CHLORIDE, SODIUM LACTATE, POTASSIUM CHLORIDE, CALCIUM CHLORIDE 600; 310; 30; 20 MG/100ML; MG/100ML; MG/100ML; MG/100ML
INJECTION, SOLUTION INTRAVENOUS CONTINUOUS PRN
Status: DISCONTINUED | OUTPATIENT
Start: 2020-06-25 | End: 2020-06-25

## 2020-06-25 RX ORDER — HYDROCHLOROTHIAZIDE 25 MG/1
50 TABLET ORAL DAILY
Status: DISCONTINUED | OUTPATIENT
Start: 2020-06-25 | End: 2020-06-29 | Stop reason: HOSPADM

## 2020-06-25 RX ORDER — NEOMYCIN SULFATE 500 MG/1
1000 TABLET ORAL EVERY 8 HOURS
Status: ON HOLD | COMMUNITY
End: 2020-06-29 | Stop reason: HOSPADM

## 2020-06-25 RX ORDER — FENTANYL CITRATE 50 UG/ML
INJECTION, SOLUTION INTRAMUSCULAR; INTRAVENOUS
Status: DISCONTINUED | OUTPATIENT
Start: 2020-06-25 | End: 2020-06-25

## 2020-06-25 RX ORDER — FLUTICASONE PROPIONATE 50 MCG
2 SPRAY, SUSPENSION (ML) NASAL DAILY
Status: DISCONTINUED | OUTPATIENT
Start: 2020-06-25 | End: 2020-06-29 | Stop reason: HOSPADM

## 2020-06-25 RX ORDER — BUPIVACAINE HYDROCHLORIDE AND EPINEPHRINE 5; 5 MG/ML; UG/ML
INJECTION, SOLUTION EPIDURAL; INTRACAUDAL; PERINEURAL
Status: DISCONTINUED | OUTPATIENT
Start: 2020-06-25 | End: 2020-06-25 | Stop reason: HOSPADM

## 2020-06-25 RX ORDER — HYDROCODONE BITARTRATE AND ACETAMINOPHEN 10; 325 MG/1; MG/1
1 TABLET ORAL EVERY 4 HOURS PRN
Status: DISCONTINUED | OUTPATIENT
Start: 2020-06-25 | End: 2020-06-26

## 2020-06-25 RX ORDER — LABETALOL 100 MG/1
200 TABLET, FILM COATED ORAL 2 TIMES DAILY
Status: DISCONTINUED | OUTPATIENT
Start: 2020-06-25 | End: 2020-06-29 | Stop reason: HOSPADM

## 2020-06-25 RX ORDER — PHENYLEPHRINE HYDROCHLORIDE 10 MG/ML
INJECTION INTRAVENOUS
Status: DISCONTINUED | OUTPATIENT
Start: 2020-06-25 | End: 2020-06-25

## 2020-06-25 RX ORDER — ONDANSETRON 2 MG/ML
4 INJECTION INTRAMUSCULAR; INTRAVENOUS EVERY 6 HOURS PRN
Status: DISCONTINUED | OUTPATIENT
Start: 2020-06-25 | End: 2020-06-29 | Stop reason: HOSPADM

## 2020-06-25 RX ORDER — MIDAZOLAM HYDROCHLORIDE 1 MG/ML
INJECTION, SOLUTION INTRAMUSCULAR; INTRAVENOUS
Status: DISCONTINUED | OUTPATIENT
Start: 2020-06-25 | End: 2020-06-25

## 2020-06-25 RX ORDER — GLYCOPYRROLATE 0.2 MG/ML
INJECTION INTRAMUSCULAR; INTRAVENOUS
Status: DISCONTINUED | OUTPATIENT
Start: 2020-06-25 | End: 2020-06-25

## 2020-06-25 RX ORDER — SODIUM CHLORIDE, SODIUM LACTATE, POTASSIUM CHLORIDE, CALCIUM CHLORIDE 600; 310; 30; 20 MG/100ML; MG/100ML; MG/100ML; MG/100ML
INJECTION, SOLUTION INTRAVENOUS CONTINUOUS
Status: DISCONTINUED | OUTPATIENT
Start: 2020-06-25 | End: 2020-06-29 | Stop reason: HOSPADM

## 2020-06-25 RX ORDER — ROCURONIUM BROMIDE 10 MG/ML
INJECTION, SOLUTION INTRAVENOUS
Status: DISCONTINUED | OUTPATIENT
Start: 2020-06-25 | End: 2020-06-25

## 2020-06-25 RX ORDER — HYDROMORPHONE HYDROCHLORIDE 2 MG/ML
0.2 INJECTION, SOLUTION INTRAMUSCULAR; INTRAVENOUS; SUBCUTANEOUS EVERY 5 MIN PRN
Status: DISCONTINUED | OUTPATIENT
Start: 2020-06-25 | End: 2020-06-25 | Stop reason: HOSPADM

## 2020-06-25 RX ORDER — SPIRONOLACTONE 25 MG/1
50 TABLET ORAL DAILY
Status: DISCONTINUED | OUTPATIENT
Start: 2020-06-25 | End: 2020-06-29 | Stop reason: HOSPADM

## 2020-06-25 RX ORDER — FAMOTIDINE 20 MG/1
20 TABLET, FILM COATED ORAL DAILY
Status: DISCONTINUED | OUTPATIENT
Start: 2020-06-25 | End: 2020-06-29 | Stop reason: HOSPADM

## 2020-06-25 RX ORDER — NEOSTIGMINE METHYLSULFATE 1 MG/ML
INJECTION, SOLUTION INTRAVENOUS
Status: DISCONTINUED | OUTPATIENT
Start: 2020-06-25 | End: 2020-06-25

## 2020-06-25 RX ORDER — SUCCINYLCHOLINE CHLORIDE 20 MG/ML
INJECTION INTRAMUSCULAR; INTRAVENOUS
Status: DISCONTINUED | OUTPATIENT
Start: 2020-06-25 | End: 2020-06-25

## 2020-06-25 RX ADMIN — Medication 100 MG: at 07:06

## 2020-06-25 RX ADMIN — PHENYLEPHRINE HYDROCHLORIDE 0.1 MCG/KG/MIN: 10 INJECTION INTRAVENOUS at 07:06

## 2020-06-25 RX ADMIN — HYDROMORPHONE HYDROCHLORIDE 1 MG: 2 INJECTION, SOLUTION INTRAMUSCULAR; INTRAVENOUS; SUBCUTANEOUS at 11:06

## 2020-06-25 RX ADMIN — HYDROMORPHONE HYDROCHLORIDE 1 MG: 2 INJECTION, SOLUTION INTRAMUSCULAR; INTRAVENOUS; SUBCUTANEOUS at 01:06

## 2020-06-25 RX ADMIN — SODIUM CHLORIDE, SODIUM LACTATE, POTASSIUM CHLORIDE, AND CALCIUM CHLORIDE: .6; .31; .03; .02 INJECTION, SOLUTION INTRAVENOUS at 11:06

## 2020-06-25 RX ADMIN — HYDROMORPHONE HYDROCHLORIDE 1 MG: 2 INJECTION, SOLUTION INTRAMUSCULAR; INTRAVENOUS; SUBCUTANEOUS at 04:06

## 2020-06-25 RX ADMIN — ERTAPENEM SODIUM 1 G: 1 INJECTION, POWDER, LYOPHILIZED, FOR SOLUTION INTRAMUSCULAR; INTRAVENOUS at 07:06

## 2020-06-25 RX ADMIN — HYDROMORPHONE HYDROCHLORIDE 0.2 MG: 2 INJECTION, SOLUTION INTRAMUSCULAR; INTRAVENOUS; SUBCUTANEOUS at 10:06

## 2020-06-25 RX ADMIN — PHENYLEPHRINE HYDROCHLORIDE 80 MCG: 10 INJECTION INTRAVENOUS at 08:06

## 2020-06-25 RX ADMIN — FLUTICASONE PROPIONATE 100 MCG: 50 SPRAY, METERED NASAL at 04:06

## 2020-06-25 RX ADMIN — GLYCOPYRROLATE 0.4 MG: 0.2 INJECTION, SOLUTION INTRAMUSCULAR; INTRAVENOUS at 09:06

## 2020-06-25 RX ADMIN — PROPOFOL 130 MG: 10 INJECTION, EMULSION INTRAVENOUS at 07:06

## 2020-06-25 RX ADMIN — PHENYLEPHRINE HYDROCHLORIDE 100 MCG: 10 INJECTION INTRAVENOUS at 07:06

## 2020-06-25 RX ADMIN — HYDROMORPHONE HYDROCHLORIDE 0.2 MG: 2 INJECTION, SOLUTION INTRAMUSCULAR; INTRAVENOUS; SUBCUTANEOUS at 11:06

## 2020-06-25 RX ADMIN — PHENYLEPHRINE HYDROCHLORIDE 80 MCG: 10 INJECTION INTRAVENOUS at 07:06

## 2020-06-25 RX ADMIN — FENTANYL CITRATE 25 MCG: 50 INJECTION, SOLUTION INTRAMUSCULAR; INTRAVENOUS at 10:06

## 2020-06-25 RX ADMIN — ROCURONIUM BROMIDE 10 MG: 10 INJECTION, SOLUTION INTRAVENOUS at 07:06

## 2020-06-25 RX ADMIN — ONDANSETRON 4 MG: 2 INJECTION, SOLUTION INTRAMUSCULAR; INTRAVENOUS at 08:06

## 2020-06-25 RX ADMIN — ROCURONIUM BROMIDE 30 MG: 10 INJECTION, SOLUTION INTRAVENOUS at 07:06

## 2020-06-25 RX ADMIN — FENTANYL CITRATE 100 MCG: 50 INJECTION INTRAMUSCULAR; INTRAVENOUS at 07:06

## 2020-06-25 RX ADMIN — SODIUM CHLORIDE, SODIUM LACTATE, POTASSIUM CHLORIDE, AND CALCIUM CHLORIDE: .6; .31; .03; .02 INJECTION, SOLUTION INTRAVENOUS at 08:06

## 2020-06-25 RX ADMIN — SUCCINYLCHOLINE CHLORIDE 120 MG: 20 INJECTION, SOLUTION INTRAMUSCULAR; INTRAVENOUS at 07:06

## 2020-06-25 RX ADMIN — SODIUM CHLORIDE, SODIUM LACTATE, POTASSIUM CHLORIDE, AND CALCIUM CHLORIDE: .6; .31; .03; .02 INJECTION, SOLUTION INTRAVENOUS at 07:06

## 2020-06-25 RX ADMIN — MIDAZOLAM HYDROCHLORIDE 2 MG: 1 INJECTION, SOLUTION INTRAMUSCULAR; INTRAVENOUS at 07:06

## 2020-06-25 RX ADMIN — FAMOTIDINE 20 MG: 20 TABLET ORAL at 12:06

## 2020-06-25 RX ADMIN — ACETAMINOPHEN 1000 MG: 10 INJECTION, SOLUTION INTRAVENOUS at 12:06

## 2020-06-25 RX ADMIN — NEOSTIGMINE METHYLSULFATE 3 MG: 1 INJECTION INTRAVENOUS at 09:06

## 2020-06-25 RX ADMIN — HYDROMORPHONE HYDROCHLORIDE 1 MG: 2 INJECTION, SOLUTION INTRAMUSCULAR; INTRAVENOUS; SUBCUTANEOUS at 08:06

## 2020-06-25 NOTE — NURSING
Pt arrived to unit c/o 10/10. bp 105/66. Hr 55. MD contacted regarding this. Order for tylenol iv received

## 2020-06-25 NOTE — BRIEF OP NOTE
[facility]  Brief Operative Note    SUMMARY     Surgery Date: 6/25/2020     Surgeon(s) and Role:    Panel 2:     * STEPHANY Smyth MD - Primary    Assisting Surgeon: None    Pre-op Diagnosis:  Malignant neoplasm of cecum [C18.0]    Post-op Diagnosis:  Post-Op Diagnosis Codes:     * Malignant neoplasm of cecum [C18.0]    Procedure(s) (LRB):    CYSTOSCOPY, WITH URETERAL STENT INSERTION (Bilateral)    Anesthesia: General    Description of Procedure: 5 Fr open ended catheters placed without difficulty    Description of the findings of the procedure: Left catheter cut at angle for identification    Estimated Blood Loss: * No values recorded between 6/25/2020  7:40 AM and 6/25/2020  7:49 AM *         Specimens:   Specimen (12h ago, onward)    None

## 2020-06-25 NOTE — ANESTHESIA PREPROCEDURE EVALUATION
06/25/2020  Keyonna Guillen is a 60 y.o., female.    Anesthesia Evaluation     I have reviewed the Nursing Notes.       Review of Systems  Anesthesia Hx:  No problems with previous Anesthesia   Social:  Non-Smoker    Cardiovascular:   Denies Pacemaker. Hypertension CAD   CABG/stent (CABG) Dysrhythmias atrial fibrillation PVD hyperlipidemia    Pulmonary:  Pulmonary Normal    Renal/:  Renal/ Normal     Hepatic/GI:   Bowel Prep. Denies PUD. Denies Hiatal Hernia.  Denies GERD. Denies Liver Disease.  Denies Hepatitis. Colon mass   Neurological:  Neurology Normal    Endocrine:  Endocrine Normal        Physical Exam  General:  Obesity    Airway/Jaw/Neck:  AIRWAY FINDINGS: Normal      Chest/Lungs:  Chest/Lungs Clear    Heart/Vascular:  Heart Findings: Normal       Mental Status:  Mental Status Findings: Normal        Anesthesia Plan  Type of Anesthesia, risks & benefits discussed:  Anesthesia Type:  general  Patient's Preference:   Intra-op Monitoring Plan: standard ASA monitors  Intra-op Monitoring Plan Comments:   Post Op Pain Control Plan:   Post Op Pain Control Plan Comments:   Induction:   IV  Beta Blocker:  Patient is not currently on a Beta-Blocker (No further documentation required).       Informed Consent: Patient understands risks and agrees with Anesthesia plan.  Questions answered. Anesthesia consent signed with patient.  ASA Score: 2     Day of Surgery Review of History & Physical:  There are no significant changes.  H&P update referred to the provider.         Ready For Surgery From Anesthesia Perspective.

## 2020-06-25 NOTE — PROGRESS NOTES
gen surg  H&P from 6/4/20 reviewed,no changes  Situation,procedure,consent and risks d/w pt, she wishes to proceed

## 2020-06-25 NOTE — OP NOTE
DATE OF PROCEDURE:   06/25/2020     PREOPERATIVE DIAGNOSIS: Colon Cancer     POSTOPERATIVE DIAGNOSIS: Colon Cancer     PROCEDURE PERFORMED:  Cystoscopy, with bilateral ureteral stent placement, Santoro placement.     PRIMARY SURGEON:  Jace Smyth M.D.     ANESTHESIA:  General.     ESTIMATED BLOOD LOSS:  Minimal.     DRAINS:   5 [place]an open-ended catheter bilaterally, 16 Chilean Santoro     COMPLICATIONS:  None.      INDICATIONS:  Keyonna Guillen is a 60 y.o. woman with history colon cancer.  She is here today for Cystoscopy with ureteral stent placement for better ureteral identification during her partial colectomy..    PROCEDURE:   Keyonna Guillen was taken to the Operating Room where she was positively   identified by monica.  She was placed supine on the operating room table.    Following induction of adequate general anesthesia, she was placed in the dorsal   lithotomy position and her external genitalia were prepped and draped in the   usual sterile fashion.     A preoperative timeout was performed as well as confirmation of preoperative   antibiotics.     A 22-Chilean rigid cystoscope was then passed per urethra into the bladder under   direct vision.  There were no urethral lesions seen. The bladder was inspected with 30 and 70 degree lenses.   No bladder lesions seen.  Both ureteral orifices   were identified.  They were seen to be in orthotopic position.      A 0.035 in Bentson wire was then passed through the scope to intubate the right ureteral orifice.  The wire passed up easily.  I then passed a 5 Chilean open-ended catheter to 20 cm without difficulty.  The wire was withdrawn and the scope was then withdrawn leaving the catheter in place.    The scope was reintroduced.A 0.035 in Bentson wire was then passed through the scope to intubate the left ureteral orifice.  The wire passed up easily.  I then passed a 5 Chilean open-ended catheter to 20 cm without difficulty.  The wire was withdrawn and the  scope was then withdrawn leaving the catheter in place.  The left-sided catheter was then cut at an angle to identify this externally.     A 16 Greenlandic Santoro catheter was then placed without difficulty.  The open-ended catheters were secured to the Santoro catheter with silk ties.  The catheters were then placed individual bag drainage.    The care of Ms. Guillen was then transferred back to the General surgery team.

## 2020-06-25 NOTE — OP NOTE
[facility]  Brief Operative Note    SUMMARY     Surgery Date: 6/25/2020     Surgeon(s) and Role:  Panel 1:     * Louis Rivas III, MD - Primary     * Jace Mendosa MD - Assisting  Panel 2:     * STEPHANY Smyth MD - Primary        Pre-op Diagnosis:  Right colon mass    Post-op Diagnosis:  Right colon mass    Procedure(s) (LRB):  HAND ASSISTED HEMICOLECTOMY, LAPAROSCOPIC (Right)  CYSTOSCOPY, WITH URETERAL STENT INSERTION (Bilateral)   Hand assisted laparoscopic right hemicolectomy, lysis of adhesions    Anesthesia: General    Description of Procedure:  60-year-old female with a mass at her ileocecal valve causing a partial obstruction.  Biopsy was benign.  She was consented for resection.  Patient had bilateral ureteral stents placed and dictated by Dr. Smyth  Patient brought into the operating room.  Placed in the operative table supine position.  The abdomen was prepped draped sterile fashion.  A 7 cm incision was made in the midline above and below the umbilicus.  Electrocautery was used to dissect the subcutaneous tissue down to the fascia which was incised for the length of the incision.  The peritoneal cavity was entered.  There were a few omental adhesions inferior to the incision from previous surgery which were taken down carefully with cautery and sharp dissection with no damage to the bowel and no bleeding.  The GelPort was inserted.  With my hand protecting the bowel and a 10 mm trocar was placed subxiphoid and a 5 mm trocar in the left upper quadrant.  The GelPort was placed and pneumoperitoneum was instituted.  The lap scope was inserted.  There is no sign of carcinomatosis.  Manual and visual inspection of the liver revealed no masses.  There were some additional adhesions in the patient's right lower abdomen involving the terminal ileum.  With careful of dissection sharply and with cautery these adhesions were taken down and the terminal ileum was freed up with  no damage to the bowel or any of the surrounding structures.  Next the LigaSure was used to enter the space between the transverse colon in the stomach.  Heading towards the hepatic flexure the proximal transverse colon was mobilized.  The duodenum and gallbladder were identified and kept out of harm's way.  This was carried down the ascending colon.  The inked area was in the proximal ascending colon and there was about a 2 cm mass palpable there.  Dissection was carried down past the cecum and the appendix to the terminal ileum with the LigaSure .  The right ureter was repeatedly checked by palpation throughout the procedure and kept out of harm's way.  The ascending colon and proximal transverse colon were completely mobilized.  There was no bleeding retroperitoneally on the area of dissection.  The colon was exteriorized through the midline incision.  Terminal ileum was transected 10 cm proximal to the ileocecal valve with the BRNET stapling device with a blue load.  The transverse colon was transected just to the right of the middle colic vessel in the same way.  The EnSeal was used to take down the mesentery at its base repeatedly checking the duodenum and ureter to make sure they are out of harm's way.  There was no black bleeding from the mesentery.  Specimen was sent as right colon.  A side-to-side anastomosis was created between the small bowel and the colon both of which were well perfused.  Small enterotomy was made in each immediately adjacent to the staple line.  A BRENT 75 stapling device with a blue load was placed with each and down 1 lumen of the bowel.  It was closed held for 1 min fired and withdrawn.  The staple line was inspected was intact and there was no bleeding.  The common enterotomy in 2 previous staple lines were resected with the BRENT 100 stapling device with a blue load.  This was sent as a product of staple anastomosis.  The staple line was intact.  Gastrointestinal contents easily milked  across the anastomosis which was about 3 cm in length and was no leakage.  Both ends of the bowel well perfused.  Interrupted 3 0 silk figure-of-eight sutures were used to over sew the final staple line and also an unzipping stitch was placed.  There was no spillage of enteric contents.  This was irrigated with normal saline was inspected found be hemostatic and then reduced back into the abdomen.  The right abdomen was inspected and there was no bleeding this was able to be done through the midline incision.  There were no enterotomies.  The trocars removed.  The midline fascial defect was closed with interrupted 1.  Vicryl sutures.  The wounds irrigated was saline.  The skin was closed with staples.  10 cc of 0.5% Marcaine with epinephrine were injected for local anesthesia.  Patient tolerated the procedure well.    Description of the findings of the procedure:  Mass palpable in ascending colon, no sign of metastatic disease on gross examination of abdomen    Estimated Blood Loss: * No values recorded between 6/25/2020  7:40 AM and 6/25/2020  9:32 AM *         Specimens:   Specimen (12h ago, onward)    None

## 2020-06-25 NOTE — TRANSFER OF CARE
Anesthesia Transfer of Care Note    Patient: Keyonna Guillen    Procedure(s) Performed: Procedure(s) (LRB):  HAND ASSISTED HEMICOLECTOMY, LAPAROSCOPIC (Right)  CYSTOSCOPY, WITH URETERAL STENT INSERTION (Bilateral)    Patient location: PACU    Anesthesia Type: general    Transport from OR: Transported from OR on room air with adequate spontaneous ventilation    Post pain: adequate analgesia    Post assessment: no apparent anesthetic complications and tolerated procedure well    Post vital signs: stable    Level of consciousness: awake, alert and oriented    Nausea/Vomiting: no nausea/vomiting    Complications: none    Transfer of care protocol was followed      Last vitals:   Visit Vitals  /80 (BP Location: Right arm, Patient Position: Lying)   Pulse 76   Temp 36.5 °C (97.7 °F) (Oral)   Resp 18   Wt 89.7 kg (197 lb 12 oz)   SpO2 98%   Breastfeeding No   BMI 35.03 kg/m²

## 2020-06-25 NOTE — PLAN OF CARE
Problem: Adult Inpatient Plan of Care  Goal: Plan of Care Review  Outcome: Ongoing, Progressing     Problem: Fall Injury Risk  Goal: Absence of Fall and Fall-Related Injury  Outcome: Ongoing, Progressing     Problem: Infection  Goal: Infection Symptom Resolution  Outcome: Ongoing, Progressing   Pt aaox4. Prn pain med given x2 this shift. Three abdominal dressings with middle one with small amount of serosanguinous drainage. Bowel sounds hypoactive. Iv fluids infusing. Scds on. Call light w/I reach

## 2020-06-26 LAB
ANION GAP SERPL CALC-SCNC: 6 MMOL/L (ref 8–16)
BASOPHILS # BLD AUTO: 0 K/UL (ref 0–0.2)
BASOPHILS NFR BLD: 0 % (ref 0–1.9)
BUN SERPL-MCNC: 22 MG/DL (ref 8–23)
CALCIUM SERPL-MCNC: 8.8 MG/DL (ref 8.7–10.5)
CHLORIDE SERPL-SCNC: 106 MMOL/L (ref 95–110)
CO2 SERPL-SCNC: 23 MMOL/L (ref 23–29)
CREAT SERPL-MCNC: 1.1 MG/DL (ref 0.5–1.4)
DIFFERENTIAL METHOD: ABNORMAL
EOSINOPHIL # BLD AUTO: 0 K/UL (ref 0–0.5)
EOSINOPHIL NFR BLD: 0.2 % (ref 0–8)
ERYTHROCYTE [DISTWIDTH] IN BLOOD BY AUTOMATED COUNT: 13.8 % (ref 11.5–14.5)
EST. GFR  (AFRICAN AMERICAN): >60 ML/MIN/1.73 M^2
EST. GFR  (NON AFRICAN AMERICAN): 54 ML/MIN/1.73 M^2
GLUCOSE SERPL-MCNC: 94 MG/DL (ref 70–110)
HCT VFR BLD AUTO: 31.4 % (ref 37–48.5)
HGB BLD-MCNC: 10.2 G/DL (ref 12–16)
IMM GRANULOCYTES # BLD AUTO: 0.01 K/UL (ref 0–0.04)
IMM GRANULOCYTES NFR BLD AUTO: 0.2 % (ref 0–0.5)
LYMPHOCYTES # BLD AUTO: 0.8 K/UL (ref 1–4.8)
LYMPHOCYTES NFR BLD: 12.5 % (ref 18–48)
MCH RBC QN AUTO: 30.9 PG (ref 27–31)
MCHC RBC AUTO-ENTMCNC: 32.5 G/DL (ref 32–36)
MCV RBC AUTO: 95 FL (ref 82–98)
MONOCYTES # BLD AUTO: 0.7 K/UL (ref 0.3–1)
MONOCYTES NFR BLD: 10.6 % (ref 4–15)
NEUTROPHILS # BLD AUTO: 4.8 K/UL (ref 1.8–7.7)
NEUTROPHILS NFR BLD: 76.5 % (ref 38–73)
NRBC BLD-RTO: 0 /100 WBC
PLATELET # BLD AUTO: 181 K/UL (ref 150–350)
PMV BLD AUTO: 10 FL (ref 9.2–12.9)
POTASSIUM SERPL-SCNC: 5 MMOL/L (ref 3.5–5.1)
RBC # BLD AUTO: 3.3 M/UL (ref 4–5.4)
SODIUM SERPL-SCNC: 135 MMOL/L (ref 136–145)
WBC # BLD AUTO: 6.23 K/UL (ref 3.9–12.7)

## 2020-06-26 PROCEDURE — 94761 N-INVAS EAR/PLS OXIMETRY MLT: CPT

## 2020-06-26 PROCEDURE — 99232 PR SUBSEQUENT HOSPITAL CARE,LEVL II: ICD-10-PCS | Mod: ,,, | Performed by: UROLOGY

## 2020-06-26 PROCEDURE — 80048 BASIC METABOLIC PNL TOTAL CA: CPT

## 2020-06-26 PROCEDURE — 11000001 HC ACUTE MED/SURG PRIVATE ROOM

## 2020-06-26 PROCEDURE — 99900035 HC TECH TIME PER 15 MIN (STAT)

## 2020-06-26 PROCEDURE — 63600175 PHARM REV CODE 636 W HCPCS: Performed by: SURGERY

## 2020-06-26 PROCEDURE — 85025 COMPLETE CBC W/AUTO DIFF WBC: CPT

## 2020-06-26 PROCEDURE — 97161 PT EVAL LOW COMPLEX 20 MIN: CPT

## 2020-06-26 PROCEDURE — 36415 COLL VENOUS BLD VENIPUNCTURE: CPT

## 2020-06-26 PROCEDURE — 25000003 PHARM REV CODE 250: Performed by: SURGERY

## 2020-06-26 PROCEDURE — 97116 GAIT TRAINING THERAPY: CPT

## 2020-06-26 PROCEDURE — 94799 UNLISTED PULMONARY SVC/PX: CPT

## 2020-06-26 PROCEDURE — 99232 SBSQ HOSP IP/OBS MODERATE 35: CPT | Mod: ,,, | Performed by: UROLOGY

## 2020-06-26 RX ORDER — OXYCODONE AND ACETAMINOPHEN 10; 325 MG/1; MG/1
1 TABLET ORAL EVERY 4 HOURS PRN
Status: DISCONTINUED | OUTPATIENT
Start: 2020-06-26 | End: 2020-06-29 | Stop reason: HOSPADM

## 2020-06-26 RX ORDER — KETOROLAC TROMETHAMINE 30 MG/ML
30 INJECTION, SOLUTION INTRAMUSCULAR; INTRAVENOUS ONCE
Status: COMPLETED | OUTPATIENT
Start: 2020-06-26 | End: 2020-06-26

## 2020-06-26 RX ORDER — HYDROMORPHONE HYDROCHLORIDE 2 MG/ML
1.5 INJECTION, SOLUTION INTRAMUSCULAR; INTRAVENOUS; SUBCUTANEOUS
Status: DISCONTINUED | OUTPATIENT
Start: 2020-06-26 | End: 2020-06-29 | Stop reason: HOSPADM

## 2020-06-26 RX ADMIN — HYDROMORPHONE HYDROCHLORIDE 1.5 MG: 2 INJECTION, SOLUTION INTRAMUSCULAR; INTRAVENOUS; SUBCUTANEOUS at 10:06

## 2020-06-26 RX ADMIN — HYDROMORPHONE HYDROCHLORIDE 1.5 MG: 2 INJECTION, SOLUTION INTRAMUSCULAR; INTRAVENOUS; SUBCUTANEOUS at 07:06

## 2020-06-26 RX ADMIN — HYDROMORPHONE HYDROCHLORIDE 1.5 MG: 2 INJECTION, SOLUTION INTRAMUSCULAR; INTRAVENOUS; SUBCUTANEOUS at 01:06

## 2020-06-26 RX ADMIN — SODIUM CHLORIDE, SODIUM LACTATE, POTASSIUM CHLORIDE, AND CALCIUM CHLORIDE: .6; .31; .03; .02 INJECTION, SOLUTION INTRAVENOUS at 05:06

## 2020-06-26 RX ADMIN — KETOROLAC TROMETHAMINE 30 MG: 30 INJECTION, SOLUTION INTRAMUSCULAR at 07:06

## 2020-06-26 RX ADMIN — RIVAROXABAN 20 MG: 20 TABLET, FILM COATED ORAL at 05:06

## 2020-06-26 RX ADMIN — HYDROMORPHONE HYDROCHLORIDE 1 MG: 2 INJECTION, SOLUTION INTRAMUSCULAR; INTRAVENOUS; SUBCUTANEOUS at 03:06

## 2020-06-26 RX ADMIN — SODIUM CHLORIDE, SODIUM LACTATE, POTASSIUM CHLORIDE, AND CALCIUM CHLORIDE: .6; .31; .03; .02 INJECTION, SOLUTION INTRAVENOUS at 01:06

## 2020-06-26 RX ADMIN — HYDROMORPHONE HYDROCHLORIDE 1.5 MG: 2 INJECTION, SOLUTION INTRAMUSCULAR; INTRAVENOUS; SUBCUTANEOUS at 04:06

## 2020-06-26 RX ADMIN — HYDROMORPHONE HYDROCHLORIDE 1.5 MG: 2 INJECTION, SOLUTION INTRAMUSCULAR; INTRAVENOUS; SUBCUTANEOUS at 09:06

## 2020-06-26 NOTE — PLAN OF CARE
Problem: Physical Therapy Goal  Goal: Physical Therapy Goal  Description: Goals to be met by: 7/3     Patient will increase functional independence with mobility by performin. Gait  x 300 feet with [place] .     Outcome: Ongoing, Progressing    5x. No DME or HHPT needs.

## 2020-06-26 NOTE — PLAN OF CARE
Discharge planning and home needs addressed with patient; patient confirmed information on face sheet as correct;     TN Role Explained.  Patient identified by using 2 identifiers:  Name and date of birth    Patient stated that she lives independently at home with spouse; 0 DME and denies needs     Patient stated that spouse, George WILL HELP AT HOME WITH () RECOVERY if needed.       Patient stated that she takes medications as prescribed but feels the Xarelto is too expensive and wants to inquire about changing to cheaper alternative; TN provided discount card and resources to patient;     TN name and means of contact given to patient and encouraged to call for any discharge needs or questions       [id number] - [place], [place] - [address]  [address] [address]  Phone: [phone] Fax: [phone]         06/26/20 9673   Discharge Assessment   Assessment Type Discharge Planning Assessment   Confirmed/corrected address and phone number on facesheet? Yes   Assessment information obtained from? Patient   Expected Length of Stay (days) 3   Communicated expected length of stay with patient/caregiver yes   Prior to hospitilization cognitive status: Alert/Oriented   Prior to hospitalization functional status: Independent   Current cognitive status: Alert/Oriented   Current Functional Status: Independent   Lives With spouse   Able to Return to Prior Arrangements yes   Is patient able to care for self after discharge? Yes   Who are your caregiver(s) and their phone number(s)? George Guillen (spouse) [phone]   Patient's perception of discharge disposition home or selfcare   Readmission Within the Last 30 Days no previous admission in last 30 days   Patient currently being followed by outpatient case management? No   Patient currently receives any other outside agency services? No   Equipment Currently Used at Home none   Part D Coverage [employer] - Rx benefits   Do you have any problems affording any  of your prescribed medications? Yes   If yes, what medications? Xarelto - discount card and resouces provided   Is the patient taking medications as prescribed? yes   Does the patient have transportation home? Yes   Transportation Anticipated family or friend will provide   Does the patient receive services at the [facility] Clinic? No   Discharge Plan A Home   DME Needed Upon Discharge    (TBD)   Patient/Family in Agreement with Plan yes

## 2020-06-26 NOTE — PLAN OF CARE
Problem: Infection  Goal: Infection Symptom Resolution  Intervention: Prevent or Manage Infection  Flowsheets (Taken 6/26/2020 0428)  Fever Reduction/Comfort Measures: medication administered  Isolation Precautions: contact precautions maintained     Patient c/o acute pain throughout this shift. PRN pain medication rendered around the clock. No relief obtained. Patient repositioned, mild relief obtained.

## 2020-06-26 NOTE — PT/OT/SLP EVAL
Physical Therapy Evaluation    Patient Name:  Keyonna Guillen   MRN:  [id number]    Recommendations:     Discharge Recommendations:  home   Discharge Equipment Recommendations: none   Barriers to discharge: None    Assessment:     Keyonna Guillen is a 60 y.o. female admitted with a medical diagnosis of Mass of colon.  She presents with the following impairments/functional limitations:  weakness, gait instability, impaired endurance, impaired balance, pain, edema, impaired functional mobilty(dizziness) .    Pt mobilizing very well POD#1. D/w pt and spouse the importance of ambulating as much as possible over the w/e with family handheld assistance if necessary. Mobility Level 4. Encouraged pt to use BSC and get OOB to chair independently but encouraged her to call for assistance if IV line attached.     Rehab Prognosis: Good; patient would benefit from acute skilled PT services to address these deficits and reach maximum level of function.    Recent Surgery: Procedure(s) (LRB):  HAND ASSISTED HEMICOLECTOMY, LAPAROSCOPIC (Right)  CYSTOSCOPY, WITH URETERAL STENT INSERTION (Bilateral) 1 Day Post-Op    Plan:     During this hospitalization, patient to be seen 5 x/week to address the identified rehab impairments via gait training, therapeutic exercises, therapeutic activities and progress toward the following goals:    · Plan of Care Expires:  07/03/20    Subjective     Chief Complaint: worried about having to get OOB frequently to urinate  Patient/Family Comments/goals: agreed to PT POC  Pain/Comfort:  · Pain Rating 1: 0/10  · Pain Addressed 1: Pre-medicate for activity    Patients cultural, spiritual, Quaker conflicts given the current situation: no    Living Environment:  With spouse. No GEOFF. No DME  Prior to admission, patients level of function was ind with all.  Equipment used at home: none.  DME owned (not currently used): none.  Upon discharge, patient will have assistance from spopuse.    Objective:      Communicated with ALFREDO Root prior to session.  Patient found HOB elevated with peripheral IV, telemetry, oxygen, SCD  upon PT entry to room.    General Precautions: Standard,     Orthopedic Precautions:N/A   Braces: N/A     Exams:  · spouse present. RN arrived to remove johns during session. pt found on RA O2 with NC off nares.   · Cognitive Exam:  Patient is oriented to Person, Place, Time and Situation  · Gross Motor Coordination:  WFL  · Postural Exam:  Patient presented with the following abnormalities:    · -       No postural abnormalities identified  · Sensation:    · -       Intact  · Skin Integrity/Edema:      · -       Edema: Moderate abd  · RUE ROM: WFL  · RUE Strength: WFL  · LUE ROM: WFL  · LUE Strength: WFL  · RLE ROM: WFL  · RLE Strength: WFL  · LLE ROM: WFL  · LLE Strength: WFL    Functional Mobility:  · Bed Mobility:     · Supine to Sit: modified independence  · Transfers:     · Sit to Stand:  supervision with no AD  · Gait: with RW ~ 150 ft with SBA; pt c/o severe dizziness which did not worsen. no LOB or SoB on RA O2.   · Balance: good with and without RW, standing   · Pt educated on splinting with pillow when coughing for pain control, importance of sitting OOB UIC frequently throughout the day, ambulating for return of bowel function.     Therapeutic Activities and Exercises:   n/a    AM-PAC 6 CLICK MOBILITY  Total Score:22     Patient left up in chair with call button in reach and spouse present.    GOALS:   Multidisciplinary Problems     Physical Therapy Goals        Problem: Physical Therapy Goal    Goal Priority Disciplines Outcome Goal Variances Interventions   Physical Therapy Goal     PT, PT/OT Ongoing, Progressing     Description: Goals to be met by: 7/3     Patient will increase functional independence with mobility by performin. Gait  x 300 feet with [place] .                      History:     Past Medical History:   Diagnosis Date    Atrial fibrillation     Colon  polyp     Coronary artery disease     Hyperlipidemia     Hypertension        Past Surgical History:   Procedure Laterality Date    BREAST BIOPSY      CARDIAC SURGERY      COLONOSCOPY N/A 5/25/2020    Procedure: COLONOSCOPY;  Surgeon: José Luis Fonseca MD;  Location: [facility] ENDO;  Service: Endoscopy;  Laterality: N/A;  XARELTO/PLETAL ok    CORONARY ARTERY BYPASS GRAFT      CYSTOSCOPY W/ URETERAL STENT PLACEMENT Bilateral 6/25/2020    Procedure: CYSTOSCOPY, WITH URETERAL STENT INSERTION;  Surgeon: STEPHANY Smyth MD;  Location: [facility] OR;  Service: Urology;  Laterality: Bilateral;    HYSTERECTOMY      LAPAROSCOPIC HEMICOLECTOMY Right 6/25/2020    Procedure: HAND ASSISTED HEMICOLECTOMY, LAPAROSCOPIC;  Surgeon: Louis Rivas III, MD;  Location: [facility] OR;  Service: General;  Laterality: Right;  RN PREOP 6/19/2020--has cardiac clearance from Dr. Khalil, --COVID NEGATIVE and T/S on 6/23/2020  COMBINED CASE WITH DR SMYTH       Time Tracking:     PT Received On: 06/26/20  PT Start Time: 1615     PT Stop Time: 1643  PT Total Time (min): 28 min     Billable Minutes: Evaluation 15 and Gait Training 13      Vee Zayas, PT  06/26/2020

## 2020-06-26 NOTE — NURSING
Report given to receiving nurse Ivett.  Pt awake in bed. C/o pain 10/10 PRS. Day shift nurse to administer IV pain medication. Walking rounds completed. Pt assessed, NAD noted.     24hr chart check completed.

## 2020-06-26 NOTE — PLAN OF CARE
Problem: Fall Injury Risk  Goal: Absence of Fall and Fall-Related Injury  Intervention: Identify and Manage Contributors to Fall Injury Risk  Flowsheets (Taken 6/26/2020 1816)  Self-Care Promotion:   independence encouraged   safe use of adaptive equipment encouraged  Medication Review/Management: high risk medications identified     Problem: Infection  Goal: Infection Symptom Resolution  Intervention: Prevent or Manage Infection  Flowsheets (Taken 6/26/2020 1816)  Fever Reduction/Comfort Measures: medication administered   Pain has reduced with meds.sat at bedside several hours now returned to bed. Ambulated with minium assistance. Gen improvement noted

## 2020-06-26 NOTE — PROGRESS NOTES
"gen surg pod 1  Op findings d/w pt, c/o R sided abd pain, not hungry, no sob, no nausea  Blood pressure (!) 115/56, pulse 76, temperature 98.9 °F (37.2 °C), temperature source Oral, resp. rate 18, height 5' 3" (1.6 m), weight 86 kg (189 lb 9.5 oz), SpO2 (!) 93 %, not currently breastfeeding.  uo 650cc overnight,very slight blood tinge as expected  abd soft, hypoactive bs, nd, dressings dry, mod inc tenderness  A/p s/p lap R colecotmy for mass- adjust analgesics, oob, IS, ;nicolas johns in for today, dc am tomorrow,await resolution expected poast op adynamic ileus  "

## 2020-06-26 NOTE — NURSING
"Patient refused b/p medication Hydralazine 150mg and labetalol 200mg by mouth, stated "My pressure is too low to take it."  B/p 116/66. C/o abdominal pain 10/10 PRS. Dilaudid 1mg IV administered by Kalyani FUENTES. Will continue to monitor.  "

## 2020-06-26 NOTE — PROGRESS NOTES
[facility]  Urology  Progress Note    Patient Name: Keyonna Guillen  MRN: [id number]  Admission Date: 6/25/2020  Hospital Length of Stay: 1 days  Code Status: Prior   Attending Provider: Louis Rivas III,*   Primary Care Physician: Morgan Leger MD    Subjective:     HPI:  No notes on file    Interval History: Feeling better.  She feels ready to have her Santoro removed.      Review of Systems   Constitutional: Negative.    HENT: Negative.    Eyes: Negative.    Respiratory: Negative for cough, chest tightness and shortness of breath.    Cardiovascular: Negative for chest pain.   Gastrointestinal: Negative.  Negative for constipation, diarrhea and nausea.   Genitourinary: Negative for flank pain and hematuria.   Musculoskeletal: Negative.    Neurological: Negative.    Psychiatric/Behavioral: Negative.      Objective:     Temp:  [97.4 °F (36.3 °C)-98.9 °F (37.2 °C)] 98.1 °F (36.7 °C)  Pulse:  [55-79] 79  Resp:  [13-21] 16  SpO2:  [90 %-99 %] 90 %  BP: ()/(54-73) 107/68     Body mass index is 33.59 kg/m².           Drains     Drain                 Urethral Catheter 06/25/20 0750 Latex 16 Fr. 1 day                Physical Exam   Nursing note and vitals reviewed.  Constitutional: She is oriented to person, place, and time. She appears well-developed.   HENT:   Head: Normocephalic.   Eyes: Conjunctivae are normal.   Neck: Normal range of motion. No tracheal deviation present. No thyromegaly present.   Cardiovascular: Normal rate, normal heart sounds and normal pulses.    Pulmonary/Chest: Effort normal and breath sounds normal. No respiratory distress. She has no wheezes.   Abdominal: Soft. She exhibits no distension and no mass. There is no abdominal tenderness. There is no rebound and no guarding. No hernia.   Genitourinary:    Genitourinary Comments: Santoro in place with yellow urine     Musculoskeletal: Normal range of motion. No tenderness.   Lymphadenopathy:     She has no cervical  adenopathy.   Neurological: She is alert and oriented to person, place, and time.   Skin: Skin is warm and dry. No rash noted. No erythema.     Psychiatric: Her behavior is normal. Judgment and thought content normal.       Significant Labs:    BMP:  Recent Labs   Lab 06/19/20  1420 06/26/20  0412    135*   K 5.1 5.0    106   CO2 26 23   BUN 24* 22   CREATININE 1.3 1.1   CALCIUM 9.6 8.8       CBC:   Recent Labs   Lab 06/19/20  1420 06/25/20  1256 06/26/20  0412   WBC 4.72 11.50 6.23   HGB 12.1 10.8* 10.2*   HCT 36.5* 33.2* 31.4*    193 181       Blood Culture: No results for input(s): LABBLOO in the last 168 hours.  Urine Culture: No results for input(s): LABURIN in the last 168 hours.    Significant Imaging:                    Assessment/Plan:     * Mass of colon  S/p bilateral catheter placement at time of resection.  Catheters out  D/C Santoro  Call with questions        VTE Risk Mitigation (From admission, onward)         Ordered     rivaroxaban tablet 20 mg  With dinner      06/25/20 1141     Place sequential compression device  Until discontinued      06/25/20 1141                W Clifford Smyth MD  Urology  [facility]

## 2020-06-26 NOTE — NURSING
Patient c/o abdominal pain throughout shift.  Positioning pillows utilized. Ice pack applied to abdomen. PRN pain medication given. Mild to no relief obtained. Will continue to monitor

## 2020-06-26 NOTE — SUBJECTIVE & OBJECTIVE
Interval History: Feeling better.  She feels ready to have her Santoro removed.      Review of Systems   Constitutional: Negative.    HENT: Negative.    Eyes: Negative.    Respiratory: Negative for cough, chest tightness and shortness of breath.    Cardiovascular: Negative for chest pain.   Gastrointestinal: Negative.  Negative for constipation, diarrhea and nausea.   Genitourinary: Negative for flank pain and hematuria.   Musculoskeletal: Negative.    Neurological: Negative.    Psychiatric/Behavioral: Negative.      Objective:     Temp:  [97.4 °F (36.3 °C)-98.9 °F (37.2 °C)] 98.1 °F (36.7 °C)  Pulse:  [55-79] 79  Resp:  [13-21] 16  SpO2:  [90 %-99 %] 90 %  BP: ()/(54-73) 107/68     Body mass index is 33.59 kg/m².           Drains     Drain                 Urethral Catheter 06/25/20 0750 Latex 16 Fr. 1 day                Physical Exam   Nursing note and vitals reviewed.  Constitutional: She is oriented to person, place, and time. She appears well-developed.   HENT:   Head: Normocephalic.   Eyes: Conjunctivae are normal.   Neck: Normal range of motion. No tracheal deviation present. No thyromegaly present.   Cardiovascular: Normal rate, normal heart sounds and normal pulses.    Pulmonary/Chest: Effort normal and breath sounds normal. No respiratory distress. She has no wheezes.   Abdominal: Soft. She exhibits no distension and no mass. There is no abdominal tenderness. There is no rebound and no guarding. No hernia.   Genitourinary:    Genitourinary Comments: Santoro in place with yellow urine     Musculoskeletal: Normal range of motion. No tenderness.   Lymphadenopathy:     She has no cervical adenopathy.   Neurological: She is alert and oriented to person, place, and time.   Skin: Skin is warm and dry. No rash noted. No erythema.     Psychiatric: Her behavior is normal. Judgment and thought content normal.       Significant Labs:    BMP:  Recent Labs   Lab 06/19/20  1420 06/26/20  0412    135*   K 5.1 5.0     106   CO2 26 23   BUN 24* 22   CREATININE 1.3 1.1   CALCIUM 9.6 8.8       CBC:   Recent Labs   Lab 06/19/20  1420 06/25/20  1256 06/26/20  0412   WBC 4.72 11.50 6.23   HGB 12.1 10.8* 10.2*   HCT 36.5* 33.2* 31.4*    193 181       Blood Culture: No results for input(s): LABBLOO in the last 168 hours.  Urine Culture: No results for input(s): LABURIN in the last 168 hours.    Significant Imaging:

## 2020-06-26 NOTE — NURSING
Report received and patient care assumed. Relief noted after dose of toradol and dilaudid given. Repositioned for comfort. Patient states went from 10/prs to 5 prs at this time.

## 2020-06-26 NOTE — ASSESSMENT & PLAN NOTE
S/p bilateral catheter placement at time of resection.  Catheters out  D/C Santoro  Call with questions

## 2020-06-27 LAB
ALBUMIN SERPL BCP-MCNC: 3.1 G/DL (ref 3.5–5.2)
ALP SERPL-CCNC: 89 U/L (ref 55–135)
ALT SERPL W/O P-5'-P-CCNC: 17 U/L (ref 10–44)
ANION GAP SERPL CALC-SCNC: 7 MMOL/L (ref 8–16)
AST SERPL-CCNC: 15 U/L (ref 10–40)
BACTERIA #/AREA URNS HPF: ABNORMAL /HPF
BILIRUB SERPL-MCNC: 0.5 MG/DL (ref 0.1–1)
BILIRUB UR QL STRIP: ABNORMAL
BUN SERPL-MCNC: 22 MG/DL (ref 8–23)
CALCIUM SERPL-MCNC: 8.9 MG/DL (ref 8.7–10.5)
CHLORIDE SERPL-SCNC: 103 MMOL/L (ref 95–110)
CLARITY UR: ABNORMAL
CO2 SERPL-SCNC: 25 MMOL/L (ref 23–29)
COLOR UR: ABNORMAL
CREAT SERPL-MCNC: 1 MG/DL (ref 0.5–1.4)
EST. GFR  (AFRICAN AMERICAN): >60 ML/MIN/1.73 M^2
EST. GFR  (NON AFRICAN AMERICAN): >60 ML/MIN/1.73 M^2
GLUCOSE SERPL-MCNC: 88 MG/DL (ref 70–110)
GLUCOSE UR QL STRIP: ABNORMAL
HGB UR QL STRIP: ABNORMAL
KETONES UR QL STRIP: ABNORMAL
LEUKOCYTE ESTERASE UR QL STRIP: ABNORMAL
MICROSCOPIC COMMENT: ABNORMAL
NITRITE UR QL STRIP: ABNORMAL
PH UR STRIP: ABNORMAL [PH] (ref 5–8)
POTASSIUM SERPL-SCNC: 4.9 MMOL/L (ref 3.5–5.1)
PROT SERPL-MCNC: 5.8 G/DL (ref 6–8.4)
PROT UR QL STRIP: ABNORMAL
RBC #/AREA URNS HPF: >100 /HPF (ref 0–4)
SODIUM SERPL-SCNC: 135 MMOL/L (ref 136–145)
SP GR UR STRIP: ABNORMAL (ref 1–1.03)
URN SPEC COLLECT METH UR: ABNORMAL
UROBILINOGEN UR STRIP-ACNC: ABNORMAL EU/DL
WBC #/AREA URNS HPF: 15 /HPF (ref 0–5)
YEAST URNS QL MICRO: ABNORMAL

## 2020-06-27 PROCEDURE — 94761 N-INVAS EAR/PLS OXIMETRY MLT: CPT

## 2020-06-27 PROCEDURE — 80053 COMPREHEN METABOLIC PANEL: CPT

## 2020-06-27 PROCEDURE — 87086 URINE CULTURE/COLONY COUNT: CPT

## 2020-06-27 PROCEDURE — 63600175 PHARM REV CODE 636 W HCPCS: Performed by: SURGERY

## 2020-06-27 PROCEDURE — 36415 COLL VENOUS BLD VENIPUNCTURE: CPT

## 2020-06-27 PROCEDURE — 25000003 PHARM REV CODE 250: Performed by: SURGERY

## 2020-06-27 PROCEDURE — 81000 URINALYSIS NONAUTO W/SCOPE: CPT

## 2020-06-27 PROCEDURE — 11000001 HC ACUTE MED/SURG PRIVATE ROOM

## 2020-06-27 RX ADMIN — HYDROMORPHONE HYDROCHLORIDE 1.5 MG: 2 INJECTION, SOLUTION INTRAMUSCULAR; INTRAVENOUS; SUBCUTANEOUS at 02:06

## 2020-06-27 RX ADMIN — HYDROMORPHONE HYDROCHLORIDE 1.5 MG: 2 INJECTION, SOLUTION INTRAMUSCULAR; INTRAVENOUS; SUBCUTANEOUS at 10:06

## 2020-06-27 RX ADMIN — OXYCODONE HYDROCHLORIDE AND ACETAMINOPHEN 1 TABLET: 10; 325 TABLET ORAL at 04:06

## 2020-06-27 RX ADMIN — ONDANSETRON HYDROCHLORIDE 4 MG: 2 INJECTION, SOLUTION INTRAMUSCULAR; INTRAVENOUS at 04:06

## 2020-06-27 RX ADMIN — FAMOTIDINE 20 MG: 20 TABLET ORAL at 08:06

## 2020-06-27 RX ADMIN — HYDROMORPHONE HYDROCHLORIDE 1.5 MG: 2 INJECTION, SOLUTION INTRAMUSCULAR; INTRAVENOUS; SUBCUTANEOUS at 06:06

## 2020-06-27 RX ADMIN — SODIUM CHLORIDE 500 ML: 0.9 INJECTION, SOLUTION INTRAVENOUS at 02:06

## 2020-06-27 RX ADMIN — OXYCODONE HYDROCHLORIDE AND ACETAMINOPHEN 1 TABLET: 10; 325 TABLET ORAL at 05:06

## 2020-06-27 RX ADMIN — FLUTICASONE PROPIONATE 100 MCG: 50 SPRAY, METERED NASAL at 08:06

## 2020-06-27 RX ADMIN — OXYCODONE HYDROCHLORIDE AND ACETAMINOPHEN 1 TABLET: 10; 325 TABLET ORAL at 09:06

## 2020-06-27 RX ADMIN — SODIUM CHLORIDE, SODIUM LACTATE, POTASSIUM CHLORIDE, AND CALCIUM CHLORIDE: .6; .31; .03; .02 INJECTION, SOLUTION INTRAVENOUS at 05:06

## 2020-06-27 RX ADMIN — HYDROMORPHONE HYDROCHLORIDE 1.5 MG: 2 INJECTION, SOLUTION INTRAMUSCULAR; INTRAVENOUS; SUBCUTANEOUS at 03:06

## 2020-06-27 RX ADMIN — OXYCODONE HYDROCHLORIDE AND ACETAMINOPHEN 1 TABLET: 10; 325 TABLET ORAL at 11:06

## 2020-06-27 NOTE — PLAN OF CARE
AAOx4. No falls/injuries throughout shift. Ambulates to bathroom without difficulty, voided without any problem after cath removal. Dressing has dried drainage. Bed locked and low, call light within reach. Will continue to monitor

## 2020-06-27 NOTE — PROGRESS NOTES
"gen surg pod 2  Feels better, less abd pain, no nausea,no bm, voiding without prob after johns removal yest  Blood pressure 118/75, pulse 84, temperature 98.2 °F (36.8 °C), temperature source Oral, resp. rate 18, height 5' 3" (1.6 m), weight 86 kg (189 lb 9.5 oz), SpO2 (!) 90 %, not currently breastfeeding.  abd soft, hypoactive bs, nd, incs healing well, appropriate inc tenderness  A/p s/p lap R colectomy for suspicious mass- oob, IS, doing well, clears po  "

## 2020-06-27 NOTE — PLAN OF CARE
Pt AAOx4, VSS, urine output monitoring, Cardiac monitoring, Diet advanced from NPO to Clear liquid, voids per bedside commode, pain management reviewed with pt, remains free of fall.    Problem: Fall Injury Risk  Goal: Absence of Fall and Fall-Related Injury  Outcome: Ongoing, Progressing  Intervention: Identify and Manage Contributors to Fall Injury Risk  Flowsheets (Taken 6/27/2020 1534)  Self-Care Promotion:   independence encouraged   BADL personal objects within reach   BADL personal routines maintained  Medication Review/Management: medications reviewed  Intervention: Promote Injury-Free Environment  Flowsheets (Taken 6/27/2020 1534)  Safety Promotion/Fall Prevention:   assistive device/personal item within reach   bedside commode chair   instructed to call staff for mobility   side rails raised x 2   nonskid shoes/socks when out of bed   medications reviewed   Fall Risk reviewed with patient/family  Environmental Safety Modification:   assistive device/personal items within reach   clutter free environment maintained     Problem: Infection  Goal: Infection Symptom Resolution  Outcome: Ongoing, Progressing  Intervention: Prevent or Manage Infection  Flowsheets (Taken 6/27/2020 1534)  Fever Reduction/Comfort Measures: medication administered  Infection Management: aseptic technique maintained

## 2020-06-28 PROCEDURE — 25000003 PHARM REV CODE 250: Performed by: SURGERY

## 2020-06-28 PROCEDURE — 11000001 HC ACUTE MED/SURG PRIVATE ROOM

## 2020-06-28 PROCEDURE — 94761 N-INVAS EAR/PLS OXIMETRY MLT: CPT

## 2020-06-28 PROCEDURE — 63600175 PHARM REV CODE 636 W HCPCS: Performed by: SURGERY

## 2020-06-28 RX ADMIN — SODIUM CHLORIDE, SODIUM LACTATE, POTASSIUM CHLORIDE, AND CALCIUM CHLORIDE: .6; .31; .03; .02 INJECTION, SOLUTION INTRAVENOUS at 02:06

## 2020-06-28 RX ADMIN — FAMOTIDINE 20 MG: 20 TABLET ORAL at 08:06

## 2020-06-28 RX ADMIN — HYDROMORPHONE HYDROCHLORIDE 1.5 MG: 2 INJECTION, SOLUTION INTRAMUSCULAR; INTRAVENOUS; SUBCUTANEOUS at 08:06

## 2020-06-28 RX ADMIN — OXYCODONE HYDROCHLORIDE AND ACETAMINOPHEN 1 TABLET: 10; 325 TABLET ORAL at 01:06

## 2020-06-28 RX ADMIN — LABETALOL HYDROCHLORIDE 200 MG: 100 TABLET, FILM COATED ORAL at 08:06

## 2020-06-28 RX ADMIN — OXYCODONE HYDROCHLORIDE AND ACETAMINOPHEN 1 TABLET: 10; 325 TABLET ORAL at 08:06

## 2020-06-28 RX ADMIN — ONDANSETRON HYDROCHLORIDE 4 MG: 2 INJECTION, SOLUTION INTRAMUSCULAR; INTRAVENOUS at 12:06

## 2020-06-28 RX ADMIN — HYDROMORPHONE HYDROCHLORIDE 1.5 MG: 2 INJECTION, SOLUTION INTRAMUSCULAR; INTRAVENOUS; SUBCUTANEOUS at 10:06

## 2020-06-28 RX ADMIN — SODIUM CHLORIDE, SODIUM LACTATE, POTASSIUM CHLORIDE, AND CALCIUM CHLORIDE: .6; .31; .03; .02 INJECTION, SOLUTION INTRAVENOUS at 10:06

## 2020-06-28 RX ADMIN — HYDROMORPHONE HYDROCHLORIDE 1.5 MG: 2 INJECTION, SOLUTION INTRAMUSCULAR; INTRAVENOUS; SUBCUTANEOUS at 04:06

## 2020-06-28 RX ADMIN — HYDRALAZINE HYDROCHLORIDE 150 MG: 25 TABLET, FILM COATED ORAL at 08:06

## 2020-06-28 RX ADMIN — FLUTICASONE PROPIONATE 100 MCG: 50 SPRAY, METERED NASAL at 08:06

## 2020-06-28 RX ADMIN — RIVAROXABAN 20 MG: 20 TABLET, FILM COATED ORAL at 04:06

## 2020-06-28 RX ADMIN — HYDROMORPHONE HYDROCHLORIDE 1.5 MG: 2 INJECTION, SOLUTION INTRAMUSCULAR; INTRAVENOUS; SUBCUTANEOUS at 02:06

## 2020-06-28 NOTE — PLAN OF CARE
Walked in to room and pt nasal cannula was off and oxygen saturation in 70s. NC put back on @ 3.5 liters and saturation went back up to high 90s. Education was performed on oxygenation and will notify MD.      *UA results abnormal/ No ABX ordered as of now. Still voiding reddish brown colored urine. Will continue to monitor throughout night for any changes. No respiratory distress noted.

## 2020-06-28 NOTE — PLAN OF CARE
Pt AAOx4, VSS, pain managed with prn meds, incision dressing monitoring, urine characteristics monitoring, remains free of fall.    Problem: Fall Injury Risk  Goal: Absence of Fall and Fall-Related Injury  Outcome: Ongoing, Progressing  Intervention: Identify and Manage Contributors to Fall Injury Risk  Flowsheets (Taken 6/28/2020 1532)  Self-Care Promotion:   independence encouraged   BADL personal objects within reach   BADL personal routines maintained  Medication Review/Management: medications reviewed  Intervention: Promote Injury-Free Environment  Flowsheets (Taken 6/28/2020 1532)  Safety Promotion/Fall Prevention:   assistive device/personal item within reach   commode/urinal/bedpan at bedside   Fall Risk reviewed with patient/family   medications reviewed   nonskid shoes/socks when out of bed   instructed to call staff for mobility   side rails raised x 2  Environmental Safety Modification:   assistive device/personal items within reach   clutter free environment maintained     Problem: Infection  Goal: Infection Symptom Resolution  Outcome: Ongoing, Progressing  Intervention: Prevent or Manage Infection  Flowsheets (Taken 6/28/2020 1532)  Fever Reduction/Comfort Measures: medication administered  Infection Management: aseptic technique maintained

## 2020-06-28 NOTE — PROGRESS NOTES
"gen surg pod 3  urnien reported dark to me yest-eval appears to show cause is hematuria-prob from stent trauma-anticoagulation  Not much appetite, no bm  Blood pressure 130/72, pulse 82, temperature 98.6 °F (37 °C), temperature source Oral, resp. rate 18, height 5' 3" (1.6 m), weight 86 kg (189 lb 9.5 oz), SpO2 97 %, not currently breastfeeding.  abd soft, hypoactive bs, nd, incs healing well, appropriate inc tenderness  A/p s/p lap R colecotmy for mass-expected post op adynamic ileus not yet resolved, oob, IS  "

## 2020-06-29 ENCOUNTER — TELEPHONE (OUTPATIENT)
Dept: UROLOGY | Facility: CLINIC | Age: 60
End: 2020-06-29

## 2020-06-29 VITALS
WEIGHT: 189.63 LBS | OXYGEN SATURATION: 96 % | RESPIRATION RATE: 18 BRPM | DIASTOLIC BLOOD PRESSURE: 67 MMHG | HEART RATE: 64 BPM | BODY MASS INDEX: 33.6 KG/M2 | HEIGHT: 63 IN | TEMPERATURE: 98 F | SYSTOLIC BLOOD PRESSURE: 130 MMHG

## 2020-06-29 LAB
BACTERIA UR CULT: NO GROWTH
FINAL PATHOLOGIC DIAGNOSIS: NORMAL
GROSS: NORMAL
MICROSCOPIC EXAM: NORMAL

## 2020-06-29 PROCEDURE — 63600175 PHARM REV CODE 636 W HCPCS: Performed by: SURGERY

## 2020-06-29 PROCEDURE — 25000003 PHARM REV CODE 250: Performed by: SURGERY

## 2020-06-29 RX ADMIN — OXYCODONE HYDROCHLORIDE AND ACETAMINOPHEN 1 TABLET: 10; 325 TABLET ORAL at 09:06

## 2020-06-29 RX ADMIN — FAMOTIDINE 20 MG: 20 TABLET ORAL at 09:06

## 2020-06-29 RX ADMIN — HYDROMORPHONE HYDROCHLORIDE 1.5 MG: 2 INJECTION, SOLUTION INTRAMUSCULAR; INTRAVENOUS; SUBCUTANEOUS at 04:06

## 2020-06-29 NOTE — PROGRESS NOTES
TN taught Symptoms and Problems for POST OP home care with pt and with teach back:  1. Fever, 2.smelly drainage, 3. Blood in urine, 4. Blood in stool. TN placed education sheet in [employer]..     Help at home will be from spouse, George, assisting in pt's recovery.     TN taught patient about things SHE is responsible for when discharged TO HELP WITH HER RECOVERY:  Particularly on how to Manage HER Care At Home:  1. Getting her prescriptions filled.  2. Taking her medications as directed. DO NOT MISS ANY DOSES!  3. Going to her follow-up doctor appointments.   .  Sherrell Jolly RN, BSN, STN CCM @[facility]

## 2020-06-29 NOTE — TELEPHONE ENCOUNTER
Patient  called in with concerns that her heart rate is elevated and she's having palpitations. Patients  advised to take the patient to the ER. Patient  verbalized understanding.

## 2020-06-29 NOTE — PROGRESS NOTES
[facility] CASE MANAGEMENT                  WRITTEN DISCHARGE INFORMATION      APPOINTMENTS AND RESOURCES TO HELP YOU MANAGE YOUR CARE AT HOME BASED ON YOUR PREFERENCES:  (If an appointment is not scheduled for you when you leave the hospital, call your doctor to schedule a follow up visit within a week)    Follow-up Information     Louis Rivas III, MD On 7/9/2020.    Specialty: Surgery  Why: @1:40pm, for Surgery follow up  Contact information:  [address] [place] [address]  [facility] [phone]                   Healthy Living Instructions to HELP MANAGE YOUR CARE AT HOME:  Things You are responsible for:  1.    Getting your prescriptions filled   2.    Taking your medications as directed, DO NOT MISS ANY DOSES!  3.    Following the diet and exercise recommended by your doctor  4.    Going to your follow-up doctor appointment. This is important because it allows the doctor to monitor your progress and determine if any changes need to made to your treatment plan.  5. If you have any questions about MANAGING YOUR CARE AT HOME Call the Nurse Care Line for 24/7 Assistance [phone]       Please answer any calls you may receive from [facility]. We want to continue to support you as you manage your healthcare needs. [facility] is happy to have the opportunity to serve you.      Thank you for choosing [facility] for your healthcare needs!  Your [facility] Case Management Team,

## 2020-06-29 NOTE — PLAN OF CARE
"Secure message sent to nurseRohit and verbally notified GEMINI Carey.    EDUCATION:  Ms Guillen provided with educational information on post op.  Information reviewed and placed in :My Healthcare Packet" to be brought home for her to use as resource after discharge.  Information included:  problems and symptoms to look for and call the doctor if experiencing, and symptoms that may indicate a medical emergency: CALL 911.      All questions answered.  Teach back method used.    Patient stated, " SOB and CP" call 911"       06/29/20 1119   Final Note   Assessment Type Final Discharge Note   Anticipated Discharge Disposition Home   Hospital Follow Up  Appt(s) scheduled? Yes   Discharge plans and expectations educations in teach back method with documentation complete? Yes   Right Care Referral Info   Post Acute Recommendation No Care   Post-Acute Status   Post-Acute Authorization Other   Other Status No Post-Acute Service Needs   Discharge Delays None known at this time   .       "

## 2020-06-29 NOTE — DISCHARGE SUMMARY
[facility]  General Surgery  Discharge Summary      Patient Name: Keyonna Guillen  MRN: [id number]  Admission Date: 6/25/2020  Hospital Length of Stay: 4 days  Discharge Date and Time:  06/29/2020 6:40 AM  Attending Physician: Louis Rivas III,*   Discharging Provider: Louis Rivas III, MD  Primary Care Provider: Morgan Leger MD        HPI:  60-year-old female with a suspicious right colon mass benign by biopsy scheduled for elective right hemicolectomy    Procedure(s) (LRB):  HAND ASSISTED HEMICOLECTOMY, LAPAROSCOPIC (Right)  CYSTOSCOPY, WITH URETERAL STENT INSERTION (Bilateral)      Hospital Course:  Patient underwent hand assisted laparoscopic right hemicolectomy with bilateral ureteral stents placed by Dr. Smyth on day of admit 06/25/2020.  Over the next few days her expected postop adynamic ileus resolved, she had hematuria little bit longer than usual likely due to her anticoagulation.  On the day of discharge she is tolerating liquids and having bowel movements and is afebrile.  She will be discharged home later on today if she continues to tolerate regular diet    Consults:     Final Active Diagnoses:    Diagnosis Date Noted POA    PRINCIPAL PROBLEM:  Mass of colon [K63.89] 06/25/2020 Yes      Problems Resolved During this Admission:      Discharged Condition: good    Disposition: Home or Self Care    Follow Up:    Patient Instructions:   No discharge procedures on file.  Medications:  Reconciled Home Medications:      Medication List      ASK your doctor about these medications    atorvastatin 40 MG tablet  Commonly known as: LIPITOR  TAKE 1 TABLET BY MOUTH EVERYDAY AT BEDTIME     cilostazoL 50 MG Tab  Commonly known as: PLETAL  Take 1 tablet (50 mg total) by mouth 2 (two) times daily.     cloNIDine 0.1 MG tablet  Commonly known as: CATAPRES  Take 1 tablet (0.1 mg total) by mouth daily as needed (If SBP > 170.).     doxycycline 100 MG tablet  Commonly known as:  VIBRA-TABS  Take 1 tablet (100 mg total) by mouth 2 (two) times daily. for 7 days  Ask about: Should I take this medication?     hydrALAZINE 100 MG tablet  Commonly known as: APRESOLINE  Take 1.5 tablets (150 mg total) by mouth 2 (two) times daily.     hydroCHLOROthiazide 50 MG tablet  Commonly known as: HYDRODIURIL  Take 1 tablet (50 mg total) by mouth once daily.     labetaloL 200 MG tablet  Commonly known as: NORMODYNE  Take 1 tablet (200 mg total) by mouth 2 (two) times daily.     metroNIDAZOLE 500 MG tablet  Commonly known as: FLAGYL  Take 500 mg by mouth every 8 (eight) hours.     neomycin 500 mg Tab  Commonly known as: MYCIFRADIN  Take 1,000 mg by mouth every 8 (eight) hours.     olmesartan 40 MG tablet  Commonly known as: BENICAR  TAKE 1 TABLET BY MOUTH EVERY DAY     sertraline 25 MG tablet  Commonly known as: ZOLOFT  TAKE 1 TABLET BY MOUTH EVERY DAY     spironolactone 50 MG tablet  Commonly known as: ALDACTONE  TAKE 1 TABLET BY MOUTH EVERY DAY     XARELTO 20 mg Tab  Generic drug: rivaroxaban  TAKE 1 TABLET (20 MG TOTAL) BY MOUTH DAILY WITH DINNER OR EVENING MEAL.            Significant Diagnostic Studies: Radiology: CT scan: CT ABDOMEN PELVIS WITH CONTRAST: No results found for this visit on 06/25/20.    Pending Diagnostic Studies:     Procedure Component Value Units Date/Time    Specimen to Pathology, Surgery General Surgery [[id number]] Collected: 06/25/20 0857    Order Status: Sent Lab Status: In process Updated: 06/26/20 1258        Indwelling Lines/Drains at time of discharge:   Lines/Drains/Airways     None                 Time spent on the discharge of patient: 15 minutes  Patient was seen and examined on the date of discharge and determined to be suitable for discharge.         Louis Rivas III, MD  Department of Hospital Medicine  [facility]

## 2020-06-29 NOTE — PROGRESS NOTES
"gen surg pod 4  gerardo liquids, having bms, hungry, feeling better each day  Path pending  Blood pressure 137/69, pulse 71, temperature 97.9 °F (36.6 °C), temperature source Oral, resp. rate 16, height 5' 3" (1.6 m), weight 86 kg (189 lb 9.5 oz), SpO2 97 %, not currently breastfeeding.  abd soft, bs present, ntnd, incs healing well  A/p s/p lap R colecotmy- dc after lunch if gerardo reg diet, tentative dc instr reviewed w pt  "

## 2020-06-29 NOTE — PLAN OF CARE
Aox4. LR continued overnight. Pain meds given per orders. Remains free from falls. C/o nausea before having BM at 2000, but nausea resolved on its own. Staples to midline abdomen dry and intact c scant dried drainage. B/P monitored. No distress noted.  Problem: Adult Inpatient Plan of Care  Goal: Plan of Care Review  Outcome: Ongoing, Progressing  Goal: Patient-Specific Goal (Individualization)  Outcome: Ongoing, Progressing  Goal: Absence of Hospital-Acquired Illness or Injury  Outcome: Ongoing, Progressing  Goal: Optimal Comfort and Wellbeing  Outcome: Ongoing, Progressing  Goal: Readiness for Transition of Care  Outcome: Ongoing, Progressing     Problem: Fall Injury Risk  Goal: Absence of Fall and Fall-Related Injury  Outcome: Ongoing, Progressing     Problem: Infection  Goal: Infection Symptom Resolution  Outcome: Ongoing, Progressing

## 2020-07-07 ENCOUNTER — PATIENT OUTREACH (OUTPATIENT)
Dept: OTHER | Facility: OTHER | Age: 60
End: 2020-07-07

## 2020-07-07 NOTE — PROGRESS NOTES
"Digital Medicine: Health  Follow-Up    Patient reports she is recovering from surgery from 2 weeks ago.  States she noticed her BP was dropping so she takes her BP reading first before taking medications.  States she is only taking some medications (labetolol, hydralazine, and sprionolactone "here and there").  Reports instead of having hypotensive symptoms she experiences her right shoulder and back muscles tensing.  Will route note to clinician.    The history is provided by the patient.   Follow Up  Follow-up reason(s): reading review      Readings are trending down       INTERVENTION(S)  reviewed monitoring technique, encouragement/support and denied questions    PLAN  patient verbalizes understanding and continue monitoring      There are no preventive care reminders to display for this patient.    Last 5 Patient Entered Readings                                      Current 30 Day Average: 126/83     Recent Readings 7/6/2020 7/4/2020 7/4/2020 7/4/2020 7/1/2020    SBP (mmHg) 113 127 122 87 132    DBP (mmHg) 71 80 77 69 90    Pulse 73 86 92 110 97                  Screenings    SDOH  "

## 2020-07-08 ENCOUNTER — NURSE TRIAGE (OUTPATIENT)
Dept: ADMINISTRATIVE | Facility: CLINIC | Age: 60
End: 2020-07-08

## 2020-07-09 NOTE — TELEPHONE ENCOUNTER
Attempted to contact patient on behalf of Post Procedural Symptom Tracker. No answer 2nd attempt. No follow up needed.  Reason for Disposition   Second attempt to contact family AND no contact made.  Phone number verified.    Protocols used: NO CONTACT OR DUPLICATE CONTACT CALL-A-AH

## 2020-07-15 NOTE — ANESTHESIA POSTPROCEDURE EVALUATION
Anesthesia Post Evaluation    Patient: Keyonna Guillen    Procedure(s) Performed: Procedure(s) (LRB):  HAND ASSISTED HEMICOLECTOMY, LAPAROSCOPIC (Right)  CYSTOSCOPY, WITH URETERAL STENT INSERTION (Bilateral)    Final Anesthesia Type: general    Patient location during evaluation: PACU  Patient participation: Yes- Able to Participate  Level of consciousness: awake and alert  Post-procedure vital signs: reviewed and stable  Pain management: adequate  Airway patency: patent    PONV status at discharge: No PONV  Anesthetic complications: no      Cardiovascular status: blood pressure returned to baseline and hemodynamically stable  Respiratory status: unassisted and spontaneous ventilation  Hydration status: euvolemic  Follow-up not needed.          Vitals Value Taken Time   /67 06/29/20 1140   Temp 36.6 °C (97.9 °F) 06/29/20 1140   Pulse 64 06/29/20 1140   Resp 18 06/29/20 1140   SpO2 96 % 06/29/20 1140         Event Time   Out of Recovery 11:35:42         Pain/Hilario Score: No data recorded

## 2020-07-23 ENCOUNTER — LAB VISIT (OUTPATIENT)
Dept: LAB | Facility: HOSPITAL | Age: 60
End: 2020-07-23
Attending: SURGERY
Payer: COMMERCIAL

## 2020-07-23 ENCOUNTER — HOSPITAL ENCOUNTER (OUTPATIENT)
Dept: RADIOLOGY | Facility: HOSPITAL | Age: 60
Discharge: HOME | End: 2020-07-23
Attending: SURGERY
Payer: COMMERCIAL

## 2020-07-23 DIAGNOSIS — R10.9 ACUTE ABDOMINAL PAIN: Primary | ICD-10-CM

## 2020-07-23 DIAGNOSIS — D17.9 FATTY TUMOR: ICD-10-CM

## 2020-07-23 DIAGNOSIS — D17.9 FATTY TUMOR: Primary | ICD-10-CM

## 2020-07-23 LAB
ALBUMIN SERPL BCP-MCNC: 3.6 G/DL (ref 3.5–5.2)
ALP SERPL-CCNC: 185 U/L (ref 55–135)
ALT SERPL W/O P-5'-P-CCNC: 77 U/L (ref 10–44)
ANION GAP SERPL CALC-SCNC: 9 MMOL/L (ref 8–16)
AST SERPL-CCNC: 49 U/L (ref 10–40)
BASOPHILS # BLD AUTO: 0.01 K/UL (ref 0–0.2)
BASOPHILS NFR BLD: 0.2 % (ref 0–1.9)
BILIRUB SERPL-MCNC: 0.3 MG/DL (ref 0.1–1)
BUN SERPL-MCNC: 27 MG/DL (ref 8–23)
CALCIUM SERPL-MCNC: 9.3 MG/DL (ref 8.7–10.5)
CHLORIDE SERPL-SCNC: 106 MMOL/L (ref 95–110)
CO2 SERPL-SCNC: 26 MMOL/L (ref 23–29)
CREAT SERPL-MCNC: 1.3 MG/DL (ref 0.5–1.4)
DIFFERENTIAL METHOD: ABNORMAL
EOSINOPHIL # BLD AUTO: 0 K/UL (ref 0–0.5)
EOSINOPHIL NFR BLD: 0.6 % (ref 0–8)
ERYTHROCYTE [DISTWIDTH] IN BLOOD BY AUTOMATED COUNT: 12.9 % (ref 11.5–14.5)
EST. GFR  (AFRICAN AMERICAN): 51 ML/MIN/1.73 M^2
EST. GFR  (NON AFRICAN AMERICAN): 44 ML/MIN/1.73 M^2
GLUCOSE SERPL-MCNC: 119 MG/DL (ref 70–110)
HCT VFR BLD AUTO: 33.7 % (ref 37–48.5)
HGB BLD-MCNC: 10.7 G/DL (ref 12–16)
IMM GRANULOCYTES # BLD AUTO: 0.02 K/UL (ref 0–0.04)
IMM GRANULOCYTES NFR BLD AUTO: 0.3 % (ref 0–0.5)
LYMPHOCYTES # BLD AUTO: 1.1 K/UL (ref 1–4.8)
LYMPHOCYTES NFR BLD: 17.4 % (ref 18–48)
MCH RBC QN AUTO: 30.3 PG (ref 27–31)
MCHC RBC AUTO-ENTMCNC: 31.8 G/DL (ref 32–36)
MCV RBC AUTO: 96 FL (ref 82–98)
MONOCYTES # BLD AUTO: 0.7 K/UL (ref 0.3–1)
MONOCYTES NFR BLD: 10.7 % (ref 4–15)
NEUTROPHILS # BLD AUTO: 4.6 K/UL (ref 1.8–7.7)
NEUTROPHILS NFR BLD: 70.8 % (ref 38–73)
NRBC BLD-RTO: 0 /100 WBC
PLATELET # BLD AUTO: 268 K/UL (ref 150–350)
PMV BLD AUTO: 9.2 FL (ref 9.2–12.9)
POTASSIUM SERPL-SCNC: 4.2 MMOL/L (ref 3.5–5.1)
PROT SERPL-MCNC: 7.2 G/DL (ref 6–8.4)
RBC # BLD AUTO: 3.53 M/UL (ref 4–5.4)
SODIUM SERPL-SCNC: 141 MMOL/L (ref 136–145)
WBC # BLD AUTO: 6.44 K/UL (ref 3.9–12.7)

## 2020-07-23 PROCEDURE — 36415 COLL VENOUS BLD VENIPUNCTURE: CPT

## 2020-07-23 PROCEDURE — 25500020 PHARM REV CODE 255: Performed by: SURGERY

## 2020-07-23 PROCEDURE — 74177 CT ABD & PELVIS W/CONTRAST: CPT | Mod: TC

## 2020-07-23 PROCEDURE — 85025 COMPLETE CBC W/AUTO DIFF WBC: CPT

## 2020-07-23 PROCEDURE — 74177 CT ABDOMEN PELVIS WITH CONTRAST: ICD-10-PCS | Mod: 26,,, | Performed by: RADIOLOGY

## 2020-07-23 PROCEDURE — 80053 COMPREHEN METABOLIC PANEL: CPT

## 2020-07-23 PROCEDURE — 74177 CT ABD & PELVIS W/CONTRAST: CPT | Mod: 26,,, | Performed by: RADIOLOGY

## 2020-07-23 RX ADMIN — IOHEXOL 85 ML: 350 INJECTION, SOLUTION INTRAVENOUS at 04:07

## 2020-07-23 RX ADMIN — IOHEXOL 15 ML: 300 INJECTION, SOLUTION INTRAVENOUS at 04:07

## 2020-08-19 ENCOUNTER — PATIENT OUTREACH (OUTPATIENT)
Dept: OTHER | Facility: OTHER | Age: 60
End: 2020-08-19

## 2020-08-19 NOTE — PROGRESS NOTES
"11/17/20 Multiple open encounters. Follow outreach from 11/06/20.    8/28/20  Per Health  encounter on 8/27/20: "Patient reports elevated reading on 8/19 of 181/100 mmHg was before taking BP medication.  Reports she has been slowly taking her blood pressure medication since her BP had dropped after her colonoscopy in May. States she is back taking medication as prescribed and waiting at least 1 hr after medications to take BP reading." f/u in 4-6 weeks.    8/19/20 Left Message - Keyonna Guillen submitted a reading of 181/100 at 8/19/2020  3:18 PM.   "

## 2020-08-27 ENCOUNTER — PATIENT OUTREACH (OUTPATIENT)
Dept: OTHER | Facility: OTHER | Age: 60
End: 2020-08-27

## 2020-08-27 NOTE — PROGRESS NOTES
Digital Medicine: Health  Follow-Up    The history is provided by the patient.               Care Team received high BP alert.        Additional Follow-up details: Patient reports elevated reading on 8/19 of 181/100 mmHg was before taking BP medication.  Reports she has been slowly taking her blood pressure medication since her BP had dropped after her colonoscopy in May. States she is back taking medication as prescribed and waiting at least 1 hr after medications to take BP reading.    Will route note to clinician as she attempted to contact patient about elevated BP reading.        Diet-no change to diet    No change to diet.        Physical Activity-no change to routine  No change to exercise routine.       Additional physical activity details: Reports she has not gone out much due to COVID 19.      Medication Adherence-Medication adherence was asssessed.  Patient continue taking medication as prescribed.          Reports she is now taking blood pressure medications as prescribed.  Substance, Sleep, Stress-Not assessed      Reviewed Device Techniques.     Addressed any questions or concerns and patient has my contact information if needed prior to next outreach. Patient verbalizes understanding.      Explained the importance of self-monitoring and medication adherence. Encouraged the patient to communicate with their health  for lifestyle modifications to help improve or maintain a healthy lifestyle.            There are no preventive care reminders to display for this patient.    Last 5 Patient Entered Readings                                      Current 30 Day Average: 154/95     Recent Readings 8/22/2020 8/19/2020 8/13/2020 8/10/2020 8/9/2020    SBP (mmHg) 149 181 135 150 154    DBP (mmHg) 88 100 85 104 96    Pulse 70 79 86 71 73

## 2020-08-31 DIAGNOSIS — D17.5 LIPOMA OF INTRA-ABDOMINAL ORGANS: Primary | ICD-10-CM

## 2020-09-25 ENCOUNTER — HOSPITAL ENCOUNTER (OUTPATIENT)
Dept: RADIOLOGY | Facility: HOSPITAL | Age: 60
Discharge: HOME | End: 2020-09-25
Attending: SURGERY
Payer: COMMERCIAL

## 2020-09-25 DIAGNOSIS — D17.5 LIPOMA OF INTRA-ABDOMINAL ORGANS: ICD-10-CM

## 2020-09-25 PROCEDURE — 25500020 PHARM REV CODE 255: Performed by: SURGERY

## 2020-09-25 PROCEDURE — 74177 CT ABDOMEN PELVIS WITH CONTRAST: ICD-10-PCS | Mod: 26,,, | Performed by: RADIOLOGY

## 2020-09-25 PROCEDURE — 74177 CT ABD & PELVIS W/CONTRAST: CPT | Mod: TC

## 2020-09-25 PROCEDURE — 74177 CT ABD & PELVIS W/CONTRAST: CPT | Mod: 26,,, | Performed by: RADIOLOGY

## 2020-09-25 RX ADMIN — IOHEXOL 15 ML: 300 INJECTION, SOLUTION INTRAVENOUS at 08:09

## 2020-09-25 RX ADMIN — IOHEXOL 100 ML: 350 INJECTION, SOLUTION INTRAVENOUS at 09:09

## 2020-10-01 ENCOUNTER — PATIENT OUTREACH (OUTPATIENT)
Dept: OTHER | Facility: OTHER | Age: 60
End: 2020-10-01

## 2020-11-06 ENCOUNTER — PATIENT OUTREACH (OUTPATIENT)
Dept: OTHER | Facility: OTHER | Age: 60
End: 2020-11-06

## 2020-11-06 NOTE — PROGRESS NOTES
Digital Medicine: Health  Follow-Up    The history is provided by the patient.             Reason for review: Blood pressure not at goal        Topics Covered on Call: stress    Additional Follow-up details: Following up about elevated BP readings.  Patient attributes elevated readings to stress.            Diet-Not assessed          Physical Activity-Not assessed    Medication Adherence-Medication Adherence not addressed.        Substance, Sleep, Stress-change  stress-assessed  Details:Patient attributes elevated BP readings to family stress.   Intervention(s): States she cuddles with her 16year old dog    Sleep-  Details:  Intervention(s):    Alcohol -  Details:  Intervention(s):    Tobacco-  Details:  Intervention(s):    Additional details:Reports her daughter recently found a growth on her neck and her grandson recently had a severe staff infection that damaged his discs in lower back. States he has been on antibiotics for 6 weeks..         Provided patient education.       Addressed patient questions and patient has my contact information if needed prior to next outreach. Patient verbalizes understanding.      Explained the importance of self-monitoring and medication adherence. Encouraged the patient to communicate with their health  for lifestyle modifications to help improve or maintain a healthy lifestyle.               There are no preventive care reminders to display for this patient.      Last 5 Patient Entered Readings                                      Current 30 Day Average: 154/101     Recent Readings 11/5/2020 11/1/2020 10/30/2020 10/30/2020 10/16/2020    SBP (mmHg) 156 130 173 182 154    DBP (mmHg) 97 87 104 114 104    Pulse 79 80 64 66 77

## 2020-12-07 ENCOUNTER — PATIENT OUTREACH (OUTPATIENT)
Dept: OTHER | Facility: OTHER | Age: 60
End: 2020-12-07

## 2020-12-11 ENCOUNTER — OFFICE VISIT (OUTPATIENT)
Dept: CARDIOLOGY | Facility: CLINIC | Age: 60
End: 2020-12-11
Payer: COMMERCIAL

## 2020-12-11 VITALS
DIASTOLIC BLOOD PRESSURE: 70 MMHG | HEART RATE: 75 BPM | WEIGHT: 194.88 LBS | BODY MASS INDEX: 34.53 KG/M2 | OXYGEN SATURATION: 98 % | SYSTOLIC BLOOD PRESSURE: 124 MMHG | RESPIRATION RATE: 15 BRPM | HEIGHT: 63 IN

## 2020-12-11 DIAGNOSIS — I48.0 PAROXYSMAL ATRIAL FIBRILLATION: ICD-10-CM

## 2020-12-11 DIAGNOSIS — I10 ESSENTIAL HYPERTENSION: ICD-10-CM

## 2020-12-11 DIAGNOSIS — E78.49 OTHER HYPERLIPIDEMIA: ICD-10-CM

## 2020-12-11 DIAGNOSIS — I70.0 AORTIC ATHEROSCLEROSIS: ICD-10-CM

## 2020-12-11 DIAGNOSIS — I25.810 CORONARY ARTERY DISEASE INVOLVING CORONARY BYPASS GRAFT OF NATIVE HEART WITHOUT ANGINA PECTORIS: Primary | ICD-10-CM

## 2020-12-11 DIAGNOSIS — I65.21 STENOSIS OF RIGHT CAROTID ARTERY: ICD-10-CM

## 2020-12-11 DIAGNOSIS — N18.32 STAGE 3B CHRONIC KIDNEY DISEASE: ICD-10-CM

## 2020-12-11 DIAGNOSIS — I73.9 PAD (PERIPHERAL ARTERY DISEASE): ICD-10-CM

## 2020-12-11 DIAGNOSIS — E66.9 NON MORBID OBESITY, UNSPECIFIED OBESITY TYPE: ICD-10-CM

## 2020-12-11 PROCEDURE — 3008F BODY MASS INDEX DOCD: CPT | Mod: CPTII,S$GLB,, | Performed by: INTERNAL MEDICINE

## 2020-12-11 PROCEDURE — 3074F PR MOST RECENT SYSTOLIC BLOOD PRESSURE < 130 MM HG: ICD-10-PCS | Mod: CPTII,S$GLB,, | Performed by: INTERNAL MEDICINE

## 2020-12-11 PROCEDURE — 99999 PR PBB SHADOW E&M-EST. PATIENT-LVL IV: CPT | Mod: PBBFAC,,, | Performed by: INTERNAL MEDICINE

## 2020-12-11 PROCEDURE — 3074F SYST BP LT 130 MM HG: CPT | Mod: CPTII,S$GLB,, | Performed by: INTERNAL MEDICINE

## 2020-12-11 PROCEDURE — 93000 ELECTROCARDIOGRAM COMPLETE: CPT | Mod: S$GLB,,, | Performed by: INTERNAL MEDICINE

## 2020-12-11 PROCEDURE — 99214 PR OFFICE/OUTPT VISIT, EST, LEVL IV, 30-39 MIN: ICD-10-PCS | Mod: S$GLB,,, | Performed by: INTERNAL MEDICINE

## 2020-12-11 PROCEDURE — 99214 OFFICE O/P EST MOD 30 MIN: CPT | Mod: S$GLB,,, | Performed by: INTERNAL MEDICINE

## 2020-12-11 PROCEDURE — 93000 EKG 12-LEAD: ICD-10-PCS | Mod: S$GLB,,, | Performed by: INTERNAL MEDICINE

## 2020-12-11 PROCEDURE — 3078F DIAST BP <80 MM HG: CPT | Mod: CPTII,S$GLB,, | Performed by: INTERNAL MEDICINE

## 2020-12-11 PROCEDURE — 1126F AMNT PAIN NOTED NONE PRSNT: CPT | Mod: S$GLB,,, | Performed by: INTERNAL MEDICINE

## 2020-12-11 PROCEDURE — 1126F PR PAIN SEVERITY QUANTIFIED, NO PAIN PRESENT: ICD-10-PCS | Mod: S$GLB,,, | Performed by: INTERNAL MEDICINE

## 2020-12-11 PROCEDURE — 3008F PR BODY MASS INDEX (BMI) DOCUMENTED: ICD-10-PCS | Mod: CPTII,S$GLB,, | Performed by: INTERNAL MEDICINE

## 2020-12-11 PROCEDURE — 99999 PR PBB SHADOW E&M-EST. PATIENT-LVL IV: ICD-10-PCS | Mod: PBBFAC,,, | Performed by: INTERNAL MEDICINE

## 2020-12-11 PROCEDURE — 3078F PR MOST RECENT DIASTOLIC BLOOD PRESSURE < 80 MM HG: ICD-10-PCS | Mod: CPTII,S$GLB,, | Performed by: INTERNAL MEDICINE

## 2020-12-11 RX ORDER — LABETALOL 200 MG/1
100 TABLET, FILM COATED ORAL 2 TIMES DAILY
Qty: 180 TABLET | Refills: 3 | Status: SHIPPED | OUTPATIENT
Start: 2020-12-11 | End: 2021-04-14 | Stop reason: SDUPTHER

## 2020-12-11 NOTE — PROGRESS NOTES
CARDIOVASCULAR PROGRESS NOTE    REASON FOR CONSULT:   Keyonna Guillen is a 60 y.o. female who presents for follow up of PAF, CAD/CABG, HTN, PAD  PCP: Page  HISTORY OF PRESENT ILLNESS:   Patient returns for follow-up.  She reports generally asymptomatic status without angina or dyspnea.  There has been no palpitations or syncope.  She denies any PND, orthopnea, lower extremity edema.  There has been no melena, hematuria, or claudicant symptoms.    The patient tells me she is takin 200 mg once daily of labetalol and I explained that this is a twice daily medication.  We will change this to 100 mg b.i.d.    she also confirms that she is no longer taking cilostazol and I will remove this from her list below.    CARDIOVASCULAR HISTORY:   CAD s/p CABG 4/5/11: LIMA-LAD, SVG-D, SVG-OM  PAF s/p FRANCISCO/DCCV 3/8/18, on Xarelto, CHADS VASC 2  Hx L vert art occlusion  PAD, ?R SFA stenosis (US 5/2018)    PAST MEDICAL HISTORY:     Past Medical History:   Diagnosis Date    Atrial fibrillation     Colon polyp     Coronary artery disease     Hyperlipidemia     Hypertension        PAST SURGICAL HISTORY:     Past Surgical History:   Procedure Laterality Date    BREAST BIOPSY      CARDIAC SURGERY      COLONOSCOPY N/A 5/25/2020    Procedure: COLONOSCOPY;  Surgeon: José Luis Fonseca MD;  Location: [facility] ENDO;  Service: Endoscopy;  Laterality: N/A;  XARELTO/PLETAL ok    CORONARY ARTERY BYPASS GRAFT      CYSTOSCOPY W/ URETERAL STENT PLACEMENT Bilateral 6/25/2020    Procedure: CYSTOSCOPY, WITH URETERAL STENT INSERTION;  Surgeon: STEPHANY Smyth MD;  Location: [facility] OR;  Service: Urology;  Laterality: Bilateral;    HYSTERECTOMY      LAPAROSCOPIC HEMICOLECTOMY Right 6/25/2020    Procedure: HAND ASSISTED HEMICOLECTOMY, LAPAROSCOPIC;  Surgeon: Louis Rivas III, MD;  Location: [facility] OR;  Service: General;  Laterality: Right;  RN PREOP 6/19/2020--has cardiac clearance from Dr. Khalil, --COVID NEGATIVE and T/S on 6/23/2020  COMBINED  CASE WITH DR PIERCE       ALLERGIES AND MEDICATION:   Review of patient's allergies indicates:  No Known Allergies       Medication List          Accurate as of December 11, 2020 11:45 AM. If you have any questions, ask your nurse or doctor.            CONTINUE taking these medications    atorvastatin 40 MG tablet  Commonly known as: LIPITOR  TAKE 1 TABLET BY MOUTH EVERYDAY AT BEDTIME     cilostazoL 50 MG Tab  Commonly known as: PLETAL  Take 1 tablet (50 mg total) by mouth 2 (two) times daily.     cloNIDine 0.1 MG tablet  Commonly known as: CATAPRES  Take 1 tablet (0.1 mg total) by mouth daily as needed (If SBP > 170.).     hydrALAZINE 100 MG tablet  Commonly known as: APRESOLINE  Take 1.5 tablets (150 mg total) by mouth 2 (two) times daily.     hydroCHLOROthiazide 50 MG tablet  Commonly known as: HYDRODIURIL  Take 1 tablet (50 mg total) by mouth once daily.     labetaloL 200 MG tablet  Commonly known as: NORMODYNE  Take 1 tablet (200 mg total) by mouth 2 (two) times daily.     olmesartan 40 MG tablet  Commonly known as: BENICAR  TAKE 1 TABLET BY MOUTH EVERY DAY     sertraline 25 MG tablet  Commonly known as: ZOLOFT  TAKE 1 TABLET BY MOUTH EVERY DAY     spironolactone 50 MG tablet  Commonly known as: ALDACTONE  TAKE 1 TABLET BY MOUTH EVERY DAY     XARELTO 20 mg Tab  Generic drug: rivaroxaban  TAKE 1 TABLET (20 MG TOTAL) BY MOUTH DAILY WITH DINNER OR EVENING MEAL.              SOCIAL HISTORY:     Social History     Socioeconomic History    Marital status:      Spouse name: Not on file    Number of children: Not on file    Years of education: Not on file    Highest education level: Not on file   Occupational History    Not on file   Social Needs    Financial resource strain: Not on file    Food insecurity     Worry: Not on file     Inability: Not on file    Transportation needs     Medical: Not on file     Non-medical: Not on file   Tobacco Use    Smoking status: Never Smoker    Smokeless tobacco:  "Never Used   Substance and Sexual Activity    Alcohol use: No    Drug use: No    Sexual activity: Yes     Partners: Male     Birth control/protection: Surgical   Lifestyle    Physical activity     Days per week: Not on file     Minutes per session: Not on file    Stress: Not on file   Relationships    Social connections     Talks on phone: Not on file     Gets together: Not on file     Attends Shinto service: Not on file     Active member of club or organization: Not on file     Attends meetings of clubs or organizations: Not on file     Relationship status: Not on file   Other Topics Concern    Not on file   Social History Narrative    Not on file       FAMILY HISTORY:     Family History   Problem Relation Age of Onset    Cancer Mother     Breast cancer Maternal Uncle        REVIEW OF SYSTEMS:   Review of Systems   Constitutional: Negative for chills, diaphoresis and fever.   HENT: Negative for nosebleeds.    Eyes: Negative for blurred vision, double vision and photophobia.   Respiratory: Negative for hemoptysis, shortness of breath and wheezing.    Cardiovascular: Negative for chest pain, palpitations, orthopnea, claudication, leg swelling and PND.   Gastrointestinal: Negative for abdominal pain, blood in stool, heartburn, melena, nausea and vomiting.   Genitourinary: Negative for flank pain and hematuria.   Musculoskeletal: Negative for falls, myalgias and neck pain.   Skin: Negative for rash.   Neurological: Negative for dizziness, seizures, loss of consciousness, weakness and headaches.   Endo/Heme/Allergies: Negative for polydipsia. Does not bruise/bleed easily.   Psychiatric/Behavioral: Negative for depression and memory loss. The patient is not nervous/anxious.        PHYSICAL EXAM:     Vitals:    12/11/20 1137   BP: 124/70   Pulse: 75   Resp: 15    Body mass index is 34.52 kg/m².  Weight: 88.4 kg (194 lb 14.2 oz)   Height: 5' 3" (160 cm)     Physical Exam  Vitals signs reviewed. "   Constitutional:       General: She is not in acute distress.     Appearance: She is well-developed. She is obese. She is not ill-appearing, toxic-appearing or diaphoretic.   HENT:      Head: Normocephalic and atraumatic.   Eyes:      General: No scleral icterus.     Conjunctiva/sclera: Conjunctivae normal.      Pupils: Pupils are equal, round, and reactive to light.   Neck:      Musculoskeletal: Normal range of motion and neck supple. No edema or neck rigidity.      Thyroid: No thyromegaly.      Vascular: Normal carotid pulses. No carotid bruit or JVD.      Trachea: Trachea normal. No tracheal deviation.   Cardiovascular:      Rate and Rhythm: Normal rate and regular rhythm.      Pulses:           Carotid pulses are 2+ on the right side and 2+ on the left side.     Heart sounds: S1 normal and S2 normal. No murmur. No friction rub. No gallop.    Pulmonary:      Effort: Pulmonary effort is normal. No respiratory distress.      Breath sounds: Normal breath sounds. No stridor. No wheezing, rhonchi or rales.   Chest:      Chest wall: No tenderness.   Abdominal:      General: There is no distension.      Palpations: Abdomen is soft.   Musculoskeletal: Normal range of motion.         General: No swelling or tenderness.   Feet:      Right foot:      Skin integrity: No ulcer.      Left foot:      Skin integrity: No ulcer.   Skin:     General: Skin is warm and dry.      Findings: No erythema or rash.   Neurological:      Mental Status: She is alert and oriented to person, place, and time.      Cranial Nerves: No cranial nerve deficit.   Psychiatric:         Speech: Speech normal.         Behavior: Behavior normal. Behavior is cooperative.         DATA:   EKG: (personally reviewed tracing)  12/22/20 SR 75    Laboratory:  CBC:  Recent Labs   Lab 06/25/20  1256 06/26/20  0412 07/23/20  1549   WBC 11.50 6.23 6.44   Hemoglobin 10.8 L 10.2 L 10.7 L   Hematocrit 33.2 L 31.4 L 33.7 L   Platelets 193 181 268        CHEMISTRIES:  Recent Labs   Lab 03/09/18  0615 04/17/18  1344  06/26/20  0412 06/27/20  1446 07/23/20  1549 09/25/20  0804   Glucose 116 H 90   < > 94 88 119 H  --    Sodium 143 144   < > 135 L 135 L 141  --    Potassium 3.0 L 4.3   < > 5.0 4.9 4.2  --    BUN 21 H 20   < > 22 22 27 H  --    Creatinine 0.9 0.9   < > 1.1 1.0 1.3 1.3   eGFR if African American >60 >60   < > >60 >60 51 A 51 A   eGFR if non African American >60 >60   < > 54 A >60 44 A 44 A   Calcium 9.5 10.3   < > 8.8 8.9 9.3  --    Magnesium 2.0 2.3  --   --   --   --   --     < > = values in this interval not displayed.       CARDIAC BIOMARKERS:  Recent Labs   Lab 03/09/18  0150 03/09/18  0826 04/17/18  1344   CPK  --   --  63   Troponin I 0.027 H 0.065 H  --        COAGS:  Recent Labs   Lab 04/17/18  1344   INR 1.1       LIPIDS/LFTS:  Recent Labs   Lab 03/09/18  0615  05/22/19  0843  06/19/20  1420 06/27/20  1446 07/23/20  1549   Cholesterol 158  --  170  --   --   --   --    Triglycerides 142  --  126  --   --   --   --    HDL 39 L  --  41  --   --   --   --    LDL Cholesterol 90.6  --  103.8  --   --   --   --    Non-HDL Cholesterol 119  --  129  --   --   --   --    AST  --    < > 15   < > 22 15 49 H   ALT  --    < > 16   < > 40 17 77 H    < > = values in this interval not displayed.     Lab Results   Component Value Date    TSH 1.958 05/22/2019         Cardiovascular Testing:  Echo 1/24/19  · Normal left ventricular systolic function. The estimated ejection fraction is 60%  · Mild left ventricular enlargement.  · Eccentric left ventricular hypertrophy.  · No wall motion abnormalities.  · Normal right ventricular systolic function.  · Mild tricuspid regurgitation.  · The estimated PA systolic pressure is 34 mm Hg    Renal art US 1/10/19  -Normal sized kidneys without evidence for nephrolithiasis or hydronephrosis.  -There is slight increased peak systolic velocity within the right renal artery from the proximal to mid aspect from 50 cm/sec to  158 cm/sec which is likely related to vessel tortuosity and turbulent flow in light of the absent elevated resistive indices or ratios however underlying right renal artery stenosis cannot be excluded.  This could be further evaluated with CTA or MRA imaging.  -No evidence for left renal artery elevated resistive indices or renal artery velocities to suggest renal artery stenosis.    LE art US 5/31/18  R STEFANI 1.09  L STEFANI 1.08  Elevated velocities within the mid to distal right superficial femoral artery strongly suggestive of hemodynamically significant stenoses within the mid to distal right superficial femoral artery.    LE venous US/reflux 5/31/18  No evidence for deep venous thrombosis within either lower extremity.  No evidence for venous insufficiency within the greater or lesser saphenous veins bilaterally.    Carotid US 4/4/18 (images prev pers rev, ?L vert occlusion)  TDS  There is 40 - 49% right Internal Carotid stenosis.  There is 0 - 19% left Internal Carotid stenosis.    [place] MPI 4/4/18   Nuclear Quantitative Functional Analysis:   LVEF: 69 %  LVED Volume: 88 ml  LVES Volume: 27 ml  Impression: NORMAL MYOCARDIAL PERFUSION  1. The perfusion scan is free of evidence for myocardial ischemia or injury.   2. Resting wall motion is physiologic.   3. Resting LV function is normal.   4. The ventricular volumes are normal at rest and stress.   5. The extracardiac distribution of radioactivity is normal.     FRANCISCO/DCCV 3/9/18  Normal bivent size/fxn, EF 55%, normal wall motion  Normal appearing valves.  Trace AI, mild MR, mild TR  No LA/LON thrombus  Succcessful DCCV af->NSR 360J x1.  Plan:  Cont med rx  Cont Xarelto 20mg qd  OK for discharge later on today and follow up with me in the office 1 week for outpatient stress test.    Cath 3/25/11 ([facility], subsequently had CABG)  LM: normal  LAD sequential 90% prox and 80% mid stenoses   D2 prox 80%  LCx: MLI   OM2 prox 80%  RCA: dom, mid-dist 40% sequential  stenoses    ASSESSMENT:   # HTN, controlled  # CAD s/p CABG x3V 4/2011.  MPI 4/2018 normal.   # PAD, bilat buttock and RLE claudication, R SFA stenosis noted on US 5/2018.  Sxs seemd to have resolved (pt thinks it was more related to R hip pain, did not notice much of a difference with pletal rx)  # PAF s/p FRANCISCO/DCCV 3/9/18.  CHADS VASC 2 on Xarelto.  Currently in SR.  # HLP on atorva 40mg  # hx L vert art occlusion  # CKD3b  # BMI 35, up 1 unit vs last OV  # preop for colon resection  # aortic atherosclerosis (CT A/P 9/25/20)    PLAN:   Cont med rx  Cont Xarelto 20mg qd  Pletal removed from med list  Surveillance carotid US  Diet/exercise/weight loss  RTC 6 months   Consider LE angio in future for eval of LE PAD if limiting claudication recurs      José Khalil MD, FACC

## 2021-03-04 ENCOUNTER — HOSPITAL ENCOUNTER (OUTPATIENT)
Dept: RADIOLOGY | Facility: HOSPITAL | Age: 61
Discharge: HOME | End: 2021-03-04
Attending: FAMILY MEDICINE
Payer: COMMERCIAL

## 2021-03-04 ENCOUNTER — OFFICE VISIT (OUTPATIENT)
Dept: FAMILY MEDICINE | Facility: CLINIC | Age: 61
End: 2021-03-04
Payer: COMMERCIAL

## 2021-03-04 VITALS
TEMPERATURE: 98 F | BODY MASS INDEX: 34.8 KG/M2 | HEART RATE: 63 BPM | DIASTOLIC BLOOD PRESSURE: 64 MMHG | SYSTOLIC BLOOD PRESSURE: 132 MMHG | RESPIRATION RATE: 18 BRPM | OXYGEN SATURATION: 98 % | WEIGHT: 196.44 LBS

## 2021-03-04 DIAGNOSIS — R10.31 RIGHT LOWER QUADRANT PAIN: ICD-10-CM

## 2021-03-04 PROCEDURE — 99214 OFFICE O/P EST MOD 30 MIN: CPT | Mod: S$GLB,,, | Performed by: FAMILY MEDICINE

## 2021-03-04 PROCEDURE — 3008F PR BODY MASS INDEX (BMI) DOCUMENTED: ICD-10-PCS | Mod: CPTII,S$GLB,, | Performed by: FAMILY MEDICINE

## 2021-03-04 PROCEDURE — 74177 CT ABDOMEN PELVIS WITH CONTRAST: ICD-10-PCS | Mod: 26,,, | Performed by: RADIOLOGY

## 2021-03-04 PROCEDURE — 3078F PR MOST RECENT DIASTOLIC BLOOD PRESSURE < 80 MM HG: ICD-10-PCS | Mod: CPTII,S$GLB,, | Performed by: FAMILY MEDICINE

## 2021-03-04 PROCEDURE — 99214 PR OFFICE/OUTPT VISIT, EST, LEVL IV, 30-39 MIN: ICD-10-PCS | Mod: S$GLB,,, | Performed by: FAMILY MEDICINE

## 2021-03-04 PROCEDURE — 25500020 PHARM REV CODE 255: Performed by: FAMILY MEDICINE

## 2021-03-04 PROCEDURE — 3078F DIAST BP <80 MM HG: CPT | Mod: CPTII,S$GLB,, | Performed by: FAMILY MEDICINE

## 2021-03-04 PROCEDURE — 1125F AMNT PAIN NOTED PAIN PRSNT: CPT | Mod: S$GLB,,, | Performed by: FAMILY MEDICINE

## 2021-03-04 PROCEDURE — 99999 PR PBB SHADOW E&M-EST. PATIENT-LVL IV: ICD-10-PCS | Mod: PBBFAC,,, | Performed by: FAMILY MEDICINE

## 2021-03-04 PROCEDURE — 1125F PR PAIN SEVERITY QUANTIFIED, PAIN PRESENT: ICD-10-PCS | Mod: S$GLB,,, | Performed by: FAMILY MEDICINE

## 2021-03-04 PROCEDURE — 3075F PR MOST RECENT SYSTOLIC BLOOD PRESS GE 130-139MM HG: ICD-10-PCS | Mod: CPTII,S$GLB,, | Performed by: FAMILY MEDICINE

## 2021-03-04 PROCEDURE — 99999 PR PBB SHADOW E&M-EST. PATIENT-LVL IV: CPT | Mod: PBBFAC,,, | Performed by: FAMILY MEDICINE

## 2021-03-04 PROCEDURE — 74177 CT ABD & PELVIS W/CONTRAST: CPT | Mod: TC

## 2021-03-04 PROCEDURE — 3075F SYST BP GE 130 - 139MM HG: CPT | Mod: CPTII,S$GLB,, | Performed by: FAMILY MEDICINE

## 2021-03-04 PROCEDURE — 3008F BODY MASS INDEX DOCD: CPT | Mod: CPTII,S$GLB,, | Performed by: FAMILY MEDICINE

## 2021-03-04 PROCEDURE — 74177 CT ABD & PELVIS W/CONTRAST: CPT | Mod: 26,,, | Performed by: RADIOLOGY

## 2021-03-04 RX ADMIN — IOHEXOL 15 ML: 300 INJECTION, SOLUTION INTRAVENOUS at 09:03

## 2021-03-04 RX ADMIN — IOHEXOL 85 ML: 350 INJECTION, SOLUTION INTRAVENOUS at 10:03

## 2021-03-05 ENCOUNTER — TELEPHONE (OUTPATIENT)
Dept: FAMILY MEDICINE | Facility: CLINIC | Age: 61
End: 2021-03-05

## 2021-03-05 ENCOUNTER — PATIENT MESSAGE (OUTPATIENT)
Dept: FAMILY MEDICINE | Facility: CLINIC | Age: 61
End: 2021-03-05

## 2021-03-05 DIAGNOSIS — N18.32 STAGE 3B CHRONIC KIDNEY DISEASE: Primary | ICD-10-CM

## 2021-03-08 ENCOUNTER — TELEPHONE (OUTPATIENT)
Dept: FAMILY MEDICINE | Facility: CLINIC | Age: 61
End: 2021-03-08

## 2021-03-19 ENCOUNTER — LAB VISIT (OUTPATIENT)
Dept: LAB | Facility: HOSPITAL | Age: 61
End: 2021-03-19
Attending: FAMILY MEDICINE
Payer: COMMERCIAL

## 2021-03-19 DIAGNOSIS — N18.32 STAGE 3B CHRONIC KIDNEY DISEASE: ICD-10-CM

## 2021-03-19 LAB
ALBUMIN SERPL BCP-MCNC: 4 G/DL (ref 3.5–5.2)
ALP SERPL-CCNC: 121 U/L (ref 55–135)
ALT SERPL W/O P-5'-P-CCNC: 35 U/L (ref 10–44)
ANION GAP SERPL CALC-SCNC: 11 MMOL/L (ref 8–16)
AST SERPL-CCNC: 23 U/L (ref 10–40)
BILIRUB SERPL-MCNC: 0.5 MG/DL (ref 0.1–1)
BUN SERPL-MCNC: 29 MG/DL (ref 8–23)
CALCIUM SERPL-MCNC: 9.7 MG/DL (ref 8.7–10.5)
CHLORIDE SERPL-SCNC: 108 MMOL/L (ref 95–110)
CO2 SERPL-SCNC: 26 MMOL/L (ref 23–29)
CREAT SERPL-MCNC: 1.1 MG/DL (ref 0.5–1.4)
EST. GFR  (AFRICAN AMERICAN): >60 ML/MIN/1.73 M^2
EST. GFR  (NON AFRICAN AMERICAN): 54 ML/MIN/1.73 M^2
GLUCOSE SERPL-MCNC: 103 MG/DL (ref 70–110)
POTASSIUM SERPL-SCNC: 5 MMOL/L (ref 3.5–5.1)
PROT SERPL-MCNC: 7 G/DL (ref 6–8.4)
PTH-INTACT SERPL-MCNC: 111.6 PG/ML (ref 9–77)
SODIUM SERPL-SCNC: 145 MMOL/L (ref 136–145)

## 2021-03-19 PROCEDURE — 80053 COMPREHEN METABOLIC PANEL: CPT | Performed by: FAMILY MEDICINE

## 2021-03-19 PROCEDURE — 36415 COLL VENOUS BLD VENIPUNCTURE: CPT | Mod: PN | Performed by: FAMILY MEDICINE

## 2021-03-19 PROCEDURE — 83970 ASSAY OF PARATHORMONE: CPT | Performed by: FAMILY MEDICINE

## 2021-04-14 ENCOUNTER — OFFICE VISIT (OUTPATIENT)
Dept: CARDIOLOGY | Facility: CLINIC | Age: 61
End: 2021-04-14
Payer: COMMERCIAL

## 2021-04-14 VITALS
SYSTOLIC BLOOD PRESSURE: 144 MMHG | HEIGHT: 63 IN | HEART RATE: 75 BPM | OXYGEN SATURATION: 97 % | RESPIRATION RATE: 15 BRPM | DIASTOLIC BLOOD PRESSURE: 82 MMHG | BODY MASS INDEX: 33.83 KG/M2 | WEIGHT: 190.94 LBS

## 2021-04-14 DIAGNOSIS — I65.02 OCCLUSION OF LEFT VERTEBRAL ARTERY: ICD-10-CM

## 2021-04-14 DIAGNOSIS — I48.0 PAROXYSMAL ATRIAL FIBRILLATION: ICD-10-CM

## 2021-04-14 DIAGNOSIS — I70.0 AORTIC ATHEROSCLEROSIS: ICD-10-CM

## 2021-04-14 DIAGNOSIS — I10 ESSENTIAL HYPERTENSION: Chronic | ICD-10-CM

## 2021-04-14 DIAGNOSIS — E78.49 OTHER HYPERLIPIDEMIA: ICD-10-CM

## 2021-04-14 DIAGNOSIS — I73.9 PAD (PERIPHERAL ARTERY DISEASE): ICD-10-CM

## 2021-04-14 DIAGNOSIS — I25.810 CORONARY ARTERY DISEASE INVOLVING CORONARY BYPASS GRAFT OF NATIVE HEART WITHOUT ANGINA PECTORIS: Primary | ICD-10-CM

## 2021-04-14 PROCEDURE — 1126F PR PAIN SEVERITY QUANTIFIED, NO PAIN PRESENT: ICD-10-PCS | Mod: S$GLB,,, | Performed by: INTERNAL MEDICINE

## 2021-04-14 PROCEDURE — 99214 PR OFFICE/OUTPT VISIT, EST, LEVL IV, 30-39 MIN: ICD-10-PCS | Mod: S$GLB,,, | Performed by: INTERNAL MEDICINE

## 2021-04-14 PROCEDURE — 99214 OFFICE O/P EST MOD 30 MIN: CPT | Mod: S$GLB,,, | Performed by: INTERNAL MEDICINE

## 2021-04-14 PROCEDURE — 99999 PR PBB SHADOW E&M-EST. PATIENT-LVL IV: ICD-10-PCS | Mod: PBBFAC,,, | Performed by: INTERNAL MEDICINE

## 2021-04-14 PROCEDURE — 93000 ELECTROCARDIOGRAM COMPLETE: CPT | Mod: S$GLB,,, | Performed by: INTERNAL MEDICINE

## 2021-04-14 PROCEDURE — 1126F AMNT PAIN NOTED NONE PRSNT: CPT | Mod: S$GLB,,, | Performed by: INTERNAL MEDICINE

## 2021-04-14 PROCEDURE — 93000 EKG 12-LEAD: ICD-10-PCS | Mod: S$GLB,,, | Performed by: INTERNAL MEDICINE

## 2021-04-14 PROCEDURE — 3008F BODY MASS INDEX DOCD: CPT | Mod: CPTII,S$GLB,, | Performed by: INTERNAL MEDICINE

## 2021-04-14 PROCEDURE — 99999 PR PBB SHADOW E&M-EST. PATIENT-LVL IV: CPT | Mod: PBBFAC,,, | Performed by: INTERNAL MEDICINE

## 2021-04-14 PROCEDURE — 3008F PR BODY MASS INDEX (BMI) DOCUMENTED: ICD-10-PCS | Mod: CPTII,S$GLB,, | Performed by: INTERNAL MEDICINE

## 2021-04-14 RX ORDER — CYCLOBENZAPRINE HCL 10 MG
TABLET ORAL
COMMUNITY
End: 2022-01-07 | Stop reason: CLARIF

## 2021-04-14 RX ORDER — LABETALOL 200 MG/1
200 TABLET, FILM COATED ORAL 2 TIMES DAILY
Qty: 180 TABLET | Refills: 3 | Status: SHIPPED | OUTPATIENT
Start: 2021-04-14 | End: 2022-01-03

## 2021-04-29 ENCOUNTER — LAB VISIT (OUTPATIENT)
Dept: LAB | Facility: HOSPITAL | Age: 61
End: 2021-04-29
Attending: FAMILY MEDICINE
Payer: COMMERCIAL

## 2021-04-29 ENCOUNTER — CLINICAL SUPPORT (OUTPATIENT)
Dept: FAMILY MEDICINE | Facility: CLINIC | Age: 61
End: 2021-04-29
Payer: COMMERCIAL

## 2021-04-29 VITALS — SYSTOLIC BLOOD PRESSURE: 132 MMHG | OXYGEN SATURATION: 97 % | DIASTOLIC BLOOD PRESSURE: 80 MMHG | HEART RATE: 60 BPM

## 2021-04-29 DIAGNOSIS — I10 ESSENTIAL HYPERTENSION: Primary | ICD-10-CM

## 2021-04-29 DIAGNOSIS — R30.0 DYSURIA: ICD-10-CM

## 2021-04-29 DIAGNOSIS — R30.0 DYSURIA: Primary | ICD-10-CM

## 2021-04-29 LAB
BACTERIA #/AREA URNS HPF: ABNORMAL /HPF
BILIRUB UR QL STRIP: NEGATIVE
CLARITY UR: CLEAR
COLOR UR: YELLOW
GLUCOSE UR QL STRIP: NEGATIVE
HGB UR QL STRIP: NEGATIVE
KETONES UR QL STRIP: NEGATIVE
LEUKOCYTE ESTERASE UR QL STRIP: ABNORMAL
MICROSCOPIC COMMENT: ABNORMAL
NITRITE UR QL STRIP: NEGATIVE
PH UR STRIP: 5 [PH] (ref 5–8)
PROT UR QL STRIP: NEGATIVE
SP GR UR STRIP: 1.01 (ref 1–1.03)
URN SPEC COLLECT METH UR: ABNORMAL
UROBILINOGEN UR STRIP-ACNC: NEGATIVE EU/DL
WBC #/AREA URNS HPF: 3 /HPF (ref 0–5)
WBC CLUMPS URNS QL MICRO: ABNORMAL

## 2021-04-29 PROCEDURE — 99999 PR PBB SHADOW E&M-EST. PATIENT-LVL II: ICD-10-PCS | Mod: PBBFAC,,,

## 2021-04-29 PROCEDURE — 81000 URINALYSIS NONAUTO W/SCOPE: CPT | Performed by: FAMILY MEDICINE

## 2021-04-29 PROCEDURE — 99999 PR PBB SHADOW E&M-EST. PATIENT-LVL II: CPT | Mod: PBBFAC,,,

## 2021-04-29 PROCEDURE — 99499 UNLISTED E&M SERVICE: CPT | Mod: S$GLB,,, | Performed by: FAMILY MEDICINE

## 2021-04-29 PROCEDURE — 99499 NO LOS: ICD-10-PCS | Mod: S$GLB,,, | Performed by: FAMILY MEDICINE

## 2021-04-30 ENCOUNTER — PATIENT MESSAGE (OUTPATIENT)
Dept: FAMILY MEDICINE | Facility: CLINIC | Age: 61
End: 2021-04-30

## 2021-05-03 ENCOUNTER — OFFICE VISIT (OUTPATIENT)
Dept: FAMILY MEDICINE | Facility: CLINIC | Age: 61
End: 2021-05-03
Payer: COMMERCIAL

## 2021-05-03 VITALS
OXYGEN SATURATION: 95 % | WEIGHT: 190.5 LBS | SYSTOLIC BLOOD PRESSURE: 138 MMHG | BODY MASS INDEX: 33.75 KG/M2 | RESPIRATION RATE: 19 BRPM | HEIGHT: 63 IN | HEART RATE: 87 BPM | DIASTOLIC BLOOD PRESSURE: 70 MMHG | TEMPERATURE: 99 F

## 2021-05-03 DIAGNOSIS — R35.0 URINARY FREQUENCY: Primary | ICD-10-CM

## 2021-05-03 DIAGNOSIS — E27.9 LESION OF ADRENAL GLAND: ICD-10-CM

## 2021-05-03 DIAGNOSIS — R10.31 CHRONIC RIGHT LOWER QUADRANT PAIN: ICD-10-CM

## 2021-05-03 DIAGNOSIS — G89.29 CHRONIC RIGHT LOWER QUADRANT PAIN: ICD-10-CM

## 2021-05-03 DIAGNOSIS — M54.50 ACUTE BILATERAL LOW BACK PAIN WITHOUT SCIATICA: ICD-10-CM

## 2021-05-03 DIAGNOSIS — R50.9 FEVER, UNSPECIFIED FEVER CAUSE: ICD-10-CM

## 2021-05-03 LAB
BILIRUB UR QL STRIP: NEGATIVE
CLARITY UR: CLEAR
COLOR UR: YELLOW
GLUCOSE UR QL STRIP: NEGATIVE
HGB UR QL STRIP: NEGATIVE
KETONES UR QL STRIP: NEGATIVE
LEUKOCYTE ESTERASE UR QL STRIP: NEGATIVE
NITRITE UR QL STRIP: NEGATIVE
PH UR STRIP: 5 [PH] (ref 5–8)
PROT UR QL STRIP: NEGATIVE
SP GR UR STRIP: 1.01 (ref 1–1.03)
URN SPEC COLLECT METH UR: NORMAL
UROBILINOGEN UR STRIP-ACNC: NEGATIVE EU/DL

## 2021-05-03 PROCEDURE — U0003 INFECTIOUS AGENT DETECTION BY NUCLEIC ACID (DNA OR RNA); SEVERE ACUTE RESPIRATORY SYNDROME CORONAVIRUS 2 (SARS-COV-2) (CORONAVIRUS DISEASE [COVID-19]), AMPLIFIED PROBE TECHNIQUE, MAKING USE OF HIGH THROUGHPUT TECHNOLOGIES AS DESCRIBED BY CMS-2020-01-R: HCPCS | Performed by: NURSE PRACTITIONER

## 2021-05-03 PROCEDURE — 99214 PR OFFICE/OUTPT VISIT, EST, LEVL IV, 30-39 MIN: ICD-10-PCS | Mod: S$GLB,,, | Performed by: NURSE PRACTITIONER

## 2021-05-03 PROCEDURE — 1125F AMNT PAIN NOTED PAIN PRSNT: CPT | Mod: S$GLB,,, | Performed by: NURSE PRACTITIONER

## 2021-05-03 PROCEDURE — 99214 OFFICE O/P EST MOD 30 MIN: CPT | Mod: S$GLB,,, | Performed by: NURSE PRACTITIONER

## 2021-05-03 PROCEDURE — U0005 INFEC AGEN DETEC AMPLI PROBE: HCPCS | Performed by: NURSE PRACTITIONER

## 2021-05-03 PROCEDURE — 87186 SC STD MICRODIL/AGAR DIL: CPT | Performed by: NURSE PRACTITIONER

## 2021-05-03 PROCEDURE — 81003 URINALYSIS AUTO W/O SCOPE: CPT | Performed by: NURSE PRACTITIONER

## 2021-05-03 PROCEDURE — 3008F BODY MASS INDEX DOCD: CPT | Mod: CPTII,S$GLB,, | Performed by: NURSE PRACTITIONER

## 2021-05-03 PROCEDURE — 99999 PR PBB SHADOW E&M-EST. PATIENT-LVL V: ICD-10-PCS | Mod: PBBFAC,,, | Performed by: NURSE PRACTITIONER

## 2021-05-03 PROCEDURE — 87088 URINE BACTERIA CULTURE: CPT | Performed by: NURSE PRACTITIONER

## 2021-05-03 PROCEDURE — 87086 URINE CULTURE/COLONY COUNT: CPT | Performed by: NURSE PRACTITIONER

## 2021-05-03 PROCEDURE — 87077 CULTURE AEROBIC IDENTIFY: CPT | Performed by: NURSE PRACTITIONER

## 2021-05-03 PROCEDURE — 3008F PR BODY MASS INDEX (BMI) DOCUMENTED: ICD-10-PCS | Mod: CPTII,S$GLB,, | Performed by: NURSE PRACTITIONER

## 2021-05-03 PROCEDURE — 1125F PR PAIN SEVERITY QUANTIFIED, PAIN PRESENT: ICD-10-PCS | Mod: S$GLB,,, | Performed by: NURSE PRACTITIONER

## 2021-05-03 PROCEDURE — 99999 PR PBB SHADOW E&M-EST. PATIENT-LVL V: CPT | Mod: PBBFAC,,, | Performed by: NURSE PRACTITIONER

## 2021-05-04 ENCOUNTER — PATIENT MESSAGE (OUTPATIENT)
Dept: FAMILY MEDICINE | Facility: CLINIC | Age: 61
End: 2021-05-04

## 2021-05-04 LAB — SARS-COV-2 RNA RESP QL NAA+PROBE: NOT DETECTED

## 2021-05-05 ENCOUNTER — PATIENT MESSAGE (OUTPATIENT)
Dept: FAMILY MEDICINE | Facility: CLINIC | Age: 61
End: 2021-05-05

## 2021-05-05 LAB — BACTERIA UR CULT: ABNORMAL

## 2021-05-07 ENCOUNTER — HOSPITAL ENCOUNTER (OUTPATIENT)
Dept: CARDIOLOGY | Facility: HOSPITAL | Age: 61
Discharge: HOME | End: 2021-05-07
Attending: INTERNAL MEDICINE
Payer: COMMERCIAL

## 2021-05-07 DIAGNOSIS — I65.21 STENOSIS OF RIGHT CAROTID ARTERY: ICD-10-CM

## 2021-05-07 LAB
LEFT CBA DIAS: 24 CM/S
LEFT CBA SYS: 65 CM/S
LEFT CCA DIST DIAS: 8 CM/S
LEFT CCA DIST SYS: 70 CM/S
LEFT CCA MID DIAS: 25 CM/S
LEFT CCA MID SYS: 97 CM/S
LEFT CCA PROX DIAS: 36 CM/S
LEFT CCA PROX SYS: 103 CM/S
LEFT ECA DIAS: 11 CM/S
LEFT ECA SYS: 85 CM/S
LEFT ICA DIST DIAS: 5 CM/S
LEFT ICA DIST SYS: 2 CM/S
LEFT ICA MID DIAS: 29 CM/S
LEFT ICA MID SYS: 65 CM/S
LEFT ICA PROX DIAS: 31 CM/S
LEFT ICA PROX SYS: 77 CM/S
LEFT VERTEBRAL DIAS: 25 CM/S
LEFT VERTEBRAL SYS: 62 CM/S
OHS CV CAROTID RIGHT ICA EDV HIGHEST: 39
OHS CV CAROTID ULTRASOUND LEFT ICA/CCA RATIO: 1.1
OHS CV CAROTID ULTRASOUND RIGHT ICA/CCA RATIO: 1.29
OHS CV PV CAROTID LEFT HIGHEST CCA: 103
OHS CV PV CAROTID LEFT HIGHEST ICA: 77
OHS CV PV CAROTID RIGHT HIGHEST CCA: 81
OHS CV PV CAROTID RIGHT HIGHEST ICA: 98
OHS CV US CAROTID LEFT HIGHEST EDV: 31
RIGHT CBA DIAS: 34 CM/S
RIGHT CBA SYS: 99 CM/S
RIGHT CCA DIST DIAS: 23 CM/S
RIGHT CCA DIST SYS: 76 CM/S
RIGHT CCA MID DIAS: 27 CM/S
RIGHT CCA MID SYS: 81 CM/S
RIGHT CCA PROX DIAS: 20 CM/S
RIGHT CCA PROX SYS: 81 CM/S
RIGHT ECA DIAS: 11 CM/S
RIGHT ECA SYS: 75 CM/S
RIGHT ICA DIST DIAS: 29 CM/S
RIGHT ICA DIST SYS: 59 CM/S
RIGHT ICA MID DIAS: 21 CM/S
RIGHT ICA MID SYS: 49 CM/S
RIGHT ICA PROX DIAS: 39 CM/S
RIGHT ICA PROX SYS: 98 CM/S
RIGHT VERTEBRAL DIAS: 37 CM/S
RIGHT VERTEBRAL SYS: 71 CM/S

## 2021-05-07 PROCEDURE — 93880 EXTRACRANIAL BILAT STUDY: CPT | Mod: 26,,, | Performed by: INTERNAL MEDICINE

## 2021-05-07 PROCEDURE — 93880 CV US DOPPLER CAROTID (CUPID ONLY): ICD-10-PCS | Mod: 26,,, | Performed by: INTERNAL MEDICINE

## 2021-05-07 PROCEDURE — 93880 EXTRACRANIAL BILAT STUDY: CPT

## 2021-06-04 ENCOUNTER — HOSPITAL ENCOUNTER (OUTPATIENT)
Dept: RADIOLOGY | Facility: HOSPITAL | Age: 61
Discharge: HOME | End: 2021-06-04
Attending: INTERNAL MEDICINE
Payer: COMMERCIAL

## 2021-06-04 DIAGNOSIS — R10.31 ABDOMINAL PAIN, RIGHT LOWER QUADRANT: ICD-10-CM

## 2021-06-04 DIAGNOSIS — Z86.010 HISTORY OF COLON POLYPS: ICD-10-CM

## 2021-06-04 PROCEDURE — 74177 CT ABD & PELVIS W/CONTRAST: CPT | Mod: 26,,, | Performed by: RADIOLOGY

## 2021-06-04 PROCEDURE — 25500020 PHARM REV CODE 255: Performed by: INTERNAL MEDICINE

## 2021-06-04 PROCEDURE — 74177 CT ABD & PELVIS W/CONTRAST: CPT | Mod: TC

## 2021-06-04 PROCEDURE — 74177 CT ABDOMEN PELVIS WITH CONTRAST: ICD-10-PCS | Mod: 26,,, | Performed by: RADIOLOGY

## 2021-06-04 RX ADMIN — IOHEXOL 85 ML: 350 INJECTION, SOLUTION INTRAVENOUS at 08:06

## 2021-06-04 RX ADMIN — IOHEXOL 15 ML: 300 INJECTION, SOLUTION INTRAVENOUS at 07:06

## 2021-07-28 ENCOUNTER — PATIENT OUTREACH (OUTPATIENT)
Dept: ADMINISTRATIVE | Facility: HOSPITAL | Age: 61
End: 2021-07-28

## 2021-07-28 ENCOUNTER — PATIENT MESSAGE (OUTPATIENT)
Dept: ADMINISTRATIVE | Facility: HOSPITAL | Age: 61
End: 2021-07-28

## 2021-07-28 DIAGNOSIS — Z12.31 BREAST CANCER SCREENING BY MAMMOGRAM: Primary | ICD-10-CM

## 2021-10-26 ENCOUNTER — PATIENT OUTREACH (OUTPATIENT)
Dept: ADMINISTRATIVE | Facility: HOSPITAL | Age: 61
End: 2021-10-26
Payer: COMMERCIAL

## 2021-10-28 ENCOUNTER — TELEPHONE (OUTPATIENT)
Dept: FAMILY MEDICINE | Facility: CLINIC | Age: 61
End: 2021-10-28
Payer: COMMERCIAL

## 2021-12-15 ENCOUNTER — PATIENT MESSAGE (OUTPATIENT)
Dept: ADMINISTRATIVE | Facility: HOSPITAL | Age: 61
End: 2021-12-15
Payer: COMMERCIAL

## 2021-12-17 ENCOUNTER — PATIENT OUTREACH (OUTPATIENT)
Dept: ADMINISTRATIVE | Facility: HOSPITAL | Age: 61
End: 2021-12-17
Payer: COMMERCIAL

## 2022-01-02 DIAGNOSIS — I10 ESSENTIAL HYPERTENSION: Chronic | ICD-10-CM

## 2022-01-03 RX ORDER — LABETALOL 200 MG/1
100 TABLET, FILM COATED ORAL EVERY 12 HOURS
Qty: 90 TABLET | Refills: 1 | Status: SHIPPED | OUTPATIENT
Start: 2022-01-03 | End: 2022-01-27 | Stop reason: SDUPTHER

## 2022-01-06 ENCOUNTER — OFFICE VISIT (OUTPATIENT)
Dept: CARDIOLOGY | Facility: CLINIC | Age: 62
End: 2022-01-06
Payer: COMMERCIAL

## 2022-01-06 ENCOUNTER — PATIENT OUTREACH (OUTPATIENT)
Dept: ADMINISTRATIVE | Facility: OTHER | Age: 62
End: 2022-01-06
Payer: COMMERCIAL

## 2022-01-06 VITALS
HEART RATE: 100 BPM | DIASTOLIC BLOOD PRESSURE: 92 MMHG | SYSTOLIC BLOOD PRESSURE: 158 MMHG | RESPIRATION RATE: 15 BRPM | OXYGEN SATURATION: 98 %

## 2022-01-06 DIAGNOSIS — E78.49 OTHER HYPERLIPIDEMIA: ICD-10-CM

## 2022-01-06 DIAGNOSIS — I25.810 CORONARY ARTERY DISEASE INVOLVING CORONARY BYPASS GRAFT OF NATIVE HEART WITHOUT ANGINA PECTORIS: ICD-10-CM

## 2022-01-06 DIAGNOSIS — I10 ESSENTIAL HYPERTENSION: ICD-10-CM

## 2022-01-06 DIAGNOSIS — N18.31 STAGE 3A CHRONIC KIDNEY DISEASE: ICD-10-CM

## 2022-01-06 DIAGNOSIS — I73.9 PAD (PERIPHERAL ARTERY DISEASE): ICD-10-CM

## 2022-01-06 DIAGNOSIS — E66.9 NON MORBID OBESITY, UNSPECIFIED OBESITY TYPE: ICD-10-CM

## 2022-01-06 DIAGNOSIS — I48.0 PAROXYSMAL ATRIAL FIBRILLATION: Primary | ICD-10-CM

## 2022-01-06 DIAGNOSIS — I70.0 AORTIC ATHEROSCLEROSIS: ICD-10-CM

## 2022-01-06 PROCEDURE — 93000 ELECTROCARDIOGRAM COMPLETE: CPT | Mod: S$GLB,,, | Performed by: INTERNAL MEDICINE

## 2022-01-06 PROCEDURE — 1159F MED LIST DOCD IN RCRD: CPT | Mod: CPTII,S$GLB,, | Performed by: INTERNAL MEDICINE

## 2022-01-06 PROCEDURE — 3077F SYST BP >= 140 MM HG: CPT | Mod: CPTII,S$GLB,, | Performed by: INTERNAL MEDICINE

## 2022-01-06 PROCEDURE — 99999 PR PBB SHADOW E&M-EST. PATIENT-LVL III: CPT | Mod: PBBFAC,,, | Performed by: INTERNAL MEDICINE

## 2022-01-06 PROCEDURE — 1160F PR REVIEW ALL MEDS BY PRESCRIBER/CLIN PHARMACIST DOCUMENTED: ICD-10-PCS | Mod: CPTII,S$GLB,, | Performed by: INTERNAL MEDICINE

## 2022-01-06 PROCEDURE — 1160F RVW MEDS BY RX/DR IN RCRD: CPT | Mod: CPTII,S$GLB,, | Performed by: INTERNAL MEDICINE

## 2022-01-06 PROCEDURE — 3077F PR MOST RECENT SYSTOLIC BLOOD PRESSURE >= 140 MM HG: ICD-10-PCS | Mod: CPTII,S$GLB,, | Performed by: INTERNAL MEDICINE

## 2022-01-06 PROCEDURE — 93000 EKG 12-LEAD: ICD-10-PCS | Mod: S$GLB,,, | Performed by: INTERNAL MEDICINE

## 2022-01-06 PROCEDURE — 3080F PR MOST RECENT DIASTOLIC BLOOD PRESSURE >= 90 MM HG: ICD-10-PCS | Mod: CPTII,S$GLB,, | Performed by: INTERNAL MEDICINE

## 2022-01-06 PROCEDURE — 99215 OFFICE O/P EST HI 40 MIN: CPT | Mod: S$GLB,,, | Performed by: INTERNAL MEDICINE

## 2022-01-06 PROCEDURE — 3080F DIAST BP >= 90 MM HG: CPT | Mod: CPTII,S$GLB,, | Performed by: INTERNAL MEDICINE

## 2022-01-06 PROCEDURE — 99215 PR OFFICE/OUTPT VISIT, EST, LEVL V, 40-54 MIN: ICD-10-PCS | Mod: S$GLB,,, | Performed by: INTERNAL MEDICINE

## 2022-01-06 PROCEDURE — 1159F PR MEDICATION LIST DOCUMENTED IN MEDICAL RECORD: ICD-10-PCS | Mod: CPTII,S$GLB,, | Performed by: INTERNAL MEDICINE

## 2022-01-06 PROCEDURE — 99999 PR PBB SHADOW E&M-EST. PATIENT-LVL III: ICD-10-PCS | Mod: PBBFAC,,, | Performed by: INTERNAL MEDICINE

## 2022-01-06 NOTE — H&P (VIEW-ONLY)
CARDIOVASCULAR PROGRESS NOTE    REASON FOR CONSULT:   Keyonna Guillen is a 61 y.o. female who presents for follow up of PAF, CAD/CABG, HTN, PAD  PCP: Page  HISTORY OF PRESENT ILLNESS:   Last seen April 2021.     The patient comes in today at her urgent request.  She is describing the development of palpitations yesterday followed by chest discomfort and back discomfort.  She is now back in atrial fibrillation, albeit with controlled ventricular response.  She has had no syncope, although she does describe some dizziness.  She denies PND, orthopnea, melena, hematuria, or claudicant symptoms.  She has been taking her Xarelto and reports Nondenominational adherence to its use.  She tells me she has not missed any doses in the past month at least.    CARDIOVASCULAR HISTORY:   CAD s/p CABG 4/5/11: LIMA-LAD, SVG-D, SVG-OM  PAF s/p FRANCISCO/DCCV 3/8/18, on Xarelto, CHADS VASC 2  Hx L vert art occlusion  PAD, ?R SFA stenosis (US 5/2018)    PAST MEDICAL HISTORY:     Past Medical History:   Diagnosis Date    Atrial fibrillation     Colon polyp     Coronary artery disease     Hyperlipidemia     Hypertension        PAST SURGICAL HISTORY:     Past Surgical History:   Procedure Laterality Date    BREAST BIOPSY      CARDIAC SURGERY      COLONOSCOPY N/A 5/25/2020    Procedure: COLONOSCOPY;  Surgeon: José Luis Fonseca MD;  Location: [facility] ENDO;  Service: Endoscopy;  Laterality: N/A;  XARELTO/PLETAL ok    CORONARY ARTERY BYPASS GRAFT      CYSTOSCOPY W/ URETERAL STENT PLACEMENT Bilateral 6/25/2020    Procedure: CYSTOSCOPY, WITH URETERAL STENT INSERTION;  Surgeon: STEPHANY Smyth MD;  Location: [facility] OR;  Service: Urology;  Laterality: Bilateral;    HYSTERECTOMY      LAPAROSCOPIC HEMICOLECTOMY Right 6/25/2020    Procedure: HAND ASSISTED HEMICOLECTOMY, LAPAROSCOPIC;  Surgeon: Louis Rivas III, MD;  Location: [facility] OR;  Service: General;  Laterality: Right;  RN PREOP 6/19/2020--has cardiac clearance from Dr. Khalil, --COVID NEGATIVE  and T/S on 6/23/2020  COMBINED CASE WITH DR PIERCE       ALLERGIES AND MEDICATION:   Review of patient's allergies indicates:  No Known Allergies       Medication List          Accurate as of January 6, 2022  2:45 PM. If you have any questions, ask your nurse or doctor.            CONTINUE taking these medications    atorvastatin 40 MG tablet  Commonly known as: LIPITOR  TAKE 1 TABLET BY MOUTH EVERYDAY AT BEDTIME     cloNIDine 0.1 MG tablet  Commonly known as: CATAPRES  TAKE 1 TABLET BY MOUTH DAILY AS NEEDED     cyclobenzaprine 10 MG tablet  Commonly known as: FLEXERIL     hydrALAZINE 100 MG tablet  Commonly known as: APRESOLINE  Take 1.5 tablets (150 mg total) by mouth 2 (two) times a day.     labetaloL 200 MG tablet  Commonly known as: NORMODYNE  Take 0.5 tablets (100 mg total) by mouth every 12 (twelve) hours.     olmesartan 40 MG tablet  Commonly known as: BENICAR  TAKE 1 TABLET BY MOUTH EVERY DAY     XARELTO 20 mg Tab  Generic drug: rivaroxaban  TAKE 1 TABLET (20 MG TOTAL) BY MOUTH DAILY WITH DINNER OR EVENING MEAL.              SOCIAL HISTORY:     Social History     Socioeconomic History    Marital status:    Tobacco Use    Smoking status: Never Smoker    Smokeless tobacco: Never Used   Substance and Sexual Activity    Alcohol use: No    Drug use: No    Sexual activity: Yes     Partners: Male     Birth control/protection: Surgical       FAMILY HISTORY:     Family History   Problem Relation Age of Onset    Cancer Mother     Breast cancer Maternal Uncle        REVIEW OF SYSTEMS:   Review of Systems   Constitutional: Negative for chills, diaphoresis and fever.   HENT: Negative for nosebleeds.    Eyes: Negative for blurred vision, double vision and photophobia.   Respiratory: Positive for shortness of breath. Negative for hemoptysis and wheezing.    Cardiovascular: Positive for chest pain and palpitations. Negative for orthopnea, claudication, leg swelling and PND.   Gastrointestinal: Negative for  abdominal pain, blood in stool, heartburn, melena, nausea and vomiting.   Genitourinary: Negative for flank pain and hematuria.   Musculoskeletal: Negative for falls, myalgias and neck pain.   Skin: Negative for rash.   Neurological: Positive for dizziness. Negative for seizures, loss of consciousness, weakness and headaches.   Endo/Heme/Allergies: Negative for polydipsia. Does not bruise/bleed easily.   Psychiatric/Behavioral: Negative for depression and memory loss. The patient is not nervous/anxious.        PHYSICAL EXAM:     Vitals:    01/06/22 1433   BP: (!) 158/92   Pulse: 100   Resp: 15    There is no height or weight on file to calculate BMI.            Physical Exam  Vitals reviewed.   Constitutional:       General: She is not in acute distress.     Appearance: She is well-developed and well-nourished. She is obese. She is not ill-appearing, toxic-appearing or diaphoretic.   HENT:      Head: Normocephalic and atraumatic.   Eyes:      General: No scleral icterus.     Extraocular Movements: Extraocular movements intact and EOM normal.      Conjunctiva/sclera: Conjunctivae normal.      Pupils: Pupils are equal, round, and reactive to light.   Neck:      Thyroid: No thyromegaly.      Vascular: Normal carotid pulses. No carotid bruit or JVD.      Trachea: Trachea normal. No tracheal deviation.   Cardiovascular:      Rate and Rhythm: Normal rate. Rhythm irregularly irregular.      Chest Wall: PMI is not displaced.      Pulses: Intact distal pulses.           Carotid pulses are 2+ on the right side and 2+ on the left side.     Heart sounds: S1 normal and S2 normal. Heart sounds are distant. No murmur heard.  No friction rub. No gallop.    Pulmonary:      Effort: Pulmonary effort is normal. No respiratory distress.      Breath sounds: Normal breath sounds. No stridor. No wheezing, rhonchi or rales.   Chest:      Chest wall: No tenderness.   Abdominal:      General: There is no distension.      Palpations: Abdomen  is soft. There is no hepatosplenomegaly.   Musculoskeletal:         General: No swelling, tenderness or edema. Normal range of motion.      Cervical back: Normal range of motion and neck supple. No edema or rigidity.      Right lower leg: No edema.      Left lower leg: No edema.   Feet:      Right foot:      Skin integrity: No ulcer.      Left foot:      Skin integrity: No ulcer.   Skin:     General: Skin is warm and dry.      Coloration: Skin is not jaundiced.      Findings: No erythema or rash.   Neurological:      General: No focal deficit present.      Mental Status: She is alert and oriented to person, place, and time.      Cranial Nerves: No cranial nerve deficit.   Psychiatric:         Mood and Affect: Mood and affect and mood normal.         Speech: Speech normal.         Behavior: Behavior normal. Behavior is cooperative.         DATA:   EKG: (personally reviewed tracing(s))  4/14/21 SR 75, similar to 12/11/21 1/6/22 AF 96, LVH, NSSTTW changes    Laboratory:  CBC:  Recent Labs   Lab 06/26/20  0412 07/23/20  1549 03/04/21  0936   WBC 6.23 6.44 5.51   Hemoglobin 10.2 L 10.7 L 13.1   Hematocrit 31.4 L 33.7 L 39.8   Platelets 181 268 220       CHEMISTRIES:  Recent Labs   Lab 03/04/21  0936 03/19/21  0946 06/04/21  0708   Glucose 95 103 119 H   Sodium 140 145 141   Potassium 5.4 H 5.0 4.5   BUN 39 H 29 H 18   Creatinine 1.6 H 1.1 1.1   eGFR if African American 40 A >60 >60   eGFR if non  35 A 54 A 54 A   Calcium 9.5 9.7 9.5       CARDIAC BIOMARKERS:        COAGS:        LIPIDS/LFTS:  Recent Labs   Lab 05/22/19  0843 10/29/19  0755 07/23/20  1549 03/04/21  0936 03/19/21  0946   Cholesterol 170  --   --   --   --    Triglycerides 126  --   --   --   --    HDL 41  --   --   --   --    LDL Cholesterol 103.8  --   --   --   --    Non-HDL Cholesterol 129  --   --   --   --    AST 15   < > 49 H 20 23   ALT 16   < > 77 H 25 35    < > = values in this interval not displayed.     Lab Results   Component  Value Date    TSH 1.958 05/22/2019         Cardiovascular Testing:  Carotid US 5/7/21  · There is 20-39% right Internal Carotid Stenosis.  · There is 0-19% left Internal Carotid Stenosis.    Echo 1/24/19  · Normal left ventricular systolic function. The estimated ejection fraction is 60%  · Mild left ventricular enlargement.  · Eccentric left ventricular hypertrophy.  · No wall motion abnormalities.  · Normal right ventricular systolic function.  · Mild tricuspid regurgitation.  · The estimated PA systolic pressure is 34 mm Hg    Renal art US 1/10/19  -Normal sized kidneys without evidence for nephrolithiasis or hydronephrosis.  -There is slight increased peak systolic velocity within the right renal artery from the proximal to mid aspect from 50 cm/sec to 158 cm/sec which is likely related to vessel tortuosity and turbulent flow in light of the absent elevated resistive indices or ratios however underlying right renal artery stenosis cannot be excluded.  This could be further evaluated with CTA or MRA imaging.  -No evidence for left renal artery elevated resistive indices or renal artery velocities to suggest renal artery stenosis.    LE art US 5/31/18  R STEFANI 1.09  L STEFANI 1.08  Elevated velocities within the mid to distal right superficial femoral artery strongly suggestive of hemodynamically significant stenoses within the mid to distal right superficial femoral artery.    LE venous US/reflux 5/31/18  No evidence for deep venous thrombosis within either lower extremity.  No evidence for venous insufficiency within the greater or lesser saphenous veins bilaterally.    Ofelia MPI 4/4/18   Nuclear Quantitative Functional Analysis:   LVEF: 69 %  LVED Volume: 88 ml  LVES Volume: 27 ml  Impression: NORMAL MYOCARDIAL PERFUSION  1. The perfusion scan is free of evidence for myocardial ischemia or injury.   2. Resting wall motion is physiologic.   3. Resting LV function is normal.   4. The ventricular volumes are normal at  rest and stress.   5. The extracardiac distribution of radioactivity is normal.     FRANCISCO/DCCV 3/9/18  Normal bivent size/fxn, EF 55%, normal wall motion  Normal appearing valves.  Trace AI, mild MR, mild TR  No LA/LON thrombus  Succcessful DCCV af->NSR 360J x1.  Plan:  Cont med rx  Cont Xarelto 20mg qd  OK for discharge later on today and follow up with me in the office 1 week for outpatient stress test.    Cath 3/25/11 ([facility], subsequently had CABG)  LM: normal  LAD sequential 90% prox and 80% mid stenoses   D2 prox 80%  LCx: MLI   OM2 prox 80%  RCA: dom, mid-dist 40% sequential stenoses    ASSESSMENT:   # PAF s/p FRANCISCO/DCCV 3/9/18.  CHADS VASC 2 on Xarelto.  Back in AF with associated sxs.  Reports reliqious Xarelto adherence.  # HTN, uncontrolled  # CAD s/p CABG x3V 4/2011.  MPI 4/2018 normal.   # PAD, bilat buttock and RLE claudication, R SFA stenosis noted on US 5/2018.  Sxs seemd to have resolved (pt thinks it was more related to R hip pain, did not notice much of a difference with pletal rx)  # HLP on atorva 40mg  # CKD3a  # BMI 34  # aortic atherosclerosis (CT A/P 9/25/20)    PLAN:   Cont med rx  Cont Xarelto 20mg qd  Inc labetalol 200mg bid, titrate prn  DCCV 1/10/22 (FRANCISCO not needed)  Echo  Diet/exercise/weight loss  RTC 1 month.  If persistent anginal sxs will need  MPI vs cath  Consider LE angio in future for eval of LE PAD if limiting claudication recurs    Risks, benefits and alternatives of the Cardioversion procedure were discussed with the patient including  aspiration, anesthetic complications, skin irritation, arrhythmia, etc.  Patient understands and agrees to proceed.  Permit signed.        José Khalil MD, FACC

## 2022-01-06 NOTE — PROGRESS NOTES
CARDIOVASCULAR PROGRESS NOTE    REASON FOR CONSULT:   Keyonna Guillen is a 61 y.o. female who presents for follow up of PAF, CAD/CABG, HTN, PAD  PCP: Page  HISTORY OF PRESENT ILLNESS:   Last seen April 2021.     The patient comes in today at her urgent request.  She is describing the development of palpitations yesterday followed by chest discomfort and back discomfort.  She is now back in atrial fibrillation, albeit with controlled ventricular response.  She has had no syncope, although she does describe some dizziness.  She denies PND, orthopnea, melena, hematuria, or claudicant symptoms.  She has been taking her Xarelto and reports Anglican adherence to its use.  She tells me she has not missed any doses in the past month at least.    CARDIOVASCULAR HISTORY:   CAD s/p CABG 4/5/11: LIMA-LAD, SVG-D, SVG-OM  PAF s/p FRANCISCO/DCCV 3/8/18, on Xarelto, CHADS VASC 2  Hx L vert art occlusion  PAD, ?R SFA stenosis (US 5/2018)    PAST MEDICAL HISTORY:     Past Medical History:   Diagnosis Date    Atrial fibrillation     Colon polyp     Coronary artery disease     Hyperlipidemia     Hypertension        PAST SURGICAL HISTORY:     Past Surgical History:   Procedure Laterality Date    BREAST BIOPSY      CARDIAC SURGERY      COLONOSCOPY N/A 5/25/2020    Procedure: COLONOSCOPY;  Surgeon: José Luis Fonseca MD;  Location: [facility] ENDO;  Service: Endoscopy;  Laterality: N/A;  XARELTO/PLETAL ok    CORONARY ARTERY BYPASS GRAFT      CYSTOSCOPY W/ URETERAL STENT PLACEMENT Bilateral 6/25/2020    Procedure: CYSTOSCOPY, WITH URETERAL STENT INSERTION;  Surgeon: STEPHANY Smyth MD;  Location: [facility] OR;  Service: Urology;  Laterality: Bilateral;    HYSTERECTOMY      LAPAROSCOPIC HEMICOLECTOMY Right 6/25/2020    Procedure: HAND ASSISTED HEMICOLECTOMY, LAPAROSCOPIC;  Surgeon: Louis Rivas III, MD;  Location: [facility] OR;  Service: General;  Laterality: Right;  RN PREOP 6/19/2020--has cardiac clearance from Dr. Khalil, --COVID NEGATIVE  and T/S on 6/23/2020  COMBINED CASE WITH DR PIERCE       ALLERGIES AND MEDICATION:   Review of patient's allergies indicates:  No Known Allergies       Medication List          Accurate as of January 6, 2022  2:45 PM. If you have any questions, ask your nurse or doctor.            CONTINUE taking these medications    atorvastatin 40 MG tablet  Commonly known as: LIPITOR  TAKE 1 TABLET BY MOUTH EVERYDAY AT BEDTIME     cloNIDine 0.1 MG tablet  Commonly known as: CATAPRES  TAKE 1 TABLET BY MOUTH DAILY AS NEEDED     cyclobenzaprine 10 MG tablet  Commonly known as: FLEXERIL     hydrALAZINE 100 MG tablet  Commonly known as: APRESOLINE  Take 1.5 tablets (150 mg total) by mouth 2 (two) times a day.     labetaloL 200 MG tablet  Commonly known as: NORMODYNE  Take 0.5 tablets (100 mg total) by mouth every 12 (twelve) hours.     olmesartan 40 MG tablet  Commonly known as: BENICAR  TAKE 1 TABLET BY MOUTH EVERY DAY     XARELTO 20 mg Tab  Generic drug: rivaroxaban  TAKE 1 TABLET (20 MG TOTAL) BY MOUTH DAILY WITH DINNER OR EVENING MEAL.              SOCIAL HISTORY:     Social History     Socioeconomic History    Marital status:    Tobacco Use    Smoking status: Never Smoker    Smokeless tobacco: Never Used   Substance and Sexual Activity    Alcohol use: No    Drug use: No    Sexual activity: Yes     Partners: Male     Birth control/protection: Surgical       FAMILY HISTORY:     Family History   Problem Relation Age of Onset    Cancer Mother     Breast cancer Maternal Uncle        REVIEW OF SYSTEMS:   Review of Systems   Constitutional: Negative for chills, diaphoresis and fever.   HENT: Negative for nosebleeds.    Eyes: Negative for blurred vision, double vision and photophobia.   Respiratory: Positive for shortness of breath. Negative for hemoptysis and wheezing.    Cardiovascular: Positive for chest pain and palpitations. Negative for orthopnea, claudication, leg swelling and PND.   Gastrointestinal: Negative for  abdominal pain, blood in stool, heartburn, melena, nausea and vomiting.   Genitourinary: Negative for flank pain and hematuria.   Musculoskeletal: Negative for falls, myalgias and neck pain.   Skin: Negative for rash.   Neurological: Positive for dizziness. Negative for seizures, loss of consciousness, weakness and headaches.   Endo/Heme/Allergies: Negative for polydipsia. Does not bruise/bleed easily.   Psychiatric/Behavioral: Negative for depression and memory loss. The patient is not nervous/anxious.        PHYSICAL EXAM:     Vitals:    01/06/22 1433   BP: (!) 158/92   Pulse: 100   Resp: 15    There is no height or weight on file to calculate BMI.            Physical Exam  Vitals reviewed.   Constitutional:       General: She is not in acute distress.     Appearance: She is well-developed and well-nourished. She is obese. She is not ill-appearing, toxic-appearing or diaphoretic.   HENT:      Head: Normocephalic and atraumatic.   Eyes:      General: No scleral icterus.     Extraocular Movements: Extraocular movements intact and EOM normal.      Conjunctiva/sclera: Conjunctivae normal.      Pupils: Pupils are equal, round, and reactive to light.   Neck:      Thyroid: No thyromegaly.      Vascular: Normal carotid pulses. No carotid bruit or JVD.      Trachea: Trachea normal. No tracheal deviation.   Cardiovascular:      Rate and Rhythm: Normal rate. Rhythm irregularly irregular.      Chest Wall: PMI is not displaced.      Pulses: Intact distal pulses.           Carotid pulses are 2+ on the right side and 2+ on the left side.     Heart sounds: S1 normal and S2 normal. Heart sounds are distant. No murmur heard.  No friction rub. No gallop.    Pulmonary:      Effort: Pulmonary effort is normal. No respiratory distress.      Breath sounds: Normal breath sounds. No stridor. No wheezing, rhonchi or rales.   Chest:      Chest wall: No tenderness.   Abdominal:      General: There is no distension.      Palpations: Abdomen  is soft. There is no hepatosplenomegaly.   Musculoskeletal:         General: No swelling, tenderness or edema. Normal range of motion.      Cervical back: Normal range of motion and neck supple. No edema or rigidity.      Right lower leg: No edema.      Left lower leg: No edema.   Feet:      Right foot:      Skin integrity: No ulcer.      Left foot:      Skin integrity: No ulcer.   Skin:     General: Skin is warm and dry.      Coloration: Skin is not jaundiced.      Findings: No erythema or rash.   Neurological:      General: No focal deficit present.      Mental Status: She is alert and oriented to person, place, and time.      Cranial Nerves: No cranial nerve deficit.   Psychiatric:         Mood and Affect: Mood and affect and mood normal.         Speech: Speech normal.         Behavior: Behavior normal. Behavior is cooperative.         DATA:   EKG: (personally reviewed tracing(s))  4/14/21 SR 75, similar to 12/11/21 1/6/22 AF 96, LVH, NSSTTW changes    Laboratory:  CBC:  Recent Labs   Lab 06/26/20  0412 07/23/20  1549 03/04/21  0936   WBC 6.23 6.44 5.51   Hemoglobin 10.2 L 10.7 L 13.1   Hematocrit 31.4 L 33.7 L 39.8   Platelets 181 268 220       CHEMISTRIES:  Recent Labs   Lab 03/04/21  0936 03/19/21  0946 06/04/21  0708   Glucose 95 103 119 H   Sodium 140 145 141   Potassium 5.4 H 5.0 4.5   BUN 39 H 29 H 18   Creatinine 1.6 H 1.1 1.1   eGFR if African American 40 A >60 >60   eGFR if non  35 A 54 A 54 A   Calcium 9.5 9.7 9.5       CARDIAC BIOMARKERS:        COAGS:        LIPIDS/LFTS:  Recent Labs   Lab 05/22/19  0843 10/29/19  0755 07/23/20  1549 03/04/21  0936 03/19/21  0946   Cholesterol 170  --   --   --   --    Triglycerides 126  --   --   --   --    HDL 41  --   --   --   --    LDL Cholesterol 103.8  --   --   --   --    Non-HDL Cholesterol 129  --   --   --   --    AST 15   < > 49 H 20 23   ALT 16   < > 77 H 25 35    < > = values in this interval not displayed.     Lab Results   Component  Value Date    TSH 1.958 05/22/2019         Cardiovascular Testing:  Carotid US 5/7/21  · There is 20-39% right Internal Carotid Stenosis.  · There is 0-19% left Internal Carotid Stenosis.    Echo 1/24/19  · Normal left ventricular systolic function. The estimated ejection fraction is 60%  · Mild left ventricular enlargement.  · Eccentric left ventricular hypertrophy.  · No wall motion abnormalities.  · Normal right ventricular systolic function.  · Mild tricuspid regurgitation.  · The estimated PA systolic pressure is 34 mm Hg    Renal art US 1/10/19  -Normal sized kidneys without evidence for nephrolithiasis or hydronephrosis.  -There is slight increased peak systolic velocity within the right renal artery from the proximal to mid aspect from 50 cm/sec to 158 cm/sec which is likely related to vessel tortuosity and turbulent flow in light of the absent elevated resistive indices or ratios however underlying right renal artery stenosis cannot be excluded.  This could be further evaluated with CTA or MRA imaging.  -No evidence for left renal artery elevated resistive indices or renal artery velocities to suggest renal artery stenosis.    LE art US 5/31/18  R STEFANI 1.09  L STEFANI 1.08  Elevated velocities within the mid to distal right superficial femoral artery strongly suggestive of hemodynamically significant stenoses within the mid to distal right superficial femoral artery.    LE venous US/reflux 5/31/18  No evidence for deep venous thrombosis within either lower extremity.  No evidence for venous insufficiency within the greater or lesser saphenous veins bilaterally.    Ofelia MPI 4/4/18   Nuclear Quantitative Functional Analysis:   LVEF: 69 %  LVED Volume: 88 ml  LVES Volume: 27 ml  Impression: NORMAL MYOCARDIAL PERFUSION  1. The perfusion scan is free of evidence for myocardial ischemia or injury.   2. Resting wall motion is physiologic.   3. Resting LV function is normal.   4. The ventricular volumes are normal at  rest and stress.   5. The extracardiac distribution of radioactivity is normal.     FRANCISCO/DCCV 3/9/18  Normal bivent size/fxn, EF 55%, normal wall motion  Normal appearing valves.  Trace AI, mild MR, mild TR  No LA/LON thrombus  Succcessful DCCV af->NSR 360J x1.  Plan:  Cont med rx  Cont Xarelto 20mg qd  OK for discharge later on today and follow up with me in the office 1 week for outpatient stress test.    Cath 3/25/11 ([facility], subsequently had CABG)  LM: normal  LAD sequential 90% prox and 80% mid stenoses   D2 prox 80%  LCx: MLI   OM2 prox 80%  RCA: dom, mid-dist 40% sequential stenoses    ASSESSMENT:   # PAF s/p FRANCISCO/DCCV 3/9/18.  CHADS VASC 2 on Xarelto.  Back in AF with associated sxs.  Reports reliqious Xarelto adherence.  # HTN, uncontrolled  # CAD s/p CABG x3V 4/2011.  MPI 4/2018 normal.   # PAD, bilat buttock and RLE claudication, R SFA stenosis noted on US 5/2018.  Sxs seemd to have resolved (pt thinks it was more related to R hip pain, did not notice much of a difference with pletal rx)  # HLP on atorva 40mg  # CKD3a  # BMI 34  # aortic atherosclerosis (CT A/P 9/25/20)    PLAN:   Cont med rx  Cont Xarelto 20mg qd  Inc labetalol 200mg bid, titrate prn  DCCV 1/10/22 (FRANCISCO not needed)  Echo  Diet/exercise/weight loss  RTC 1 month.  If persistent anginal sxs will need  MPI vs cath  Consider LE angio in future for eval of LE PAD if limiting claudication recurs    Risks, benefits and alternatives of the Cardioversion procedure were discussed with the patient including  aspiration, anesthetic complications, skin irritation, arrhythmia, etc.  Patient understands and agrees to proceed.  Permit signed.        José Khalil MD, FACC

## 2022-01-07 ENCOUNTER — HOSPITAL ENCOUNTER (OUTPATIENT)
Dept: PREADMISSION TESTING | Facility: HOSPITAL | Age: 62
Discharge: HOME | End: 2022-01-07
Attending: INTERNAL MEDICINE
Payer: COMMERCIAL

## 2022-01-07 ENCOUNTER — ANESTHESIA EVENT (OUTPATIENT)
Dept: CARDIOLOGY | Facility: HOSPITAL | Age: 62
End: 2022-01-07
Payer: COMMERCIAL

## 2022-01-07 VITALS
RESPIRATION RATE: 16 BRPM | SYSTOLIC BLOOD PRESSURE: 142 MMHG | TEMPERATURE: 96 F | BODY MASS INDEX: 34.18 KG/M2 | HEART RATE: 98 BPM | OXYGEN SATURATION: 98 % | WEIGHT: 192.88 LBS | HEIGHT: 63 IN | DIASTOLIC BLOOD PRESSURE: 99 MMHG

## 2022-01-07 DIAGNOSIS — Z01.818 PRE-OP TESTING: Primary | ICD-10-CM

## 2022-01-07 DIAGNOSIS — Z11.52 ENCOUNTER FOR PREOPERATIVE SCREENING LABORATORY TESTING FOR COVID-19 VIRUS: ICD-10-CM

## 2022-01-07 DIAGNOSIS — Z01.812 ENCOUNTER FOR PREOPERATIVE SCREENING LABORATORY TESTING FOR COVID-19 VIRUS: ICD-10-CM

## 2022-01-07 LAB — SARS-COV-2 RDRP RESP QL NAA+PROBE: NEGATIVE

## 2022-01-07 PROCEDURE — U0002 COVID-19 LAB TEST NON-CDC: HCPCS | Performed by: INTERNAL MEDICINE

## 2022-01-07 NOTE — DISCHARGE INSTRUCTIONS
Your surgery is scheduled for __Monday Dyole. 10, 2022___(9:30 am)__.    Call [phone] between 2 p.m. and 5 p.m. on   _Today to find out your arrival time for the day of your surgery.      Please report to SAME DAY SURGERY UNIT on the 2nd FLOOR at _______ a.m.        INSTRUCTIONS IMPORTANT!!!  ¨ Do not eat  after 12 midnight-. OK to brush teeth, no   gum, candy or mints!    ¨ Take only these medicines with a small swallow of water-morning of surgery.  Take med's checked on MED LIST          _x___  Prep instructions:    SHOWER        x____  Do not wear makeup, including mascara. WEARING EYE MAKEUP MAY LEAD TO SERIOUS EYE INJURY during surgery.  _x___  No powder, lotions or creams to your body.  _x___  You may wear only deodorant on the day of surgery.  _x___  Please remove all jewelry, including piercings and leave at home.  x____  No money or valuables needed. Please leave at home.  You may bring your  cell phone.  ____  Please bring any documents given by your doctor.  _x___  If going home the same day, arrange for a ride home. You will not be able to   drive if Anesthesia was used.    _x___  Wear loose fitting clothing. Allow for dressings, bandages.  _x___  Stop Aspirin, Ibuprofen, Motrin and Aleve at least 3-5 days before surgery, unless otherwise instructed by your doctor, or the nurse.              You MAY use Tylenol/acetaminophen until day of surgery.  _x___  Call MD for temperature above 101 degrees.        _x___ Stop taking any Fish Oil supplement or any Vitamins that contain Vitamin  E at least 5 days prior to surgery.          I have read or had read and explained to me, and understand the above information.  Additional comments or instructions:Please call   [phone] if you have any questions regarding the instructions above.

## 2022-01-10 ENCOUNTER — HOSPITAL ENCOUNTER (OUTPATIENT)
Facility: HOSPITAL | Age: 62
Discharge: HOME | End: 2022-01-10
Attending: INTERNAL MEDICINE | Admitting: INTERNAL MEDICINE
Payer: COMMERCIAL

## 2022-01-10 ENCOUNTER — ANESTHESIA (OUTPATIENT)
Dept: CARDIOLOGY | Facility: HOSPITAL | Age: 62
End: 2022-01-10
Payer: COMMERCIAL

## 2022-01-10 VITALS
OXYGEN SATURATION: 97 % | HEART RATE: 62 BPM | SYSTOLIC BLOOD PRESSURE: 163 MMHG | DIASTOLIC BLOOD PRESSURE: 90 MMHG | RESPIRATION RATE: 16 BRPM | TEMPERATURE: 98 F

## 2022-01-10 DIAGNOSIS — I48.20 ATRIAL FIBRILLATION, CHRONIC: ICD-10-CM

## 2022-01-10 DIAGNOSIS — Z01.812 ENCOUNTER FOR PREOPERATIVE SCREENING LABORATORY TESTING FOR COVID-19 VIRUS: Primary | ICD-10-CM

## 2022-01-10 DIAGNOSIS — Z11.52 ENCOUNTER FOR PREOPERATIVE SCREENING LABORATORY TESTING FOR COVID-19 VIRUS: Primary | ICD-10-CM

## 2022-01-10 DIAGNOSIS — I48.0 PAROXYSMAL ATRIAL FIBRILLATION: ICD-10-CM

## 2022-01-10 LAB
ANION GAP SERPL CALC-SCNC: 9 MMOL/L (ref 8–16)
BASOPHILS # BLD AUTO: 0.02 K/UL (ref 0–0.2)
BASOPHILS NFR BLD: 0.4 % (ref 0–1.9)
BUN SERPL-MCNC: 22 MG/DL (ref 8–23)
CALCIUM SERPL-MCNC: 9.1 MG/DL (ref 8.7–10.5)
CHLORIDE SERPL-SCNC: 109 MMOL/L (ref 95–110)
CO2 SERPL-SCNC: 27 MMOL/L (ref 23–29)
CREAT SERPL-MCNC: 1 MG/DL (ref 0.5–1.4)
DIFFERENTIAL METHOD: ABNORMAL
EOSINOPHIL # BLD AUTO: 0 K/UL (ref 0–0.5)
EOSINOPHIL NFR BLD: 0.7 % (ref 0–8)
ERYTHROCYTE [DISTWIDTH] IN BLOOD BY AUTOMATED COUNT: 12.2 % (ref 11.5–14.5)
EST. GFR  (AFRICAN AMERICAN): >60 ML/MIN/1.73 M^2
EST. GFR  (NON AFRICAN AMERICAN): >60 ML/MIN/1.73 M^2
GLUCOSE SERPL-MCNC: 97 MG/DL (ref 70–110)
HCT VFR BLD AUTO: 41.7 % (ref 37–48.5)
HGB BLD-MCNC: 13.2 G/DL (ref 12–16)
IMM GRANULOCYTES # BLD AUTO: 0.01 K/UL (ref 0–0.04)
IMM GRANULOCYTES NFR BLD AUTO: 0.2 % (ref 0–0.5)
LYMPHOCYTES # BLD AUTO: 1.3 K/UL (ref 1–4.8)
LYMPHOCYTES NFR BLD: 29.4 % (ref 18–48)
MCH RBC QN AUTO: 30 PG (ref 27–31)
MCHC RBC AUTO-ENTMCNC: 31.7 G/DL (ref 32–36)
MCV RBC AUTO: 95 FL (ref 82–98)
MONOCYTES # BLD AUTO: 0.4 K/UL (ref 0.3–1)
MONOCYTES NFR BLD: 7.8 % (ref 4–15)
NEUTROPHILS # BLD AUTO: 2.7 K/UL (ref 1.8–7.7)
NEUTROPHILS NFR BLD: 61.5 % (ref 38–73)
NRBC BLD-RTO: 0 /100 WBC
PLATELET # BLD AUTO: 248 K/UL (ref 150–450)
PMV BLD AUTO: 9.8 FL (ref 9.2–12.9)
POTASSIUM SERPL-SCNC: 3.9 MMOL/L (ref 3.5–5.1)
RBC # BLD AUTO: 4.4 M/UL (ref 4–5.4)
SODIUM SERPL-SCNC: 145 MMOL/L (ref 136–145)
WBC # BLD AUTO: 4.46 K/UL (ref 3.9–12.7)

## 2022-01-10 PROCEDURE — 92960 CARDIOVERSION ELECTRIC EXT: CPT | Mod: ,,, | Performed by: INTERNAL MEDICINE

## 2022-01-10 PROCEDURE — D9220A PRA ANESTHESIA: Mod: ANES,,, | Performed by: ANESTHESIOLOGY

## 2022-01-10 PROCEDURE — 92960 CARDIOVERSION ELECTRIC EXT: CPT | Performed by: INTERNAL MEDICINE

## 2022-01-10 PROCEDURE — 25000003 PHARM REV CODE 250: Performed by: STUDENT IN AN ORGANIZED HEALTH CARE EDUCATION/TRAINING PROGRAM

## 2022-01-10 PROCEDURE — D9220A PRA ANESTHESIA: ICD-10-PCS | Mod: ANES,,, | Performed by: ANESTHESIOLOGY

## 2022-01-10 PROCEDURE — 80048 BASIC METABOLIC PNL TOTAL CA: CPT | Performed by: INTERNAL MEDICINE

## 2022-01-10 PROCEDURE — 63600175 PHARM REV CODE 636 W HCPCS: Performed by: STUDENT IN AN ORGANIZED HEALTH CARE EDUCATION/TRAINING PROGRAM

## 2022-01-10 PROCEDURE — 93010 ELECTROCARDIOGRAM REPORT: CPT | Mod: ,,, | Performed by: INTERNAL MEDICINE

## 2022-01-10 PROCEDURE — 85025 COMPLETE CBC W/AUTO DIFF WBC: CPT | Performed by: INTERNAL MEDICINE

## 2022-01-10 PROCEDURE — 93010 EKG 12-LEAD: ICD-10-PCS | Mod: ,,, | Performed by: INTERNAL MEDICINE

## 2022-01-10 PROCEDURE — D9220A PRA ANESTHESIA: Mod: CRNA,,, | Performed by: STUDENT IN AN ORGANIZED HEALTH CARE EDUCATION/TRAINING PROGRAM

## 2022-01-10 PROCEDURE — 92960 PR CARDIOVERSION, ELECTIVE;EXTERN: ICD-10-PCS | Mod: ,,, | Performed by: INTERNAL MEDICINE

## 2022-01-10 PROCEDURE — 37000008 HC ANESTHESIA 1ST 15 MINUTES: Performed by: INTERNAL MEDICINE

## 2022-01-10 PROCEDURE — 37000009 HC ANESTHESIA EA ADD 15 MINS: Performed by: INTERNAL MEDICINE

## 2022-01-10 PROCEDURE — 93005 ELECTROCARDIOGRAM TRACING: CPT

## 2022-01-10 PROCEDURE — D9220A PRA ANESTHESIA: ICD-10-PCS | Mod: CRNA,,, | Performed by: STUDENT IN AN ORGANIZED HEALTH CARE EDUCATION/TRAINING PROGRAM

## 2022-01-10 RX ORDER — PROPOFOL 10 MG/ML
VIAL (ML) INTRAVENOUS
Status: DISCONTINUED | OUTPATIENT
Start: 2022-01-10 | End: 2022-01-10

## 2022-01-10 RX ORDER — LIDOCAINE HYDROCHLORIDE 20 MG/ML
INJECTION INTRAVENOUS
Status: DISCONTINUED | OUTPATIENT
Start: 2022-01-10 | End: 2022-01-10

## 2022-01-10 RX ADMIN — PROPOFOL 100 MG: 10 INJECTION, EMULSION INTRAVENOUS at 01:01

## 2022-01-10 RX ADMIN — SODIUM CHLORIDE, SODIUM LACTATE, POTASSIUM CHLORIDE, AND CALCIUM CHLORIDE: .6; .31; .03; .02 INJECTION, SOLUTION INTRAVENOUS at 01:01

## 2022-01-10 RX ADMIN — LIDOCAINE HYDROCHLORIDE 75 MG: 20 INJECTION, SOLUTION INTRAVENOUS at 01:01

## 2022-01-10 NOTE — BRIEF OP NOTE
[facility] [place] - Cath Lab  Brief Operative Note     SUMMARY     Surgery Date: 1/10/2022     Surgeon(s) and Role:     * José Khalil MD - Primary    Assisting Surgeon: None    Pre-op Diagnosis:  Paroxysmal atrial fibrillation [I48.0]    Post-op Diagnosis:  Post-Op Diagnosis Codes:     * Paroxysmal atrial fibrillation [I48.0]    Procedure(s) (LRB):  Cardioversion/Defibrillation (FRANCISCO not needed) (N/A)    Anesthesia: Monitor Anesthesia Care    Description of the findings of the procedure: Uneventful DCCV with anesthesia.    Findings/Key Components:   Successful DCCV AF->SR 75 BPM, 200J x1.    Pt tolerated procedure well without complications.    Plan:  Cont Xarelto 20mg qd  Follow up with Dr. Khalil as planned.    Estimated Blood Loss: none         Specimens: None    Diet: cardiac    Activity: ad wil.

## 2022-01-10 NOTE — DISCHARGE SUMMARY
[facility] Lab  Discharge Note    SUMMARY     Admit Date: 1/10/2022    Discharge Date and Time:  01/10/2022 3PM    Hospital Course (synopsis of major diagnoses, care, treatment, and services provided during the course of the hospital stay): Uneventful DCCV with anesthesia.    Final Diagnosis: Post-Op Diagnosis Codes:     * Paroxysmal atrial fibrillation [I48.0]    Disposition: Home or Self Care    Follow Up/Patient Instructions:     Medications:  Reconciled Home Medications:      Medication List      CONTINUE taking these medications    atorvastatin 40 MG tablet  Commonly known as: LIPITOR  TAKE 1 TABLET BY MOUTH EVERYDAY AT BEDTIME     cloNIDine 0.1 MG tablet  Commonly known as: CATAPRES  TAKE 1 TABLET BY MOUTH DAILY AS NEEDED     hydrALAZINE 100 MG tablet  Commonly known as: APRESOLINE  Take 1.5 tablets (150 mg total) by mouth 2 (two) times a day.     labetaloL 200 MG tablet  Commonly known as: NORMODYNE  Take 0.5 tablets (100 mg total) by mouth every 12 (twelve) hours.     olmesartan 40 MG tablet  Commonly known as: BENICAR  TAKE 1 TABLET BY MOUTH EVERY DAY     XARELTO 20 mg Tab  Generic drug: rivaroxaban  TAKE 1 TABLET (20 MG TOTAL) BY MOUTH DAILY WITH DINNER OR EVENING MEAL.          No discharge procedures on file.   Follow-up Information     José Khalil MD On 2/3/2022.    Specialties: Cardiology, INTERVENTIONAL CARDIOLOGY  Why: 220pm for follow up  Contact information:  [address]  [address]  [facility] [address]  [phone]                           Diet: Cardiac    Activity: ad wil.

## 2022-01-10 NOTE — ANESTHESIA PREPROCEDURE EVALUATION
01/10/2022     Keyonna Guillen is a 61 y.o., female.  Pre-operative evaluation for Procedure(s) (LRB):  Cardioversion/Defibrillation (FRANCISCO not needed) (N/A)    NPO >8  METS >4    Vitals:    01/10/22 1006   BP: (!) 154/87   BP Location: Right arm   Patient Position: Sitting   Pulse: 78   Resp: 18   Temp: 36.6 °C (97.8 °F)   TempSrc: Oral   SpO2: 97%       Patient Active Problem List   Diagnosis    Menopausal state    Status post hysterectomy    Hormone replacement therapy (HRT)    Atrophic vaginitis    Essential hypertension    Coronary artery disease involving coronary bypass graft of native heart without angina pectoris    Encounter for gynecological examination without abnormal finding    Other hyperlipidemia    Palpitations    Paroxysmal atrial fibrillation    Occlusion of left vertebral artery    Hematuria    PAD (peripheral artery disease)    Non morbid obesity, unspecified obesity type    Resistant hypertension    Nausea    Mass of colon    Aortic atherosclerosis    Stage 3b chronic kidney disease       Review of patient's allergies indicates:   Allergen Reactions    Amlodipine Swelling       No current facility-administered medications on file prior to encounter.     Current Outpatient Medications on File Prior to Encounter   Medication Sig Dispense Refill    atorvastatin (LIPITOR) 40 MG tablet TAKE 1 TABLET BY MOUTH EVERYDAY AT BEDTIME 90 tablet 3    hydrALAZINE (APRESOLINE) 100 MG tablet Take 1.5 tablets (150 mg total) by mouth 2 (two) times a day. 270 tablet 3    labetaloL (NORMODYNE) 200 MG tablet Take 0.5 tablets (100 mg total) by mouth every 12 (twelve) hours. 90 tablet 1    XARELTO 20 mg Tab TAKE 1 TABLET (20 MG TOTAL) BY MOUTH DAILY WITH DINNER OR EVENING MEAL. 90 tablet 3    cloNIDine (CATAPRES) 0.1 MG tablet TAKE 1 TABLET BY MOUTH DAILY AS NEEDED 90 tablet 3    olmesartan (BENICAR) 40 MG tablet TAKE 1 TABLET BY MOUTH EVERY DAY 90 tablet 3       Past Surgical History:    Procedure Laterality Date    BREAST BIOPSY      CARDIAC SURGERY      COLONOSCOPY N/A 2020    Procedure: COLONOSCOPY;  Surgeon: José Luis Fonseca MD;  Location: [facility] ENDO;  Service: Endoscopy;  Laterality: N/A;  XARELTO/PLETAL ok    CORONARY ARTERY BYPASS GRAFT      CYSTOSCOPY W/ URETERAL STENT PLACEMENT Bilateral 2020    Procedure: CYSTOSCOPY, WITH URETERAL STENT INSERTION;  Surgeon: STEPHANY Smyth MD;  Location: [facility] OR;  Service: Urology;  Laterality: Bilateral;    HYSTERECTOMY      LAPAROSCOPIC HEMICOLECTOMY Right 2020    Procedure: HAND ASSISTED HEMICOLECTOMY, LAPAROSCOPIC;  Surgeon: Louis Rivas III, MD;  Location: [facility] OR;  Service: General;  Laterality: Right;  RN PREOP 2020--has cardiac clearance from Dr. Khalil, --COVID NEGATIVE and T/S on 2020  COMBINED CASE WITH DR SMYTH       Social History     Socioeconomic History    Marital status:    Tobacco Use    Smoking status: Never Smoker    Smokeless tobacco: Never Used   Substance and Sexual Activity    Alcohol use: No    Drug use: No    Sexual activity: Yes     Partners: Male     Birth control/protection: Surgical         CBC:   Recent Labs     01/10/22  1045   WBC 4.46   RBC 4.40   HGB 13.2   HCT 41.7      MCV 95   MCH 30.0   MCHC 31.7*       CMP:   Recent Labs     01/10/22  1045      K 3.9      CO2 27   BUN 22   CREATININE 1.0   GLU 97   CALCIUM 9.1       INR  No results for input(s): PT, INR, PROTIME, APTT in the last 72 hours.        Diagnostic Studies:      EKD Echo:  No results found for this or any previous visit.      Anesthesia Evaluation    I have reviewed the Patient Summary Reports.    I have reviewed the Nursing Notes. I have reviewed the NPO Status.   I have reviewed the Medications.     Review of Systems  Anesthesia Hx:  No problems with previous Anesthesia  History of prior surgery of interest to airway management or planning:  Denies Personal Hx of  Anesthesia complications.   Social:  Non-Smoker    Hematology/Oncology:  Hematology Normal   Oncology Normal     EENT/Dental:EENT/Dental Normal   Cardiovascular:   Exercise tolerance: good Hypertension CAD  CABG/stent Dysrhythmias (xarelto) atrial fibrillation ECG has been reviewed.    Pulmonary:  Pulmonary Normal    Renal/:  Renal/ Normal     Hepatic/GI:  Hepatic/GI Normal    Neurological:  Neurology Normal    Endocrine:  Endocrine Normal        Physical Exam  General:  Obesity    Airway/Jaw/Neck:  Airway Findings: Mouth Opening: Normal Tongue: Normal  General Airway Assessment: Adult  Mallampati: II  TM Distance: < 4 cm      Dental:  Dental Findings: In tact   Chest/Lungs:  Chest/Lungs Findings: Normal Respiratory Rate     Heart/Vascular:  Heart Findings: Normal       Mental Status:  Mental Status Findings:  Cooperative, Alert and Oriented         Anesthesia Plan  Type of Anesthesia, risks & benefits discussed:  Anesthesia Type:  MAC, general    Patient's Preference:   Plan Factors:          Intra-op Monitoring Plan: standard ASA monitors  Intra-op Monitoring Plan Comments:   Post Op Pain Control Plan: per primary service following discharge from PACU  Post Op Pain Control Plan Comments:     Induction:   IV  Beta Blocker:  Patient is on a Beta-Blocker and has received one dose within the past 24 hours (No further documentation required).       Informed Consent: Patient understands risks and agrees with Anesthesia plan.  Questions answered. Anesthesia consent signed with patient.  ASA Score: 3     Day of Surgery Review of History & Physical: I have interviewed and examined the patient. I have reviewed the patient's H&P dated:  There are no significant changes.          Ready For Surgery From Anesthesia Perspective.

## 2022-01-10 NOTE — DISCHARGE INSTRUCTIONS
Fall Prevention  Millions of people fall every year and injure themselves. You may have had anesthesia or sedation which may increase your risk of falling. You may have health issues that put you at an increased risk of falling.     Here are ways to reduce your risk of falling.  ·   · Make your home safe by keeping walkways clear of objects you may trip over.  · Use non-slip pads under rugs. Do not use area rugs or small throw rugs.  · Use non-slip mats in bathtubs and showers.  · Install handrails and lights on staircases.  · Do not walk in poorly lit areas.  · Do not stand on chairs or wobbly ladders.  · Use caution when reaching overhead or looking upward. This position can cause a loss of balance.  · Be sure your shoes fit properly, have non-slip bottoms and are in good condition.   · Wear shoes both inside and out. Avoid going barefoot or wearing slippers.  · Be cautious when going up and down stairs, curbs, and when walking on uneven sidewalks.  · If your balance is poor, consider using a cane or walker.  · If your fall was related to alcohol use, stop or limit alcohol intake.   · If your fall was related to use of sleeping medicines, talk to your doctor about this. You may need to reduce your dosage at bedtime if you awaken during the night to go to the bathroom.    · To reduce the need for nighttime bathroom trips:  ¨ Avoid drinking fluids for several hours before going to bed  ¨ Empty your bladder before going to bed  ¨ Men can keep a urinal at the bedside  · Stay as active as you can. Balance, flexibility, strength, and endurance all come from exercise. They all play a role in preventing falls. Ask your healthcare provider which types of activity are right for you.  · Get your vision checked on a regular basis.  · If you have pets, know where they are before you stand up or walk so you don't trip over them.  · Use night lights.

## 2022-01-10 NOTE — TRANSFER OF CARE
Anesthesia Transfer of Care Note    Patient: Keyonna Guillen    Procedure(s) Performed: Procedure(s) (LRB):  Cardioversion/Defibrillation (FRANCISCO not needed) (N/A)    Patient location: Other: or 3- cardiac rn    Anesthesia Type: MAC    Transport from OR: Transported from OR on room air with adequate spontaneous ventilation    Post pain: adequate analgesia    Post assessment: no apparent anesthetic complications and tolerated procedure well    Post vital signs: stable    Level of consciousness: awake, alert and oriented    Nausea/Vomiting: no nausea/vomiting    Complications: none    Transfer of care protocol was followed      Last vitals:   Visit Vitals  BP (!) 154/87 (BP Location: Right arm, Patient Position: Sitting)   Pulse 78   Temp 36.6 °C (97.8 °F) (Oral)   Resp 18   SpO2 97%   Breastfeeding No

## 2022-01-12 NOTE — ANESTHESIA POSTPROCEDURE EVALUATION
Anesthesia Post Evaluation    Patient: Keyonna Guillen    Procedure(s) Performed: Procedure(s) (LRB):  Cardioversion/Defibrillation (FRANCISCO not needed) (N/A)    Final Anesthesia Type: general      Patient location during evaluation: PACU  Patient participation: Yes- Able to Participate  Level of consciousness: awake and alert and oriented  Post-procedure vital signs: reviewed and stable  Pain management: adequate  Airway patency: patent    PONV status at discharge: No PONV  Anesthetic complications: no      Cardiovascular status: blood pressure returned to baseline, hemodynamically stable and stable  Respiratory status: unassisted, spontaneous ventilation and room air  Hydration status: euvolemic  Follow-up not needed.          Vitals Value Taken Time   /90 01/10/22 1453   Temp 36.6 °C (97.8 °F) 01/10/22 1400   Pulse 64 01/10/22 1455   Resp 16 01/10/22 1400   SpO2 97 % 01/10/22 1455   Vitals shown include unvalidated device data.      No case tracking events are documented in the log.      Pain/Hilario Score: No data recorded

## 2022-01-16 ENCOUNTER — NURSE TRIAGE (OUTPATIENT)
Dept: ADMINISTRATIVE | Facility: CLINIC | Age: 62
End: 2022-01-16
Payer: COMMERCIAL

## 2022-01-16 NOTE — TELEPHONE ENCOUNTER
"Patient is calling, post cardioversion 1/10. Having neck and jaw pain since yesterday. States when he has a previous cardiac event she had jaw pain, no chest pain. Noted she is sweating with no exertion. Triaged done- advised ED. Stressed to patient need to go and be very forceful re her hx and complaint. Verb understanding. Patient called back, verified that patient was alone. Advised to call 911. Verb understanding.     Reason for Disposition   Difficulty breathing or unusual sweating (e.g., sweating without exertion)    Additional Information   Negative: Shock suspected (e.g., cold/pale/clammy skin, too weak to stand, low BP, rapid pulse)   Negative: Difficult to awaken or acting confused (e.g., disoriented, slurred speech)   Negative: [1] Similar pain previously AND [2] it was from "angina" AND [3] not relieved by nitroglycerin   Negative: Sounds like a life-threatening emergency to the triager   Negative: Followed a neck injury (e.g., MVA, sports, impact or collision)   Negative: Chest pain   Negative: Lymph node in the neck is swollen or painful to the touch   Negative: Sore throat is main symptom   Negative: [1] Similar pain previously AND [2] it was from "heart attack"    Protocols used: NECK PAIN OR [id number]      "

## 2022-01-18 ENCOUNTER — HOSPITAL ENCOUNTER (EMERGENCY)
Facility: HOSPITAL | Age: 62
Discharge: HOME | End: 2022-01-18
Attending: EMERGENCY MEDICINE
Payer: COMMERCIAL

## 2022-01-18 VITALS
BODY MASS INDEX: 33.83 KG/M2 | OXYGEN SATURATION: 97 % | TEMPERATURE: 98 F | HEART RATE: 48 BPM | DIASTOLIC BLOOD PRESSURE: 88 MMHG | RESPIRATION RATE: 22 BRPM | WEIGHT: 191 LBS | SYSTOLIC BLOOD PRESSURE: 163 MMHG

## 2022-01-18 DIAGNOSIS — R07.9 CHEST PAIN: ICD-10-CM

## 2022-01-18 DIAGNOSIS — R68.84 JAW PAIN: ICD-10-CM

## 2022-01-18 LAB
ALBUMIN SERPL BCP-MCNC: 3.6 G/DL (ref 3.5–5.2)
ALP SERPL-CCNC: 124 U/L (ref 55–135)
ALT SERPL W/O P-5'-P-CCNC: 21 U/L (ref 10–44)
ANION GAP SERPL CALC-SCNC: 6 MMOL/L (ref 8–16)
AST SERPL-CCNC: 14 U/L (ref 10–40)
BASOPHILS # BLD AUTO: 0.01 K/UL (ref 0–0.2)
BASOPHILS NFR BLD: 0.2 % (ref 0–1.9)
BILIRUB SERPL-MCNC: 0.3 MG/DL (ref 0.1–1)
BNP SERPL-MCNC: 528 PG/ML (ref 0–99)
BUN SERPL-MCNC: 18 MG/DL (ref 8–23)
CALCIUM SERPL-MCNC: 9.9 MG/DL (ref 8.7–10.5)
CHLORIDE SERPL-SCNC: 110 MMOL/L (ref 95–110)
CO2 SERPL-SCNC: 28 MMOL/L (ref 23–29)
CREAT SERPL-MCNC: 0.9 MG/DL (ref 0.5–1.4)
DIFFERENTIAL METHOD: ABNORMAL
EOSINOPHIL # BLD AUTO: 0 K/UL (ref 0–0.5)
EOSINOPHIL NFR BLD: 0.8 % (ref 0–8)
ERYTHROCYTE [DISTWIDTH] IN BLOOD BY AUTOMATED COUNT: 12.4 % (ref 11.5–14.5)
EST. GFR  (AFRICAN AMERICAN): >60 ML/MIN/1.73 M^2
EST. GFR  (NON AFRICAN AMERICAN): >60 ML/MIN/1.73 M^2
GLUCOSE SERPL-MCNC: 88 MG/DL (ref 70–110)
HCT VFR BLD AUTO: 36.8 % (ref 37–48.5)
HGB BLD-MCNC: 12 G/DL (ref 12–16)
IMM GRANULOCYTES # BLD AUTO: 0.01 K/UL (ref 0–0.04)
IMM GRANULOCYTES NFR BLD AUTO: 0.2 % (ref 0–0.5)
LYMPHOCYTES # BLD AUTO: 1.5 K/UL (ref 1–4.8)
LYMPHOCYTES NFR BLD: 30 % (ref 18–48)
MCH RBC QN AUTO: 30.6 PG (ref 27–31)
MCHC RBC AUTO-ENTMCNC: 32.6 G/DL (ref 32–36)
MCV RBC AUTO: 94 FL (ref 82–98)
MONOCYTES # BLD AUTO: 0.4 K/UL (ref 0.3–1)
MONOCYTES NFR BLD: 8.1 % (ref 4–15)
NEUTROPHILS # BLD AUTO: 3.1 K/UL (ref 1.8–7.7)
NEUTROPHILS NFR BLD: 60.7 % (ref 38–73)
NRBC BLD-RTO: 0 /100 WBC
PLATELET # BLD AUTO: 215 K/UL (ref 150–450)
PMV BLD AUTO: 9.9 FL (ref 9.2–12.9)
POTASSIUM SERPL-SCNC: 3.7 MMOL/L (ref 3.5–5.1)
PROT SERPL-MCNC: 6.7 G/DL (ref 6–8.4)
RBC # BLD AUTO: 3.92 M/UL (ref 4–5.4)
SODIUM SERPL-SCNC: 144 MMOL/L (ref 136–145)
TROPONIN I SERPL DL<=0.01 NG/ML-MCNC: 0.01 NG/ML (ref 0–0.03)
WBC # BLD AUTO: 5.04 K/UL (ref 3.9–12.7)

## 2022-01-18 PROCEDURE — 99284 EMERGENCY DEPT VISIT MOD MDM: CPT

## 2022-01-18 PROCEDURE — 80053 COMPREHEN METABOLIC PANEL: CPT | Performed by: PHYSICIAN ASSISTANT

## 2022-01-18 PROCEDURE — 25000003 PHARM REV CODE 250: Performed by: PHYSICIAN ASSISTANT

## 2022-01-18 PROCEDURE — 83880 ASSAY OF NATRIURETIC PEPTIDE: CPT | Performed by: PHYSICIAN ASSISTANT

## 2022-01-18 PROCEDURE — 84484 ASSAY OF TROPONIN QUANT: CPT | Performed by: PHYSICIAN ASSISTANT

## 2022-01-18 PROCEDURE — 93010 ELECTROCARDIOGRAM REPORT: CPT | Mod: ,,, | Performed by: INTERNAL MEDICINE

## 2022-01-18 PROCEDURE — 25000242 PHARM REV CODE 250 ALT 637 W/ HCPCS: Performed by: EMERGENCY MEDICINE

## 2022-01-18 PROCEDURE — 85025 COMPLETE CBC W/AUTO DIFF WBC: CPT | Performed by: PHYSICIAN ASSISTANT

## 2022-01-18 PROCEDURE — 93010 EKG 12-LEAD: ICD-10-PCS | Mod: ,,, | Performed by: INTERNAL MEDICINE

## 2022-01-18 PROCEDURE — 93005 ELECTROCARDIOGRAM TRACING: CPT

## 2022-01-18 RX ORDER — ISOSORBIDE MONONITRATE 30 MG/1
30 TABLET, EXTENDED RELEASE ORAL DAILY
Qty: 30 TABLET | Refills: 11 | Status: SHIPPED | OUTPATIENT
Start: 2022-01-18 | End: 2022-01-27 | Stop reason: SDUPTHER

## 2022-01-18 RX ORDER — NITROGLYCERIN 0.4 MG/1
TABLET SUBLINGUAL
Status: DISPENSED
Start: 2022-01-18 | End: 2022-01-19

## 2022-01-18 RX ORDER — ASPIRIN 325 MG
325 TABLET ORAL
Status: COMPLETED | OUTPATIENT
Start: 2022-01-18 | End: 2022-01-18

## 2022-01-18 RX ORDER — NITROGLYCERIN 0.4 MG/1
0.4 TABLET SUBLINGUAL EVERY 5 MIN PRN
Qty: 100 TABLET | Refills: 0 | Status: SHIPPED | OUTPATIENT
Start: 2022-01-18 | End: 2023-07-13 | Stop reason: SDUPTHER

## 2022-01-18 RX ORDER — NITROGLYCERIN 0.4 MG/1
0.4 TABLET SUBLINGUAL EVERY 5 MIN PRN
Status: DISCONTINUED | OUTPATIENT
Start: 2022-01-18 | End: 2022-01-18 | Stop reason: HOSPADM

## 2022-01-18 RX ADMIN — NITROGLYCERIN 0.4 MG: 0.4 TABLET, ORALLY DISINTEGRATING SUBLINGUAL at 02:01

## 2022-01-18 RX ADMIN — ASPIRIN 325 MG ORAL TABLET 325 MG: 325 PILL ORAL at 02:01

## 2022-01-18 NOTE — ED PROVIDER NOTES
"Encounter Date: 1/18/2022    SCRIBE #1 NOTE: I, Anabelle Hopper, am scribing for, and in the presence of,  Rafal Lopez MD. I have scribed the following portions of the note - Other sections scribed: HPI, ROS, PE.       History     Chief Complaint   Patient presents with    Jaw Pain     Pt reporting jaw and neck pain to the right side of neck x Wednesday. Pt reports seeing MD Khalil on Monday (01/10/22) for A-fib where " my heart was shocked". Pt states jaw and neck pain started afterwards but worsened Wednesday. Pt has a hx of HTN, CABG.      Chief complaint: Jaw pain    History of present of illness: This is a 61 year old female who Atrial fibrillation, Coronary artery disease, Hyperlipidemia, and Hypertension who presents to the emergency department for emergent evaluation of acute right jaw pain that radiates to the right neck x's 6 days. Patient states that the jaw pain and neck pain occurred while sitting. Patients symptoms are currently moderate and 5/10. She states that the jaw pain and neck pain are not worse with head movement. She states that the jaw pain radiates to the head, and down her right arm when severe. Symptoms are worse on exertion, which she describes the pain as a tightening sensation when exerting herself. She spoke with a nurse at her doctors office, and nurse was concerned that the symptoms were cardiac related, or secondary to a blood clot. Patient reports previous episode of jaw pain 11-12 years ago, in which the jaw pain was related to a cardiac issue and the patient required open heart surgery at that time. The patients Cardiologist is Dr. Khalil. She is unsure of her last stress test and angiogram. She has not taken Nitroglycerin for the current jaw pain and neck pain. Patient admits to increased shortness of breath within the last 2 days. She states that she was recently admitted for Atrial fibrillation on 1/10/2022, which she received a cardioversion at that time. She states " that the shortness of breath resolved after the cardioversion, but has since reoccurred. Patient takes Xarelto. She has not currently stopped taking Xarelto for any reason. She is compliant with Xarelto. She was never a smoker. Patient denies dental pain, pain with chewing, facial pain, facial swelling, ear pain, sinus pain, fever, sore throat, pain with swallowing, hearing loss, hot or cold sensitivity when drink or eat, or tobacco use.      The history is provided by the patient. No  was used.     Review of patient's allergies indicates:   Allergen Reactions    Amlodipine Swelling     Past Medical History:   Diagnosis Date    Atrial fibrillation     Colon polyp     Coronary artery disease     Hyperlipidemia     Hypertension      Past Surgical History:   Procedure Laterality Date    BREAST BIOPSY      CARDIAC SURGERY      COLONOSCOPY N/A 5/25/2020    Procedure: COLONOSCOPY;  Surgeon: José Luis Fonseca MD;  Location: [facility] ENDO;  Service: Endoscopy;  Laterality: N/A;  XARELTO/PLETAL ok    CORONARY ARTERY BYPASS GRAFT      CYSTOSCOPY W/ URETERAL STENT PLACEMENT Bilateral 6/25/2020    Procedure: CYSTOSCOPY, WITH URETERAL STENT INSERTION;  Surgeon: STEPHANY Smyth MD;  Location: [facility] OR;  Service: Urology;  Laterality: Bilateral;    HYSTERECTOMY      LAPAROSCOPIC HEMICOLECTOMY Right 6/25/2020    Procedure: HAND ASSISTED HEMICOLECTOMY, LAPAROSCOPIC;  Surgeon: Louis Rivas III, MD;  Location: [facility] OR;  Service: General;  Laterality: Right;  RN PREOP 6/19/2020--has cardiac clearance from Dr. Khalil, --COVID NEGATIVE and T/S on 6/23/2020  COMBINED CASE WITH DR SMYTH     Family History   Problem Relation Age of Onset    Cancer Mother     Breast cancer Maternal Uncle      Social History     Tobacco Use    Smoking status: Never Smoker    Smokeless tobacco: Never Used   Substance Use Topics    Alcohol use: No    Drug use: No     Review of Systems   Constitutional: Negative for  fever.   HENT: Negative for dental problem, ear pain, facial swelling, hearing loss, sinus pain, sore throat and trouble swallowing.         (-) Facial pain   Eyes: Negative for visual disturbance.   Respiratory: Positive for shortness of breath. Negative for cough.    Cardiovascular: Negative for chest pain and palpitations.   Gastrointestinal: Negative for abdominal pain, diarrhea, nausea and vomiting.   Genitourinary: Negative for difficulty urinating.   Musculoskeletal: Positive for myalgias (right jaw pain) and neck pain (right). Negative for joint swelling.        (-) Pain with chewing   Skin: Negative for color change.   Neurological: Negative for dizziness and headaches.       Physical Exam     Initial Vitals [01/18/22 1215]   BP Pulse Resp Temp SpO2   (!) 166/79 (!) 57 20 98.1 °F (36.7 °C) 96 %      MAP       --         Physical Exam    Nursing note and vitals reviewed.  Constitutional: She appears well-developed and well-nourished.   HENT:   Head: Normocephalic and atraumatic.   Mouth/Throat: Mucous membranes are normal.   Eyes: EOM are normal. Pupils are equal, round, and reactive to light.   Neck: Neck supple. No thyromegaly present. No JVD present.   Normal range of motion.  Cardiovascular: Normal rate, regular rhythm and normal heart sounds. Exam reveals no gallop and no friction rub.    No murmur heard.  Pulmonary/Chest: Breath sounds normal. No respiratory distress.   Abdominal: Abdomen is soft. She exhibits no distension. There is no abdominal tenderness. There is no rebound and no guarding.   Musculoskeletal:         General: No tenderness or edema. Normal range of motion.      Cervical back: Normal range of motion and neck supple.     Neurological: She is alert and oriented to person, place, and time. She has normal strength. GCS score is 15. GCS eye subscore is 4. GCS verbal subscore is 5. GCS motor subscore is 6.   Skin: Skin is warm and dry. Capillary refill takes less than 2 seconds.    Psychiatric: She has a normal mood and affect.         ED Course   Procedures  Labs Reviewed   CBC W/ AUTO DIFFERENTIAL - Abnormal; Notable for the following components:       Result Value    RBC 3.92 (*)     Hematocrit 36.8 (*)     All other components within normal limits   COMPREHENSIVE METABOLIC PANEL - Abnormal; Notable for the following components:    Anion Gap 6 (*)     All other components within normal limits   B-TYPE NATRIURETIC PEPTIDE - Abnormal; Notable for the following components:     (*)     All other components within normal limits   TROPONIN I   TROPONIN I     EKG Readings: (Independently Interpreted)   Initial Reading: No STEMI. Rhythm: Sinus Bradycardia. Heart Rate: 53.       Imaging Results    None     Patient has had right jaw pain for 1 week that gets worse with exertion and also has exertional shortness of breath.  Cardiac workup here is normal.  Troponin normal despite week-long pain.  Symptoms completely resolved with sublingual nitroglycerin.  I discussed the case with her cardiologist Dr. Khalil. We will start Imdur 30mg, refill nitro glycerin and patient has follow up appointment 2/3/2022. Dr khalil has instructed his staff to try and get her in sooner. She understands to return tot he ER for new or worsening symptoms.      Medications   nitroGLYCERIN SL tablet 0.4 mg (0.4 mg Sublingual Given 1/18/22 1441)   aspirin tablet 325 mg (325 mg Oral Given 1/18/22 1441)              Scribe Attestation:   Scribe #1: I performed the above scribed service and the documentation accurately describes the services I performed. I attest to the accuracy of the note.               I, Rafal Finch, personally performed the services described in this documentation. All medical record entries made by the scribe were at my direction and in my presence. I have reviewed the chart and agree that the record reflects my personal performance and is accurate and complete.  Clinical Impression:   Final  diagnoses:  [R68.84] Jaw pain  [R07.9] Chest pain          ED Disposition Condition    Discharge Stable        ED Prescriptions     Medication Sig Dispense Start Date End Date Auth. Provider    isosorbide mononitrate (IMDUR) 30 MG 24 hr tablet Take 1 tablet (30 mg total) by mouth once daily. 30 tablet 1/18/2022 1/18/2023 Rafal Lopez MD    nitroGLYCERIN (NITROSTAT) 0.4 MG SL tablet Place 1 tablet (0.4 mg total) under the tongue every 5 (five) minutes as needed for Chest pain. 100 tablet 1/18/2022 1/18/2023 aRfal Lopez MD        Follow-up Information     Follow up With Specialties Details Why Contact Info    José Khalil MD Cardiology, INTERVENTIONAL CARDIOLOGY  as scheduled or sooner if possible. [address]  [address]  [facility] [address]  [phone]      [facility] [place] - Emergency Dept Emergency Medicine  As needed, If symptoms wreturn, worsen or new symptoms develop [address] [facility] [address] [phone]           Rafal Lopez MD  01/18/22 3786

## 2022-01-18 NOTE — FIRST PROVIDER EVALUATION
" Emergency Department TeleTriage Encounter Note      CHIEF COMPLAINT    Chief Complaint   Patient presents with    Jaw Pain     Pt reporting jaw and neck pain to the right side of neck x Wednesday. Pt reports seeing MD Khalil on Monday (01/10/22) for A-fib where " my heart was shocked". Pt states jaw and neck pain started afterwards but worsened Wednesday. Pt has a hx of HTN, CABG.        VITAL SIGNS   Initial Vitals [01/18/22 1215]   BP Pulse Resp Temp SpO2   (!) 166/79 (!) 57 20 98.1 °F (36.7 °C) 96 %      MAP       --            ALLERGIES    Review of patient's allergies indicates:   Allergen Reactions    Amlodipine Swelling       PROVIDER TRIAGE NOTE  This is a teletriage evaluation of a 61 y.o. female presenting to the ED complaining of R sided jaw/neck pain, dizzy, intermittent nausea without vomiting, diaphoresis that waxes and wanes but is constant x 6 days.  She denies CP, left arm pain, left sided jaw pain.      Initial orders will be placed and care will be transferred to an alternate provider when patient is roomed for a full evaluation. Any additional orders and the final disposition will be determined by that provider.           ORDERS  Labs Reviewed - No data to display    ED Orders (720h ago, onward)    Start Ordered     Status Ordering Provider    01/18/22 1536 01/18/22 1235  Troponin I #2  Once Timed         Ordered WINIFREDPLEY, ANGELA P.    01/18/22 1245 01/18/22 1235  aspirin tablet 325 mg  ED 1 Time         Ordered SHAPPLEY, ANGELA P.    01/18/22 1236 01/18/22 1235  Vital signs  Every 15 min         Ordered WINIFREDPLEY ANGELA P.    01/18/22 1236 01/18/22 1235  Cardiac Monitoring - Adult  Continuous        Comments: Notify Physician If:    Ordered SHAPPLEY, ANGELA P.    01/18/22 1236 01/18/22 1235  Pulse Oximetry Continuous  Continuous         Ordered SHAPPLEY, ANGELA P.    01/18/22 1236 01/18/22 1235  Diet NPO  Diet effective now         Ordered SHAPPLEY, ANGELA P.    01/18/22 1236 01/18/22 1235  " Saline lock IV  Once         Ordered ANGELA NIEVES P.    01/18/22 1236 01/18/22 1235  EKG 12-lead  Once        Comments: Do not perform if previously done during this visit/ triage    Ordered ANGELA NIEVES P.    01/18/22 1236 01/18/22 1235  CBC auto differential  STAT         Ordered EZEQUIEL ANGELA P.    01/18/22 1236 01/18/22 1235  Comprehensive metabolic panel  STAT         Ordered EZEQUIEL ANGELA P.    01/18/22 1236 01/18/22 1235  Troponin I #1  STAT         Ordered WENDY NIEVESHAN P.    01/18/22 1236 01/18/22 1235  B-Type natriuretic peptide (BNP)  STAT         Ordered ANGELA NIEVES P.    01/18/22 1218 01/18/22 1218  EKG 12-lead  Once         Ordered WENDI POSEY            Virtual Visit Note: The provider triage portion of this emergency department evaluation and documentation was performed via [employer], a HIPAA-compliant telemedicine application, in concert with a tele-presenter in the room. A face to face patient evaluation with one of my colleagues will occur once the patient is placed in an emergency department room.      DISCLAIMER: This note was prepared with [employer] voice recognition transcription software. Garbled syntax, mangled pronouns, and other bizarre constructions may be attributed to that software system.

## 2022-01-18 NOTE — ED TRIAGE NOTES
Pt presents to ED via personal transportation with complaints of right sided neck pain. Pt explains that she recognized she had A-Fib Wednesday, 1/5/22. Pt states she knew because she has had this issue before in 2018, so she knew right away. She went to the doctor and they admitted her the 10th. Pt states that she had a cardioversion done for the A-Fib. Pt states the cardioversion was done on the 10th as well. Pt states late Wednesday, she started having some right sided jaw pain. Pt states that she knew it had something to do with her heart because she always has jaw pain when something is going on. Pt states that the pain is in her jaw and neck. Pt denies doing anything when it started. Pt states she has been a little SOB over the last couple of days. Pt denies chest pain. Pt appears stable currently and placed on cardiac monitor and continuous pulse ox. Pt a little hypertensive but otherwise vitals WNL.

## 2022-01-20 ENCOUNTER — TELEPHONE (OUTPATIENT)
Dept: CARDIOLOGY | Facility: CLINIC | Age: 62
End: 2022-01-20
Payer: COMMERCIAL

## 2022-01-20 NOTE — PROVATION PATIENT INSTRUCTIONS
Discharge Summary/Instructions after an Endoscopic Procedure  Patient Name: Keyonna Dunham  Patient MRN: [id number]  Patient YOB: 1960  Monday, May 25, 2020  José Luis Fonseca MD  RESTRICTIONS:  During your procedure today, you received medications for sedation.  These   medications may affect your judgment, balance and coordination.  Therefore,   for 24 hours, you have the following restrictions:   - DO NOT drive a car, operate machinery, make legal/financial decisions,   sign important papers or drink alcohol.    ACTIVITY:  Today: no heavy lifting, straining or running due to procedural   sedation/anesthesia.  The following day: return to full activity including work.  DIET:  Eat and drink normally unless instructed otherwise.     TREATMENT FOR COMMON SIDE EFFECTS:  - Mild abdominal pain, nausea, belching, bloating or excessive gas:  rest,   eat lightly and use a heating pad.  - Sore Throat: treat with throat lozenges and/or gargle with warm salt   water.  - Because air was used during the procedure, expelling large amounts of air   from your rectum or belching is normal.  - If a bowel prep was taken, you may not have a bowel movement for 1-3 days.    This is normal.  SYMPTOMS TO WATCH FOR AND REPORT TO YOUR PHYSICIAN:  1. Abdominal pain or bloating, other than gas cramps.  2. Chest pain.  3. Back pain.  4. Signs of infection such as: chills or fever occurring within 24 hours   after the procedure.  5. Rectal bleeding, which would show as bright red, maroon, or black stools.   (A tablespoon of blood from the rectum is not serious, especially if   hemorrhoids are present.)  6. Vomiting.  7. Weakness or dizziness.  GO DIRECTLY TO THE NEAREST EMERGENCY ROOM IF YOU HAVE ANY OF THE FOLLOWING:      Difficulty breathing              Chills and/or fever over 101 F   Persistent vomiting and/or vomiting blood   Severe abdominal pain   Severe chest pain   Black, tarry stools   Bleeding- more than one  Patient: Maikel Storey Date: 2022   : 1956  Attending: Ken Graff DPM   65 year old male      WOUND CARE F/U  VISIT      Chief Complaint:   Chief Complaint   Patient presents with   • Wound       History of Present Illness:    65 old male with PMHx of T2DM, CKD, history of MRSA, PVD, who was admitted by Dr. Graff for osteomyelitis to the left fifth metatarsal. [facility]. Graff, and developed an ulceration with an infection in the fifth metatarsal bone.  He underwent a excision of ulceration with debridement of resection of residual fifth metatarsal bone, left foot (DOS 21).  Patient is follow-up ulcer fifth metatarsal head right foot. Has completed course of antibiotics.  Patient has been discharged home with home health.      .  I have not seen the patient for approximately a month and the patient has missed multiple appointments.  He states that the amputation site left foot continues healed with no problems.  Patient had x-rays at [facility] and then had MRI of the right foot for further investigation of possible osteomyelitis.  Patient states he has been using oregano to treat the infection.  Patient has elected not to proceed with surgical invention and  would like to continue with medical management with IV antibiotics to treat the osteomyelitis in the right fifth metatarsal.  I discussed with Dr. Vera.  Patient has completed vancomycin IVPB at the infusion center through [facility] and was also on Cipro 500 mg p.o. twice daily.  Patient also sees a  and is using oregano oil topically on the wound.     .  Patient follows with Dr. Vera and is to see him for follow-up visit. Patient had follow-up x-rays [facility] 2021.  He has completed his course of antibiotics per ID.  He is requesting a follow-up x-ray.  He has started hyperbarics at [facility] and has completed about 25 sessions he is feeling fine no new symptoms.  Patient had x-rays at [facility] on 1/6/2022 of the right foot.  Glucose is 127 after hyperbaric oxygen treatment today at [facility].      Past Medical History:   Diagnosis Date   • Anasarca 5/15/2020   • Anemia 11/12/2020   • Arthritis    • CKD (chronic kidney disease) 5/15/2020   • Congestive cardiac failure (CMS/[facility])    • Diabetes mellitus (CMS/[facility])    • Dyspnea on exertion 8/13/2019   • Erectile dysfunction    • Essential (primary) hypertension    • Gastroesophageal reflux disease    • High cholesterol    • Hyperlipidemia 7/31/2020   • Hyperlipoproteinemia    • Kidney stone 11/12/2019   • Kidney stone    • MRSA (methicillin resistant Staphylococcus aureus)     foot, left   • Open wound     lt foot   • Peripheral vascular disease due to secondary diabetes mellitus (CMS/[facility]) 7/8/2020   • PVOD (pulmonary veno-occlusive disease) (CMS/[facility]) 11/12/2020   • Severe sepsis (CMS/[facility]) 7/8/2020   • Sleep apnea     not using  CPAP   due to loss  teeth   • Staph infection     left foot wound   • Status post amputation of toe (CMS/[facility]) 7/31/2020   • Transfusion of blood product refused for Shinto reason    • Type 2 diabetes mellitus with hyperglycemia (CMS/[facility]) 4/11/2017     Past Surgical History:   Procedure Laterality Date   • Kidney stone surgery     • Toe amputation Left 07/2020    #3, 4, and 5th toe     Family History   Problem Relation Age of Onset   • Cancer Father    • Coronary Artery Disease Brother    • Liver Disease Brother    • Cancer Paternal Aunt        Social History:   Social History     Substance and Sexual Activity   Alcohol Use Yes   • Alcohol/week: 1.0 standard drink   • Types: 1 Cans of beer per week     History   Drug Use Unknown     Social History     Tobacco Use   Smoking Status Never Smoker   Smokeless Tobacco Never Used   Tobacco Comment    quit smoking 10-12 years ago             Review of Systems:  tablespoon   Any other symptom or condition that you feel may need urgent attention  Your doctor recommends these additional instructions:  If any biopsies were taken, your doctors clinic will contact you in 1 to 2   weeks with any results.  - Await pathology results.   - Repeat colonoscopy in 1 year for surveillance.   - A CT of the chest, abdomen, and pelvis will be obtained and she will be   referred to Dr. Rivas and Dr. Donald.  - Return to GI clinic in 3 weeks.   - Discharge patient to home (via wheelchair).  For questions, problems or results please call your physician - José Luis Fonseca MD at Work:  [phone].  [facility] can be reached at [phone]     IF A COMPLICATION OR EMERGENCY SITUATION ARISES AND YOU ARE UNABLE TO REACH   YOUR PHYSICIAN - GO DIRECTLY TO THE EMERGENCY ROOM.  José Luis Fonscea MD  5/25/2020 9:17:16 AM  This report has been verified and signed electronically.  [employer]   Pertinent items are noted in HPI (history of present illness).    Physical Exam:  Visit Vitals  Temp 96.1 °F (35.6 °C) (Temporal)   Physical Exam    l  General: Alert and oriented x 3, no acute distress  Cardiovascular: Pedal pulses: Dorsalis pedis is nonpalpable bilateral.  Posterior tibial is palpable but reduced on the right and nonpalpable on the left.    Muscle tone & strength: wnl bilaterally   Left foot s/p 3rd, 4th, 5th ray amputation and s/p split thickness skin graft site with hypkeratotic tissue along the incision site.    There is  Mild edema of the entire foot and lower extremity with venous insufficiency with venous stasis.  6 months status post additional surgery on left foot with removal of base of fifth metatarsal and additional debridement with bone cultures and sensitivity.  The sutures have been removed previously with the area of dehiscence in the mid incision line from likely patient weightbearing against orders with measurements and pictures below.  Previous ulceration on the lateral aspect of the second metatarsal head and base of the second digit is closed.  Incision site and ulcers right foot entirely closed.  Ulcer plantar aspect fifth metatarsal base right foot with previous exposed bone, but no current exposed bone covered with granulation tissue, no cellulitis and no current drainage.  Mild surrounding hyperkeratotic rim chronic venous insufficiency with chronic venous stasis.  There is no cellulitis.    Neurological: Sensorium diminished to the foot and ankle,ryan   .  The second and first toes are viable at this time.   Neurological: Sensorium decreased bilaterally measured with monofilament 10 g of pressure    Picture of ulceration plantar right foot on 9/23/2021:          Picture surgical site left foot on 5/6/2021:          Picture left foot on 3/18/2021:               Picture of ulceration, dehiscence lateral base of second digit and lateral head of second metatarsal left foot and  ulcer following debridement of abscess lateral left foot on 1/21/2021:          Picture of ulceration/dehiscence lateral aspect base of second digit and lateral head of second metatarsal left foot on 1/7/2021:        Picture of left foot ulceration status post surgery on 12/10/2020:          Wound Measurements    Wound Right Plantar (Active)   Wound Length (cm) 1 cm 01/06/22 1100   Wound Width (cm) 1 cm 01/06/22 1100   Wound Depth (cm) 0.4 cm 01/06/22 1100   Wound Surface Area (cm^2) 1 cm^2 01/06/22 1100   Wound Volume (cm^3) 0.4 cm^3 01/06/22 1100   Number of days: 189       Laboratory assessments:   Hospital Outpatient Visit on 01/20/2022   Component Date Value Ref Range Status   • GLUCOSE, BEDSIDE - POINT OF CARE 01/20/2022 139 (A) 70 - 99 mg/dL Final   • GLUCOSE, BEDSIDE - POINT OF CARE 01/20/2022 127 (A) 70 - 99 mg/dL Final   Hospital Outpatient Visit on 01/19/2022   Component Date Value Ref Range Status   • GLUCOSE, BEDSIDE - POINT OF CARE 01/19/2022 141 (A) 70 - 99 mg/dL Final   • GLUCOSE, BEDSIDE - POINT OF CARE 01/19/2022 151 (A) 70 - 99 mg/dL Final   Hospital Outpatient Visit on 01/18/2022   Component Date Value Ref Range Status   • GLUCOSE, BEDSIDE - POINT OF CARE 01/18/2022 117 (A) 70 - 99 mg/dL Final   • GLUCOSE, BEDSIDE - POINT OF CARE 01/18/2022 115 (A) 70 - 99 mg/dL Final   Hospital Outpatient Visit on 01/17/2022   Component Date Value Ref Range Status   • GLUCOSE, BEDSIDE - POINT OF CARE 01/17/2022 125 (A) 70 - 99 mg/dL Final   • GLUCOSE, BEDSIDE - POINT OF CARE 01/17/2022 129 (A) 70 - 99 mg/dL Final   Hospital Outpatient Visit on 01/14/2022   Component Date Value Ref Range Status   • GLUCOSE, BEDSIDE - POINT OF CARE 01/14/2022 144 (A) 70 - 99 mg/dL Final   • GLUCOSE, BEDSIDE - POINT OF CARE 01/14/2022 119 (A) 70 - 99 mg/dL Final   Hospital Outpatient Visit on 01/13/2022   Component Date Value Ref Range Status   • GLUCOSE, BEDSIDE - POINT OF CARE 01/13/2022 166 (A) 70 - 99 mg/dL Final   • GLUCOSE,  BEDSIDE - POINT OF CARE 01/13/2022 177 (A) 70 - 99 mg/dL Final   Hospital Outpatient Visit on 01/12/2022   Component Date Value Ref Range Status   • GLUCOSE, BEDSIDE - POINT OF CARE 01/12/2022 198 (A) 70 - 99 mg/dL Final   • GLUCOSE, BEDSIDE - POINT OF CARE 01/12/2022 196 (A) 70 - 99 mg/dL Final   Hospital Outpatient Visit on 01/11/2022   Component Date Value Ref Range Status   • GLUCOSE, BEDSIDE - POINT OF CARE 01/11/2022 80  70 - 99 mg/dL Final   • GLUCOSE, BEDSIDE - POINT OF CARE 01/11/2022 104 (A) 70 - 99 mg/dL Final   Hospital Outpatient Visit on 01/10/2022   Component Date Value Ref Range Status   • GLUCOSE, BEDSIDE - POINT OF CARE 01/10/2022 139 (A) 70 - 99 mg/dL Final   • GLUCOSE, BEDSIDE - POINT OF CARE 01/10/2022 115 (A) 70 - 99 mg/dL Final   Hospital Outpatient Visit on 01/07/2022   Component Date Value Ref Range Status   • GLUCOSE, BEDSIDE - POINT OF CARE 01/07/2022 194 (A) 70 - 99 mg/dL Final   • GLUCOSE, BEDSIDE - POINT OF CARE 01/07/2022 151 (A) 70 - 99 mg/dL Final   There may be more visits with results that are not included.       Xr Foot Min 3 Views Left    Result Date: 7/10/2020  Narrative EXAM: XR FOOT MIN 3 VIEWS LEFT CLINICAL INDICATION: Postoperative amputation COMPARISON: 07/08/2020 FINDINGS: Since the prior examination there has been transmetatarsal amputation 3rd, 4th and 5th toes.   Postoperative changes in the soft tissues.   The cortex sharp at the amputation site.  Soft tissue swelling.  Large calcaneal spur.     Impression  Postoperative see above. Electronically Signed by: BERTRAM OSEI M.D. Signed on: 7/10/2020 1:10 PM     Xr Foot Min 3 Views Left    Result Date: 7/8/2020  Narrative EXAM: XR FOOT MIN 3 VIEWS LEFT CLINICAL INDICATION: Gangrene 4th and 5th digits left foot.  Ulcer plantar 5th metatarsal head. COMPARISON: None. FINDINGS: Three views were obtained.  There is diffuse soft tissue swelling involving the distal aspects of the 3rd and 4th digits at the level of the phalanges.   There is air within the soft tissues, compatible with soft tissue emphysema/gangrene.  Air also extends into the soft tissues overlying the second and 3rd digit proximal phalanx.  The scattered air limits evaluation for potential fracture or bony erosive/destructive changes.  No conclusive fracture or bony erosive/destructive changes identified.  Scattered vascular calcifications are present.  There is soft tissue swelling adjacent to the 5th MTP joint.  No radiopaque foreign body is identified.     Impression  As above. Electronically Signed by: ANNA ESTRADA M.D. Signed on: 7/8/2020 9:50 PM     Us Lower Extremity Arterial Ischemic Level 1    Result Date: 7/9/2020  Narrative EXAM: [facility] LOWER EXTREMITY ARTERIES ISCHEMIC LEVEL 1 LEFT CLINICAL INDICATION: Gangrene of the left foot.  Acute ischemic limb. COMPARISON: There are no prior examinations currently available for comparison. TECHNIQUE: Segmental left limb pressures, corresponding ankle-brachial indices and toe indices were obtained. Doppler wave forms of the left lower extremity arteries were also obtained. FINDINGS: LEFT LOWER EXTREMITY Segmental limb pressure and corresponding indices are as follows: RV brachial: 95 Low thigh: 244 / 2.24 Calf: 116 / 1.06 Ankle (PT): 104 / 0.95 Ankle (DPM): 106 / 0.97 Digit: 52 / 0.48  Normal triphasic waveforms within the left common femoral, superficial femoral, and popliteal arteries.  Abnormal monophasic waveforms within the left dorsalis pedis and posterior tibial arteries.  There is a reduced left toe pressure which is within the intermediate claudication category.   The finding suggests a flow-limiting stenosis that occurs at the distal left knee/proximal left calf with severely reduced flow within the infrapopliteal arterial structures. No evidence for an arterial occlusion within the left leg.     Impression 1.  Findings suggest a flow-limiting stenosis that occurs in the distal left knee/proximal left calf with  severely reduced flow within the infrapopliteal arterial structures. 2.  Reduced left toe pressure is within the intermediate claudication category. 3.  No arterial occlusion within the left leg. Electronically Signed by: BINDU PATTON M.D. Signed on: 7/9/2020 8:31 AM             Xr Foot Min 3 Views Left    Result Date: 7/10/2020  Narrative: EXAM: XR FOOT MIN 3 VIEWS LEFT CLINICAL INDICATION: Postoperative amputation COMPARISON: 07/08/2020 FINDINGS: Since the prior examination there has been transmetatarsal amputation 3rd, 4th and 5th toes.   Postoperative changes in the soft tissues.   The cortex sharp at the amputation site.  Soft tissue swelling.  Large calcaneal spur.     Impression:  Postoperative see above. Electronically Signed by: BERTRAM OSEI M.D. Signed on: 7/10/2020 1:10 PM     Xr Foot Min 3 Views Left    Result Date: 7/8/2020  Narrative: EXAM: XR FOOT MIN 3 VIEWS LEFT CLINICAL INDICATION: Gangrene 4th and 5th digits left foot.  Ulcer plantar 5th metatarsal head. COMPARISON: None. FINDINGS: Three views were obtained.  There is diffuse soft tissue swelling involving the distal aspects of the 3rd and 4th digits at the level of the phalanges.  There is air within the soft tissues, compatible with soft tissue emphysema/gangrene.  Air also extends into the soft tissues overlying the second and 3rd digit proximal phalanx.  The scattered air limits evaluation for potential fracture or bony erosive/destructive changes.  No conclusive fracture or bony erosive/destructive changes identified.  Scattered vascular calcifications are present.  There is soft tissue swelling adjacent to the 5th MTP joint.  No radiopaque foreign body is identified.     Impression:  As above. Electronically Signed by: ANNA ESTRADA M.D. Signed on: 7/8/2020 9:50 PM     Us Lower Extremity Arterial Ischemic Level 1    Result Date: 7/9/2020  Narrative: EXAM: US VASC LOWER EXTREMITY ARTERIES ISCHEMIC LEVEL 1 LEFT CLINICAL INDICATION:  Gangrene of the left foot.  Acute ischemic limb. COMPARISON: There are no prior examinations currently available for comparison. TECHNIQUE: Segmental left limb pressures, corresponding ankle-brachial indices and toe indices were obtained. Doppler wave forms of the left lower extremity arteries were also obtained. FINDINGS: LEFT LOWER EXTREMITY Segmental limb pressure and corresponding indices are as follows: RV brachial: 95 Low thigh: 244 / 2.24 Calf: 116 / 1.06 Ankle (PT): 104 / 0.95 Ankle (DPM): 106 / 0.97 Digit: 52 / 0.48  Normal triphasic waveforms within the left common femoral, superficial femoral, and popliteal arteries.  Abnormal monophasic waveforms within the left dorsalis pedis and posterior tibial arteries.  There is a reduced left toe pressure which is within the intermediate claudication category.   The finding suggests a flow-limiting stenosis that occurs at the distal left knee/proximal left calf with severely reduced flow within the infrapopliteal arterial structures. No evidence for an arterial occlusion within the left leg.     Impression: 1.  Findings suggest a flow-limiting stenosis that occurs in the distal left knee/proximal left calf with severely reduced flow within the infrapopliteal arterial structures. 2.  Reduced left toe pressure is within the intermediate claudication category. 3.  No arterial occlusion within the left leg. Electronically Signed by: BINDU PATTON M.D. Signed on: 7/9/2020 8:31 AM          Procedure: Debridement: Excisional Debridement.  Informed consent obtained.  Time out was completed.  Patient, procedure, and site verified.  Anesthesia-  Topical  Tool-curette  Tissue Type Excised-  Subcutaneous   Hemostasis achieved-  Pressure  Patient procedure-  tolerated well, no complications.  Refer to nursing notes for wound dimensions and assessment.  Patient stable upon completion of procedure.  Wound dimensions unchanged post procedure.    Procedure was Performed by:  Ken Graff DPM     Diagnoses:   1. Type 2 diabetes mellitus with right diabetic foot ulcer (CMS/[facility])    2. PVD (peripheral vascular disease) (CMS/[facility])    3. Open wound of right foot, subsequent encounter    4. Acute hematogenous osteomyelitis of right foot (CMS/[facility])    5. Status post amputation of toe (CMS/[facility])    6. Open wound of left foot, subsequent encounter    7. Status post amputation of lesser toe, left (CMS/[facility])    8. Midfoot ulceration, right, with fat layer exposed (CMS/[facility])    9. Amputation of left foot, sequela (CMS/[facility])    10. Amputation at midfoot, left, sequela (CMS/[facility])    11. S/P transmetatarsal amputation of foot, left (CMS/[facility])    9.  6 months status post surgery with removal of foreign body/staple with bone biopsy left foot  10.   ulceration lateral amputation site left foot  11.  Ulceration plantar aspect distal remaining fifth metatarsal left foot  12.  6 months status post surgery with excision and biopsy of remaining fifth metatarsal left foot  13.  ulcer plantar aspect fifth metatarsal head right foot  14.  Osteomyelitis fifth metatarsal head right foot and base of fifth proximal phalanx right foot  15.  Ulcer anterior medial left ankle     Impression:   1) diabetes mellitus type 2, poorly controlled  2) diabetic neuropathy  3) peripheral vascular disease  4) gangrene fourth and fifth digits left foot with gas in tissues.  5) ulcer plantar aspect fifth metatarsal head left foot  6) cellulitis left foot  7)  Sepsis  8) acute kidney injury  9) acute on chronic diastolic congestive heart failure  10) hypercholesterolemia  11) hypertension  12)  7 months status post incision and drainage left foot with excision of necrotic tissue and bone with amputations third, fourth, fifth digit and distal portion of the metatarsals.  OR cultures many E. coli.  Blood culture positive for staph simulans and Corynebacterium.  13) retained foreign bodies, staple, lateral base of second digit left  foot  14) subluxation with deviation second MPJ left  15) 6 months status post removal of foreign body/staple with bone culture left foot  16) ulceration lateral aspect amputation site left foot  17) 7 months status post surgery with excision and biopsy of remaining fifth metatarsal left foot  18).  Ulcer plantar aspect fifth metatarsal head right foot with underlying osteomyelitis  19).  Osteomyelitis fifth metatarsal head right foot and base of fifth proximal phalanx right foot  20) ulcer anterior medial left ankle    Plan:  1) following a verbal timeout which was taken and acknowledged by the patient and marking of the ulcer plantar fifth metatarsal head right foot, the ulcer was debrided through including the subcutaneous tissue with a scalpel removing devitalized tissue.  This was excisional debridement.  This was then irrigated.  Applied hydrogel and a sterile dressing.  The patient is also applying oregano oil to the ulceration.  The area was offloaded with a 1/4 inch accommodative felt pad.     We discussed his chronic venous insufficiency and patient does not use support hose or elevate his legs.  We discussed his options.  Patient to  have twice daily dressing changes with hydrogel and dressing and offloading.  Discussed with the patient the x-rays from 9/23/2021 which shows cortical bone loss involving the first metatarsal head raising possibility of osteomyelitis.  Patient then had an MRI of the right foot without contrast at [facility] on 9/24/2021.   this showed osteomyelitis of the fifth metatarsal head and  neck with reactive marrow edema.  There is also a chronic type fracture of the fifth proximal phalanx. I reviewed his most recent x-rays from 11/23/2020 one of the right foot. There continues to be the ulcer at the lateral fifth metatarsal phalangeal joint with bony fragmentation and erosion involving the distal fifth metatarsal with periosteal reaction along the fifth metatarsal more  proximally. Also there is lucency in the base of the fifth proximal phalanx all consistent with his chronic osteomyelitis. Discussed with the patient I recommend admittance to [facility] with repeat arterial Doppler with possible interventional cardiology.  We discussed possible angiogram and endovascular procedure.  We also discussed surgical excision of the infected bone with likely partial fifth ray amputation.  Patient does not want any surgical invention and would like to try to treat it with antibiotics alone.  Discussed the possible risks and complications with possible loss of limb or life.  Patient refusing admittance and has seen Dr. Saini and completed IV antibiotics.  He has completed this course I discussed the case with Dr. Saini.  Discussed with the patient even with IV antibiotics the likelihood of eradicating the infection in the bone is slim.  Patient has completed vancomycin IVPB and Cipro 500 mg p.o. twice daily. Patient then completed Zyvox 600 mg p.o. twice daily and Cipro 500 mg p.o. twice daily for 14 more days . Patient states understanding and accept these risks.  Discussed with the patient go to the emergency room right away if he has any increased redness, swelling, drainage, dizziness, shortness of breath, fever.  Patient instructed on twice daily dressing changes and offloading.  Discussed the new x-rays from 1/6/2022 which shows continued irregularity in the cortex lateral aspect of the distal fifth metatarsal but there has been improvement.  3) patient has started hyperbaric oxygen Chambers at [facility].  Patient has completed 25 sessions and is scheduled for 30 sessions.  Patient would like to continue for another 30 days.  Prescription for another 30 days of hyperbaric oxygen treatment daily at [facility].  We will continue to follow.  4) reviewed x-rays of right foot from 1/6/6/2022 at [facility]  5) continue dressing changes with  hydrogel, offloading quarter inch felt daily.    Follow-up: 2  weeks [facility] wound program    Ken Graff DPM  1/20/2022    Never

## 2022-01-20 NOTE — TELEPHONE ENCOUNTER
Called and left message for the patient. Trying to move her follow up appointment up to see Dr. Khalil.

## 2022-01-24 ENCOUNTER — HOSPITAL ENCOUNTER (OUTPATIENT)
Dept: RADIOLOGY | Facility: HOSPITAL | Age: 62
Discharge: HOME | End: 2022-01-24
Attending: FAMILY MEDICINE
Payer: COMMERCIAL

## 2022-01-24 VITALS — BODY MASS INDEX: 33.66 KG/M2 | WEIGHT: 190 LBS | HEIGHT: 63 IN

## 2022-01-24 DIAGNOSIS — Z12.31 BREAST CANCER SCREENING BY MAMMOGRAM: ICD-10-CM

## 2022-01-24 PROCEDURE — 77063 BREAST TOMOSYNTHESIS BI: CPT | Mod: 26,,, | Performed by: RADIOLOGY

## 2022-01-24 PROCEDURE — 77063 BREAST TOMOSYNTHESIS BI: CPT | Mod: TC

## 2022-01-24 PROCEDURE — 77067 MAMMO DIGITAL SCREENING BILAT WITH TOMO: ICD-10-PCS | Mod: 26,,, | Performed by: RADIOLOGY

## 2022-01-24 PROCEDURE — 77063 MAMMO DIGITAL SCREENING BILAT WITH TOMO: ICD-10-PCS | Mod: 26,,, | Performed by: RADIOLOGY

## 2022-01-24 PROCEDURE — 77067 SCR MAMMO BI INCL CAD: CPT | Mod: TC

## 2022-01-24 PROCEDURE — 77067 SCR MAMMO BI INCL CAD: CPT | Mod: 26,,, | Performed by: RADIOLOGY

## 2022-01-26 ENCOUNTER — HOSPITAL ENCOUNTER (OUTPATIENT)
Dept: CARDIOLOGY | Facility: HOSPITAL | Age: 62
Discharge: HOME | End: 2022-01-26
Attending: INTERNAL MEDICINE
Payer: COMMERCIAL

## 2022-01-26 DIAGNOSIS — I48.0 PAROXYSMAL ATRIAL FIBRILLATION: ICD-10-CM

## 2022-01-26 LAB
ASCENDING AORTA: 3.51 CM
AV INDEX (PROSTH): 0.71
AV MEAN GRADIENT: 4 MMHG
AV PEAK GRADIENT: 7 MMHG
AV VALVE AREA: 2.17 CM2
AV VELOCITY RATIO: 0.71
CV ECHO LV RWT: 0.54 CM
DOP CALC AO PEAK VEL: 1.32 M/S
DOP CALC AO VTI: 31.52 CM
DOP CALC LVOT AREA: 3 CM2
DOP CALC LVOT DIAMETER: 1.97 CM
DOP CALC LVOT PEAK VEL: 0.94 M/S
DOP CALC LVOT STROKE VOLUME: 68.52 CM3
DOP CALCLVOT PEAK VEL VTI: 22.49 CM
E WAVE DECELERATION TIME: 190.31 MSEC
E/A RATIO: 2.24
E/E' RATIO: 10.36 M/S
ECHO LV POSTERIOR WALL: 1.22 CM (ref 0.6–1.1)
EJECTION FRACTION: 65 %
FRACTIONAL SHORTENING: 46 % (ref 28–44)
INTERVENTRICULAR SEPTUM: 1.21 CM (ref 0.6–1.1)
IVRT: 87.54 MSEC
LA MAJOR: 5.79 CM
LA MINOR: 6.02 CM
LA WIDTH: 4.22 CM
LEFT ATRIUM SIZE: 3.75 CM
LEFT ATRIUM VOLUME: 79.4 CM3
LEFT INTERNAL DIMENSION IN SYSTOLE: 2.43 CM (ref 2.1–4)
LEFT VENTRICLE DIASTOLIC VOLUME: 91.35 ML
LEFT VENTRICLE SYSTOLIC VOLUME: 20.79 ML
LEFT VENTRICULAR INTERNAL DIMENSION IN DIASTOLE: 4.48 CM (ref 3.5–6)
LEFT VENTRICULAR MASS: 200.3 G
LV LATERAL E/E' RATIO: 9.5 M/S
LV SEPTAL E/E' RATIO: 11.4 M/S
MV PEAK A VEL: 0.51 M/S
MV PEAK E VEL: 1.14 M/S
MV STENOSIS PRESSURE HALF TIME: 55.19 MS
MV VALVE AREA P 1/2 METHOD: 3.99 CM2
PISA TR MAX VEL: 2.88 M/S
PV PEAK VELOCITY: 0.99 CM/S
RA MAJOR: 5.64 CM
RA PRESSURE: 3 MMHG
RA WIDTH: 4.25 CM
RIGHT VENTRICULAR END-DIASTOLIC DIMENSION: 3.56 CM
RV TISSUE DOPPLER FREE WALL SYSTOLIC VELOCITY 1 (APICAL 4 CHAMBER VIEW): 12.03 CM/S
SINUS: 3.5 CM
STJ: 2.92 CM
TDI LATERAL: 0.12 M/S
TDI SEPTAL: 0.1 M/S
TDI: 0.11 M/S
TR MAX PG: 33 MMHG
TRICUSPID ANNULAR PLANE SYSTOLIC EXCURSION: 1.95 CM
TV REST PULMONARY ARTERY PRESSURE: 36 MMHG

## 2022-01-26 PROCEDURE — 93306 ECHO (CUPID ONLY): ICD-10-PCS | Mod: 26,,, | Performed by: INTERNAL MEDICINE

## 2022-01-26 PROCEDURE — 93306 TTE W/DOPPLER COMPLETE: CPT

## 2022-01-26 PROCEDURE — 93306 TTE W/DOPPLER COMPLETE: CPT | Mod: 26,,, | Performed by: INTERNAL MEDICINE

## 2022-01-27 ENCOUNTER — OFFICE VISIT (OUTPATIENT)
Dept: CARDIOLOGY | Facility: CLINIC | Age: 62
End: 2022-01-27
Payer: COMMERCIAL

## 2022-01-27 VITALS
HEART RATE: 67 BPM | BODY MASS INDEX: 33.66 KG/M2 | SYSTOLIC BLOOD PRESSURE: 164 MMHG | WEIGHT: 190 LBS | HEIGHT: 63 IN | DIASTOLIC BLOOD PRESSURE: 82 MMHG | RESPIRATION RATE: 16 BRPM | OXYGEN SATURATION: 98 %

## 2022-01-27 DIAGNOSIS — Z78.9 PATIENT UNABLE TO EXERCISE: ICD-10-CM

## 2022-01-27 DIAGNOSIS — I25.708 CORONARY ARTERY DISEASE OF BYPASS GRAFT OF NATIVE HEART WITH STABLE ANGINA PECTORIS: ICD-10-CM

## 2022-01-27 DIAGNOSIS — I48.0 PAROXYSMAL ATRIAL FIBRILLATION: Primary | ICD-10-CM

## 2022-01-27 DIAGNOSIS — I10 ESSENTIAL HYPERTENSION: ICD-10-CM

## 2022-01-27 DIAGNOSIS — I73.9 PAD (PERIPHERAL ARTERY DISEASE): ICD-10-CM

## 2022-01-27 DIAGNOSIS — E66.9 NON MORBID OBESITY, UNSPECIFIED OBESITY TYPE: ICD-10-CM

## 2022-01-27 DIAGNOSIS — E78.49 OTHER HYPERLIPIDEMIA: ICD-10-CM

## 2022-01-27 DIAGNOSIS — I70.0 AORTIC ATHEROSCLEROSIS: ICD-10-CM

## 2022-01-27 PROCEDURE — 3077F PR MOST RECENT SYSTOLIC BLOOD PRESSURE >= 140 MM HG: ICD-10-PCS | Mod: CPTII,S$GLB,, | Performed by: INTERNAL MEDICINE

## 2022-01-27 PROCEDURE — 3079F DIAST BP 80-89 MM HG: CPT | Mod: CPTII,S$GLB,, | Performed by: INTERNAL MEDICINE

## 2022-01-27 PROCEDURE — 99999 PR PBB SHADOW E&M-EST. PATIENT-LVL IV: ICD-10-PCS | Mod: PBBFAC,,, | Performed by: INTERNAL MEDICINE

## 2022-01-27 PROCEDURE — 99214 PR OFFICE/OUTPT VISIT, EST, LEVL IV, 30-39 MIN: ICD-10-PCS | Mod: 25,S$GLB,, | Performed by: INTERNAL MEDICINE

## 2022-01-27 PROCEDURE — 93000 EKG 12-LEAD: ICD-10-PCS | Mod: S$GLB,,, | Performed by: INTERNAL MEDICINE

## 2022-01-27 PROCEDURE — 3008F PR BODY MASS INDEX (BMI) DOCUMENTED: ICD-10-PCS | Mod: CPTII,S$GLB,, | Performed by: INTERNAL MEDICINE

## 2022-01-27 PROCEDURE — 1159F MED LIST DOCD IN RCRD: CPT | Mod: CPTII,S$GLB,, | Performed by: INTERNAL MEDICINE

## 2022-01-27 PROCEDURE — 1160F RVW MEDS BY RX/DR IN RCRD: CPT | Mod: CPTII,S$GLB,, | Performed by: INTERNAL MEDICINE

## 2022-01-27 PROCEDURE — 3077F SYST BP >= 140 MM HG: CPT | Mod: CPTII,S$GLB,, | Performed by: INTERNAL MEDICINE

## 2022-01-27 PROCEDURE — 3079F PR MOST RECENT DIASTOLIC BLOOD PRESSURE 80-89 MM HG: ICD-10-PCS | Mod: CPTII,S$GLB,, | Performed by: INTERNAL MEDICINE

## 2022-01-27 PROCEDURE — 3008F BODY MASS INDEX DOCD: CPT | Mod: CPTII,S$GLB,, | Performed by: INTERNAL MEDICINE

## 2022-01-27 PROCEDURE — 1159F PR MEDICATION LIST DOCUMENTED IN MEDICAL RECORD: ICD-10-PCS | Mod: CPTII,S$GLB,, | Performed by: INTERNAL MEDICINE

## 2022-01-27 PROCEDURE — 1160F PR REVIEW ALL MEDS BY PRESCRIBER/CLIN PHARMACIST DOCUMENTED: ICD-10-PCS | Mod: CPTII,S$GLB,, | Performed by: INTERNAL MEDICINE

## 2022-01-27 PROCEDURE — 99999 PR PBB SHADOW E&M-EST. PATIENT-LVL IV: CPT | Mod: PBBFAC,,, | Performed by: INTERNAL MEDICINE

## 2022-01-27 PROCEDURE — 93000 ELECTROCARDIOGRAM COMPLETE: CPT | Mod: S$GLB,,, | Performed by: INTERNAL MEDICINE

## 2022-01-27 PROCEDURE — 99214 OFFICE O/P EST MOD 30 MIN: CPT | Mod: 25,S$GLB,, | Performed by: INTERNAL MEDICINE

## 2022-01-27 RX ORDER — LABETALOL 200 MG/1
200 TABLET, FILM COATED ORAL EVERY 12 HOURS
Qty: 180 TABLET | Refills: 3 | Status: SHIPPED | OUTPATIENT
Start: 2022-01-27 | End: 2022-07-07

## 2022-01-27 RX ORDER — ISOSORBIDE MONONITRATE 60 MG/1
60 TABLET, EXTENDED RELEASE ORAL DAILY
Qty: 90 TABLET | Refills: 3 | Status: SHIPPED | OUTPATIENT
Start: 2022-01-27 | End: 2022-02-24 | Stop reason: SDUPTHER

## 2022-01-27 NOTE — PROGRESS NOTES
CARDIOVASCULAR PROGRESS NOTE    REASON FOR CONSULT:   Keyonna Guillen is a 61 y.o. female who presents for follow up of PAF, CAD/CABG, HTN, PAD  PCP: Page  HISTORY OF PRESENT ILLNESS:   Patient returns for follow-up.  In the interim since her last office visit she underwent successful cardioversion remains in sinus rhythm.  She tells me she has been having exertional lightheadedness without overt angina or dyspnea.  She did have an episode of nitroglycerin responsive chest discomfort which prompted her to go to the emergency room.  She ruled out for myocardial infarction.  She has had no recurrent exertional symptoms.  She denies any palpitations or syncope.  There has been no PND, orthopnea, melena, hematuria, or claudicant symptoms.  She continues take her Xarelto anticoagulation.  I discussed further evaluation of her coronary disease, we plan to perform nuclear stress test for ischemic assessment.  The patient tells me she is unable to exercise for this, and this will need to be a chemical stress test.    CARDIOVASCULAR HISTORY:   CAD s/p CABG 4/5/11: LIMA-LAD, SVG-D, SVG-OM  PAF s/p FRANCISCO/DCCV 3/8/18, DCCV 1/10/22, on Xarelto, CHADS VASC 2  Hx L vert art occlusion  PAD, ?R SFA stenosis (US 5/2018)    PAST MEDICAL HISTORY:     Past Medical History:   Diagnosis Date    Atrial fibrillation     Colon polyp     Coronary artery disease     Hyperlipidemia     Hypertension        PAST SURGICAL HISTORY:     Past Surgical History:   Procedure Laterality Date    BREAST BIOPSY      CARDIAC SURGERY      COLONOSCOPY N/A 5/25/2020    Procedure: COLONOSCOPY;  Surgeon: José Luis Fonseca MD;  Location: [facility] ENDO;  Service: Endoscopy;  Laterality: N/A;  XARELTO/PLETAL ok    CORONARY ARTERY BYPASS GRAFT      CYSTOSCOPY W/ URETERAL STENT PLACEMENT Bilateral 6/25/2020    Procedure: CYSTOSCOPY, WITH URETERAL STENT INSERTION;  Surgeon: STEPHANY Smyth MD;  Location: [facility] OR;  Service: Urology;  Laterality: Bilateral;     HYSTERECTOMY      LAPAROSCOPIC HEMICOLECTOMY Right 6/25/2020    Procedure: HAND ASSISTED HEMICOLECTOMY, LAPAROSCOPIC;  Surgeon: Louis Rivas III, MD;  Location: [facility] OR;  Service: General;  Laterality: Right;  RN PREOP 6/19/2020--has cardiac clearance from Dr. Khalil, --COVID NEGATIVE and T/S on 6/23/2020  COMBINED CASE WITH DR PIERCE    TREATMENT OF CARDIAC ARRHYTHMIA N/A 1/10/2022    Procedure: Cardioversion/Defibrillation (FRANCISCO not needed);  Surgeon: José Khalil MD;  Location: [facility] CATH LAB;  Service: Cardiology;  Laterality: N/A;  RN PRE OP, COVID NEGATIVE       ALLERGIES AND MEDICATION:   Review of patient's allergies indicates:  No Known Allergies       Medication List          Accurate as of January 27, 2022 11:25 AM. If you have any questions, ask your nurse or doctor.            CONTINUE taking these medications    atorvastatin 40 MG tablet  Commonly known as: LIPITOR  TAKE 1 TABLET BY MOUTH EVERYDAY AT BEDTIME     cloNIDine 0.1 MG tablet  Commonly known as: CATAPRES  TAKE 1 TABLET BY MOUTH DAILY AS NEEDED     hydrALAZINE 100 MG tablet  Commonly known as: APRESOLINE  Take 1.5 tablets (150 mg total) by mouth 2 (two) times a day.     isosorbide mononitrate 30 MG 24 hr tablet  Commonly known as: IMDUR  Take 1 tablet (30 mg total) by mouth once daily.     labetaloL 200 MG tablet  Commonly known as: NORMODYNE  Take 0.5 tablets (100 mg total) by mouth every 12 (twelve) hours.     nitroGLYCERIN 0.4 MG SL tablet  Commonly known as: NITROSTAT  Place 1 tablet (0.4 mg total) under the tongue every 5 (five) minutes as needed for Chest pain.     olmesartan 40 MG tablet  Commonly known as: BENICAR  TAKE 1 TABLET BY MOUTH EVERY DAY     XARELTO 20 mg Tab  Generic drug: rivaroxaban  TAKE 1 TABLET (20 MG TOTAL) BY MOUTH DAILY WITH DINNER OR EVENING MEAL.              SOCIAL HISTORY:     Social History     Socioeconomic History    Marital status:    Tobacco Use    Smoking status: Never Smoker     "Smokeless tobacco: Never Used   Substance and Sexual Activity    Alcohol use: No    Drug use: No    Sexual activity: Yes     Partners: Male     Birth control/protection: Surgical       FAMILY HISTORY:     Family History   Problem Relation Age of Onset    Cancer Mother     Breast cancer Maternal Uncle        REVIEW OF SYSTEMS:   Review of Systems   Constitutional: Negative for chills, diaphoresis and fever.   HENT: Negative for nosebleeds.    Eyes: Negative for blurred vision, double vision and photophobia.   Respiratory: Positive for shortness of breath. Negative for hemoptysis and wheezing.    Cardiovascular: Positive for chest pain. Negative for palpitations, orthopnea, claudication, leg swelling and PND.   Gastrointestinal: Negative for abdominal pain, blood in stool, heartburn, melena, nausea and vomiting.   Genitourinary: Negative for flank pain and hematuria.   Musculoskeletal: Negative for falls, myalgias and neck pain.   Skin: Negative for rash.   Neurological: Positive for dizziness. Negative for seizures, loss of consciousness, weakness and headaches.   Endo/Heme/Allergies: Negative for polydipsia. Does not bruise/bleed easily.   Psychiatric/Behavioral: Negative for depression and memory loss. The patient is not nervous/anxious.        PHYSICAL EXAM:     Vitals:    01/27/22 1112   BP: (!) 164/82   Pulse: 67   Resp: 16    Body mass index is 33.66 kg/m².  Weight: 86.2 kg (190 lb)   Height: 5' 3" (160 cm)     Physical Exam  Vitals reviewed.   Constitutional:       General: She is not in acute distress.     Appearance: She is well-developed. She is obese. She is not ill-appearing, toxic-appearing or diaphoretic.   HENT:      Head: Normocephalic and atraumatic.   Eyes:      General: No scleral icterus.     Extraocular Movements: Extraocular movements intact.      Conjunctiva/sclera: Conjunctivae normal.      Pupils: Pupils are equal, round, and reactive to light.   Neck:      Thyroid: No thyromegaly.      " Vascular: Normal carotid pulses. No carotid bruit or JVD.      Trachea: Trachea normal. No tracheal deviation.   Cardiovascular:      Rate and Rhythm: Normal rate and regular rhythm.      Pulses:           Carotid pulses are 2+ on the right side and 2+ on the left side.     Heart sounds: S1 normal and S2 normal. Heart sounds are distant. No murmur heard.  No friction rub. No gallop.    Pulmonary:      Effort: Pulmonary effort is normal. No respiratory distress.      Breath sounds: Normal breath sounds. No stridor. No wheezing, rhonchi or rales.   Chest:      Chest wall: No tenderness.   Abdominal:      General: There is no distension.      Palpations: Abdomen is soft.   Musculoskeletal:         General: No swelling or tenderness. Normal range of motion.      Cervical back: Normal range of motion and neck supple. No edema or rigidity.      Right lower leg: No edema.      Left lower leg: No edema.   Feet:      Right foot:      Skin integrity: No ulcer.      Left foot:      Skin integrity: No ulcer.   Skin:     General: Skin is warm and dry.      Coloration: Skin is not jaundiced.      Findings: No erythema or rash.   Neurological:      General: No focal deficit present.      Mental Status: She is alert and oriented to person, place, and time.      Cranial Nerves: No cranial nerve deficit.   Psychiatric:         Mood and Affect: Mood normal.         Speech: Speech normal.         Behavior: Behavior normal. Behavior is cooperative.         DATA:   EKG: (personally reviewed tracing(s))  4/14/21 SR 75, similar to 12/11/21 1/6/22 AF 96, LVH, NSSTTW changes  1/27/22 SR 62    Laboratory:  CBC:  Recent Labs   Lab 03/04/21  0936 01/10/22  1045 01/18/22  1445   WBC 5.51 4.46 5.04   Hemoglobin 13.1 13.2 12.0   Hematocrit 39.8 41.7 36.8 L   Platelets 220 248 215       CHEMISTRIES:  Recent Labs   Lab 06/04/21  0708 01/10/22  1045 01/18/22  1445   Glucose 119 H 97 88   Sodium 141 145 144   Potassium 4.5 3.9 3.7   BUN 18 22 18    Creatinine 1.1 1.0 0.9   eGFR if  >60 >60 >60   eGFR if non  54 A >60 >60   Calcium 9.5 9.1 9.9       CARDIAC BIOMARKERS:  Recent Labs   Lab 01/18/22  1445   Troponin I 0.009       COAGS:        LIPIDS/LFTS:  Recent Labs   Lab 05/22/19  0843 10/29/19  0755 03/04/21  0936 03/19/21  0946 01/18/22  1445   Cholesterol 170  --   --   --   --    Triglycerides 126  --   --   --   --    HDL 41  --   --   --   --    LDL Cholesterol 103.8  --   --   --   --    Non-HDL Cholesterol 129  --   --   --   --    AST 15   < > 20 23 14   ALT 16   < > 25 35 21    < > = values in this interval not displayed.     Lab Results   Component Value Date    TSH 1.958 05/22/2019         Cardiovascular Testing:  Echo 1/26/22  · The left ventricle is normal in size with concentric hypertrophy and normal systolic function.  · The estimated ejection fraction is 65%.  · Normal left ventricular diastolic function.  · Normal right ventricular size with normal right ventricular systolic function.  · Mild left atrial enlargement.  · Mild-to-moderate mitral regurgitation.  · Mild to moderate tricuspid regurgitation.  · Normal central venous pressure (3 mmHg).  · The estimated PA systolic pressure is 36 mmHg.    Carotid US 5/7/21  · There is 20-39% right Internal Carotid Stenosis.  · There is 0-19% left Internal Carotid Stenosis.    Renal art US 1/10/19  -Normal sized kidneys without evidence for nephrolithiasis or hydronephrosis.  -There is slight increased peak systolic velocity within the right renal artery from the proximal to mid aspect from 50 cm/sec to 158 cm/sec which is likely related to vessel tortuosity and turbulent flow in light of the absent elevated resistive indices or ratios however underlying right renal artery stenosis cannot be excluded.  This could be further evaluated with CTA or MRA imaging.  -No evidence for left renal artery elevated resistive indices or renal artery velocities to suggest renal  artery stenosis.    LE art US 5/31/18  R STEFANI 1.09  L STEFANI 1.08  Elevated velocities within the mid to distal right superficial femoral artery strongly suggestive of hemodynamically significant stenoses within the mid to distal right superficial femoral artery.    LE venous US/reflux 5/31/18  No evidence for deep venous thrombosis within either lower extremity.  No evidence for venous insufficiency within the greater or lesser saphenous veins bilaterally.    Ofelia MPI 4/4/18   Nuclear Quantitative Functional Analysis:   LVEF: 69 %  LVED Volume: 88 ml  LVES Volume: 27 ml  Impression: NORMAL MYOCARDIAL PERFUSION  1. The perfusion scan is free of evidence for myocardial ischemia or injury.   2. Resting wall motion is physiologic.   3. Resting LV function is normal.   4. The ventricular volumes are normal at rest and stress.   5. The extracardiac distribution of radioactivity is normal.     FRANCISCO/DCCV 3/9/18  Normal bivent size/fxn, EF 55%, normal wall motion  Normal appearing valves.  Trace AI, mild MR, mild TR  No LA/LON thrombus  Succcessful DCCV af->NSR 360J x1.  Plan:  Cont med rx  Cont Xarelto 20mg qd  OK for discharge later on today and follow up with me in the office 1 week for outpatient stress test.    Cath 3/25/11 ([facility], subsequently had CABG)  LM: normal  LAD sequential 90% prox and 80% mid stenoses   D2 prox 80%  LCx: MLI   OM2 prox 80%  RCA: dom, mid-dist 40% sequential stenoses    ASSESSMENT:   # PAF s/p FRANCISCO/DCCV 3/9/18, DCCV 1/10/22.  CHADS VASC 2 on Xarelto.    # HTN, uncontrolled  # CAD s/p CABG x3V 4/2011.  MPI 4/2018 normal. Now with exertional jaw pain r/w NTG and recent ER visit.  # PAD, bilat buttock and RLE claudication, R SFA stenosis noted on US 5/2018.  Sxs seemd to have resolved (pt thinks it was more related to R hip pain, did not notice much of a difference with pletal rx)  # HLP on atorva 40mg  # CKD3a  # BMI 34, stable vs last OV  # aortic atherosclerosis (CT A/P 9/25/20)    PLAN:   Cont med  rx  Cont Xarelto 20mg qd  Inc imdur 60mg qd  Ofelia MPI (pt unable to exercise)  Diet/exercise/weight loss  RTC 1 month.    Consider LE angio in future for eval of LE PAD if limiting claudication recurs      José Khalil MD, FACC

## 2022-02-16 ENCOUNTER — HOSPITAL ENCOUNTER (OUTPATIENT)
Dept: RADIOLOGY | Facility: HOSPITAL | Age: 62
Discharge: HOME | End: 2022-02-16
Attending: INTERNAL MEDICINE
Payer: COMMERCIAL

## 2022-02-16 ENCOUNTER — HOSPITAL ENCOUNTER (OUTPATIENT)
Dept: CARDIOLOGY | Facility: HOSPITAL | Age: 62
Discharge: HOME | End: 2022-02-16
Attending: INTERNAL MEDICINE
Payer: COMMERCIAL

## 2022-02-16 DIAGNOSIS — Z78.9 PATIENT UNABLE TO EXERCISE: ICD-10-CM

## 2022-02-16 DIAGNOSIS — I25.708 CORONARY ARTERY DISEASE OF BYPASS GRAFT OF NATIVE HEART WITH STABLE ANGINA PECTORIS: ICD-10-CM

## 2022-02-16 LAB
CV STRESS BASE HR: 61 BPM
DIASTOLIC BLOOD PRESSURE: 89 MMHG
NUC STRESS DIASTOLIC VOLUME INDEX: 60
NUC STRESS EJECTION FRACTION: 71 %
NUC STRESS SYSTOLIC VOLUME INDEX: 18
OHS CV CPX 85 PERCENT MAX PREDICTED HEART RATE MALE: 129
OHS CV CPX MAX PREDICTED HEART RATE: 151
OHS CV CPX PATIENT IS FEMALE: 1
OHS CV CPX PATIENT IS MALE: 0
OHS CV CPX PEAK DIASTOLIC BLOOD PRESSURE: 84 MMHG
OHS CV CPX PEAK HEAR RATE: 98 BPM
OHS CV CPX PEAK RATE PRESSURE PRODUCT: NORMAL
OHS CV CPX PEAK SYSTOLIC BLOOD PRESSURE: 176 MMHG
OHS CV CPX PERCENT MAX PREDICTED HEART RATE ACHIEVED: 65
OHS CV CPX RATE PRESSURE PRODUCT PRESENTING: 9577
SYSTOLIC BLOOD PRESSURE: 157 MMHG

## 2022-02-16 PROCEDURE — 78452 HT MUSCLE IMAGE SPECT MULT: CPT

## 2022-02-16 PROCEDURE — 93016 CV STRESS TEST SUPVJ ONLY: CPT | Mod: ,,, | Performed by: INTERNAL MEDICINE

## 2022-02-16 PROCEDURE — 78452 STRESS TEST WITH MYOCARDIAL PERFUSION (CUPID ONLY): ICD-10-PCS | Mod: 26,,, | Performed by: INTERNAL MEDICINE

## 2022-02-16 PROCEDURE — 93016 STRESS TEST WITH MYOCARDIAL PERFUSION (CUPID ONLY): ICD-10-PCS | Mod: ,,, | Performed by: INTERNAL MEDICINE

## 2022-02-16 PROCEDURE — 93018 STRESS TEST WITH MYOCARDIAL PERFUSION (CUPID ONLY): ICD-10-PCS | Mod: ,,, | Performed by: INTERNAL MEDICINE

## 2022-02-16 PROCEDURE — A9502 TC99M TETROFOSMIN: HCPCS

## 2022-02-16 PROCEDURE — 78452 HT MUSCLE IMAGE SPECT MULT: CPT | Mod: 26,,, | Performed by: INTERNAL MEDICINE

## 2022-02-16 PROCEDURE — 93018 CV STRESS TEST I&R ONLY: CPT | Mod: ,,, | Performed by: INTERNAL MEDICINE

## 2022-02-16 PROCEDURE — 63600175 PHARM REV CODE 636 W HCPCS: Performed by: INTERNAL MEDICINE

## 2022-02-16 RX ORDER — REGADENOSON 0.08 MG/ML
0.4 INJECTION, SOLUTION INTRAVENOUS ONCE
Status: COMPLETED | OUTPATIENT
Start: 2022-02-16 | End: 2022-02-16

## 2022-02-16 RX ADMIN — REGADENOSON 0.4 MG: 0.08 INJECTION, SOLUTION INTRAVENOUS at 08:02

## 2022-02-24 ENCOUNTER — OFFICE VISIT (OUTPATIENT)
Dept: CARDIOLOGY | Facility: CLINIC | Age: 62
End: 2022-02-24
Payer: COMMERCIAL

## 2022-02-24 VITALS
HEART RATE: 70 BPM | DIASTOLIC BLOOD PRESSURE: 88 MMHG | WEIGHT: 190.06 LBS | OXYGEN SATURATION: 94 % | BODY MASS INDEX: 33.68 KG/M2 | RESPIRATION RATE: 15 BRPM | HEIGHT: 63 IN | SYSTOLIC BLOOD PRESSURE: 168 MMHG

## 2022-02-24 DIAGNOSIS — I10 ESSENTIAL HYPERTENSION: ICD-10-CM

## 2022-02-24 DIAGNOSIS — N18.31 STAGE 3A CHRONIC KIDNEY DISEASE: ICD-10-CM

## 2022-02-24 DIAGNOSIS — E66.9 NON MORBID OBESITY, UNSPECIFIED OBESITY TYPE: ICD-10-CM

## 2022-02-24 DIAGNOSIS — I70.0 AORTIC ATHEROSCLEROSIS: ICD-10-CM

## 2022-02-24 DIAGNOSIS — I73.9 PAD (PERIPHERAL ARTERY DISEASE): ICD-10-CM

## 2022-02-24 DIAGNOSIS — I25.810 CORONARY ARTERY DISEASE INVOLVING CORONARY BYPASS GRAFT OF NATIVE HEART WITHOUT ANGINA PECTORIS: Primary | ICD-10-CM

## 2022-02-24 DIAGNOSIS — I48.0 PAROXYSMAL ATRIAL FIBRILLATION: ICD-10-CM

## 2022-02-24 DIAGNOSIS — E78.49 OTHER HYPERLIPIDEMIA: ICD-10-CM

## 2022-02-24 PROCEDURE — 1159F PR MEDICATION LIST DOCUMENTED IN MEDICAL RECORD: ICD-10-PCS | Mod: CPTII,S$GLB,, | Performed by: INTERNAL MEDICINE

## 2022-02-24 PROCEDURE — 3079F PR MOST RECENT DIASTOLIC BLOOD PRESSURE 80-89 MM HG: ICD-10-PCS | Mod: CPTII,S$GLB,, | Performed by: INTERNAL MEDICINE

## 2022-02-24 PROCEDURE — 1159F MED LIST DOCD IN RCRD: CPT | Mod: CPTII,S$GLB,, | Performed by: INTERNAL MEDICINE

## 2022-02-24 PROCEDURE — 3079F DIAST BP 80-89 MM HG: CPT | Mod: CPTII,S$GLB,, | Performed by: INTERNAL MEDICINE

## 2022-02-24 PROCEDURE — 99999 PR PBB SHADOW E&M-EST. PATIENT-LVL III: CPT | Mod: PBBFAC,,, | Performed by: INTERNAL MEDICINE

## 2022-02-24 PROCEDURE — 1160F PR REVIEW ALL MEDS BY PRESCRIBER/CLIN PHARMACIST DOCUMENTED: ICD-10-PCS | Mod: CPTII,S$GLB,, | Performed by: INTERNAL MEDICINE

## 2022-02-24 PROCEDURE — 3077F PR MOST RECENT SYSTOLIC BLOOD PRESSURE >= 140 MM HG: ICD-10-PCS | Mod: CPTII,S$GLB,, | Performed by: INTERNAL MEDICINE

## 2022-02-24 PROCEDURE — 99999 PR PBB SHADOW E&M-EST. PATIENT-LVL III: ICD-10-PCS | Mod: PBBFAC,,, | Performed by: INTERNAL MEDICINE

## 2022-02-24 PROCEDURE — 3008F BODY MASS INDEX DOCD: CPT | Mod: CPTII,S$GLB,, | Performed by: INTERNAL MEDICINE

## 2022-02-24 PROCEDURE — 99214 PR OFFICE/OUTPT VISIT, EST, LEVL IV, 30-39 MIN: ICD-10-PCS | Mod: S$GLB,,, | Performed by: INTERNAL MEDICINE

## 2022-02-24 PROCEDURE — 1160F RVW MEDS BY RX/DR IN RCRD: CPT | Mod: CPTII,S$GLB,, | Performed by: INTERNAL MEDICINE

## 2022-02-24 PROCEDURE — 99214 OFFICE O/P EST MOD 30 MIN: CPT | Mod: S$GLB,,, | Performed by: INTERNAL MEDICINE

## 2022-02-24 PROCEDURE — 3077F SYST BP >= 140 MM HG: CPT | Mod: CPTII,S$GLB,, | Performed by: INTERNAL MEDICINE

## 2022-02-24 PROCEDURE — 3008F PR BODY MASS INDEX (BMI) DOCUMENTED: ICD-10-PCS | Mod: CPTII,S$GLB,, | Performed by: INTERNAL MEDICINE

## 2022-02-24 RX ORDER — ISOSORBIDE MONONITRATE 120 MG/1
120 TABLET, EXTENDED RELEASE ORAL DAILY
Qty: 90 TABLET | Refills: 3 | Status: SHIPPED | OUTPATIENT
Start: 2022-02-24 | End: 2023-04-12

## 2022-02-24 NOTE — PROGRESS NOTES
CARDIOVASCULAR PROGRESS NOTE    REASON FOR CONSULT:   Keyonna Guillen is a 61 y.o. female who presents for follow up of PAF, CAD/CABG, HTN, PAD  PCP: Page  HISTORY OF PRESENT ILLNESS:   Patient returns for follow-up.  She tells me her anginal symptoms and dyspnea resolved with the addition of Imdur to her regimen.  She has had no palpitations or syncope.  She denies PND, orthopnea, lower extremity edema.  She has had no melena, hematuria, or claudicant symptoms.  Her Xarelto anticoagulation continues without issues.    I reviewed the results of her nuclear stress test which revealed anterior attenuation artifact without ischemia and normal ejection fraction.    CARDIOVASCULAR HISTORY:   CAD s/p CABG 4/5/11: LIMA-LAD, SVG-D, SVG-OM  PAF s/p FRANCISCO/DCCV 3/8/18, DCCV 1/10/22, on Xarelto, CHADS VASC 2  Hx L vert art occlusion  PAD, ?R SFA stenosis (US 5/2018)    PAST MEDICAL HISTORY:     Past Medical History:   Diagnosis Date    Atrial fibrillation     Colon polyp     Coronary artery disease     Hyperlipidemia     Hypertension        PAST SURGICAL HISTORY:     Past Surgical History:   Procedure Laterality Date    BREAST BIOPSY      CARDIAC SURGERY      COLONOSCOPY N/A 5/25/2020    Procedure: COLONOSCOPY;  Surgeon: José Luis Fonseca MD;  Location: [facility] ENDO;  Service: Endoscopy;  Laterality: N/A;  XARELTO/PLETAL ok    CORONARY ARTERY BYPASS GRAFT      CYSTOSCOPY W/ URETERAL STENT PLACEMENT Bilateral 6/25/2020    Procedure: CYSTOSCOPY, WITH URETERAL STENT INSERTION;  Surgeon: STEPHANY Smyth MD;  Location: [facility] OR;  Service: Urology;  Laterality: Bilateral;    HYSTERECTOMY      LAPAROSCOPIC HEMICOLECTOMY Right 6/25/2020    Procedure: HAND ASSISTED HEMICOLECTOMY, LAPAROSCOPIC;  Surgeon: Louis Rivas III, MD;  Location: [facility] OR;  Service: General;  Laterality: Right;  RN PREOP 6/19/2020--has cardiac clearance from Dr. Khalil, --COVID NEGATIVE and T/S on 6/23/2020  COMBINED CASE WITH DR SMYTH    TREATMENT  OF CARDIAC ARRHYTHMIA N/A 1/10/2022    Procedure: Cardioversion/Defibrillation (FRANCISCO not needed);  Surgeon: José Khalil MD;  Location: [facility] CATH LAB;  Service: Cardiology;  Laterality: N/A;  RN PRE OP, COVID NEGATIVE       ALLERGIES AND MEDICATION:   Review of patient's allergies indicates:  No Known Allergies       Medication List          Accurate as of February 24, 2022 11:55 AM. If you have any questions, ask your nurse or doctor.            CONTINUE taking these medications    atorvastatin 40 MG tablet  Commonly known as: LIPITOR  TAKE 1 TABLET BY MOUTH EVERYDAY AT BEDTIME     hydrALAZINE 100 MG tablet  Commonly known as: APRESOLINE  Take 1.5 tablets (150 mg total) by mouth 2 (two) times a day.     isosorbide mononitrate 60 MG 24 hr tablet  Commonly known as: IMDUR  Take 1 tablet (60 mg total) by mouth once daily.     labetaloL 200 MG tablet  Commonly known as: NORMODYNE  Take 1 tablet (200 mg total) by mouth every 12 (twelve) hours.     nitroGLYCERIN 0.4 MG SL tablet  Commonly known as: NITROSTAT  Place 1 tablet (0.4 mg total) under the tongue every 5 (five) minutes as needed for Chest pain.     olmesartan 40 MG tablet  Commonly known as: BENICAR  TAKE 1 TABLET BY MOUTH EVERY DAY     XARELTO 20 mg Tab  Generic drug: rivaroxaban  TAKE 1 TABLET (20 MG TOTAL) BY MOUTH DAILY WITH DINNER OR EVENING MEAL.              SOCIAL HISTORY:     Social History     Socioeconomic History    Marital status:    Tobacco Use    Smoking status: Never Smoker    Smokeless tobacco: Never Used   Substance and Sexual Activity    Alcohol use: No    Drug use: No    Sexual activity: Yes     Partners: Male     Birth control/protection: Surgical       FAMILY HISTORY:     Family History   Problem Relation Age of Onset    Cancer Mother     Breast cancer Maternal Uncle        REVIEW OF SYSTEMS:   Review of Systems   Constitutional: Negative for chills, diaphoresis and fever.   HENT: Negative for nosebleeds.    Eyes:  "Negative for blurred vision, double vision and photophobia.   Respiratory: Negative for hemoptysis, shortness of breath and wheezing.    Cardiovascular: Negative for chest pain, palpitations, orthopnea, claudication, leg swelling and PND.   Gastrointestinal: Negative for abdominal pain, blood in stool, heartburn, melena, nausea and vomiting.   Genitourinary: Negative for flank pain and hematuria.   Musculoskeletal: Negative for falls, myalgias and neck pain.   Skin: Negative for rash.   Neurological: Negative for dizziness, seizures, loss of consciousness, weakness and headaches.   Endo/Heme/Allergies: Negative for polydipsia. Does not bruise/bleed easily.   Psychiatric/Behavioral: Negative for depression and memory loss. The patient is not nervous/anxious.        PHYSICAL EXAM:     Vitals:    02/24/22 1137   BP: (!) 168/88   Pulse: 70   Resp: 15    Body mass index is 33.66 kg/m².  Weight: 86.2 kg (190 lb 0.6 oz)   Height: 5' 3" (160 cm)     Physical Exam  Vitals reviewed.   Constitutional:       General: She is not in acute distress.     Appearance: She is well-developed. She is obese. She is not ill-appearing, toxic-appearing or diaphoretic.   HENT:      Head: Normocephalic and atraumatic.   Eyes:      General: No scleral icterus.     Extraocular Movements: Extraocular movements intact.      Conjunctiva/sclera: Conjunctivae normal.      Pupils: Pupils are equal, round, and reactive to light.   Neck:      Thyroid: No thyromegaly.      Vascular: Normal carotid pulses. No carotid bruit or JVD.      Trachea: Trachea normal. No tracheal deviation.   Cardiovascular:      Rate and Rhythm: Normal rate and regular rhythm.      Pulses:           Carotid pulses are 2+ on the right side and 2+ on the left side.     Heart sounds: S1 normal and S2 normal. Heart sounds are distant. No murmur heard.    No friction rub. No gallop.   Pulmonary:      Effort: Pulmonary effort is normal. No respiratory distress.      Breath sounds: " Normal breath sounds. No stridor. No wheezing, rhonchi or rales.   Chest:      Chest wall: No tenderness.   Abdominal:      General: There is no distension.      Palpations: Abdomen is soft.   Musculoskeletal:         General: No swelling or tenderness. Normal range of motion.      Cervical back: Normal range of motion and neck supple. No edema or rigidity.      Right lower leg: No edema.      Left lower leg: No edema.   Feet:      Right foot:      Skin integrity: No ulcer.      Left foot:      Skin integrity: No ulcer.   Skin:     General: Skin is warm and dry.      Coloration: Skin is not jaundiced.      Findings: No erythema or rash.   Neurological:      General: No focal deficit present.      Mental Status: She is alert and oriented to person, place, and time.      Cranial Nerves: No cranial nerve deficit.   Psychiatric:         Mood and Affect: Mood normal.         Speech: Speech normal.         Behavior: Behavior normal. Behavior is cooperative.         DATA:   EKG: (personally reviewed tracing(s))  4/14/21 SR 75, similar to 12/11/21 1/6/22 AF 96, LVH, NSSTTW changes  1/27/22 SR 62    Laboratory:  CBC:  Recent Labs   Lab 03/04/21  0936 01/10/22  1045 01/18/22  1445   WBC 5.51 4.46 5.04   Hemoglobin 13.1 13.2 12.0   Hematocrit 39.8 41.7 36.8 L   Platelets 220 248 215       CHEMISTRIES:  Recent Labs   Lab 06/04/21  0708 01/10/22  1045 01/18/22  1445   Glucose 119 H 97 88   Sodium 141 145 144   Potassium 4.5 3.9 3.7   BUN 18 22 18   Creatinine 1.1 1.0 0.9   eGFR if  >60 >60 >60   eGFR if non  54 A >60 >60   Calcium 9.5 9.1 9.9       CARDIAC BIOMARKERS:  Recent Labs   Lab 01/18/22  1445   Troponin I 0.009       COAGS:        LIPIDS/LFTS:  Recent Labs   Lab 05/22/19  0843 10/29/19  0755 03/04/21  0936 03/19/21  0946 01/18/22  1445   Cholesterol 170  --   --   --   --    Triglycerides 126  --   --   --   --    HDL 41  --   --   --   --    LDL Cholesterol 103.8  --   --   --   --     Non-HDL Cholesterol 129  --   --   --   --    AST 15   < > 20 23 14   ALT 16   < > 25 35 21    < > = values in this interval not displayed.     Lab Results   Component Value Date    TSH 1.958 05/22/2019         Cardiovascular Testing:  Ofelia MPI 2/16/22 (images personally reviewed and interpreted)    Normal myocardial perfusion scan. There is no evidence of myocardial ischemia or infarction.    There is a moderate intensity perfusion abnormality in the anterior wall of the left ventricle, secondary to breast attenuation.    The gated perfusion images showed an ejection fraction of 71% post stress.    There is normal wall motion post stress.    LV cavity size is normal at stress.    The EKG portion of this study is negative for ischemia.    The patient reported no chest pain during the stress test.    There were no arrhythmias during stress.    A PET stress exam is recommended if clinically indicated.    Echo 1/26/22  · The left ventricle is normal in size with concentric hypertrophy and normal systolic function.  · The estimated ejection fraction is 65%.  · Normal left ventricular diastolic function.  · Normal right ventricular size with normal right ventricular systolic function.  · Mild left atrial enlargement.  · Mild-to-moderate mitral regurgitation.  · Mild to moderate tricuspid regurgitation.  · Normal central venous pressure (3 mmHg).  · The estimated PA systolic pressure is 36 mmHg.    Carotid US 5/7/21  · There is 20-39% right Internal Carotid Stenosis.  · There is 0-19% left Internal Carotid Stenosis.    Renal art US 1/10/19  -Normal sized kidneys without evidence for nephrolithiasis or hydronephrosis.  -There is slight increased peak systolic velocity within the right renal artery from the proximal to mid aspect from 50 cm/sec to 158 cm/sec which is likely related to vessel tortuosity and turbulent flow in light of the absent elevated resistive indices or ratios however underlying right renal  artery stenosis cannot be excluded.  This could be further evaluated with CTA or MRA imaging.  -No evidence for left renal artery elevated resistive indices or renal artery velocities to suggest renal artery stenosis.    LE art US 5/31/18  R STEFANI 1.09  L STEFANI 1.08  Elevated velocities within the mid to distal right superficial femoral artery strongly suggestive of hemodynamically significant stenoses within the mid to distal right superficial femoral artery.    LE venous US/reflux 5/31/18  No evidence for deep venous thrombosis within either lower extremity.  No evidence for venous insufficiency within the greater or lesser saphenous veins bilaterally.    FRANCISCO/DCCV 3/9/18  Normal bivent size/fxn, EF 55%, normal wall motion  Normal appearing valves.  Trace AI, mild MR, mild TR  No LA/LON thrombus  Succcessful DCCV af->NSR 360J x1.  Plan:  Cont med rx  Cont Xarelto 20mg qd  OK for discharge later on today and follow up with me in the office 1 week for outpatient stress test.    Cath 3/25/11 ([facility], subsequently had CABG)  LM: normal  LAD sequential 90% prox and 80% mid stenoses   D2 prox 80%  LCx: MLI   OM2 prox 80%  RCA: dom, mid-dist 40% sequential stenoses    ASSESSMENT:   # PAF s/p FRANCISCO/DCCV 3/9/18, DCCV 1/10/22.  CHADS VASC 2 on Xarelto.    # HTN, uncontrolled  # CAD s/p CABG x3V 4/2011.  MPI 2/2022 neg.  Sxs abated on imdur.   # PAD, bilat buttock and RLE claudication, R SFA stenosis noted on US 5/2018.  Sxs seemd to have resolved (pt thinks it was more related to R hip pain, did not notice much of a difference with pletal rx)  # HLP on atorva 40mg  # CKD3a  # BMI 34, stable vs last OV  # aortic atherosclerosis (CT A/P 9/25/20)    PLAN:   Cont med rx  Cont Xarelto 20mg qd  Inc imdur 120mg qd  Diet/exercise/weight loss  RN BP check 2 weeks (Dr. Leger), pt states she's enrolled in digital htn program.  I have encouraged her to continue submitting her BP to the program.  RTC 6 months.  If recurrent anginal sxs despite  control of BP, consider cardiac PET vs cath.    Consider LE angio in future for eval of LE PAD if limiting claudication recurs      José Khalil MD, FACC

## 2022-03-22 ENCOUNTER — PATIENT MESSAGE (OUTPATIENT)
Dept: ADMINISTRATIVE | Facility: OTHER | Age: 62
End: 2022-03-22
Payer: COMMERCIAL

## 2022-05-16 ENCOUNTER — TELEPHONE (OUTPATIENT)
Dept: CARDIOLOGY | Facility: CLINIC | Age: 62
End: 2022-05-16
Payer: COMMERCIAL

## 2022-05-16 NOTE — TELEPHONE ENCOUNTER
----- Message from Renay Ramos MA sent at 5/16/2022  2:19 PM CDT -----  Type: Patient Call Back    Who called:self    What is the request in detail:pt. Is having a procedure on Thursday and needs a medical clearance ..     Can the clinic reply by [facility]?no    Would the patient rather a call back or a response via [facility]? yes    Best call back number:[phone] (home)

## 2022-05-17 ENCOUNTER — PATIENT MESSAGE (OUTPATIENT)
Dept: CARDIOLOGY | Facility: CLINIC | Age: 62
End: 2022-05-17
Payer: COMMERCIAL

## 2022-05-23 ENCOUNTER — PATIENT MESSAGE (OUTPATIENT)
Dept: FAMILY MEDICINE | Facility: CLINIC | Age: 62
End: 2022-05-23
Payer: COMMERCIAL

## 2022-05-23 ENCOUNTER — NURSE TRIAGE (OUTPATIENT)
Dept: ADMINISTRATIVE | Facility: CLINIC | Age: 62
End: 2022-05-23
Payer: COMMERCIAL

## 2022-05-23 NOTE — TELEPHONE ENCOUNTER
Pt reports tested COVID+ today with home test. C/o fever, headache, fatigue, feels like heart racing/beating fast at times, unsure of it's from fever. Denies chest pain or severe difficulty breathing. Right now O2 95  HR 98. Pt answered yes to mild difficulty breathing with walking or doing activities. Advised pt she needs to discuss with PCP & callback by nurse within 1hr per protocol. Instructed pt I would send message to PCP if she doesn't hear back from them within 1hr to go to [facility] or ER to be evaluated, verbalized understanding.     Reason for Disposition   MILD difficulty breathing (e.g., minimal/no SOB at rest, SOB with walking, pulse <100)    Additional Information   Negative: SEVERE difficulty breathing (e.g., struggling for each breath, speaks in single words)   Negative: Difficult to awaken or acting confused (e.g., disoriented, slurred speech)   Negative: Bluish (or gray) lips or face now   Negative: Shock suspected (e.g., cold/pale/clammy skin, too weak to stand, low BP, rapid pulse)   Negative: Sounds like a life-threatening emergency to the triager   Negative: SEVERE or constant chest pain or pressure  (Exception: Mild central chest pain, present only when coughing.)   Negative: MODERATE difficulty breathing (e.g., speaks in phrases, SOB even at rest, pulse 100-120)   Negative: Headache and stiff neck (can't touch chin to chest)   Negative: Oxygen level (e.g., pulse oximetry) 90 percent or lower   Negative: Chest pain or pressure   Negative: Patient sounds very sick or weak to the triager    Protocols used: CORONAVIRUS (COVID-19) DIAGNOSED OR [id number]

## 2022-05-23 NOTE — TELEPHONE ENCOUNTER
Pt contacted and instructed to go to [facility] for eval per order of Dr. Moss. Pt verbalized understanding and states she will go to [facility] today.

## 2022-05-24 ENCOUNTER — PATIENT MESSAGE (OUTPATIENT)
Dept: FAMILY MEDICINE | Facility: CLINIC | Age: 62
End: 2022-05-24
Payer: COMMERCIAL

## 2022-05-26 ENCOUNTER — TELEPHONE (OUTPATIENT)
Dept: CARDIOLOGY | Facility: CLINIC | Age: 62
End: 2022-05-26
Payer: COMMERCIAL

## 2022-05-26 NOTE — TELEPHONE ENCOUNTER
----- Message from Morgan Leger MD sent at 5/24/2022  5:09 PM CDT -----  She meets criteria for the infusion if she would like to go to [facility].  Please find out how severe her symptoms are.  I had emailed her stating that if she wasn't feeling terrible the side effects of the oral medication can be worse than the symptoms of covid.  With that said, if she is short of breath or wheezing then she should go to  ED    Thanks  Dr. Leger       ----- Message -----  From: Johnny Miller MA  Sent: 5/24/2022   4:15 PM CDT  To: Morgan Leger MD    Pt states she has tested positive for COVID and would like advice on what she should do.   ----- Message -----  From: Terra Snow  Sent: 5/23/2022   1:37 PM CDT  To: [facility] Staff    Type: Patient Call Back    Who called: self     What is the request in detail: Patient states she tested positive for COVID today and would like some advice at to what she should do since she has a lot of health issues.     Can the clinic reply by [facility]? No     Would the patient rather a call back or a response via [facility]? Call back     Best call back number: [phone]

## 2022-05-26 NOTE — TELEPHONE ENCOUNTER
I left a message for the pt relaying Dr. Leger's message of her meeting the criteria for the infusion if she would like to go to main campus and that if she wasn't feeling terrible the side effects, oral medication can be worse than the symptoms of covid.  I recommended her to the ER if she is short of breath or wheezing. Lastly, I said that she can call us back or reply to Dr. Leger's message on [facility] to update us on the situation.

## 2022-06-01 ENCOUNTER — PATIENT MESSAGE (OUTPATIENT)
Dept: ADMINISTRATIVE | Facility: HOSPITAL | Age: 62
End: 2022-06-01
Payer: COMMERCIAL

## 2022-06-07 ENCOUNTER — PATIENT MESSAGE (OUTPATIENT)
Dept: ADMINISTRATIVE | Facility: OTHER | Age: 62
End: 2022-06-07
Payer: COMMERCIAL

## 2022-07-07 DIAGNOSIS — I10 ESSENTIAL HYPERTENSION: ICD-10-CM

## 2022-07-07 RX ORDER — LABETALOL 200 MG/1
200 TABLET, FILM COATED ORAL EVERY 12 HOURS
Qty: 180 TABLET | Refills: 3 | Status: SHIPPED | OUTPATIENT
Start: 2022-07-07 | End: 2023-04-10

## 2022-07-28 ENCOUNTER — PATIENT MESSAGE (OUTPATIENT)
Dept: CARDIOLOGY | Facility: CLINIC | Age: 62
End: 2022-07-28
Payer: COMMERCIAL

## 2022-07-31 ENCOUNTER — PATIENT MESSAGE (OUTPATIENT)
Dept: CARDIOLOGY | Facility: CLINIC | Age: 62
End: 2022-07-31
Payer: COMMERCIAL

## 2022-08-05 ENCOUNTER — OFFICE VISIT (OUTPATIENT)
Dept: CARDIOLOGY | Facility: CLINIC | Age: 62
End: 2022-08-05
Payer: COMMERCIAL

## 2022-08-05 VITALS
BODY MASS INDEX: 33.6 KG/M2 | RESPIRATION RATE: 15 BRPM | WEIGHT: 189.63 LBS | DIASTOLIC BLOOD PRESSURE: 94 MMHG | HEART RATE: 60 BPM | HEIGHT: 63 IN | OXYGEN SATURATION: 96 % | SYSTOLIC BLOOD PRESSURE: 189 MMHG

## 2022-08-05 DIAGNOSIS — I70.0 AORTIC ATHEROSCLEROSIS: ICD-10-CM

## 2022-08-05 DIAGNOSIS — I25.810 CORONARY ARTERY DISEASE INVOLVING CORONARY BYPASS GRAFT OF NATIVE HEART WITHOUT ANGINA PECTORIS: Primary | ICD-10-CM

## 2022-08-05 DIAGNOSIS — I10 ESSENTIAL HYPERTENSION: ICD-10-CM

## 2022-08-05 DIAGNOSIS — Z01.810 PREOP CARDIOVASCULAR EXAM: ICD-10-CM

## 2022-08-05 DIAGNOSIS — E78.49 OTHER HYPERLIPIDEMIA: ICD-10-CM

## 2022-08-05 DIAGNOSIS — I48.0 PAROXYSMAL ATRIAL FIBRILLATION: ICD-10-CM

## 2022-08-05 DIAGNOSIS — E66.9 NON MORBID OBESITY, UNSPECIFIED OBESITY TYPE: ICD-10-CM

## 2022-08-05 DIAGNOSIS — I65.29 CAROTID ATHEROSCLEROSIS, UNSPECIFIED LATERALITY: ICD-10-CM

## 2022-08-05 PROCEDURE — 3077F PR MOST RECENT SYSTOLIC BLOOD PRESSURE >= 140 MM HG: ICD-10-PCS | Mod: CPTII,S$GLB,, | Performed by: INTERNAL MEDICINE

## 2022-08-05 PROCEDURE — 3080F PR MOST RECENT DIASTOLIC BLOOD PRESSURE >= 90 MM HG: ICD-10-PCS | Mod: CPTII,S$GLB,, | Performed by: INTERNAL MEDICINE

## 2022-08-05 PROCEDURE — 93000 EKG 12-LEAD: ICD-10-PCS | Mod: S$GLB,,, | Performed by: INTERNAL MEDICINE

## 2022-08-05 PROCEDURE — 99214 PR OFFICE/OUTPT VISIT, EST, LEVL IV, 30-39 MIN: ICD-10-PCS | Mod: S$GLB,,, | Performed by: INTERNAL MEDICINE

## 2022-08-05 PROCEDURE — 1159F PR MEDICATION LIST DOCUMENTED IN MEDICAL RECORD: ICD-10-PCS | Mod: CPTII,S$GLB,, | Performed by: INTERNAL MEDICINE

## 2022-08-05 PROCEDURE — 3080F DIAST BP >= 90 MM HG: CPT | Mod: CPTII,S$GLB,, | Performed by: INTERNAL MEDICINE

## 2022-08-05 PROCEDURE — 3008F PR BODY MASS INDEX (BMI) DOCUMENTED: ICD-10-PCS | Mod: CPTII,S$GLB,, | Performed by: INTERNAL MEDICINE

## 2022-08-05 PROCEDURE — 99999 PR PBB SHADOW E&M-EST. PATIENT-LVL IV: CPT | Mod: PBBFAC,,, | Performed by: INTERNAL MEDICINE

## 2022-08-05 PROCEDURE — 3008F BODY MASS INDEX DOCD: CPT | Mod: CPTII,S$GLB,, | Performed by: INTERNAL MEDICINE

## 2022-08-05 PROCEDURE — 3077F SYST BP >= 140 MM HG: CPT | Mod: CPTII,S$GLB,, | Performed by: INTERNAL MEDICINE

## 2022-08-05 PROCEDURE — 1159F MED LIST DOCD IN RCRD: CPT | Mod: CPTII,S$GLB,, | Performed by: INTERNAL MEDICINE

## 2022-08-05 PROCEDURE — 1160F PR REVIEW ALL MEDS BY PRESCRIBER/CLIN PHARMACIST DOCUMENTED: ICD-10-PCS | Mod: CPTII,S$GLB,, | Performed by: INTERNAL MEDICINE

## 2022-08-05 PROCEDURE — 99214 OFFICE O/P EST MOD 30 MIN: CPT | Mod: S$GLB,,, | Performed by: INTERNAL MEDICINE

## 2022-08-05 PROCEDURE — 93000 ELECTROCARDIOGRAM COMPLETE: CPT | Mod: S$GLB,,, | Performed by: INTERNAL MEDICINE

## 2022-08-05 PROCEDURE — 99999 PR PBB SHADOW E&M-EST. PATIENT-LVL IV: ICD-10-PCS | Mod: PBBFAC,,, | Performed by: INTERNAL MEDICINE

## 2022-08-05 PROCEDURE — 1160F RVW MEDS BY RX/DR IN RCRD: CPT | Mod: CPTII,S$GLB,, | Performed by: INTERNAL MEDICINE

## 2022-08-05 NOTE — PROGRESS NOTES
CARDIOVASCULAR PROGRESS NOTE    REASON FOR CONSULT:   Keyonna Guillen is a 62 y.o. female who presents for follow up of PAF, CAD/CABG, HTN, PAD  PCP: Page  HISTORY OF PRESENT ILLNESS:   The patient comes in today for follow-up.  She denies intercurrent angina, dyspnea, palpitations, or syncope.  There has been no PND, orthopnea, or lower extremity edema.  She denies melena, hematuria, or claudicant symptoms.  Her blood pressure is uncontrolled today, she does report missing some doses of her labetalol.  She is following the digital medicine program.  She also tells me she needs to have cataract surgery.  Recent nuclear stress testing was normal.  This is a low risk surgery.  She is at acceptable cardiac risk for the procedure and associated anesthesia.  It is acceptable to hold her Xarelto for 3 days prior to surgery and resume it after surgery.    CARDIOVASCULAR HISTORY:   CAD s/p CABG 4/5/11: LIMA-LAD, SVG-D, SVG-OM  PAF s/p FRANCISCO/DCCV 3/8/18, DCCV 1/10/22, on Xarelto, CHADS VASC 2  Hx L vert art occlusion  PAD, ?R SFA stenosis (US 5/2018)    PAST MEDICAL HISTORY:     Past Medical History:   Diagnosis Date    Atrial fibrillation     Colon polyp     Coronary artery disease     Hyperlipidemia     Hypertension        PAST SURGICAL HISTORY:     Past Surgical History:   Procedure Laterality Date    BREAST BIOPSY      CARDIAC SURGERY      COLONOSCOPY N/A 5/25/2020    Procedure: COLONOSCOPY;  Surgeon: José Luis Fonseca MD;  Location: [facility] ENDO;  Service: Endoscopy;  Laterality: N/A;  XARELTO/PLETAL ok    CORONARY ARTERY BYPASS GRAFT      CYSTOSCOPY W/ URETERAL STENT PLACEMENT Bilateral 6/25/2020    Procedure: CYSTOSCOPY, WITH URETERAL STENT INSERTION;  Surgeon: STEPHANY Smyth MD;  Location: [facility] OR;  Service: Urology;  Laterality: Bilateral;    HYSTERECTOMY      LAPAROSCOPIC HEMICOLECTOMY Right 6/25/2020    Procedure: HAND ASSISTED HEMICOLECTOMY, LAPAROSCOPIC;  Surgeon: Louis Rivas III, MD;  Location:  [facility] OR;  Service: General;  Laterality: Right;  RN PREOP 6/19/2020--has cardiac clearance from Dr. Khalil, --COVID NEGATIVE and T/S on 6/23/2020  COMBINED CASE WITH DR PIERCE    TREATMENT OF CARDIAC ARRHYTHMIA N/A 1/10/2022    Procedure: Cardioversion/Defibrillation (FRANCISCO not needed);  Surgeon: José Khalil MD;  Location: [facility] CATH LAB;  Service: Cardiology;  Laterality: N/A;  RN PRE OP, COVID NEGATIVE       ALLERGIES AND MEDICATION:   Review of patient's allergies indicates:  No Known Allergies       Medication List          Accurate as of August 5, 2022 10:24 AM. If you have any questions, ask your nurse or doctor.            CONTINUE taking these medications    atorvastatin 40 MG tablet  Commonly known as: LIPITOR  TAKE 1 TABLET BY MOUTH EVERYDAY AT BEDTIME     hydrALAZINE 100 MG tablet  Commonly known as: APRESOLINE  TAKE 1.5 TABLETS (150 MG TOTAL) BY MOUTH 2 (TWO) TIMES A DAY.     isosorbide mononitrate 120 MG 24 hr tablet  Commonly known as: IMDUR  Take 1 tablet (120 mg total) by mouth once daily.     labetaloL 200 MG tablet  Commonly known as: NORMODYNE  Take 1 tablet (200 mg total) by mouth every 12 (twelve) hours.     nitroGLYCERIN 0.4 MG SL tablet  Commonly known as: NITROSTAT  Place 1 tablet (0.4 mg total) under the tongue every 5 (five) minutes as needed for Chest pain.     olmesartan 40 MG tablet  Commonly known as: BENICAR  TAKE 1 TABLET BY MOUTH EVERY DAY     XARELTO 20 mg Tab  Generic drug: rivaroxaban  TAKE 1 TABLET (20 MG TOTAL) BY MOUTH DAILY WITH DINNER OR EVENING MEAL.              SOCIAL HISTORY:     Social History     Socioeconomic History    Marital status:    Tobacco Use    Smoking status: Never Smoker    Smokeless tobacco: Never Used   Substance and Sexual Activity    Alcohol use: No    Drug use: No    Sexual activity: Yes     Partners: Male     Birth control/protection: Surgical       FAMILY HISTORY:     Family History   Problem Relation Age of Onset    Cancer  "Mother     Breast cancer Maternal Uncle        REVIEW OF SYSTEMS:   Review of Systems   Constitutional: Negative for chills, diaphoresis and fever.   HENT: Negative for nosebleeds.    Eyes: Negative for blurred vision, double vision and photophobia.   Respiratory: Negative for hemoptysis, shortness of breath and wheezing.    Cardiovascular: Negative for chest pain, palpitations, orthopnea, claudication, leg swelling and PND.   Gastrointestinal: Negative for abdominal pain, blood in stool, heartburn, melena, nausea and vomiting.   Genitourinary: Negative for flank pain and hematuria.   Musculoskeletal: Negative for falls, myalgias and neck pain.   Skin: Negative for rash.   Neurological: Negative for dizziness, seizures, loss of consciousness, weakness and headaches.   Endo/Heme/Allergies: Negative for polydipsia. Does not bruise/bleed easily.   Psychiatric/Behavioral: Negative for depression and memory loss. The patient is not nervous/anxious.        PHYSICAL EXAM:     Vitals:    08/05/22 0940   BP: (!) 189/94   Pulse: 60   Resp: 15    Body mass index is 33.59 kg/m².  Weight: 86 kg (189 lb 9.5 oz)   Height: 5' 3" (160 cm)     Physical Exam  Vitals reviewed.   Constitutional:       General: She is not in acute distress.     Appearance: She is well-developed. She is obese. She is not ill-appearing, toxic-appearing or diaphoretic.   HENT:      Head: Normocephalic and atraumatic.   Eyes:      General: No scleral icterus.     Extraocular Movements: Extraocular movements intact.      Conjunctiva/sclera: Conjunctivae normal.      Pupils: Pupils are equal, round, and reactive to light.   Neck:      Thyroid: No thyromegaly.      Vascular: Normal carotid pulses. No carotid bruit or JVD.      Trachea: Trachea normal. No tracheal deviation.   Cardiovascular:      Rate and Rhythm: Regular rhythm. Bradycardia present.      Pulses:           Carotid pulses are 2+ on the right side and 2+ on the left side.     Heart sounds: S1 " normal and S2 normal. Heart sounds are distant. No murmur heard.    No friction rub. No gallop.   Pulmonary:      Effort: Pulmonary effort is normal. No respiratory distress.      Breath sounds: Normal breath sounds. No stridor. No wheezing, rhonchi or rales.   Chest:      Chest wall: No tenderness.   Abdominal:      General: There is no distension.      Palpations: Abdomen is soft.   Musculoskeletal:         General: No swelling or tenderness. Normal range of motion.      Cervical back: Normal range of motion and neck supple. No edema or rigidity.      Right lower leg: No edema.      Left lower leg: No edema.   Feet:      Right foot:      Skin integrity: No ulcer.      Left foot:      Skin integrity: No ulcer.   Skin:     General: Skin is warm and dry.      Coloration: Skin is not jaundiced.   Neurological:      General: No focal deficit present.      Mental Status: She is alert and oriented to person, place, and time.      Cranial Nerves: No cranial nerve deficit.   Psychiatric:         Mood and Affect: Mood normal.         Speech: Speech normal.         Behavior: Behavior normal. Behavior is cooperative.         DATA:   EKG: (personally reviewed tracing(s))  8/5/22 SR 53    Laboratory:  CBC:  Recent Labs   Lab 03/04/21  0936 01/10/22  1045 01/18/22  1445   WBC 5.51 4.46 5.04   Hemoglobin 13.1 13.2 12.0   Hematocrit 39.8 41.7 36.8 L   Platelets 220 248 215       CHEMISTRIES:  Recent Labs   Lab 06/04/21  0708 01/10/22  1045 01/18/22  1445   Glucose 119 H 97 88   Sodium 141 145 144   Potassium 4.5 3.9 3.7   BUN 18 22 18   Creatinine 1.1 1.0 0.9   eGFR if  >60 >60 >60   eGFR if non  54 A >60 >60   Calcium 9.5 9.1 9.9       CARDIAC BIOMARKERS:  Recent Labs   Lab 01/18/22  1445   Troponin I 0.009       COAGS:        LIPIDS/LFTS:  Recent Labs   Lab 03/04/21  0936 03/19/21  0946 01/18/22  1445   AST 20 23 14   ALT 25 35 21     Lab Results   Component Value Date    TSH 1.958 05/22/2019          Cardiovascular Testing:  Ofelia MPI 2/16/22 (images personally reviewed and interpreted)    Normal myocardial perfusion scan. There is no evidence of myocardial ischemia or infarction.    There is a moderate intensity perfusion abnormality in the anterior wall of the left ventricle, secondary to breast attenuation.    The gated perfusion images showed an ejection fraction of 71% post stress.    There is normal wall motion post stress.    LV cavity size is normal at stress.    The EKG portion of this study is negative for ischemia.    The patient reported no chest pain during the stress test.    There were no arrhythmias during stress.    A PET stress exam is recommended if clinically indicated.    Echo 1/26/22  · The left ventricle is normal in size with concentric hypertrophy and normal systolic function.  · The estimated ejection fraction is 65%.  · Normal left ventricular diastolic function.  · Normal right ventricular size with normal right ventricular systolic function.  · Mild left atrial enlargement.  · Mild-to-moderate mitral regurgitation.  · Mild to moderate tricuspid regurgitation.  · Normal central venous pressure (3 mmHg).  · The estimated PA systolic pressure is 36 mmHg.    Carotid US 5/7/21  · There is 20-39% right Internal Carotid Stenosis.  · There is 0-19% left Internal Carotid Stenosis.    Renal art US 1/10/19  -Normal sized kidneys without evidence for nephrolithiasis or hydronephrosis.  -There is slight increased peak systolic velocity within the right renal artery from the proximal to mid aspect from 50 cm/sec to 158 cm/sec which is likely related to vessel tortuosity and turbulent flow in light of the absent elevated resistive indices or ratios however underlying right renal artery stenosis cannot be excluded.  This could be further evaluated with CTA or MRA imaging.  -No evidence for left renal artery elevated resistive indices or renal artery velocities to suggest renal artery  stenosis.    LE art US 5/31/18  R STEFANI 1.09  L STEFANI 1.08  Elevated velocities within the mid to distal right superficial femoral artery strongly suggestive of hemodynamically significant stenoses within the mid to distal right superficial femoral artery.    LE venous US/reflux 5/31/18  No evidence for deep venous thrombosis within either lower extremity.  No evidence for venous insufficiency within the greater or lesser saphenous veins bilaterally.    FRANCISCO/DCCV 3/9/18  Normal bivent size/fxn, EF 55%, normal wall motion  Normal appearing valves.  Trace AI, mild MR, mild TR  No LA/LON thrombus  Succcessful DCCV af->NSR 360J x1.  Plan:  Cont med rx  Cont Xarelto 20mg qd  OK for discharge later on today and follow up with me in the office 1 week for outpatient stress test.    Cath 3/25/11 ([facility], subsequently had CABG)  LM: normal  LAD sequential 90% prox and 80% mid stenoses   D2 prox 80%  LCx: MLI   OM2 prox 80%  RCA: dom, mid-dist 40% sequential stenoses    ASSESSMENT:   # PAF in SR, s/p FRANCISCO/DCCV 3/9/18, DCCV 1/10/22.  CHADS VASC 2 on Xarelto.    # HTN, uncontrolled, med nonadherence noted.  Pt following in digital med program.  # CAD s/p CABG x3V 4/2011.  MPI 2/2022 neg.  Sxs abated on imdur.   # PAD, bilat buttock and RLE claudication, R SFA stenosis noted on US 5/2018.  Sxs seemd to have resolved (pt thinks it was more related to R hip pain, did not notice much of a difference with pletal rx)  # HLP on atorva 40mg  # CKD3a  # BMI 34, stable vs last OV  # aortic atherosclerosis (CT A/P 9/25/20)  # preop for cataract surgery    PLAN:   Cont med rx  Cont Xarelto 20mg qd  Diet/exercise/weight loss  Cont monitoring in digital med program.  Neg drug to titrate: Imdur +/- add HCTZ/aldact  The patient is at low cardiac risk for the planned cataract surgery and associated anesthesia.  No further preoperative cardiac testing is required.  Is acceptable to withhold the patient's Xarelto for 3 days prior to the surgery and resume  it as soon as possible thereafter.  RTC 6 months with surveillance carotid US (Feb 2023)  If recurrent anginal sxs despite control of BP, consider cardiac PET vs cath.    Consider LE angio in future for eval of LE PAD if limiting claudication recurs      José Khalil MD, FACC

## 2022-08-25 ENCOUNTER — PATIENT MESSAGE (OUTPATIENT)
Dept: FAMILY MEDICINE | Facility: CLINIC | Age: 62
End: 2022-08-25
Payer: COMMERCIAL

## 2022-10-13 RX ORDER — ATORVASTATIN CALCIUM 40 MG/1
40 TABLET, FILM COATED ORAL DAILY
Qty: 90 TABLET | Refills: 3 | Status: SHIPPED | OUTPATIENT
Start: 2022-10-13 | End: 2023-07-13 | Stop reason: SDUPTHER

## 2022-11-22 ENCOUNTER — PATIENT MESSAGE (OUTPATIENT)
Dept: ADMINISTRATIVE | Facility: OTHER | Age: 62
End: 2022-11-22
Payer: COMMERCIAL

## 2022-11-25 ENCOUNTER — PATIENT MESSAGE (OUTPATIENT)
Dept: FAMILY MEDICINE | Facility: CLINIC | Age: 62
End: 2022-11-25
Payer: COMMERCIAL

## 2022-12-06 ENCOUNTER — TELEPHONE (OUTPATIENT)
Dept: FAMILY MEDICINE | Facility: CLINIC | Age: 62
End: 2022-12-06
Payer: COMMERCIAL

## 2022-12-06 ENCOUNTER — PATIENT OUTREACH (OUTPATIENT)
Dept: ADMINISTRATIVE | Facility: HOSPITAL | Age: 62
End: 2022-12-06
Payer: COMMERCIAL

## 2022-12-06 VITALS — SYSTOLIC BLOOD PRESSURE: 136 MMHG | DIASTOLIC BLOOD PRESSURE: 88 MMHG

## 2022-12-06 RX ORDER — MELOXICAM 15 MG/1
15 TABLET ORAL
COMMUNITY
Start: 2022-11-18 | End: 2023-07-06

## 2022-12-06 RX ORDER — CYCLOBENZAPRINE HCL 10 MG
10 TABLET ORAL 3 TIMES DAILY
COMMUNITY
Start: 2022-11-18 | End: 2023-03-29

## 2022-12-06 RX ORDER — ISOSORBIDE MONONITRATE 60 MG/1
60 TABLET, EXTENDED RELEASE ORAL
COMMUNITY
Start: 2022-01-27 | End: 2023-07-13

## 2022-12-06 RX ORDER — DICLOFENAC SODIUM 10 MG/G
GEL TOPICAL
COMMUNITY
Start: 2022-11-18 | End: 2023-03-29 | Stop reason: CLARIF

## 2022-12-06 RX ORDER — GABAPENTIN 600 MG/1
TABLET ORAL
COMMUNITY
Start: 2022-10-11 | End: 2023-04-10

## 2022-12-06 RX ORDER — DIAZEPAM 10 MG/1
TABLET ORAL
COMMUNITY
Start: 2022-09-02 | End: 2023-03-29 | Stop reason: CLARIF

## 2022-12-06 RX ORDER — OLMESARTAN MEDOXOMIL 40 MG/1
40 TABLET ORAL
COMMUNITY
End: 2023-07-13 | Stop reason: SDUPTHER

## 2022-12-06 RX ORDER — HYDROCODONE BITARTRATE AND ACETAMINOPHEN 5; 325 MG/1; MG/1
1 TABLET ORAL EVERY 6 HOURS PRN
COMMUNITY
Start: 2022-09-02 | End: 2023-04-10

## 2022-12-06 RX ORDER — LABETALOL 200 MG/1
200 TABLET, FILM COATED ORAL
COMMUNITY
End: 2023-08-07

## 2022-12-06 RX ORDER — NITROGLYCERIN 0.4 MG/1
0.4 TABLET SUBLINGUAL
COMMUNITY
Start: 2022-01-18 | End: 2023-01-18

## 2022-12-06 RX ORDER — HYDRALAZINE HYDROCHLORIDE 100 MG/1
100 TABLET, FILM COATED ORAL
COMMUNITY
End: 2023-04-12

## 2022-12-06 RX ORDER — OFLOXACIN 3 MG/ML
SOLUTION/ DROPS OPHTHALMIC
COMMUNITY
Start: 2022-09-02 | End: 2023-07-13

## 2022-12-06 RX ORDER — KETOROLAC TROMETHAMINE 5 MG/ML
SOLUTION OPHTHALMIC
COMMUNITY
Start: 2022-09-02 | End: 2023-07-13

## 2022-12-06 RX ORDER — PREDNISOLONE ACETATE 10 MG/ML
SUSPENSION/ DROPS OPHTHALMIC
COMMUNITY
Start: 2022-09-02 | End: 2023-07-13

## 2022-12-06 NOTE — PROGRESS NOTES
[facility] 1 BP Non-Compliant  Page 11.14.22 - patient with a compliant blood pressure reading of 136/88 and pulse = 95 taken on 12/06/22. Patient active on Digital Hypertension. BP recorded.

## 2022-12-07 ENCOUNTER — TELEPHONE (OUTPATIENT)
Dept: CARDIOLOGY | Facility: CLINIC | Age: 62
End: 2022-12-07
Payer: COMMERCIAL

## 2022-12-07 NOTE — TELEPHONE ENCOUNTER
Returned call to patient regarding  afib and having jaw pain. She reports taking nitro for her jaw pain and now it feels better. She does not have rafael until 02/2023. Patient did not want to be seen.     Patient educated to call 911 or go to emergency room with symptoms of chest pain or afib. Patient verbalized understanding.     Karol AKINS Brent  12/07/2022  4:10 PM      ----- Message from Vanita Rich sent at 12/7/2022 12:11 PM CST -----  Regarding: Patient Advice          Name of Who is Calling:  Keyonna Guillen    Who Left The Message:  Keyonna Guillen      What is the request in detail:        Patient called requesting a call regarding she been experiencing AFib.  Please give a call back and further advise.    Thank you!    Reply by [facility]: YES      Preferred Call Back :  [phone] (M)

## 2023-02-07 ENCOUNTER — TELEPHONE (OUTPATIENT)
Dept: FAMILY MEDICINE | Facility: CLINIC | Age: 63
End: 2023-02-07

## 2023-02-07 NOTE — TELEPHONE ENCOUNTER
----- Message from Alysa Lagos sent at 2/7/2023 10:06 AM CST -----   Name of Who is Calling:     What is the request in detail:  patient request call back in reference to referral to urology / patient state she has back pain / patient has frequent urination and odor in urine Please contact to further discuss and advise      Can the clinic reply by [facility]:     What Number to Call Back if not in [facility]:  [phone]

## 2023-02-07 NOTE — TELEPHONE ENCOUNTER
----- Message from Leatha Baldwin sent at 2/7/2023  8:59 AM CST -----  Regarding: Referral Request  Type:  Patient Requesting Referral    Who Called: Self     Referral to What Specialty: Urology     Reason for Referral: Back pain and bad smell     Does the patient want the referral with a specific physician?: Libra     [facility] the [facility]    Would the patient rather a call back or a response via [facility]? Call     Best Call Back Number: .[phone]

## 2023-03-02 ENCOUNTER — LAB VISIT (OUTPATIENT)
Dept: LAB | Facility: HOSPITAL | Age: 63
End: 2023-03-02
Attending: INTERNAL MEDICINE
Payer: COMMERCIAL

## 2023-03-02 DIAGNOSIS — Z01.818 PRE-OP TESTING: ICD-10-CM

## 2023-03-02 LAB
ANION GAP SERPL CALC-SCNC: 9 MMOL/L (ref 8–16)
BASOPHILS # BLD AUTO: 0.01 K/UL (ref 0–0.2)
BASOPHILS NFR BLD: 0.2 % (ref 0–1.9)
BUN SERPL-MCNC: 21 MG/DL (ref 8–23)
CALCIUM SERPL-MCNC: 10.1 MG/DL (ref 8.7–10.5)
CHLORIDE SERPL-SCNC: 107 MMOL/L (ref 95–110)
CO2 SERPL-SCNC: 28 MMOL/L (ref 23–29)
CREAT SERPL-MCNC: 1.1 MG/DL (ref 0.5–1.4)
DIFFERENTIAL METHOD: NORMAL
EOSINOPHIL # BLD AUTO: 0 K/UL (ref 0–0.5)
EOSINOPHIL NFR BLD: 0.6 % (ref 0–8)
ERYTHROCYTE [DISTWIDTH] IN BLOOD BY AUTOMATED COUNT: 12.5 % (ref 11.5–14.5)
EST. GFR  (NO RACE VARIABLE): 56 ML/MIN/1.73 M^2
GLUCOSE SERPL-MCNC: 122 MG/DL (ref 70–110)
HCT VFR BLD AUTO: 41.6 % (ref 37–48.5)
HGB BLD-MCNC: 13.9 G/DL (ref 12–16)
IMM GRANULOCYTES # BLD AUTO: 0.02 K/UL (ref 0–0.04)
IMM GRANULOCYTES NFR BLD AUTO: 0.3 % (ref 0–0.5)
LYMPHOCYTES # BLD AUTO: 1.4 K/UL (ref 1–4.8)
LYMPHOCYTES NFR BLD: 22.4 % (ref 18–48)
MCH RBC QN AUTO: 30.5 PG (ref 27–31)
MCHC RBC AUTO-ENTMCNC: 33.4 G/DL (ref 32–36)
MCV RBC AUTO: 91 FL (ref 82–98)
MONOCYTES # BLD AUTO: 0.4 K/UL (ref 0.3–1)
MONOCYTES NFR BLD: 5.8 % (ref 4–15)
NEUTROPHILS # BLD AUTO: 4.4 K/UL (ref 1.8–7.7)
NEUTROPHILS NFR BLD: 70.7 % (ref 38–73)
NRBC BLD-RTO: 0 /100 WBC
PLATELET # BLD AUTO: 238 K/UL (ref 150–450)
PMV BLD AUTO: 9.6 FL (ref 9.2–12.9)
POTASSIUM SERPL-SCNC: 4.3 MMOL/L (ref 3.5–5.1)
RBC # BLD AUTO: 4.55 M/UL (ref 4–5.4)
SODIUM SERPL-SCNC: 144 MMOL/L (ref 136–145)
WBC # BLD AUTO: 6.24 K/UL (ref 3.9–12.7)

## 2023-03-02 PROCEDURE — 85025 COMPLETE CBC W/AUTO DIFF WBC: CPT | Performed by: INTERNAL MEDICINE

## 2023-03-02 PROCEDURE — 36415 COLL VENOUS BLD VENIPUNCTURE: CPT | Performed by: INTERNAL MEDICINE

## 2023-03-02 PROCEDURE — 80048 BASIC METABOLIC PNL TOTAL CA: CPT | Performed by: INTERNAL MEDICINE

## 2023-03-03 ENCOUNTER — TELEPHONE (OUTPATIENT)
Dept: FAMILY MEDICINE | Facility: CLINIC | Age: 63
End: 2023-03-03
Payer: COMMERCIAL

## 2023-03-03 NOTE — TELEPHONE ENCOUNTER
----- Message from Morgan Leger MD sent at 3/3/2023  1:14 PM CST -----  Regarding: FW: self  Please get patient scheduled to see me as I have not seen in since 2021.  Dr. Khalil forwarded me a message about her wanting a referral to a kidney specialist, but I need to see her    Thanks  Dr. Leger     ----- Message -----  From: José Khalil MD  Sent: 3/3/2023  11:52 AM CST  To: Morgan Leger MD  Subject: FW: self                                           ----- Message -----  From: Karol Kennedy RN  Sent: 3/3/2023  11:12 AM CST  To: José Khalil MD  Subject: FW: self                                           ----- Message -----  From: Christina Palm  Sent: 3/3/2023   8:07 AM CST  To: Simran Oro  Subject: self                                             .Type: Patient Call Back    Who called: self   What is the request in detail: t is requesting to get a referral to a kidney specialist    Can the clinic reply by [facility]? Call back     Would the patient rather a call back or a response via [facility]? Call back     Best call back number: .[phone]

## 2023-03-22 ENCOUNTER — HOSPITAL ENCOUNTER (OUTPATIENT)
Dept: CARDIOLOGY | Facility: HOSPITAL | Age: 63
Discharge: HOME | End: 2023-03-22
Attending: INTERNAL MEDICINE
Payer: COMMERCIAL

## 2023-03-22 DIAGNOSIS — I65.29 CAROTID ATHEROSCLEROSIS, UNSPECIFIED LATERALITY: ICD-10-CM

## 2023-03-22 LAB
LEFT CBA DIAS: 22 CM/S
LEFT CBA SYS: 63 CM/S
LEFT CCA DIST DIAS: 21 CM/S
LEFT CCA DIST SYS: 56 CM/S
LEFT CCA MID DIAS: 21 CM/S
LEFT CCA MID SYS: 95 CM/S
LEFT CCA PROX DIAS: 17 CM/S
LEFT CCA PROX SYS: 80 CM/S
LEFT ECA DIAS: 9 CM/S
LEFT ECA SYS: 83 CM/S
LEFT ICA DIST DIAS: 35 CM/S
LEFT ICA DIST SYS: 77 CM/S
LEFT ICA MID DIAS: 34 CM/S
LEFT ICA MID SYS: 73 CM/S
LEFT ICA PROX DIAS: 19 CM/S
LEFT ICA PROX SYS: 57 CM/S
LEFT VERTEBRAL DIAS: 12 CM/S
LEFT VERTEBRAL SYS: 43 CM/S
OHS CV CAROTID RIGHT ICA EDV HIGHEST: 23
OHS CV CAROTID ULTRASOUND LEFT ICA/CCA RATIO: 1.38
OHS CV CAROTID ULTRASOUND RIGHT ICA/CCA RATIO: 0.79
OHS CV PV CAROTID LEFT HIGHEST CCA: 95
OHS CV PV CAROTID LEFT HIGHEST ICA: 77
OHS CV PV CAROTID RIGHT HIGHEST CCA: 78
OHS CV PV CAROTID RIGHT HIGHEST ICA: 53
OHS CV US CAROTID LEFT HIGHEST EDV: 35
RIGHT CBA DIAS: 16 CM/S
RIGHT CBA SYS: 99 CM/S
RIGHT CCA DIST DIAS: 18 CM/S
RIGHT CCA DIST SYS: 67 CM/S
RIGHT CCA MID DIAS: 14 CM/S
RIGHT CCA MID SYS: 68 CM/S
RIGHT CCA PROX DIAS: 7 CM/S
RIGHT CCA PROX SYS: 78 CM/S
RIGHT ECA DIAS: 9 CM/S
RIGHT ECA SYS: 61 CM/S
RIGHT ICA DIST DIAS: 18 CM/S
RIGHT ICA DIST SYS: 48 CM/S
RIGHT ICA MID DIAS: 23 CM/S
RIGHT ICA MID SYS: 53 CM/S
RIGHT ICA PROX DIAS: 11 CM/S
RIGHT ICA PROX SYS: 52 CM/S
RIGHT VERTEBRAL DIAS: 32 CM/S
RIGHT VERTEBRAL SYS: 65 CM/S

## 2023-03-22 PROCEDURE — 93880 CV US DOPPLER CAROTID (CUPID ONLY): ICD-10-PCS | Mod: 26,,, | Performed by: INTERNAL MEDICINE

## 2023-03-22 PROCEDURE — 93880 EXTRACRANIAL BILAT STUDY: CPT

## 2023-03-22 PROCEDURE — 93880 EXTRACRANIAL BILAT STUDY: CPT | Mod: 26,,, | Performed by: INTERNAL MEDICINE

## 2023-03-27 ENCOUNTER — TELEPHONE (OUTPATIENT)
Dept: FAMILY MEDICINE | Facility: CLINIC | Age: 63
End: 2023-03-27
Payer: COMMERCIAL

## 2023-03-29 ENCOUNTER — OFFICE VISIT (OUTPATIENT)
Dept: FAMILY MEDICINE | Facility: CLINIC | Age: 63
End: 2023-03-29
Payer: COMMERCIAL

## 2023-03-29 VITALS
SYSTOLIC BLOOD PRESSURE: 138 MMHG | BODY MASS INDEX: 33.81 KG/M2 | TEMPERATURE: 98 F | HEIGHT: 63 IN | HEART RATE: 78 BPM | WEIGHT: 190.81 LBS | OXYGEN SATURATION: 96 % | RESPIRATION RATE: 18 BRPM | DIASTOLIC BLOOD PRESSURE: 78 MMHG

## 2023-03-29 DIAGNOSIS — Z00.00 WELL WOMAN EXAM WITHOUT GYNECOLOGICAL EXAM: Primary | ICD-10-CM

## 2023-03-29 DIAGNOSIS — Z12.31 SCREENING MAMMOGRAM FOR BREAST CANCER: ICD-10-CM

## 2023-03-29 DIAGNOSIS — F39 MOOD DISORDER: ICD-10-CM

## 2023-03-29 DIAGNOSIS — R82.90 MALODOROUS URINE: ICD-10-CM

## 2023-03-29 DIAGNOSIS — R31.9 HEMATURIA, UNSPECIFIED TYPE: ICD-10-CM

## 2023-03-29 DIAGNOSIS — Z12.11 ENCOUNTER FOR SCREENING COLONOSCOPY: ICD-10-CM

## 2023-03-29 PROCEDURE — 3008F PR BODY MASS INDEX (BMI) DOCUMENTED: ICD-10-PCS | Mod: CPTII,S$GLB,, | Performed by: FAMILY MEDICINE

## 2023-03-29 PROCEDURE — 3078F PR MOST RECENT DIASTOLIC BLOOD PRESSURE < 80 MM HG: ICD-10-PCS | Mod: CPTII,S$GLB,, | Performed by: FAMILY MEDICINE

## 2023-03-29 PROCEDURE — 99999 PR PBB SHADOW E&M-EST. PATIENT-LVL V: ICD-10-PCS | Mod: PBBFAC,,, | Performed by: FAMILY MEDICINE

## 2023-03-29 PROCEDURE — 3008F BODY MASS INDEX DOCD: CPT | Mod: CPTII,S$GLB,, | Performed by: FAMILY MEDICINE

## 2023-03-29 PROCEDURE — 99396 PREV VISIT EST AGE 40-64: CPT | Mod: S$GLB,,, | Performed by: FAMILY MEDICINE

## 2023-03-29 PROCEDURE — 1160F PR REVIEW ALL MEDS BY PRESCRIBER/CLIN PHARMACIST DOCUMENTED: ICD-10-PCS | Mod: CPTII,S$GLB,, | Performed by: FAMILY MEDICINE

## 2023-03-29 PROCEDURE — 99396 PR PREVENTIVE VISIT,EST,40-64: ICD-10-PCS | Mod: S$GLB,,, | Performed by: FAMILY MEDICINE

## 2023-03-29 PROCEDURE — 1159F MED LIST DOCD IN RCRD: CPT | Mod: CPTII,S$GLB,, | Performed by: FAMILY MEDICINE

## 2023-03-29 PROCEDURE — 3075F SYST BP GE 130 - 139MM HG: CPT | Mod: CPTII,S$GLB,, | Performed by: FAMILY MEDICINE

## 2023-03-29 PROCEDURE — 3078F DIAST BP <80 MM HG: CPT | Mod: CPTII,S$GLB,, | Performed by: FAMILY MEDICINE

## 2023-03-29 PROCEDURE — 1160F RVW MEDS BY RX/DR IN RCRD: CPT | Mod: CPTII,S$GLB,, | Performed by: FAMILY MEDICINE

## 2023-03-29 PROCEDURE — 3075F PR MOST RECENT SYSTOLIC BLOOD PRESS GE 130-139MM HG: ICD-10-PCS | Mod: CPTII,S$GLB,, | Performed by: FAMILY MEDICINE

## 2023-03-29 PROCEDURE — 1159F PR MEDICATION LIST DOCUMENTED IN MEDICAL RECORD: ICD-10-PCS | Mod: CPTII,S$GLB,, | Performed by: FAMILY MEDICINE

## 2023-03-29 PROCEDURE — 99999 PR PBB SHADOW E&M-EST. PATIENT-LVL V: CPT | Mod: PBBFAC,,, | Performed by: FAMILY MEDICINE

## 2023-03-29 RX ORDER — TIZANIDINE 4 MG/1
4 TABLET ORAL NIGHTLY
COMMUNITY
Start: 2023-03-22 | End: 2023-03-29

## 2023-03-29 RX ORDER — BUPROPION HYDROCHLORIDE 150 MG/1
150 TABLET ORAL DAILY
Qty: 30 TABLET | Refills: 0 | Status: SHIPPED | OUTPATIENT
Start: 2023-03-29 | End: 2023-04-20

## 2023-03-29 NOTE — PROGRESS NOTES
"Well Woman VISIT      CHIEF COMPLAINT  Chief Complaint   Patient presents with    Referral     Kidney .     Follow-up    Hypertension       HPI  Keyonna Guillen is a 62 y.o. female who presents for wellness.     Social Factors  Tobacco use: No  Ready to Quit: No  Alcohol: No  Intimate partner violence screening  Regular Exercise: No    Depression  Over the past two weeks, have you felt down, depressed, or hopeless? No  Over the past two weeks, have you felt little interest or pleasure in doing things? No    Reproductive Health  Complete hysterectomy with oophorectomy    CHD, HTN, DM2  CHD Risk Factors: hypertension and obesity (BMI >= 30 kg/m2)  Women 45 years and older should be screened for dyslipidemia if at increased risk of CHD  Women 20 to 45 years of age should be screened for dyslipidemia if at increased risk of CHD  Asymptomatic adults with sustained blood pressure greater than 135/80 mm Hg (treated or untreated) should be screened for type 2 diabetes mellitus    Estimated body mass index is 33.8 kg/m² as calculated from the following:    Height as of this encounter: 5' 3" (1.6 m).    Weight as of this encounter: 86.5 kg (190 lb 12.9 oz).      Screening  Mammogram needed: ordered  Colonoscopy needed: ordered  Osteoporosis screen needed: ordered     Women 50 to 74 years of age should be screened for breast cancer with mammography biennially.  Women should be screened for cervical cancer with Pap tests beginning at 21 years of age. Low-risk women should receive Pap testing every three years. Co-testing for human papillomavirus is an option beginning at 30 years of age, and can extend the screening interval to five years. Cervical cancer screening should be discontinued at 65 years of age or after total hysterectomy if the woman has a benign gynecologic history  Adults 50 to 75 years of age should be screened for colorectal cancer with an FOBT annually, sigmoidoscopy every five years with an FOBT every " "three years, or colonoscopy every 10 years.  Women 65 years and older should be screened for osteoporosis. Women younger than 65 years should be screened if the risk of fracture is greater than or equal to that of a 65-year-old white woman without additional risk factors.      Immunizations  delayed    ALLERGIES and MEDS were verified.   PMHx, PSHx, FHx, SOCIALHx were updated as pertinent.    REVIEW OF SYSTEMS  Review of Systems   Constitutional: Negative.    HENT: Negative.     Eyes: Negative.    Respiratory: Negative.     Cardiovascular: Negative.    Gastrointestinal: Negative.    Genitourinary:  Positive for frequency and hematuria.   Musculoskeletal: Negative.    Skin: Negative.        PHYSICAL EXAM  VITAL SIGNS: /78   Pulse 78   Temp 98.1 °F (36.7 °C) (Oral)   Resp 18   Ht 5' 3" (1.6 m)   Wt 86.5 kg (190 lb 12.9 oz)   SpO2 96%   BMI 33.80 kg/m²   GEN: Well developed, Well nourished, No acute distress.  HENT: Normocephalic, Atraumatic, Bilateral external ears normal, Nose normal, Oropharynx moist, No oral exudates.   Eyes: PERRL, EOMI, Conjunctiva normal, No discharge.   Neck: Supple, No tenderness.  Lymphatic: No cervical or supraclavicular lymphadenopathy noted.   Cardiovascular: Normal heart rate, Normal rhythm, No murmurs, No rubs, No gallops.   Thorax & Lungs: Normal breath sounds, No respiratory distress, No wheezing.  Abdomen: Soft, No tenderness, Bowel sounds normal.  Skin: Warm, Dry, No erythema, No rash.   Extremities: No edema, No tenderness.       ASSESSMENT/PLAN    Keyonna was seen today for referral, follow-up and hypertension.    Diagnoses and all orders for this visit:    Well woman exam without gynecological exam  -     CBC Auto Differential; Future  -     Comprehensive Metabolic Panel; Future  -     Lipid Panel; Future  -     Hemoglobin A1C; Future    Screening mammogram for breast cancer  -     Mammo Digital Screening Bilat w/ Aston; Future    Encounter for screening colonoscopy  -   "   Ambulatory referral/consult to Endo Procedure ; Future  -     CT Urogram Abd Pelvis W WO; Future    Malodorous urine  -     CT Urogram Abd Pelvis W WO; Future  -     Urinalysis; Future  -     Urine culture; Future  -     Renal Function Panel; Future    Hematuria, unspecified type  -     CT Urogram Abd Pelvis W WO; Future  -     Urinalysis; Future  -     Urine culture; Future  -     Renal Function Panel; Future           FOLLOW UP: 6 months or sooner if needed      Morgan Leger MD

## 2023-03-30 ENCOUNTER — CLINICAL SUPPORT (OUTPATIENT)
Dept: ENDOSCOPY | Facility: HOSPITAL | Age: 63
End: 2023-03-30
Attending: FAMILY MEDICINE
Payer: COMMERCIAL

## 2023-03-30 ENCOUNTER — HOSPITAL ENCOUNTER (OUTPATIENT)
Dept: RADIOLOGY | Facility: HOSPITAL | Age: 63
Discharge: HOME | End: 2023-03-30
Attending: FAMILY MEDICINE
Payer: COMMERCIAL

## 2023-03-30 DIAGNOSIS — R31.9 HEMATURIA, UNSPECIFIED TYPE: ICD-10-CM

## 2023-03-30 DIAGNOSIS — Z12.11 ENCOUNTER FOR SCREENING COLONOSCOPY: ICD-10-CM

## 2023-03-30 DIAGNOSIS — R82.90 MALODOROUS URINE: ICD-10-CM

## 2023-03-30 PROCEDURE — 74178 CT ABD&PLV WO CNTR FLWD CNTR: CPT | Mod: 26,,, | Performed by: RADIOLOGY

## 2023-03-30 PROCEDURE — 74178 CT UROGRAM ABD PELVIS W WO: ICD-10-PCS | Mod: 26,,, | Performed by: RADIOLOGY

## 2023-03-30 PROCEDURE — 74178 CT ABD&PLV WO CNTR FLWD CNTR: CPT | Mod: TC

## 2023-03-30 PROCEDURE — 25500020 PHARM REV CODE 255: Performed by: FAMILY MEDICINE

## 2023-03-30 RX ADMIN — IOHEXOL 125 ML: 350 INJECTION, SOLUTION INTRAVENOUS at 10:03

## 2023-03-30 NOTE — PLAN OF CARE
Contacted pt to schedule procedure. Pt is requesting procedure to be cancelled. States she has been thinking about it since yesterday and will be seeking care elsewhere. Thanked her for time.

## 2023-03-31 DIAGNOSIS — R32 URINARY INCONTINENCE, UNSPECIFIED TYPE: ICD-10-CM

## 2023-03-31 DIAGNOSIS — N30.01 ACUTE CYSTITIS WITH HEMATURIA: Primary | ICD-10-CM

## 2023-03-31 RX ORDER — NITROFURANTOIN 25; 75 MG/1; MG/1
100 CAPSULE ORAL 2 TIMES DAILY
Qty: 10 CAPSULE | Refills: 0 | Status: SHIPPED | OUTPATIENT
Start: 2023-03-31 | End: 2023-04-05

## 2023-04-03 ENCOUNTER — TELEPHONE (OUTPATIENT)
Dept: FAMILY MEDICINE | Facility: CLINIC | Age: 63
End: 2023-04-03
Payer: COMMERCIAL

## 2023-04-03 ENCOUNTER — PATIENT MESSAGE (OUTPATIENT)
Dept: FAMILY MEDICINE | Facility: CLINIC | Age: 63
End: 2023-04-03
Payer: COMMERCIAL

## 2023-04-03 NOTE — TELEPHONE ENCOUNTER
----- Message from Velia Velazco sent at 4/3/2023 10:08 AM CDT -----  Regarding: Patient Call Back  .Type: Patient Call Back    Who called: Self    What is the request in detail: Calling to discuss results from a recent urine sample. Pt states she reviewed her results in the portal and is concerned about one of the results and would like to discuss it with provider or nurse. Please advise.    Can the clinic reply by [facility]? No    Would the patient rather a call back or a response via [facility]? Call back    Best call back number: [phone]     Additional Information:

## 2023-04-04 ENCOUNTER — HOSPITAL ENCOUNTER (OUTPATIENT)
Dept: RADIOLOGY | Facility: HOSPITAL | Age: 63
Discharge: HOME | End: 2023-04-04
Attending: FAMILY MEDICINE
Payer: COMMERCIAL

## 2023-04-04 DIAGNOSIS — Z12.31 SCREENING MAMMOGRAM FOR BREAST CANCER: ICD-10-CM

## 2023-04-04 PROCEDURE — 77067 SCR MAMMO BI INCL CAD: CPT | Mod: 26,,, | Performed by: RADIOLOGY

## 2023-04-04 PROCEDURE — 77067 MAMMO DIGITAL SCREENING BILAT WITH TOMO: ICD-10-PCS | Mod: 26,,, | Performed by: RADIOLOGY

## 2023-04-04 PROCEDURE — 77063 BREAST TOMOSYNTHESIS BI: CPT | Mod: 26,,, | Performed by: RADIOLOGY

## 2023-04-04 PROCEDURE — 77063 MAMMO DIGITAL SCREENING BILAT WITH TOMO: ICD-10-PCS | Mod: 26,,, | Performed by: RADIOLOGY

## 2023-04-04 PROCEDURE — 77067 SCR MAMMO BI INCL CAD: CPT | Mod: TC,PO

## 2023-04-10 ENCOUNTER — OFFICE VISIT (OUTPATIENT)
Dept: OBSTETRICS AND GYNECOLOGY | Facility: CLINIC | Age: 63
End: 2023-04-10
Payer: COMMERCIAL

## 2023-04-10 VITALS
DIASTOLIC BLOOD PRESSURE: 92 MMHG | SYSTOLIC BLOOD PRESSURE: 190 MMHG | BODY MASS INDEX: 34.22 KG/M2 | WEIGHT: 193.13 LBS | HEIGHT: 63 IN

## 2023-04-10 DIAGNOSIS — Z01.419 WELL WOMAN EXAM WITH ROUTINE GYNECOLOGICAL EXAM: Primary | ICD-10-CM

## 2023-04-10 DIAGNOSIS — I73.9 PAD (PERIPHERAL ARTERY DISEASE): ICD-10-CM

## 2023-04-10 DIAGNOSIS — Z90.49 STATUS POST RIGHT HEMICOLECTOMY: ICD-10-CM

## 2023-04-10 DIAGNOSIS — I48.91 ATRIAL FIBRILLATION, UNSPECIFIED TYPE: ICD-10-CM

## 2023-04-10 DIAGNOSIS — I10 CHRONIC HYPERTENSION: ICD-10-CM

## 2023-04-10 DIAGNOSIS — N95.2 ATROPHIC VAGINITIS: ICD-10-CM

## 2023-04-10 DIAGNOSIS — I25.810 CORONARY ARTERY DISEASE INVOLVING CORONARY BYPASS GRAFT OF NATIVE HEART WITHOUT ANGINA PECTORIS: ICD-10-CM

## 2023-04-10 PROCEDURE — 1160F PR REVIEW ALL MEDS BY PRESCRIBER/CLIN PHARMACIST DOCUMENTED: ICD-10-PCS | Mod: CPTII,S$GLB,, | Performed by: OBSTETRICS & GYNECOLOGY

## 2023-04-10 PROCEDURE — 3008F BODY MASS INDEX DOCD: CPT | Mod: CPTII,S$GLB,, | Performed by: OBSTETRICS & GYNECOLOGY

## 2023-04-10 PROCEDURE — 3077F PR MOST RECENT SYSTOLIC BLOOD PRESSURE >= 140 MM HG: ICD-10-PCS | Mod: CPTII,S$GLB,, | Performed by: OBSTETRICS & GYNECOLOGY

## 2023-04-10 PROCEDURE — 99999 PR PBB SHADOW E&M-EST. PATIENT-LVL III: ICD-10-PCS | Mod: PBBFAC,,, | Performed by: OBSTETRICS & GYNECOLOGY

## 2023-04-10 PROCEDURE — 1159F PR MEDICATION LIST DOCUMENTED IN MEDICAL RECORD: ICD-10-PCS | Mod: CPTII,S$GLB,, | Performed by: OBSTETRICS & GYNECOLOGY

## 2023-04-10 PROCEDURE — 3044F HG A1C LEVEL LT 7.0%: CPT | Mod: CPTII,S$GLB,, | Performed by: OBSTETRICS & GYNECOLOGY

## 2023-04-10 PROCEDURE — 99999 PR PBB SHADOW E&M-EST. PATIENT-LVL III: CPT | Mod: PBBFAC,,, | Performed by: OBSTETRICS & GYNECOLOGY

## 2023-04-10 PROCEDURE — 3008F PR BODY MASS INDEX (BMI) DOCUMENTED: ICD-10-PCS | Mod: CPTII,S$GLB,, | Performed by: OBSTETRICS & GYNECOLOGY

## 2023-04-10 PROCEDURE — 1160F RVW MEDS BY RX/DR IN RCRD: CPT | Mod: CPTII,S$GLB,, | Performed by: OBSTETRICS & GYNECOLOGY

## 2023-04-10 PROCEDURE — 99386 PR PREVENTIVE VISIT,NEW,40-64: ICD-10-PCS | Mod: S$GLB,,, | Performed by: OBSTETRICS & GYNECOLOGY

## 2023-04-10 PROCEDURE — 99386 PREV VISIT NEW AGE 40-64: CPT | Mod: S$GLB,,, | Performed by: OBSTETRICS & GYNECOLOGY

## 2023-04-10 PROCEDURE — 3077F SYST BP >= 140 MM HG: CPT | Mod: CPTII,S$GLB,, | Performed by: OBSTETRICS & GYNECOLOGY

## 2023-04-10 PROCEDURE — 3080F PR MOST RECENT DIASTOLIC BLOOD PRESSURE >= 90 MM HG: ICD-10-PCS | Mod: CPTII,S$GLB,, | Performed by: OBSTETRICS & GYNECOLOGY

## 2023-04-10 PROCEDURE — 1159F MED LIST DOCD IN RCRD: CPT | Mod: CPTII,S$GLB,, | Performed by: OBSTETRICS & GYNECOLOGY

## 2023-04-10 PROCEDURE — 3080F DIAST BP >= 90 MM HG: CPT | Mod: CPTII,S$GLB,, | Performed by: OBSTETRICS & GYNECOLOGY

## 2023-04-10 PROCEDURE — 3044F PR MOST RECENT HEMOGLOBIN A1C LEVEL <7.0%: ICD-10-PCS | Mod: CPTII,S$GLB,, | Performed by: OBSTETRICS & GYNECOLOGY

## 2023-04-10 RX ORDER — GABAPENTIN 300 MG/1
300 CAPSULE ORAL NIGHTLY
COMMUNITY
Start: 2023-03-26

## 2023-04-11 NOTE — PROGRESS NOTES
Subjective:       Patient ID: Keyonna Guillen is a 62 y.o. female.    Chief Complaint:  Well Woman (Last normal mammogram was 2023. Pt with hysterectomy)        History of Present Illness  HPI  Annual Exam-Postmenopausal  Patient presents for annual exam. The patient has no complaints today. The patient is sexually active. GYN screening history: last pap: was normal prior to her hysterectomy and last mammogram: Last normal mammogram was 2023 . The patient is not currently taking hormone replacement therapy. Patient denies post-menopausal vaginal bleeding. The patient wears seatbelts: yes. The patient participates in regular exercise: no. Has the patient ever been transfused or tattooed?: no. The patient reports that there is not domestic violence in her life.    Status post hysterectomy with bilateral salpingo-oophorectomy.  Previously on ERT with Dr Brewer.  None for at least the past 4 years.    History of chronic hypertension   Atrial fibrillation on Xarelto.  Status post cardioversion  Coronary artery disease and peripheral arterial disease  Status post hemicolectomy for colonic mass.      GYN & OB History  No LMP recorded. Patient has had a hysterectomy.   Date of Last Pap: No result found    OB History    Para Term  AB Living   4 4 4     4   SAB IAB Ectopic Multiple Live Births                  # Outcome Date GA Lbr Mike/2nd Weight Sex Delivery Anes PTL Lv   4 Term            3 Term            2 Term            1 Term               Obstetric Comments    x 4       Past Medical History:   Diagnosis Date    Atrial fibrillation     Colon polyp     Coronary artery disease     Disorder of kidney and ureter     Hyperlipidemia     Hypertension        Past Surgical History:   Procedure Laterality Date    BREAST BIOPSY      CARDIAC SURGERY      COLONOSCOPY N/A 2020    Procedure: COLONOSCOPY;  Surgeon: José Luis Fonseca MD;  Location: [facility];  Service: Endoscopy;  Laterality: N/A;   XARELTO/PLETAL ok    CORONARY ARTERY BYPASS GRAFT      CYSTOSCOPY W/ URETERAL STENT PLACEMENT Bilateral 6/25/2020    Procedure: CYSTOSCOPY, WITH URETERAL STENT INSERTION;  Surgeon: STEPHANY Smyth MD;  Location: [facility] OR;  Service: Urology;  Laterality: Bilateral;    HYSTERECTOMY      LAPAROSCOPIC HEMICOLECTOMY Right 6/25/2020    Procedure: HAND ASSISTED HEMICOLECTOMY, LAPAROSCOPIC;  Surgeon: Louis Rivas III, MD;  Location: [facility] OR;  Service: General;  Laterality: Right;  RN PREOP 6/19/2020--has cardiac clearance from Dr. Khalil, --COVID NEGATIVE and T/S on 6/23/2020  COMBINED CASE WITH DR SMYTH    TREATMENT OF CARDIAC ARRHYTHMIA N/A 1/10/2022    Procedure: Cardioversion/Defibrillation (FRANCISCO not needed);  Surgeon: José Khalil MD;  Location: [facility] CATH LAB;  Service: Cardiology;  Laterality: N/A;  RN PRE OP, COVID NEGATIVE       Family History   Problem Relation Age of Onset    Cancer Father 78        lung    Cancer Mother 26        oral    Breast cancer Maternal Aunt        Social History     Socioeconomic History    Marital status:     Number of children: 4   Tobacco Use    Smoking status: Never     Passive exposure: Never    Smokeless tobacco: Never   Substance and Sexual Activity    Alcohol use: No    Drug use: No    Sexual activity: Yes     Partners: Male     Birth control/protection: Surgical   Social History Narrative     since 2001    He is a .  Heavy equipment    She  used to work for an oral surgeon        Current Outpatient Medications   Medication Sig Dispense Refill    atorvastatin (LIPITOR) 40 MG tablet Take 1 tablet (40 mg total) by mouth once daily. 90 tablet 3    buPROPion (WELLBUTRIN XL) 150 MG TB24 tablet Take 1 tablet (150 mg total) by mouth once daily. 30 tablet 0    gabapentin (NEURONTIN) 300 MG capsule Take 300 mg by mouth every evening.      hydrALAZINE (APRESOLINE) 100 MG tablet Take 100 mg by mouth.      isosorbide mononitrate (IMDUR) 60 MG 24 hr  tablet Take 60 mg by mouth.      ketorolac 0.5% (ACULAR) 0.5 % Drop       labetaloL (NORMODYNE) 200 MG tablet Take 200 mg by mouth.      meloxicam (MOBIC) 15 MG tablet Take 15 mg by mouth.      ofloxacin (OCUFLOX) 0.3 % ophthalmic solution       olmesartan (BENICAR) 40 MG tablet Take 40 mg by mouth.      prednisoLONE acetate (PRED FORTE) 1 % DrpS       rivaroxaban (XARELTO) 20 mg Tab TAKE 1 TABLET (20 MG TOTAL) BY MOUTH DAILY WITH DINNER OR EVENING MEAL. 90 tablet 1    nitroGLYCERIN (NITROSTAT) 0.4 MG SL tablet Place 1 tablet (0.4 mg total) under the tongue every 5 (five) minutes as needed for Chest pain. (Patient not taking: Reported on 4/10/2023) 100 tablet 0     No current facility-administered medications for this visit.       Review of patient's allergies indicates:   Allergen Reactions    Amlodipine Swelling        Review of Systems  Review of Systems   Constitutional:  Negative for activity change, appetite change, chills, fatigue, fever and unexpected weight change.   HENT:  Negative for mouth sores.    Respiratory:  Negative for cough, shortness of breath and wheezing.    Cardiovascular:  Negative for chest pain and palpitations.   Gastrointestinal:  Negative for abdominal pain, bloating, blood in stool, constipation, nausea and vomiting.   Endocrine: Negative for diabetes and hot flashes.   Genitourinary:  Negative for dysmenorrhea, dyspareunia, dysuria, frequency, hematuria, menorrhagia, menstrual problem, pelvic pain, urgency, vaginal bleeding, vaginal discharge, vaginal pain, urinary incontinence, postcoital bleeding and vaginal odor.   Musculoskeletal:  Negative for back pain and myalgias.   Integumentary:  Negative for rash, breast mass and nipple discharge.   Neurological:  Negative for seizures and headaches.   Psychiatric/Behavioral:  Negative for depression and sleep disturbance. The patient is not nervous/anxious.    Breast: Negative for mass, mastodynia and nipple discharge        Objective:     Physical Exam:   Constitutional: She appears well-developed and well-nourished. No distress.   BMI of 34.21    HENT:   Head: Normocephalic and atraumatic.    Eyes: EOM are normal.      Pulmonary/Chest: Effort normal. No respiratory distress.   Breasts: Non-tender, no engorgement, no masses, no retraction, no discharge. Negative for lymphadenopathy.   Large sternotomy scar        Abdominal: Soft. She exhibits no distension. There is no abdominal tenderness. There is no rebound and no guarding.   Large vertical scar in abdomen and chest     Genitourinary:    Vagina normal.   No  no vaginal discharge in the vagina.    Genitourinary Comments: Significant atrophy.  Vulva without any obvious lesions.  Urethral meatus normal size and location without any lesion.  Urethra is non-tender without stricture or discharge.  Bladder is non-tender.  Vaginal vault with good support.  Minimal white discharge noted.  No obvious lesion.  Normal rugation.  Vaginal cuff is intact and well-supported.  Cervix and Uterus are surgically absent.  Adnexa is without any masses or tenderness.  Perineum without obvious lesion.               Musculoskeletal: Normal range of motion.       Neurological: She is alert.    Skin: Skin is warm and dry.    Psychiatric: She has a normal mood and affect.        Assessment:        1. Well woman exam with routine gynecological exam    2. Atrophic vaginitis    3. Chronic hypertension    4. Atrial fibrillation, unspecified type    5. Coronary artery disease involving coronary bypass graft of native heart without angina pectoris    6. PAD (peripheral artery disease)    7. Status post right hemicolectomy              Plan:          I have discussed with the patient her condition.  Monthly breast examination was instructed, discussed, and encouraged.  Patient was encouraged to consume a low-calorie, low fat diet, and to increase of physical activity.  Healthy habits encouraged.  A Pap smear was not performed; she  no longer would need Pap according to the USPSTF recommendations.  Mammogram was ordered because of the combination of her age and risk factors, according to ACOG guidelines.  Gonorrhea and Chlamydia testing not performed;  HIV test not offered, again according to guidelines.  Colonoscopy discussed according to ACS guideline.  She would like to get it done at [facility].   Care Gaps addressed.  She was encouraged to get her shingle vaccine  Patient is to continue her medications as prescribed.   Patient requests ERT.  Risks and benefits discussed.  She has significant cardiac history.  I do not think that ERT is a good idea for her.  No estrogen prescribed at this time.  She will come back to see me in one year for her annual visit.  She can come back to see me sooner as necessary.  All of her questions were answered appropriately to her satisfaction.

## 2023-04-14 ENCOUNTER — TELEPHONE (OUTPATIENT)
Dept: UROLOGY | Facility: CLINIC | Age: 63
End: 2023-04-14
Payer: COMMERCIAL

## 2023-04-17 ENCOUNTER — PATIENT OUTREACH (OUTPATIENT)
Dept: ADMINISTRATIVE | Facility: HOSPITAL | Age: 63
End: 2023-04-17
Payer: COMMERCIAL

## 2023-04-17 ENCOUNTER — TELEPHONE (OUTPATIENT)
Dept: UROLOGY | Facility: CLINIC | Age: 63
End: 2023-04-17
Payer: COMMERCIAL

## 2023-04-17 ENCOUNTER — OFFICE VISIT (OUTPATIENT)
Dept: UROLOGY | Facility: CLINIC | Age: 63
End: 2023-04-17
Payer: COMMERCIAL

## 2023-04-17 VITALS — WEIGHT: 194.31 LBS | BODY MASS INDEX: 34.43 KG/M2

## 2023-04-17 DIAGNOSIS — N95.8 GENITOURINARY SYNDROME OF MENOPAUSE: ICD-10-CM

## 2023-04-17 DIAGNOSIS — R32 URINARY INCONTINENCE, UNSPECIFIED TYPE: ICD-10-CM

## 2023-04-17 DIAGNOSIS — N39.0 RECURRENT UTI: Primary | ICD-10-CM

## 2023-04-17 DIAGNOSIS — Z12.11 COLON CANCER SCREENING: Primary | ICD-10-CM

## 2023-04-17 PROCEDURE — 3008F BODY MASS INDEX DOCD: CPT | Mod: CPTII,S$GLB,, | Performed by: STUDENT IN AN ORGANIZED HEALTH CARE EDUCATION/TRAINING PROGRAM

## 2023-04-17 PROCEDURE — 99999 PR PBB SHADOW E&M-EST. PATIENT-LVL IV: CPT | Mod: PBBFAC,,, | Performed by: STUDENT IN AN ORGANIZED HEALTH CARE EDUCATION/TRAINING PROGRAM

## 2023-04-17 PROCEDURE — 1159F MED LIST DOCD IN RCRD: CPT | Mod: CPTII,S$GLB,, | Performed by: STUDENT IN AN ORGANIZED HEALTH CARE EDUCATION/TRAINING PROGRAM

## 2023-04-17 PROCEDURE — 3044F PR MOST RECENT HEMOGLOBIN A1C LEVEL <7.0%: ICD-10-PCS | Mod: CPTII,S$GLB,, | Performed by: STUDENT IN AN ORGANIZED HEALTH CARE EDUCATION/TRAINING PROGRAM

## 2023-04-17 PROCEDURE — 3008F PR BODY MASS INDEX (BMI) DOCUMENTED: ICD-10-PCS | Mod: CPTII,S$GLB,, | Performed by: STUDENT IN AN ORGANIZED HEALTH CARE EDUCATION/TRAINING PROGRAM

## 2023-04-17 PROCEDURE — 99214 OFFICE O/P EST MOD 30 MIN: CPT | Mod: S$GLB,,, | Performed by: STUDENT IN AN ORGANIZED HEALTH CARE EDUCATION/TRAINING PROGRAM

## 2023-04-17 PROCEDURE — 3044F HG A1C LEVEL LT 7.0%: CPT | Mod: CPTII,S$GLB,, | Performed by: STUDENT IN AN ORGANIZED HEALTH CARE EDUCATION/TRAINING PROGRAM

## 2023-04-17 PROCEDURE — 99214 PR OFFICE/OUTPT VISIT, EST, LEVL IV, 30-39 MIN: ICD-10-PCS | Mod: S$GLB,,, | Performed by: STUDENT IN AN ORGANIZED HEALTH CARE EDUCATION/TRAINING PROGRAM

## 2023-04-17 PROCEDURE — 99999 PR PBB SHADOW E&M-EST. PATIENT-LVL IV: ICD-10-PCS | Mod: PBBFAC,,, | Performed by: STUDENT IN AN ORGANIZED HEALTH CARE EDUCATION/TRAINING PROGRAM

## 2023-04-17 PROCEDURE — 1159F PR MEDICATION LIST DOCUMENTED IN MEDICAL RECORD: ICD-10-PCS | Mod: CPTII,S$GLB,, | Performed by: STUDENT IN AN ORGANIZED HEALTH CARE EDUCATION/TRAINING PROGRAM

## 2023-04-17 RX ORDER — TROSPIUM CHLORIDE 20 MG/1
20 TABLET, FILM COATED ORAL EVERY 12 HOURS
Qty: 60 TABLET | Refills: 11 | Status: SHIPPED | OUTPATIENT
Start: 2023-04-17 | End: 2024-04-11

## 2023-04-17 RX ORDER — ESTRADIOL 0.1 MG/G
1 CREAM VAGINAL
Qty: 42.5 G | Refills: 3 | Status: SHIPPED | OUTPATIENT
Start: 2023-04-17 | End: 2023-07-13

## 2023-04-17 NOTE — PATIENT INSTRUCTIONS
.      What is Pelvic Floor Therapy?    Pelvic Floor Therapy refers to a number of therapeutic assessment and treatment techniques intended to decrease discomfort and increase your control of your pelvic floor muscles. Pelvic floor therapy can be used in the prevention of pelvic floor issues (such as urinary incontinence), pelvic pain or the rehabilitation of existing issues and post-surgical recovery.     What is the Pelvic Floor?    The Pelvic Floor consists of the muscles, nerves, connective tissues, tendons, and ligaments that lay like a hammock in your pelvis. This hammock supports you vagina, bladder, rectum, and anus. It is important to keep these muscles strong, flexible, controlled, responsive, and alive, especially because these muscles are crucial for sexual function and comfortable urination.    What Is Pelvic Floor Dysfunction?    Pelvic Floor Dysfunction refers to the inability to control the muscles of the pelvic floor.  To simplify things, there are two main issues that cause most of the issues with the pelvic floor: weakness or tightness of the pelvic floor muscles.    What are the symptoms of Pelvic Floor Dysfunction?    The symptoms include but may not be limited to:        Pain or spasming in the anus, deeper into the pelvis, vagina, and/or bladder      Difficulty urinating, including urge and stress incontinence, mixed incontinence, hesitancy, and frequency of urination      Anxiety about sex before, during, or after the act, causing a dissatisfactory experience      Fecal constipation or incontinence      Pelvic pain      Difficulty with sexual orgasms    What are the causes of male pelvic floor dysfunction?        Weak (or loose) pelvic floor muscles can be caused by:      Constipation      Being overweight      Heavy lifting      Chronic coughing      Deconditioned muscles causing over-compensation and imbalance       Tight muscles are caused by a number of factors:      Stress       Anxiety      Nuevo behavior or habits over time      Inability to relax      Misunderstandings and overcorrection of core stability training        Is Pelvic Floor Rehabilitation right for me?    Pelvic Floor Therapy is designed to give you control of your pelvic muscles and thereby give you control over your life by addressing and discomfort related to your pelvic floor.     What are the procedures used for pelvic floor evaluation?  Evaluating a pelvic floor involves looking at the patient as a whole.         Assessment of posture      Muscle strength and length testing      Internal and external examination to palpate muscles for tenderness and restrictions ( to the patient's comfort)      Surface electrical testing to assess the electrical activity in your pelvic region      Ultrasound Imaging of the pelvic floor position and excursion during contraction, relaxation, and balance    What Can I Expect During Pelvic Floor Therapy?    There a number of techniques designed to help with pelvic floor disorders. The main aim of treatment, no matter what the issues are, is to relax the muscles to an acceptable baseline level. After achieving an appropriate baseline of muscle activity and resting tone, the goal is to create control through the range of muscle activation. So, to put it another way, pelvic floor muscle action controlled by you, from relaxation to contraction and back again.    Treatment Techniques can include:        Education and home program (including Kegel exercises)      Posture feedback and education       Manual therapy to tighten external and internal pelvic muscles      External muscle strengthening and lengthening as needed      EMG Biofeedback      Ultrasound imaging Biofeedback      Manometric Biofeedback    How long does a course of pelvic floor rehabilitation usually take to work?    It takes between 6 to 8 weeks to see a physiological change in a muscle or skin after therapy has started.  However, is it not uncommon to see changes in coordination and muscle control well before this time due to a reinforcement of the existing neural pathways.      Please call [phone] to schedule your appointment with one of our Pelvic Floor Physical Therapists. A popular name amongst our patients is Kaleigh Luevano.    Genitourinary syndrome of menopause    -The efficacy of estrogen for the prevention of UTIs in post-menopausal women has been demonstrated in several studies. There appears to be a higher effectiveness rate in topically applied estrogen in the vagina with an improvement in lactobacillus concentrations, decreased vaginal pH and a decrease in UTI episodes.  -The use of oral estrogen for UTIs is controversial due to systemic side effects (e.g., increased risk of stroke and blood clots if oral estrogen is started five years after menopause).  Topical estrogen also improves the symptoms of dyspareunia, vulvovaginal atrophy which are associated with genitourinary syndrome of menopause.

## 2023-04-17 NOTE — TELEPHONE ENCOUNTER
Pt notified of appt for this morning at 9:15am        ----- Message from Christina Palm sent at 4/17/2023  8:32 AM CDT -----  Regarding: self [phone]  .Type:  Patient Returning Call    Who Called: self     Who Left Message for Patient:       Does the patient know what this is regarding? yes    Would the patient rather a call back or a response via [facility]? Call back     Best Call Back Number: .[phone]

## 2023-04-17 NOTE — PROGRESS NOTES
Patient ID: Keyonna Guillen is a 62 y.o. female.    Chief Complaint: Urinary Incontinence and Urinary Tract Infection    Referral: Morgan Leger MD  [address] [facility]  [place] [address]     [facility]  62 y.o. who presents to the Urology clinic for evaluation of recurrent UTI, primary symptoms of infection include odor. Patient notes she has recurring infections, she is troubled by this, no prior management except for treatment of each infection. She also notes urge associated incontinence, she does not have to wear a pad but she does have to change her undergarments regularly, ongoing for past few years, no prior management. Patient is sexually active, denies anal intercourse or vaginal dryness. Patient drinks 6 glasses of water daily. Has had 4 vaginal deliveries. Denies incontinence associated with coughing/sneezing/laughing.  Drinks 1 cup of coffee daily. No pelvic procedures.   No hx of urolithiasis, gross hematuria. Has hx of colon cancer. Underwent cystoscopy with Dr. Smyth ( for stent placement/ureter identification) no pathology identified at that time. Has hx of atrophic vaginitis. Has remote hx of stones.     Medically Necessary ROS documented in HPI    Past Medical History  Active Ambulatory Problems     Diagnosis Date Noted    Menopausal state 03/02/2015    Status post hysterectomy 03/02/2015    Hormone replacement therapy (HRT) 03/02/2015    Atrophic vaginitis 03/02/2015    Essential hypertension 08/18/2016    Coronary artery disease involving coronary bypass graft of native heart without angina pectoris 08/18/2016    Other hyperlipidemia 03/09/2018    Palpitations     Paroxysmal atrial fibrillation 03/09/2018    Occlusion of left vertebral artery 04/11/2018    Hematuria 04/17/2018    PAD (peripheral artery disease) 06/04/2018    Non morbid obesity, unspecified obesity type 01/29/2019    Resistant hypertension 01/31/2019    Nausea 05/21/2019    Mass of colon 06/25/2020    Aortic atherosclerosis  12/11/2020    Stage 3b chronic kidney disease 12/11/2020     Resolved Ambulatory Problems     Diagnosis Date Noted    Annual physical exam 03/02/2015    Heart problem 03/02/2015    Encounter for gynecological examination without abnormal finding 10/10/2017    New onset atrial fibrillation 03/09/2018    Hypokalemia 03/09/2018    Healthcare maintenance 05/21/2019     Past Medical History:   Diagnosis Date    Atrial fibrillation     Colon polyp     Coronary artery disease     Disorder of kidney and ureter     Hyperlipidemia     Hypertension          Past Surgical History  Past Surgical History:   Procedure Laterality Date    BREAST BIOPSY      CARDIAC SURGERY      COLONOSCOPY N/A 5/25/2020    Procedure: COLONOSCOPY;  Surgeon: José Luis Fonseca MD;  Location: [facility] ENDO;  Service: Endoscopy;  Laterality: N/A;  XARELTO/PLETAL ok    CORONARY ARTERY BYPASS GRAFT      CYSTOSCOPY W/ URETERAL STENT PLACEMENT Bilateral 6/25/2020    Procedure: CYSTOSCOPY, WITH URETERAL STENT INSERTION;  Surgeon: STEPHANY Smyth MD;  Location: [facility] OR;  Service: Urology;  Laterality: Bilateral;    HYSTERECTOMY      LAPAROSCOPIC HEMICOLECTOMY Right 6/25/2020    Procedure: HAND ASSISTED HEMICOLECTOMY, LAPAROSCOPIC;  Surgeon: Louis Rivas III, MD;  Location: [facility] OR;  Service: General;  Laterality: Right;  RN PREOP 6/19/2020--has cardiac clearance from Dr. Khalil, --COVID NEGATIVE and T/S on 6/23/2020  COMBINED CASE WITH DR SMYTH    TREATMENT OF CARDIAC ARRHYTHMIA N/A 1/10/2022    Procedure: Cardioversion/Defibrillation (FRANCISCO not needed);  Surgeon: José Khalil MD;  Location: [facility] CATH LAB;  Service: Cardiology;  Laterality: N/A;  RN PRE OP, COVID NEGATIVE       Social History  Social Connections: Not on file       Medications    Current Outpatient Medications:     atorvastatin (LIPITOR) 40 MG tablet, Take 1 tablet (40 mg total) by mouth once daily., Disp: 90 tablet, Rfl: 3    buPROPion (WELLBUTRIN XL) 150 MG TB24 tablet, Take 1  tablet (150 mg total) by mouth once daily., Disp: 30 tablet, Rfl: 0    gabapentin (NEURONTIN) 300 MG capsule, Take 300 mg by mouth every evening., Disp: , Rfl:     hydrALAZINE (APRESOLINE) 100 MG tablet, TAKE 1.5 TABLETS BY MOUTH 2 TIMES A DAY., Disp: 270 tablet, Rfl: 3    isosorbide mononitrate (IMDUR) 120 MG 24 hr tablet, TAKE 1 TABLET BY MOUTH ONCE DAILY, Disp: 90 tablet, Rfl: 3    isosorbide mononitrate (IMDUR) 60 MG 24 hr tablet, Take 60 mg by mouth., Disp: , Rfl:     labetaloL (NORMODYNE) 200 MG tablet, Take 200 mg by mouth., Disp: , Rfl:     meloxicam (MOBIC) 15 MG tablet, Take 15 mg by mouth., Disp: , Rfl:     olmesartan (BENICAR) 40 MG tablet, Take 40 mg by mouth., Disp: , Rfl:     rivaroxaban (XARELTO) 20 mg Tab, TAKE 1 TABLET (20 MG TOTAL) BY MOUTH DAILY WITH DINNER OR EVENING MEAL., Disp: 90 tablet, Rfl: 1    ketorolac 0.5% (ACULAR) 0.5 % Drop, , Disp: , Rfl:     nitroGLYCERIN (NITROSTAT) 0.4 MG SL tablet, Place 1 tablet (0.4 mg total) under the tongue every 5 (five) minutes as needed for Chest pain. (Patient not taking: Reported on 4/10/2023), Disp: 100 tablet, Rfl: 0    ofloxacin (OCUFLOX) 0.3 % ophthalmic solution, , Disp: , Rfl:     prednisoLONE acetate (PRED FORTE) 1 % DrpS, , Disp: , Rfl:     Allergies  Review of patient's allergies indicates:   Allergen Reactions    Amlodipine Swelling       Patient's PMH, FH, Social hx, Medications, allergies reviewed and updated as pertinent to today's visit    Objective:      Physical Exam  Constitutional:       Appearance: She is well-developed.   HENT:      Head: Normocephalic and atraumatic.   Eyes:      Conjunctiva/sclera: Conjunctivae normal.   Pulmonary:      Effort: Pulmonary effort is normal. No respiratory distress.   Abdominal:      General: There is no distension.      Palpations: Abdomen is soft. There is no mass.      Tenderness: There is no abdominal tenderness. There is no guarding.   Skin:     General: Skin is warm.      Findings: No rash.    Neurological:      Mental Status: She is alert and oriented to person, place, and time.   Psychiatric:         Behavior: Behavior normal.           Imaging results:  Narrative & Impression  EXAMINATION:  CT UROGRAM ABD PELVIS W WO     CLINICAL HISTORY:  Hematuria, gross/macroscopic; Encounter for screening for malignant neoplasm of colon     TECHNIQUE:  Low dose axial, sagittal and coronal reformations were obtained from the lung bases to the pubic symphysis before and following the IV administration of 125 mL of Omnipaque 350.  Timing was optimized for nephrogram and excretory renal phases.     COMPARISON:  Multiple prior exams, most recent from 06/04/2021     FINDINGS:  Heart: No cardiomegaly or pericardial effusion.     Lung Bases: Symmetrically expanded and clear.     Liver: Normal in size and attenuation without focal hepatic abnormality.     Gallbladder: Normal appearance without evidence for cholecystitis.     Bile Ducts: No intra or extrahepatic biliary ductal dilation.     Pancreas: No pancreatic mass lesion or peripancreatic inflammatory change.     Spleen: Normal.     Adrenals: 1.3 cm right adrenal nodule and probable 1.2 cm left adrenal nodule, both consistent with adenomas.     Genitourinary: Normal in size and location.  No nephrolithiasis, or hydroureteronephrosis.  A few subcentimeter hypodensities within the kidneys bilaterally, too small to characterize, likely representing cysts.  Ureters are normal in course and caliber.  Bladder demonstrates smooth contours without bladder wall thickening.     Reproductive organs: Uterus is surgically absent.  No evidence of adnexal mass.     GI tract/Mesentery: Stomach is normal appearance.  No dilated loops of small bowel.  Postsurgical changes status post right hemicolectomy with right upper quadrant ileocolic anastomosis.  Diverticulosis coli with no evidence of acute diverticulitis.     Peritoneal Space: No abdominopelvic ascites or intraperitoneal free  air.     Retroperitoneum: No significant adenopathy.     Abdominal wall: Fat containing ventral hernia.     Vasculature: Abdominal aorta is normal in caliber with moderate calcific atherosclerosis.     Bones: Normal appearance without acute fracture or bony destructive process.  Partial visualization of sternotomy.     Impression:     No evidence of renal stones or renal mass.  A few subcentimeter hypodensities within the kidneys bilaterally, too small to characterize, likely representing cysts.     Diverticulosis coli with no evidence of acute diverticulitis.     Postsurgical changes status post right hemicolectomy with right upper quadrant ileocolic anastomosis.     Probable bilateral adrenal adenomas.        Electronically signed by: Tawanda Oropeza MD  Date:                                            03/30/2023  Time:                                           11:17      Assessment:       1. Recurrent UTI    2. Urinary incontinence, unspecified type    3. Genitourinary syndrome of menopause        Plan:       Recent UTI, incontinence anticipated to resolve  Antibladder spasm medication provided, vesicare, short course; Pelvic floor physical therapy recommended    Avoid irritants, list provided  Timed voiding  Bladder diary if symptoms fail to improve with conservative measures      What is known about OAB was discussed with the patient including the benefits versus risks/burdens of the available treatment alternatives and the fact that acceptable symptom control may require trials of multiple therapeutic options before it is achieved. Discussed with patient OAB is a symptom complex that is not a life-threatening condition, acknowledged it can impair quality of life for many patients. OAB treatment plan reviewed with patient    Discussed and recommended first Line therapy:   -Behavioral modification- pelvic floor retraining exercises ( which can be successful in up to 75% of patients) , limit known bladder  irritants such as caffeine, soda, alcohol, use of a voiding diary to determine frequency of events.   -recommend completion of voiding diary, 3 days to better characterize patient's symptoms and optimize treatment plan efficacy  -Treatment of any underlying constipation with increased fiber, water intake.  - Timed voiding every 4-6 hours, if patient unable to hold urine every 4 hours, engaging in bladder retraining to reach this time frame in 0.5-1 hr interval, with anticipated increase in bladder storage habits.  - Drinking at least 8-10 glasses of water daily in addition to other beverages.  Limit intake of fluids to 6pm.  -*Discussed and recommended pelvic floor muscle therapy to strengthen weak pelvic floor muscles.    Discussed Second-Line Treatments: Pharmacologic Management  Discussed use of oral anti-muscarinics and oral ?3-adrenoceptor agonists. Antimuscarinics are associated with increased risk of dementia with long term usage, contraindicated in patients with history of narrow angle glaucoma, delayed gastric emptying. Discussed mirabegron can be associated with increased BP, monitoring at home is advised. Discussed virbegron may be costly compared to other agents, but does not have the same risk of elevated BP, need for BP monitoring as mirabegron    Discussed if conservative and pharmacotherapy should fail, plan to proceed to third-Line treatments:  - Botox (must be willing to accept self catheterization risk, PVR checks)  - PTNS ( non surgical intervention to stimulate the nerves that supply the bladder for relaxation)  - Sacral Neuromodulation- surgical intervention, spinal cord stimulation. Must be willing to accept trial period prior to complete implantation    Genitourinary syndrome of menopause    -The efficacy of estrogen for the prevention of UTIs in post-menopausal women has been demonstrated in several studies. There appears to be a higher effectiveness rate in topically applied estrogen in the  vagina with an improvement in lactobacillus concentrations, decreased vaginal pH and a decrease in UTI episodes.  -The use of oral estrogen for UTIs is controversial due to systemic side effects (e.g., increased risk of stroke and blood clots if oral estrogen is started five years after menopause).  Topical estrogen also improves the symptoms of dyspareunia, vulvovaginal atrophy which are associated with genitourinary syndrome of menopause.      RTC 8 weeks for PVR check

## 2023-05-27 DIAGNOSIS — F39 MOOD DISORDER: ICD-10-CM

## 2023-05-27 NOTE — TELEPHONE ENCOUNTER
No care due was identified.  [facility] Embedded Care Due Messages. Reference number: [id number].   5/27/2023 10:33:16 AM CDT

## 2023-05-28 NOTE — TELEPHONE ENCOUNTER
Refill Routing Note   Medication(s) are not appropriate for processing by [facility] for the following reason(s):      Required vitals abnormal: 190/92    [facility] action(s):  Defer Care Due:  None identified          Appointments  past 12m or future 3m with PCP    Date Provider   Last Visit   3/29/2023 Morgan Leger MD   Next Visit   Visit date not found Morgan Leger MD   ED visits in past 90 days: 0        Note composed:6:21 PM 05/28/2023

## 2023-05-29 RX ORDER — BUPROPION HYDROCHLORIDE 150 MG/1
TABLET ORAL
Qty: 30 TABLET | Refills: 0 | Status: SHIPPED | OUTPATIENT
Start: 2023-05-29 | End: 2023-06-22

## 2023-06-03 NOTE — PROGRESS NOTES
"Routine Office Visit    Patient Name: Keyonna Guillen    : 1960  MRN: [id number]    Subjective:  Keyonna is a 59 y.o. female who presents today for:    1. Depression  Patient presenting today stating that for the past several years she has just "felt miserable".  She states that it began around the time she retired.  She states she doesn't have any major home stressors, but states she doesn't feel right.  She states she does cry a lot.  She has no thoughts of harming herself or others.  There is no AH/VH.  She states she was on antidepressants a few years ago, but got off of them.  She reports that they did help her feel better.  She states that she has lost interest in doing some (not all) things she use to enjoy.    2.  Back pain  Patient states that she has been having recurring right lower back pain that makes it very painful for her to lay on her right side (which is her favorite side to lay down on).  She states that she doesn't recall lifting anything heavy.  The pain starts in the center of her right buttock and does radiate down the right leg.  No numbness or weakness.  She states that the pain does keep her from doing more than she would like to do.  She states that the pain has been going on for several weeks.  She has not been exercising nor stretching.    Past Medical History  Past Medical History:   Diagnosis Date    Atrial fibrillation     Coronary artery disease     Hyperlipidemia     Hypertension        Past Surgical History  Past Surgical History:   Procedure Laterality Date    BREAST BIOPSY      CARDIAC SURGERY      CORONARY ARTERY BYPASS GRAFT      HYSTERECTOMY      Transesophageal Echocardiogram (FRANCISCO) N/A 3/9/2018    Performed by José [facility]il MD at [facility] CATH LAB       Family History  Family History   Problem Relation Age of Onset    Cancer Mother     Breast cancer Maternal Uncle        Social History  Social History     Socioeconomic History    Marital status:      " Spouse name: Not on file    Number of children: Not on file    Years of education: Not on file    Highest education level: Not on file   Occupational History    Not on file   Social Needs    Financial resource strain: Not on file    Food insecurity:     Worry: Not on file     Inability: Not on file    Transportation needs:     Medical: Not on file     Non-medical: Not on file   Tobacco Use    Smoking status: Never Smoker    Smokeless tobacco: Never Used   Substance and Sexual Activity    Alcohol use: No    Drug use: No    Sexual activity: Yes     Partners: Male     Birth control/protection: Surgical   Lifestyle    Physical activity:     Days per week: Not on file     Minutes per session: Not on file    Stress: Not on file   Relationships    Social connections:     Talks on phone: Not on file     Gets together: Not on file     Attends Orthodox service: Not on file     Active member of club or organization: Not on file     Attends meetings of clubs or organizations: Not on file     Relationship status: Not on file   Other Topics Concern    Not on file   Social History Narrative    Not on file       Current Medications  Current Outpatient Medications on File Prior to Visit   Medication Sig Dispense Refill    atorvastatin (LIPITOR) 40 MG tablet TAKE 1 TABLET BY MOUTH EVERYDAY AT BEDTIME 90 tablet 3    cloNIDine (CATAPRES) 0.1 MG tablet Take 1 tablet (0.1 mg total) by mouth daily as needed (If SBP > 170.). 30 tablet 11    hydrALAZINE (APRESOLINE) 100 MG tablet Take 1.5 tablets (150 mg total) by mouth 2 (two) times daily. 270 tablet 3    hydroCHLOROthiazide (HYDRODIURIL) 50 MG tablet ON HOLD - Take 1 tablet (50 mg) by mouth once daily. 90 tablet 3    labetalol (NORMODYNE) 200 MG tablet Take 1 tablet (200 mg total) by mouth 2 (two) times daily. 60 tablet 2    olmesartan (BENICAR) 40 MG tablet Take 40 mg by mouth once daily.      rivaroxaban (XARELTO) 20 mg Tab Take 1 tablet (20 mg total) by mouth  "daily with dinner or evening meal. 90 tablet 3    spironolactone (ALDACTONE) 50 MG tablet Take 1 tablet (50 mg total) by mouth once daily. 90 tablet 3    cephALEXin (KEFLEX) 500 MG capsule Take by mouth 2 (two) times daily.  0    clindamycin (CLEOCIN) 300 MG capsule Take 1 capsule (300 mg total) by mouth every 8 (eight) hours. 21 capsule 0    estradiol (ESTRACE) 0.01 % (0.1 mg/gram) vaginal cream Place 2 g vaginally once daily. Apply daily 42.5 g 6    fluticasone propionate (FLONASE) 50 mcg/actuation nasal spray fluticasone propionate 50 mcg/actuation nasal spray,suspension      hydroCHLOROthiazide (HYDRODIURIL) 25 MG tablet Take 25 mg by mouth once daily.  3     Current Facility-Administered Medications on File Prior to Visit   Medication Dose Route Frequency Provider Last Rate Last Dose    estradiol valerate injection 20 mg  20 mg Intramuscular Q21 Days Koby Brewer MD   20 mg at 06/15/16 1400       Allergies   Review of patient's allergies indicates:   Allergen Reactions    Amlodipine Swelling       Review of Systems (Pertinent positives)  Review of Systems   Constitutional: Negative.    HENT: Negative.    Eyes: Negative.    Respiratory: Negative.    Cardiovascular: Negative.    Gastrointestinal: Negative.    Musculoskeletal: Positive for back pain and myalgias.   Skin: Negative.    Psychiatric/Behavioral: Positive for depression. Negative for hallucinations, substance abuse and suicidal ideas. The patient is not nervous/anxious and does not have insomnia.          /83 (BP Location: Right arm, Patient Position: Sitting, BP Method: Medium (Automatic))   Pulse 71   Temp 98 °F (36.7 °C) (Oral)   Resp 17   Ht 5' 2.99" (1.6 m)   Wt 87.6 kg (193 lb 2 oz)   SpO2 96%   BMI 34.22 kg/m²     GENERAL APPEARANCE: in no apparent distress and well developed and well nourished  HEENT: PERRLA, EOMI, Sclera clear, anicteric, Oropharynx clear, no lesions, Neck supple with midline trachea  NECK: normal, " supple, no adenopathy, thyroid normal in size  RESPIRATORY: appears well, vitals normal, no respiratory distress, acyanotic, normal RR, chest clear, no wheezing, crepitations, rhonchi, normal symmetric air entry  HEART: regular rate and rhythm, S1, S2 normal, no murmur, click, rub or gallop.    ABDOMEN: abdomen is soft without tenderness, no masses, no hernias, no organomegaly, no rebound, no guarding. Suprapubic tenderness absent. No CVA tenderness.  NEUROLOGIC: normal without focal findings, CN II-XII are intact.    Extremities: warm/well perfused.  No abnormal hair patterns.  No clubbing, cyanosis or edema.  no muscular tenderness noted; pain on palpation of right upper glut  SKIN: no rashes, no wounds, no other lesions  PSYCH: Alert, oriented x 3, thought content appropriate, speech normal, pleasant and cooperative, good eye contact, well groomed    Assessment/Plan:  Keyonna Guillen is a 59 y.o. female who presents today for :    Keyonna was seen today for depression and leg pain.    Diagnoses and all orders for this visit:    Depression, unspecified depression type  -     sertraline (ZOLOFT) 25 MG tablet; Take 1 tablet (25 mg total) by mouth once daily.    Pyriformis syndrome, right  -     Ambulatory Referral to Physical/Occupational Therapy    Colon cancer screening  -     Case request GI: COLONOSCOPY    PAD (peripheral artery disease)  -     Ambulatory referral to Cardiology      1.  Resume zoloft and follow up 4 weeks or sooner  3.  Patient referred to PT for pyriformis syndrome  3.  Colonoscopy ordered as she is due  4.  Refer to cardiology for A-fib  5.  Follow up 3 months or sooner if needed   Yes

## 2023-06-22 DIAGNOSIS — F39 MOOD DISORDER: ICD-10-CM

## 2023-06-22 RX ORDER — BUPROPION HYDROCHLORIDE 150 MG/1
TABLET ORAL
Qty: 30 TABLET | Refills: 0 | Status: SHIPPED | OUTPATIENT
Start: 2023-06-22 | End: 2023-07-24

## 2023-06-22 NOTE — TELEPHONE ENCOUNTER
Refill Routing Note   Medication(s) are not appropriate for processing by [facility] for the following reason(s):      Required vitals abnormal    [facility] action(s):  Defer None identified          Appointments  past 12m or future 3m with PCP    Date Provider   Last Visit   3/29/2023 Morgan Leger MD   Next Visit   Visit date not found Morgan Leger MD   ED visits in past 90 days: 0        Note composed:10:11 AM 06/22/2023

## 2023-06-22 NOTE — TELEPHONE ENCOUNTER
No care due was identified.  [facility] Embedded Care Due Messages. Reference number: [id number].   6/22/2023 9:31:15 AM CDT

## 2023-07-01 ENCOUNTER — HOSPITAL ENCOUNTER (EMERGENCY)
Facility: HOSPITAL | Age: 63
Discharge: HOME | End: 2023-07-01
Attending: EMERGENCY MEDICINE
Payer: COMMERCIAL

## 2023-07-01 VITALS
RESPIRATION RATE: 20 BRPM | SYSTOLIC BLOOD PRESSURE: 156 MMHG | HEIGHT: 63 IN | OXYGEN SATURATION: 95 % | DIASTOLIC BLOOD PRESSURE: 92 MMHG | HEART RATE: 52 BPM | WEIGHT: 200 LBS | BODY MASS INDEX: 35.44 KG/M2 | TEMPERATURE: 98 F

## 2023-07-01 DIAGNOSIS — S49.92XA INJURY OF LEFT UPPER ARM: ICD-10-CM

## 2023-07-01 DIAGNOSIS — S46.211A RUPTURE OF RIGHT BICEPS TENDON, INITIAL ENCOUNTER: ICD-10-CM

## 2023-07-01 DIAGNOSIS — M79.601 RIGHT ARM PAIN: Primary | ICD-10-CM

## 2023-07-01 DIAGNOSIS — I10 HTN (HYPERTENSION): ICD-10-CM

## 2023-07-01 LAB
ALBUMIN SERPL BCP-MCNC: 3.7 G/DL (ref 3.5–5.2)
ALP SERPL-CCNC: 100 U/L (ref 55–135)
ALT SERPL W/O P-5'-P-CCNC: 26 U/L (ref 10–44)
ANION GAP SERPL CALC-SCNC: 9 MMOL/L (ref 8–16)
APTT PPP: 29.6 SEC (ref 21–32)
AST SERPL-CCNC: 20 U/L (ref 10–40)
BASOPHILS # BLD AUTO: 0.01 K/UL (ref 0–0.2)
BASOPHILS NFR BLD: 0.2 % (ref 0–1.9)
BILIRUB SERPL-MCNC: 0.5 MG/DL (ref 0.1–1)
BNP SERPL-MCNC: 153 PG/ML (ref 0–99)
BUN SERPL-MCNC: 20 MG/DL (ref 8–23)
CALCIUM SERPL-MCNC: 9.5 MG/DL (ref 8.7–10.5)
CHLORIDE SERPL-SCNC: 110 MMOL/L (ref 95–110)
CO2 SERPL-SCNC: 23 MMOL/L (ref 23–29)
CREAT SERPL-MCNC: 1.1 MG/DL (ref 0.5–1.4)
DIFFERENTIAL METHOD: NORMAL
EOSINOPHIL # BLD AUTO: 0.1 K/UL (ref 0–0.5)
EOSINOPHIL NFR BLD: 1.3 % (ref 0–8)
ERYTHROCYTE [DISTWIDTH] IN BLOOD BY AUTOMATED COUNT: 12.4 % (ref 11.5–14.5)
EST. GFR  (NO RACE VARIABLE): 56 ML/MIN/1.73 M^2
GLUCOSE SERPL-MCNC: 79 MG/DL (ref 70–110)
HCT VFR BLD AUTO: 38.5 % (ref 37–48.5)
HGB BLD-MCNC: 12.4 G/DL (ref 12–16)
IMM GRANULOCYTES # BLD AUTO: 0.01 K/UL (ref 0–0.04)
IMM GRANULOCYTES NFR BLD AUTO: 0.2 % (ref 0–0.5)
INR PPP: 1.1 (ref 0.8–1.2)
LYMPHOCYTES # BLD AUTO: 1.5 K/UL (ref 1–4.8)
LYMPHOCYTES NFR BLD: 32.1 % (ref 18–48)
MCH RBC QN AUTO: 30.5 PG (ref 27–31)
MCHC RBC AUTO-ENTMCNC: 32.2 G/DL (ref 32–36)
MCV RBC AUTO: 95 FL (ref 82–98)
MONOCYTES # BLD AUTO: 0.4 K/UL (ref 0.3–1)
MONOCYTES NFR BLD: 8.5 % (ref 4–15)
NEUTROPHILS # BLD AUTO: 2.6 K/UL (ref 1.8–7.7)
NEUTROPHILS NFR BLD: 57.7 % (ref 38–73)
NRBC BLD-RTO: 0 /100 WBC
PLATELET # BLD AUTO: 201 K/UL (ref 150–450)
PMV BLD AUTO: 9.7 FL (ref 9.2–12.9)
POTASSIUM SERPL-SCNC: 4 MMOL/L (ref 3.5–5.1)
PROT SERPL-MCNC: 6.4 G/DL (ref 6–8.4)
PROTHROMBIN TIME: 11.6 SEC (ref 9–12.5)
RBC # BLD AUTO: 4.07 M/UL (ref 4–5.4)
SODIUM SERPL-SCNC: 142 MMOL/L (ref 136–145)
TROPONIN I SERPL DL<=0.01 NG/ML-MCNC: 0.01 NG/ML (ref 0–0.03)
WBC # BLD AUTO: 4.58 K/UL (ref 3.9–12.7)

## 2023-07-01 PROCEDURE — 85610 PROTHROMBIN TIME: CPT | Performed by: EMERGENCY MEDICINE

## 2023-07-01 PROCEDURE — 93010 EKG 12-LEAD: ICD-10-PCS | Mod: ,,, | Performed by: INTERNAL MEDICINE

## 2023-07-01 PROCEDURE — 84484 ASSAY OF TROPONIN QUANT: CPT | Performed by: EMERGENCY MEDICINE

## 2023-07-01 PROCEDURE — 25000003 PHARM REV CODE 250: Performed by: EMERGENCY MEDICINE

## 2023-07-01 PROCEDURE — 93005 ELECTROCARDIOGRAM TRACING: CPT

## 2023-07-01 PROCEDURE — 85025 COMPLETE CBC W/AUTO DIFF WBC: CPT | Performed by: EMERGENCY MEDICINE

## 2023-07-01 PROCEDURE — 80053 COMPREHEN METABOLIC PANEL: CPT | Performed by: EMERGENCY MEDICINE

## 2023-07-01 PROCEDURE — 93010 ELECTROCARDIOGRAM REPORT: CPT | Mod: ,,, | Performed by: INTERNAL MEDICINE

## 2023-07-01 PROCEDURE — 83880 ASSAY OF NATRIURETIC PEPTIDE: CPT | Performed by: EMERGENCY MEDICINE

## 2023-07-01 PROCEDURE — 85730 THROMBOPLASTIN TIME PARTIAL: CPT | Performed by: EMERGENCY MEDICINE

## 2023-07-01 PROCEDURE — 99285 EMERGENCY DEPT VISIT HI MDM: CPT | Mod: 25

## 2023-07-01 RX ORDER — OXYCODONE AND ACETAMINOPHEN 5; 325 MG/1; MG/1
1 TABLET ORAL
Status: COMPLETED | OUTPATIENT
Start: 2023-07-01 | End: 2023-07-01

## 2023-07-01 RX ORDER — ACETAMINOPHEN 500 MG
500 TABLET ORAL EVERY 6 HOURS PRN
Qty: 30 TABLET | Refills: 0 | Status: SHIPPED | OUTPATIENT
Start: 2023-07-01 | End: 2023-07-13

## 2023-07-01 RX ORDER — METHOCARBAMOL 500 MG/1
1000 TABLET, FILM COATED ORAL 3 TIMES DAILY
Qty: 30 TABLET | Refills: 0 | Status: SHIPPED | OUTPATIENT
Start: 2023-07-01 | End: 2023-07-06

## 2023-07-01 RX ORDER — DICLOFENAC SODIUM 10 MG/G
2 GEL TOPICAL 4 TIMES DAILY PRN
Qty: 200 G | Refills: 0 | Status: SHIPPED | OUTPATIENT
Start: 2023-07-01 | End: 2023-09-21

## 2023-07-01 RX ORDER — IBUPROFEN 600 MG/1
600 TABLET ORAL EVERY 6 HOURS PRN
Qty: 20 TABLET | Refills: 0 | Status: SHIPPED | OUTPATIENT
Start: 2023-07-01 | End: 2023-07-06

## 2023-07-01 RX ORDER — OXYCODONE AND ACETAMINOPHEN 5; 325 MG/1; MG/1
1 TABLET ORAL EVERY 4 HOURS PRN
Qty: 12 EACH | Refills: 0 | Status: SHIPPED | OUTPATIENT
Start: 2023-07-01 | End: 2023-07-03

## 2023-07-01 RX ADMIN — OXYCODONE AND ACETAMINOPHEN 1 TABLET: 5; 325 TABLET ORAL at 11:07

## 2023-07-01 NOTE — Clinical Note
"Keyonna "Keyonna" Wilmer was seen and treated in our emergency department on 7/1/2023.  She may return to work on 07/08/2023.       If you have any questions or concerns, please don't hesitate to call.      Emily Clayton, DO"

## 2023-07-01 NOTE — ED PROVIDER NOTES
"Encounter Date: 7/1/2023    SCRIBE #1 NOTE: I, Jessica Bond, am scribing for, and in the presence of,  Emily Clayton DO. I have scribed the following portions of the note - Other sections scribed: HPI, ROS, PE.     History     Chief Complaint   Patient presents with    Arm Injury     Patient reports was trying to keep something from fallingout the fridge, felt a pop in he right upper arm, has large hematoma with pain and numbness to hands is on a blood thinner.      Keyonna Guillen is a 63 y.o. female with PMHx of HTN and HLD who presents to the ED for chief complaint of right arm injury onset 3 days ago. Patient states she opened her refrigerator and something fell out so out of reflex she jerked her right arm hard to catch it. Patient reports she felt her right arm "pop as though she busted a blood vessel." Patient states the pain radiates from her right shoulder down to her hand, which has intermittent numbness and dull/sharp pain. Patient notes the pain worsens at night where she experiences a "tingling" sensation. Patient has large hematoma to the right bicep during the initial assessment. Patient endorses SOB (chronic), headache, and generalized myalgia. Patient denies n/v/d, chest pain, or other associated symptoms. Patient states she took nothing for the pain. Patient is on Xarelto and unsure of her last tetanus vaccination. Patient is a non-smoker and no alcohol consumption. No other exacerbating or alleviating factors. This is the extent of the patient's complaints in the Emergency Department.     The history is provided by the patient. No  was used.   Review of patient's allergies indicates:   Allergen Reactions    Amlodipine Swelling     Past Medical History:   Diagnosis Date    Atrial fibrillation     Colon polyp     Coronary artery disease     Disorder of kidney and ureter     Hyperlipidemia     Hypertension      Past Surgical History:   Procedure Laterality Date    BREAST BIOPSY      " CARDIAC SURGERY      COLONOSCOPY N/A 5/25/2020    Procedure: COLONOSCOPY;  Surgeon: José Luis Fonseca MD;  Location: [facility] ENDO;  Service: Endoscopy;  Laterality: N/A;  XARELTO/PLETAL ok    CORONARY ARTERY BYPASS GRAFT      CYSTOSCOPY W/ URETERAL STENT PLACEMENT Bilateral 6/25/2020    Procedure: CYSTOSCOPY, WITH URETERAL STENT INSERTION;  Surgeon: STEPHANY Smyth MD;  Location: [facility] OR;  Service: Urology;  Laterality: Bilateral;    HYSTERECTOMY      LAPAROSCOPIC HEMICOLECTOMY Right 6/25/2020    Procedure: HAND ASSISTED HEMICOLECTOMY, LAPAROSCOPIC;  Surgeon: Louis Rivas III, MD;  Location: [facility] OR;  Service: General;  Laterality: Right;  RN PREOP 6/19/2020--has cardiac clearance from Dr. Khalil, --COVID NEGATIVE and T/S on 6/23/2020  COMBINED CASE WITH DR SMYTH    TREATMENT OF CARDIAC ARRHYTHMIA N/A 1/10/2022    Procedure: Cardioversion/Defibrillation (FRANCISCO not needed);  Surgeon: José Khalil MD;  Location: [facility] CATH LAB;  Service: Cardiology;  Laterality: N/A;  RN PRE OP, COVID NEGATIVE     Family History   Problem Relation Age of Onset    Cancer Father 78        lung    Cancer Mother 26        oral    Breast cancer Maternal Aunt      Social History     Tobacco Use    Smoking status: Never     Passive exposure: Never    Smokeless tobacco: Never   Substance Use Topics    Alcohol use: Not Currently    Drug use: No     Review of Systems   Constitutional:  Negative for chills and fever.   HENT:  Negative for congestion and sore throat.    Eyes:  Negative for photophobia.   Respiratory:  Negative for shortness of breath (chronic).    Cardiovascular:  Negative for chest pain.   Gastrointestinal:  Negative for diarrhea, nausea and vomiting.   Endocrine: Negative for polyuria.   Genitourinary:  Negative for dysuria.   Musculoskeletal:  Positive for myalgias (Generalized).        (+) Right arm injury   Skin:  Positive for color change (hematoma to the right bicep).   Neurological:  Positive for numbness and  headaches.        (+)paresthesia   Hematological:  Bruises/bleeds easily (Upper right arm).   All other systems reviewed and are negative.    Physical Exam     Initial Vitals [07/01/23 1026]   BP Pulse Resp Temp SpO2   (!) 172/86 60 18 98 °F (36.7 °C) 96 %      MAP       --         Physical Exam    Nursing note and vitals reviewed.  Constitutional: She appears well-developed and well-nourished.   HENT:   Head: Normocephalic and atraumatic.   Right Ear: External ear normal.   Left Ear: External ear normal.   Eyes: Conjunctivae and EOM are normal. Pupils are equal, round, and reactive to light.   Neck: Neck supple. No thyromegaly present. No JVD present.   Normal range of motion.  Cardiovascular:  Normal rate, regular rhythm, normal heart sounds and intact distal pulses.     Exam reveals no gallop and no friction rub.       No murmur heard.  Pulmonary/Chest: Breath sounds normal. No respiratory distress.   Abdominal: Abdomen is soft. Bowel sounds are normal.   Musculoskeletal:         General: No tenderness or edema. Normal range of motion.      Cervical back: Normal range of motion and neck supple.      Comments: No bony tenderness or deformity to right bicep on exam.        Neurological: She is alert and oriented to person, place, and time. She has normal strength.   Skin: Skin is warm and dry.       ED Course   Procedures  Labs Reviewed   COMPREHENSIVE METABOLIC PANEL - Abnormal; Notable for the following components:       Result Value    eGFR 56 (*)     All other components within normal limits   B-TYPE NATRIURETIC PEPTIDE - Abnormal; Notable for the following components:     (*)     All other components within normal limits   CBC W/ AUTO DIFFERENTIAL   PROTIME-INR   APTT   TROPONIN I        ECG Results              EKG 12-lead (Final result)  Result time 07/02/23 12:24:47      Final result by Interface, Lab In [facility] (07/02/23 12:24:47)                   Narrative:    Test Reason : I10,    Vent. Rate :  052 BPM     Atrial Rate : 052 BPM     P-R Int : 142 ms          QRS Dur : 088 ms      QT Int : 444 ms       P-R-T Axes : 068 040 055 degrees     QTc Int : 412 ms    Sinus bradycardia  Otherwise normal ECG  When compared with ECG of 05-AUG-2022 09:58,  No significant change was found  Confirmed by Lázaro Casiano MD ([id number]) on 7/2/2023 12:24:34 PM    Referred By: [email or web address]   SELF           Confirmed By:Lázaro Casiano MD                                  Imaging Results              X-Ray Chest PA And Lateral (Final result)  Result time 07/01/23 12:25:53      Final result by Roland Rosario MD (07/01/23 12:25:53)                   Impression:      No detrimental change or radiographic acute intrathoracic process seen.      Electronically signed by: Roland Rosario MD  Date:    07/01/2023  Time:    12:25               Narrative:    EXAMINATION:  XR CHEST PA AND LATERAL    CLINICAL HISTORY:  Chest Pain;    TECHNIQUE:  PA and lateral views of the chest were performed.    COMPARISON:  Chest radiograph 03/09/2018 and CT urogram 03/30/2023    FINDINGS:  Monitoring leads overlie the chest.  Patient is slightly rotated.  Resolution is limited by body habitus with underpenetration.    Sternotomy wires and mediastinal clips similar to prior.  Mediastinal structures are midline.  Similar calcification and tortuosity of the aorta.  Heart is upper limits of normal in size without evidence of failure.  Pulmonary vasculature and hilar contours are within normal limits.  Bibasilar minimal platelike scarring versus atelectasis.  The lungs are otherwise symmetrically well expanded without consolidation, pleural effusion or pneumothorax.  Osseous structures appear stable without acute process seen.                                       X-Ray Humerus 2 View Right (Final result)  Result time 07/01/23 12:26:34      Final result by Roland Rosario MD (07/01/23 12:26:34)                   Impression:      No acute displaced fracture-dislocation  identified.      Electronically signed by: Roland Rosario MD  Date:    07/01/2023  Time:    12:26               Narrative:    EXAMINATION:  XR HUMERUS 2 VIEW RIGHT    CLINICAL HISTORY:  Unspecified injury of left shoulder and upper arm, initial encounter    TECHNIQUE:  AP and lateral views    COMPARISON:  Chest radiograph same day and 03/09/2018    FINDINGS:  Overall alignment is within normal limits.  No displaced fracture, dislocation or destructive osseous process.  Mild degenerative change about the shoulder.  No subcutaneous emphysema or radiodense retained foreign body.  Right lung apex is clear.                                       Medications   oxyCODONE-acetaminophen 5-325 mg per tablet 1 tablet (1 tablet Oral Given 7/1/23 1141)     Medical Decision Making:   History:   Old Medical Records: I decided to obtain old medical records.  Old Records Summarized: other records.        Scribe Attestation:   Scribe #1: I performed the above scribed service and the documentation accurately describes the services I performed. I attest to the accuracy of the note.            Medical Decision Making:    This is an evaluation of a 63 y.o. female that presents to the Emergency Department for a right arm injury.  Chief Complaint   Patient presents with    Arm Injury     Patient reports was trying to keep something from fallingout the fridge, felt a pop in he right upper arm, has large hematoma with pain and numbness to hands is on a blood thinner.        The patient is a non-toxic and well appearing patient. On physical exam, patient appears well hydrated with moist mucus membranes. Breath sounds are clear and equal bilaterally with no adventitious breath sounds, tachypnea or respiratory distress. Regular rate and rhythm. No murmurs. Abdomen soft and non tender. Patient is tolerating PO without difficulty.  Vital Signs Are Reassuring.     Based on the patient's symptoms, I am considering and evaluating for the following  differential diagnoses: Strain, sprain, contusion, fracture, and bicep muscle rupture.  ED Course:Treatment in the ED included Physical Exam and medications given in ED  Medications   oxyCODONE-acetaminophen 5-325 mg per tablet 1 tablet (1 tablet Oral Given 7/1/23 1141)   .   Patient reports feeling better after treatment in the ER.     External Data/Documents Reviewed:   Labs: ordered and reviewed. Decision-making details documented in ED Course.   Radiology: ordered as indicated and reviewed. Decision-making details documented in ED Course.     Risk  Diagnosis or treatment significantly limited by the following social determinants of health: Body mass index is 35.43 kg/m².     In shared decision making with the patient, we discussed treatment, prescriptions, labs, and imaging results.    Consulted with Dr. Flor Wilson, orthopedic surgeon), who advised to have patient receive a sling for comfort from out-patient services.     Discharge home with   ED Prescriptions       Medication Sig Dispense Start Date End Date Auth. Provider    methocarbamoL (ROBAXIN) 500 MG Tab Take 2 tablets (1,000 mg total) by mouth 3 (three) times daily. for 5 days 30 tablet 7/1/2023 7/6/2023 Emily Clayton,     acetaminophen (TYLENOL) 500 MG tablet Take 1 tablet (500 mg total) by mouth every 6 (six) hours as needed for Pain (As needed for mild-to-moderate pain). 30 tablet 7/1/2023 -- Emily Clayton DO    diclofenac sodium (VOLTAREN) 1 % Gel Apply 2 g topically 4 (four) times daily as needed (Apply to painful area 4 times a day as needed for pain). 200 g 7/1/2023 -- Emily Clayton DO    ibuprofen (ADVIL,MOTRIN) 600 MG tablet Take 1 tablet (600 mg total) by mouth every 6 (six) hours as needed for Pain (Take with food as needed for mild-to-moderate pain). 20 tablet 7/1/2023 -- Emily Clayton DO    oxyCODONE-acetaminophen (PERCOCET) 5-325 mg per tablet Take 1 tablet by mouth every 4 (four) hours as needed for Pain (As needed for severe 10/10 pain).  12 each 7/1/2023 7/3/2023 Emily Clayton,           Fill and take prescriptions as directed.  Return to the ED if symptoms worsen or do not resolve.   Answered questions and discussed discharge plan.    Patient feels better and is ready for discharge.  Follow up with PCP/specialist in 1 day    The following labs and imaging were reviewed:        Admission on 07/01/2023, Discharged on 07/01/2023   Component Date Value Ref Range Status    WBC 07/01/2023 4.58  3.90 - 12.70 K/uL Final    RBC 07/01/2023 4.07  4.00 - 5.40 M/uL Final    Hemoglobin 07/01/2023 12.4  12.0 - 16.0 g/dL Final    Hematocrit 07/01/2023 38.5  37.0 - 48.5 % Final    MCV 07/01/2023 95  82 - 98 fL Final    MCH 07/01/2023 30.5  27.0 - 31.0 pg Final    MCHC 07/01/2023 32.2  32.0 - 36.0 g/dL Final    RDW 07/01/2023 12.4  11.5 - 14.5 % Final    Platelets 07/01/2023 201  150 - 450 K/uL Final    MPV 07/01/2023 9.7  9.2 - 12.9 fL Final    Immature Granulocytes 07/01/2023 0.2  0.0 - 0.5 % Final    Gran # (ANC) 07/01/2023 2.6  1.8 - 7.7 K/uL Final    Immature Grans (Abs) 07/01/2023 0.01  0.00 - 0.04 K/uL Final    Comment: Mild elevation in immature granulocytes is non specific and   can be seen in a variety of conditions including stress response,   acute inflammation, trauma and pregnancy. Correlation with other   laboratory and clinical findings is essential.      Lymph # 07/01/2023 1.5  1.0 - 4.8 K/uL Final    Mono # 07/01/2023 0.4  0.3 - 1.0 K/uL Final    Eos # 07/01/2023 0.1  0.0 - 0.5 K/uL Final    Baso # 07/01/2023 0.01  0.00 - 0.20 K/uL Final    nRBC 07/01/2023 0  0 /100 WBC Final    Gran % 07/01/2023 57.7  38.0 - 73.0 % Final    Lymph % 07/01/2023 32.1  18.0 - 48.0 % Final    Mono % 07/01/2023 8.5  4.0 - 15.0 % Final    Eosinophil % 07/01/2023 1.3  0.0 - 8.0 % Final    Basophil % 07/01/2023 0.2  0.0 - 1.9 % Final    Differential Method 07/01/2023 Automated   Final    Sodium 07/01/2023 142  136 - 145 mmol/L Final    Potassium 07/01/2023 4.0  3.5 - 5.1  mmol/L Final    Chloride 07/01/2023 110  95 - 110 mmol/L Final    CO2 07/01/2023 23  23 - 29 mmol/L Final    Glucose 07/01/2023 79  70 - 110 mg/dL Final    BUN 07/01/2023 20  8 - 23 mg/dL Final    Creatinine 07/01/2023 1.1  0.5 - 1.4 mg/dL Final    Calcium 07/01/2023 9.5  8.7 - 10.5 mg/dL Final    Total Protein 07/01/2023 6.4  6.0 - 8.4 g/dL Final    Albumin 07/01/2023 3.7  3.5 - 5.2 g/dL Final    Total Bilirubin 07/01/2023 0.5  0.1 - 1.0 mg/dL Final    Comment: For infants and newborns, interpretation of results should be based  on gestational age, weight and in agreement with clinical  observations.    Premature Infant recommended reference ranges:  Up to 24 hours.............<8.0 mg/dL  Up to 48 hours............<12.0 mg/dL  3-5 days..................<15.0 mg/dL  6-29 days.................<15.0 mg/dL      Alkaline Phosphatase 07/01/2023 100  55 - 135 U/L Final    AST 07/01/2023 20  10 - 40 U/L Final    ALT 07/01/2023 26  10 - 44 U/L Final    eGFR 07/01/2023 56 (A)  >60 mL/min/1.73 m^2 Final    Anion Gap 07/01/2023 9  8 - 16 mmol/L Final    Prothrombin Time 07/01/2023 11.6  9.0 - 12.5 sec Final    INR 07/01/2023 1.1  0.8 - 1.2 Final    Comment: Coumadin Therapy:  2.0 - 3.0 for INR for all indicators except mechanical heart valves  and antiphospholipid syndromes which should use 2.5 - 3.5.      aPTT 07/01/2023 29.6  21.0 - 32.0 sec Final    Comment: Refer to local heparin nomogram for intensity/dose specific   therapeutic   range.      Troponin I 07/01/2023 0.010  0.000 - 0.026 ng/mL Final    Comment: The reference interval for Troponin I represents the 99th percentile   cutoff   for our facility and is consistent with 3rd generation assay   performance.      BNP 07/01/2023 153 (H)  0 - 99 pg/mL Final    Values of less than 100 pg/ml are consistent with non-CHF populations.        Imaging Results              X-Ray Chest PA And Lateral (Final result)  Result time 07/01/23 12:25:53      Final result by Roland GUTIERREZ  MD Marlene (07/01/23 12:25:53)                   Impression:      No detrimental change or radiographic acute intrathoracic process seen.      Electronically signed by: Roland Rosario MD  Date:    07/01/2023  Time:    12:25               Narrative:    EXAMINATION:  XR CHEST PA AND LATERAL    CLINICAL HISTORY:  Chest Pain;    TECHNIQUE:  PA and lateral views of the chest were performed.    COMPARISON:  Chest radiograph 03/09/2018 and CT urogram 03/30/2023    FINDINGS:  Monitoring leads overlie the chest.  Patient is slightly rotated.  Resolution is limited by body habitus with underpenetration.    Sternotomy wires and mediastinal clips similar to prior.  Mediastinal structures are midline.  Similar calcification and tortuosity of the aorta.  Heart is upper limits of normal in size without evidence of failure.  Pulmonary vasculature and hilar contours are within normal limits.  Bibasilar minimal platelike scarring versus atelectasis.  The lungs are otherwise symmetrically well expanded without consolidation, pleural effusion or pneumothorax.  Osseous structures appear stable without acute process seen.                                       X-Ray Humerus 2 View Right (Final result)  Result time 07/01/23 12:26:34      Final result by Roland Rosario MD (07/01/23 12:26:34)                   Impression:      No acute displaced fracture-dislocation identified.      Electronically signed by: Roland Rosario MD  Date:    07/01/2023  Time:    12:26               Narrative:    EXAMINATION:  XR HUMERUS 2 VIEW RIGHT    CLINICAL HISTORY:  Unspecified injury of left shoulder and upper arm, initial encounter    TECHNIQUE:  AP and lateral views    COMPARISON:  Chest radiograph same day and 03/09/2018    FINDINGS:  Overall alignment is within normal limits.  No displaced fracture, dislocation or destructive osseous process.  Mild degenerative change about the shoulder.  No subcutaneous emphysema or radiodense retained foreign body.  Right  lung apex is clear.                                       I, Dr. Emily Clayton, personally performed the services described in this documentation. This document was produced by a scribe under my direction and in my presence. All medical record entries made by the scribe were at my direction and in my presence.  I have reviewed the chart and agree that the record reflects my personal performance and is accurate and complete. Emily Clayton DO.     07/02/2023 2:02 PM      Clinical Impression:   Final diagnoses:  [I10] HTN (hypertension)  [S49.92XA] Injury of left upper arm  [M79.601] Right arm pain (Primary)  [S46.211A] Rupture of right biceps tendon, initial encounter - Possible biceps tendon rupture        ED Disposition Condition    Discharge Stable          ED Prescriptions       Medication Sig Dispense Start Date End Date Auth. Provider    methocarbamoL (ROBAXIN) 500 MG Tab Take 2 tablets (1,000 mg total) by mouth 3 (three) times daily. for 5 days 30 tablet 7/1/2023 7/6/2023 Emily Clayton DO    acetaminophen (TYLENOL) 500 MG tablet Take 1 tablet (500 mg total) by mouth every 6 (six) hours as needed for Pain (As needed for mild-to-moderate pain). 30 tablet 7/1/2023 -- Emily Clayton DO    diclofenac sodium (VOLTAREN) 1 % Gel Apply 2 g topically 4 (four) times daily as needed (Apply to painful area 4 times a day as needed for pain). 200 g 7/1/2023 -- Emily Clayton DO    ibuprofen (ADVIL,MOTRIN) 600 MG tablet Take 1 tablet (600 mg total) by mouth every 6 (six) hours as needed for Pain (Take with food as needed for mild-to-moderate pain). 20 tablet 7/1/2023 -- Emily Clayton DO    oxyCODONE-acetaminophen (PERCOCET) 5-325 mg per tablet Take 1 tablet by mouth every 4 (four) hours as needed for Pain (As needed for severe 10/10 pain). 12 each 7/1/2023 7/3/2023 Emily Clayton DO          Follow-up Information       Follow up With Specialties Details Why Contact Info    Flor Hoang MD Orthopedic Surgery Schedule an  appointment as soon as possible for a visit in 1 day Please follow-up with orthopedic specialist for further evaluation of your arm pain [address] [facility]  [place] [address]  [phone]      Morgan Leger MD Family Medicine, Wound Care Schedule an appointment as soon as possible for a visit  As needed [address] [facility]  [place] [address]  [phone]      [facility] - Emergency Dept Emergency Medicine Go to  Please go to [facility] emergency department if symptoms worsen [address]  [facility] [id number] [phone]             Emily Clayton,   07/02/23 1402

## 2023-07-01 NOTE — ED TRIAGE NOTES
Patient to the ED with complaints of Right upper arm pain x's 3 days. Reports reaching into the fridge to grab something, jar was about to fall so she jerked and felt something pop in her upper arm. Large knot and bruising to R upper arm.

## 2023-07-03 ENCOUNTER — TELEPHONE (OUTPATIENT)
Dept: ENDOSCOPY | Facility: HOSPITAL | Age: 63
End: 2023-07-03

## 2023-07-03 ENCOUNTER — CLINICAL SUPPORT (OUTPATIENT)
Dept: ENDOSCOPY | Facility: HOSPITAL | Age: 63
End: 2023-07-03
Attending: FAMILY MEDICINE
Payer: COMMERCIAL

## 2023-07-03 DIAGNOSIS — Z86.010 HISTORY OF COLON POLYPS: Primary | ICD-10-CM

## 2023-07-03 DIAGNOSIS — Z12.11 COLON CANCER SCREENING: ICD-10-CM

## 2023-07-03 RX ORDER — POLYETHYLENE GLYCOL 3350, SODIUM SULFATE ANHYDROUS, SODIUM BICARBONATE, SODIUM CHLORIDE, POTASSIUM CHLORIDE 236; 22.74; 6.74; 5.86; 2.97 G/4L; G/4L; G/4L; G/4L; G/4L
4 POWDER, FOR SOLUTION ORAL ONCE
Qty: 4000 ML | Refills: 0 | Status: SHIPPED | OUTPATIENT
Start: 2023-07-03 | End: 2023-07-03

## 2023-07-03 NOTE — TELEPHONE ENCOUNTER
Spoke to patient to schedule procedure(s) Colonoscopy       Physician to perform procedure(s) Dr. BO Masterson  Date of Procedure (s) 8/29/23  Arrival Time 9:30 AM  Time of Procedure(s) 10:30 AM   Location of Procedure(s) [address]  Type of Rx Prep sent to patient: PEG  Instructions provided to patient via [facility]    Patient was informed on the following information and verbalized understanding. Screening questionnaire reviewed with patient and complete. If procedure requires anesthesia, a responsible adult needs to be present to accompany the patient home, patient cannot drive after receiving anesthesia. Appointment details are tentative, especially check-in time. Patient will receive a prep-op call 4 days prior to confirm check-in time for procedure. If applicable the patient should contact their pharmacy to verify Rx for procedure prep is ready for pick-up. Patient was advised to call the scheduling department at [phone] if pharmacy states no Rx is available. Patient was advised to call the endoscopy scheduling department if any questions or concerns arise.      [facility]oscopy Scheduling Department

## 2023-07-03 NOTE — TELEPHONE ENCOUNTER
Dear Dr Khalil,    Patient has a scheduled procedure Colonoscopy on 8/29/23 and is currently taking a blood thinner prescribed by your office. In order to ensure patient safety, we would like to confirm that the patient can place their blood thinner medication on hold for the procedure. Can he/she discontinue Xarelto (rivaroxaban) for a minimum of 2 days prior to the procedure?     Thank you for your prompt reply.    [facility] Endoscopy Scheduling

## 2023-07-06 ENCOUNTER — OFFICE VISIT (OUTPATIENT)
Dept: ORTHOPEDICS | Facility: CLINIC | Age: 63
End: 2023-07-06
Payer: COMMERCIAL

## 2023-07-06 ENCOUNTER — TELEPHONE (OUTPATIENT)
Dept: ORTHOPEDICS | Facility: CLINIC | Age: 63
End: 2023-07-06

## 2023-07-06 DIAGNOSIS — S46.219A BICEPS TENDON TEAR: Primary | ICD-10-CM

## 2023-07-06 PROCEDURE — 99999 PR PBB SHADOW E&M-EST. PATIENT-LVL III: ICD-10-PCS | Mod: PBBFAC,,, | Performed by: ORTHOPAEDIC SURGERY

## 2023-07-06 PROCEDURE — 99204 PR OFFICE/OUTPT VISIT, NEW, LEVL IV, 45-59 MIN: ICD-10-PCS | Mod: S$GLB,,, | Performed by: ORTHOPAEDIC SURGERY

## 2023-07-06 PROCEDURE — 99204 OFFICE O/P NEW MOD 45 MIN: CPT | Mod: S$GLB,,, | Performed by: ORTHOPAEDIC SURGERY

## 2023-07-06 PROCEDURE — 3044F HG A1C LEVEL LT 7.0%: CPT | Mod: CPTII,S$GLB,, | Performed by: ORTHOPAEDIC SURGERY

## 2023-07-06 PROCEDURE — 1159F PR MEDICATION LIST DOCUMENTED IN MEDICAL RECORD: ICD-10-PCS | Mod: CPTII,S$GLB,, | Performed by: ORTHOPAEDIC SURGERY

## 2023-07-06 PROCEDURE — 1159F MED LIST DOCD IN RCRD: CPT | Mod: CPTII,S$GLB,, | Performed by: ORTHOPAEDIC SURGERY

## 2023-07-06 PROCEDURE — 1160F RVW MEDS BY RX/DR IN RCRD: CPT | Mod: CPTII,S$GLB,, | Performed by: ORTHOPAEDIC SURGERY

## 2023-07-06 PROCEDURE — 3044F PR MOST RECENT HEMOGLOBIN A1C LEVEL <7.0%: ICD-10-PCS | Mod: CPTII,S$GLB,, | Performed by: ORTHOPAEDIC SURGERY

## 2023-07-06 PROCEDURE — 99999 PR PBB SHADOW E&M-EST. PATIENT-LVL III: CPT | Mod: PBBFAC,,, | Performed by: ORTHOPAEDIC SURGERY

## 2023-07-06 PROCEDURE — 1160F PR REVIEW ALL MEDS BY PRESCRIBER/CLIN PHARMACIST DOCUMENTED: ICD-10-PCS | Mod: CPTII,S$GLB,, | Performed by: ORTHOPAEDIC SURGERY

## 2023-07-06 RX ORDER — DIAZEPAM 5 MG/1
5 TABLET ORAL EVERY 6 HOURS PRN
Qty: 2 TABLET | Refills: 0 | Status: SHIPPED | OUTPATIENT
Start: 2023-07-06 | End: 2023-07-13

## 2023-07-06 RX ORDER — OXYCODONE AND ACETAMINOPHEN 5; 325 MG/1; MG/1
1 TABLET ORAL EVERY 8 HOURS PRN
Qty: 10 TABLET | Refills: 0 | Status: SHIPPED | OUTPATIENT
Start: 2023-07-06 | End: 2023-07-07 | Stop reason: SDUPTHER

## 2023-07-06 RX ORDER — IBUPROFEN 800 MG/1
800 TABLET ORAL 3 TIMES DAILY
Qty: 42 TABLET | Refills: 0 | Status: SHIPPED | OUTPATIENT
Start: 2023-07-06 | End: 2023-07-20

## 2023-07-06 NOTE — TELEPHONE ENCOUNTER
----- Message from Ani Chance sent at 7/6/2023  3:16 PM CDT -----  Type: Patient Call Back    Who called:Self     What is the request in detail: Asking for a call     Can the clinic reply by [facility]? No     Would the patient rather a call back or a response via [facility]? CALL     Best call back number: [phone] (home)

## 2023-07-06 NOTE — PROGRESS NOTES
Assessment: 63 y.o. female with right biceps tendon tear, suspect acute cuff tear     I explained my diagnostic impression and the reasoning behind it in detail, using layman's terms.      Plan:   - MRI R shoulder, sent to [facility] per patient request. Discussed my preference to avoid open MRI and recommended large bore MRI at [facility], she prefers to go to [facility]. She was informed that she needed to bring a disc with her   - Return to clinic after MRI    All questions were answered in detail. The patient is in full agreement with the treatment plan and will proceed accordingly.    Chief Complaint   Patient presents with    Right Upper Arm - Injury, Pain, Swelling       Initial visit (7/6/23): Keyonna Guillen is a 63 y.o. female who presents today complaining of right shoulder pain  Duration of symptoms:  about 1 week  Trauma or new activity: yes - fel a pop with immediate pain after she reached to grab a jar that was falling  Has had pain since then   Ecchymosis to upper arm  Unable to reach overhead due to pain   Disruptive to sleep  Pain is constant      This patient was seen in consultation at the request of Dr. Emily Clayton    This is the extent of the patient's complaints at this time.     Hand dominance: Right     Occupation:         Review of patient's allergies indicates:   Allergen Reactions    Amlodipine Swelling         Physical Exam:   Vitals:    07/06/23 1015   PainSc:   7   PainLoc: Arm       General: Patient is alert, awake and oriented to time, place and person. Mood and affect are appropriate.  Patient does not appear to be in any distress, denies any constitutional symptoms and appears stated age.   HEENT: Pupils are equal and round, sclera are not injected. External examination of ears and nose reveals no abnormalities. Cranial nerves II-X are grossly intact  Skin: no rashes, abrasions or open wounds on the affected extremity   Resp: No respiratory distress or audible  wheezing   CV: 2+  pulses, all extremities warm and well perfused   Right Shoulder    Shoulder Range of Motion    Right     Left   (Active/Passive)       Forward Elevation     80/140             165/165  External rotation (arm at side)  45/45             45/45       Range of motion is painful     Acromioclavicular joint is not tender  Crossbody test: negative    Neer's positive  Hawkin's positive    Josi's positive  Drop arm negative  Belly press negative      Cuff Strength     Right     Left   Supraspinatus        4/5    5/5  Infraspinatus     4/5    5/5  Subscapularis     4/5    5/5    Deltoid testing            5/5    5/5    Gomez's test negative  Speeds positive  Yergasons positive  + bhavin with ecchymosis over the anterior arm    Elbow examination demonstrates no tenderness to palpation and has normal range of motion.     ltsi C5-T1  + epl, io, fds, fdp   2+ RP      Imagin views of the right humerus:  no fractures     I personally reviewed and interpreted the patient's imaging obtained today in clinic     A note notifying Dr. Emily Clayton of my findings was sent via the electronic medical record     This note was created by combination of typed  and M-Modal dictation. Transcription and phonetic errors may be present.  If there are any questions, please contact me.      Current Outpatient Medications:     acetaminophen (TYLENOL) 500 MG tablet, Take 1 tablet (500 mg total) by mouth every 6 (six) hours as needed for Pain (As needed for mild-to-moderate pain)., Disp: 30 tablet, Rfl: 0    atorvastatin (LIPITOR) 40 MG tablet, Take 1 tablet (40 mg total) by mouth once daily., Disp: 90 tablet, Rfl: 3    buPROPion (WELLBUTRIN XL) 150 MG TB24 tablet, TAKE 1 TABLET BY MOUTH EVERY DAY, Disp: 30 tablet, Rfl: 0    diclofenac sodium (VOLTAREN) 1 % Gel, Apply 2 g topically 4 (four) times daily as needed (Apply to painful area 4 times a day as needed for pain)., Disp: 200 g, Rfl: 0    estradioL (ESTRACE) 0.01 %  (0.1 mg/gram) vaginal cream, Place 1 g vaginally twice a week., Disp: 42.5 g, Rfl: 3    gabapentin (NEURONTIN) 300 MG capsule, Take 300 mg by mouth every evening., Disp: , Rfl:     hydrALAZINE (APRESOLINE) 100 MG tablet, TAKE 1.5 TABLETS BY MOUTH 2 TIMES A DAY., Disp: 270 tablet, Rfl: 3    ibuprofen (ADVIL,MOTRIN) 600 MG tablet, Take 1 tablet (600 mg total) by mouth every 6 (six) hours as needed for Pain (Take with food as needed for mild-to-moderate pain)., Disp: 20 tablet, Rfl: 0    isosorbide mononitrate (IMDUR) 120 MG 24 hr tablet, TAKE 1 TABLET BY MOUTH ONCE DAILY, Disp: 90 tablet, Rfl: 3    isosorbide mononitrate (IMDUR) 60 MG 24 hr tablet, Take 60 mg by mouth., Disp: , Rfl:     labetaloL (NORMODYNE) 200 MG tablet, Take 200 mg by mouth., Disp: , Rfl:     meloxicam (MOBIC) 15 MG tablet, Take 15 mg by mouth., Disp: , Rfl:     methocarbamoL (ROBAXIN) 500 MG Tab, Take 2 tablets (1,000 mg total) by mouth 3 (three) times daily. for 5 days, Disp: 30 tablet, Rfl: 0    olmesartan (BENICAR) 40 MG tablet, Take 40 mg by mouth., Disp: , Rfl:     rivaroxaban (XARELTO) 20 mg Tab, TAKE 1 TABLET (20 MG TOTAL) BY MOUTH DAILY WITH DINNER OR EVENING MEAL., Disp: 90 tablet, Rfl: 1    trospium (SANCTURA) 20 mg Tab tablet, Take 1 tablet (20 mg total) by mouth every 12 (twelve) hours., Disp: 60 tablet, Rfl: 11    ketorolac 0.5% (ACULAR) 0.5 % Drop, , Disp: , Rfl:     nitroGLYCERIN (NITROSTAT) 0.4 MG SL tablet, Place 1 tablet (0.4 mg total) under the tongue every 5 (five) minutes as needed for Chest pain. (Patient not taking: Reported on 4/10/2023), Disp: 100 tablet, Rfl: 0    ofloxacin (OCUFLOX) 0.3 % ophthalmic solution, , Disp: , Rfl:     prednisoLONE acetate (PRED FORTE) 1 % DrpS, , Disp: , Rfl:     Past Medical History:   Diagnosis Date    Atrial fibrillation     Colon polyp     Coronary artery disease     Disorder of kidney and ureter     Hyperlipidemia     Hypertension        Active Problem List with Overview Notes    Diagnosis  Date Noted    Aortic atherosclerosis 12/11/2020    Stage 3b chronic kidney disease 12/11/2020    Mass of colon 06/25/2020    Nausea 05/21/2019    Resistant hypertension 01/31/2019    Non morbid obesity, unspecified obesity type 01/29/2019    PAD (peripheral artery disease) 06/04/2018    Hematuria 04/17/2018    Occlusion of left vertebral artery 04/11/2018    Other hyperlipidemia 03/09/2018    Paroxysmal atrial fibrillation 03/09/2018    Palpitations     Essential hypertension 08/18/2016    Coronary artery disease involving coronary bypass graft of native heart without angina pectoris 08/18/2016    Menopausal state 03/02/2015    Status post hysterectomy 03/02/2015    Hormone replacement therapy (HRT) 03/02/2015    Atrophic vaginitis 03/02/2015       Past Surgical History:   Procedure Laterality Date    BREAST BIOPSY      CARDIAC SURGERY      COLONOSCOPY N/A 5/25/2020    Procedure: COLONOSCOPY;  Surgeon: José Luis Fonseca MD;  Location: [facility] ENDO;  Service: Endoscopy;  Laterality: N/A;  XARELTO/PLETAL ok    CORONARY ARTERY BYPASS GRAFT      CYSTOSCOPY W/ URETERAL STENT PLACEMENT Bilateral 6/25/2020    Procedure: CYSTOSCOPY, WITH URETERAL STENT INSERTION;  Surgeon: STEPHANY Smyth MD;  Location: [facility] OR;  Service: Urology;  Laterality: Bilateral;    HYSTERECTOMY      LAPAROSCOPIC HEMICOLECTOMY Right 6/25/2020    Procedure: HAND ASSISTED HEMICOLECTOMY, LAPAROSCOPIC;  Surgeon: Louis Rivas III, MD;  Location: [facility] OR;  Service: General;  Laterality: Right;  RN PREOP 6/19/2020--has cardiac clearance from Dr. Khalil, --COVID NEGATIVE and T/S on 6/23/2020  COMBINED CASE WITH DR SMYTH    TREATMENT OF CARDIAC ARRHYTHMIA N/A 1/10/2022    Procedure: Cardioversion/Defibrillation (FRANCISCO not needed);  Surgeon: José Khalil MD;  Location: [facility] CATH LAB;  Service: Cardiology;  Laterality: N/A;  RN PRE OP, COVID NEGATIVE       Social History     Socioeconomic History    Marital status:     Number of children: 4    Tobacco Use    Smoking status: Never     Passive exposure: Never    Smokeless tobacco: Never   Substance and Sexual Activity    Alcohol use: Not Currently    Drug use: No    Sexual activity: Yes     Partners: Male     Birth control/protection: Surgical   Social History Narrative     since 2001    He is a .  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

## 2023-07-07 ENCOUNTER — TELEPHONE (OUTPATIENT)
Dept: ENDOSCOPY | Facility: HOSPITAL | Age: 63
End: 2023-07-07
Payer: COMMERCIAL

## 2023-07-07 ENCOUNTER — PATIENT MESSAGE (OUTPATIENT)
Dept: ORTHOPEDICS | Facility: CLINIC | Age: 63
End: 2023-07-07
Payer: COMMERCIAL

## 2023-07-07 DIAGNOSIS — S46.219A BICEPS TENDON TEAR: ICD-10-CM

## 2023-07-07 RX ORDER — OXYCODONE AND ACETAMINOPHEN 5; 325 MG/1; MG/1
1 TABLET ORAL EVERY 8 HOURS PRN
Qty: 10 TABLET | Refills: 0 | Status: SHIPPED | OUTPATIENT
Start: 2023-07-07 | End: 2023-09-29 | Stop reason: CLARIF

## 2023-07-07 NOTE — TELEPHONE ENCOUNTER
----- Message from Erica Browne RN sent at 7/3/2023 12:16 PM CDT -----  Regarding: BT 8/29/23  The patient is currently under an internal cardiologist BO connor and requires a blood thinner Xarelto (rivaroxaban) for their upcoming scheduled Colonoscopy on 8/29/23.

## 2023-07-07 NOTE — TELEPHONE ENCOUNTER
Spoken with [facility]/ about the patient medication and it on back orders. She asking if we can sent the medication to [facility]

## 2023-07-13 ENCOUNTER — OFFICE VISIT (OUTPATIENT)
Dept: CARDIOLOGY | Facility: CLINIC | Age: 63
End: 2023-07-13
Payer: COMMERCIAL

## 2023-07-13 VITALS
BODY MASS INDEX: 34.78 KG/M2 | RESPIRATION RATE: 18 BRPM | WEIGHT: 196.31 LBS | DIASTOLIC BLOOD PRESSURE: 78 MMHG | HEIGHT: 63 IN | HEART RATE: 62 BPM | SYSTOLIC BLOOD PRESSURE: 142 MMHG | OXYGEN SATURATION: 96 %

## 2023-07-13 DIAGNOSIS — E78.49 OTHER HYPERLIPIDEMIA: ICD-10-CM

## 2023-07-13 DIAGNOSIS — I48.0 PAROXYSMAL ATRIAL FIBRILLATION: ICD-10-CM

## 2023-07-13 DIAGNOSIS — I25.810 CORONARY ARTERY DISEASE INVOLVING CORONARY BYPASS GRAFT OF NATIVE HEART WITHOUT ANGINA PECTORIS: Primary | ICD-10-CM

## 2023-07-13 DIAGNOSIS — I65.29 CAROTID ATHEROSCLEROSIS, UNSPECIFIED LATERALITY: ICD-10-CM

## 2023-07-13 DIAGNOSIS — I10 ESSENTIAL HYPERTENSION: ICD-10-CM

## 2023-07-13 DIAGNOSIS — N18.31 STAGE 3A CHRONIC KIDNEY DISEASE: ICD-10-CM

## 2023-07-13 DIAGNOSIS — E66.9 NON MORBID OBESITY, UNSPECIFIED OBESITY TYPE: ICD-10-CM

## 2023-07-13 DIAGNOSIS — I70.0 AORTIC ATHEROSCLEROSIS: ICD-10-CM

## 2023-07-13 DIAGNOSIS — Z11.52 ENCOUNTER FOR PREOPERATIVE SCREENING LABORATORY TESTING FOR COVID-19 VIRUS: ICD-10-CM

## 2023-07-13 DIAGNOSIS — Z01.812 ENCOUNTER FOR PREOPERATIVE SCREENING LABORATORY TESTING FOR COVID-19 VIRUS: ICD-10-CM

## 2023-07-13 DIAGNOSIS — I73.9 PAD (PERIPHERAL ARTERY DISEASE): ICD-10-CM

## 2023-07-13 DIAGNOSIS — Z01.810 PREOP CARDIOVASCULAR EXAM: ICD-10-CM

## 2023-07-13 PROCEDURE — 99215 PR OFFICE/OUTPT VISIT, EST, LEVL V, 40-54 MIN: ICD-10-PCS | Mod: S$GLB,,, | Performed by: INTERNAL MEDICINE

## 2023-07-13 PROCEDURE — 3078F DIAST BP <80 MM HG: CPT | Mod: CPTII,S$GLB,, | Performed by: INTERNAL MEDICINE

## 2023-07-13 PROCEDURE — 3078F PR MOST RECENT DIASTOLIC BLOOD PRESSURE < 80 MM HG: ICD-10-PCS | Mod: CPTII,S$GLB,, | Performed by: INTERNAL MEDICINE

## 2023-07-13 PROCEDURE — 3008F PR BODY MASS INDEX (BMI) DOCUMENTED: ICD-10-PCS | Mod: CPTII,S$GLB,, | Performed by: INTERNAL MEDICINE

## 2023-07-13 PROCEDURE — 3077F PR MOST RECENT SYSTOLIC BLOOD PRESSURE >= 140 MM HG: ICD-10-PCS | Mod: CPTII,S$GLB,, | Performed by: INTERNAL MEDICINE

## 2023-07-13 PROCEDURE — 99215 OFFICE O/P EST HI 40 MIN: CPT | Mod: S$GLB,,, | Performed by: INTERNAL MEDICINE

## 2023-07-13 PROCEDURE — 1159F MED LIST DOCD IN RCRD: CPT | Mod: CPTII,S$GLB,, | Performed by: INTERNAL MEDICINE

## 2023-07-13 PROCEDURE — 3008F BODY MASS INDEX DOCD: CPT | Mod: CPTII,S$GLB,, | Performed by: INTERNAL MEDICINE

## 2023-07-13 PROCEDURE — 3044F HG A1C LEVEL LT 7.0%: CPT | Mod: CPTII,S$GLB,, | Performed by: INTERNAL MEDICINE

## 2023-07-13 PROCEDURE — 3044F PR MOST RECENT HEMOGLOBIN A1C LEVEL <7.0%: ICD-10-PCS | Mod: CPTII,S$GLB,, | Performed by: INTERNAL MEDICINE

## 2023-07-13 PROCEDURE — 1159F PR MEDICATION LIST DOCUMENTED IN MEDICAL RECORD: ICD-10-PCS | Mod: CPTII,S$GLB,, | Performed by: INTERNAL MEDICINE

## 2023-07-13 PROCEDURE — 1160F PR REVIEW ALL MEDS BY PRESCRIBER/CLIN PHARMACIST DOCUMENTED: ICD-10-PCS | Mod: CPTII,S$GLB,, | Performed by: INTERNAL MEDICINE

## 2023-07-13 PROCEDURE — 99999 PR PBB SHADOW E&M-EST. PATIENT-LVL IV: ICD-10-PCS | Mod: PBBFAC,,, | Performed by: INTERNAL MEDICINE

## 2023-07-13 PROCEDURE — 99999 PR PBB SHADOW E&M-EST. PATIENT-LVL IV: CPT | Mod: PBBFAC,,, | Performed by: INTERNAL MEDICINE

## 2023-07-13 PROCEDURE — 3077F SYST BP >= 140 MM HG: CPT | Mod: CPTII,S$GLB,, | Performed by: INTERNAL MEDICINE

## 2023-07-13 PROCEDURE — 1160F RVW MEDS BY RX/DR IN RCRD: CPT | Mod: CPTII,S$GLB,, | Performed by: INTERNAL MEDICINE

## 2023-07-13 RX ORDER — OLMESARTAN MEDOXOMIL 40 MG/1
40 TABLET ORAL DAILY
Qty: 90 TABLET | Refills: 3 | Status: SHIPPED | OUTPATIENT
Start: 2023-07-13

## 2023-07-13 RX ORDER — ATORVASTATIN CALCIUM 80 MG/1
80 TABLET, FILM COATED ORAL NIGHTLY
Qty: 90 TABLET | Refills: 3 | Status: SHIPPED | OUTPATIENT
Start: 2023-07-13

## 2023-07-13 RX ORDER — ISOSORBIDE MONONITRATE 120 MG/1
240 TABLET, EXTENDED RELEASE ORAL DAILY
Qty: 180 TABLET | Refills: 3 | Status: SHIPPED | OUTPATIENT
Start: 2023-07-13

## 2023-07-13 RX ORDER — NITROGLYCERIN 0.4 MG/1
0.4 TABLET SUBLINGUAL EVERY 5 MIN PRN
Qty: 50 TABLET | Refills: 3 | Status: SHIPPED | OUTPATIENT
Start: 2023-07-13 | End: 2024-07-12

## 2023-07-13 NOTE — PROGRESS NOTES
CARDIOVASCULAR PROGRESS NOTE    REASON FOR CONSULT:   Keyonna Guillen is a 63 y.o. female who presents for follow up of PAF, CAD/CABG, HTN, PAD  PCP: [place]  Ortho: [facility]  HISTORY OF PRESENT ILLNESS:   Last seen Aug 2022    The patient comes in today for follow-up.  She denies intercurrent angina, dyspnea, palpitations, or syncope.  There has been no PND, orthopnea, melena, hematuria, or claudication symptoms.  She continues to take her anticoagulation without any issues.  She tells me she is due to have a colonoscopy, and is also now following with Orthopedic surgery for possible retro biceps tendon.  Should surgery be necessary, this would be considered at low cardiac risk (I took the liberty of exercise the patient in the office today and she was able to come up a flight of stairs without any symptoms or limitations).    CARDIOVASCULAR HISTORY:   CAD s/p CABG 4/5/11: LIMA-LAD, SVG-D, SVG-OM  PAF s/p FRANCISCO/DCCV 3/8/18, DCCV 1/10/22, on Xarelto, CHADS VASC 2  Hx L vert art occlusion  PAD, ?R SFA stenosis (US 5/2018)    PAST MEDICAL HISTORY:     Past Medical History:   Diagnosis Date    Atrial fibrillation     Colon polyp     Coronary artery disease     Disorder of kidney and ureter     Hyperlipidemia     Hypertension        PAST SURGICAL HISTORY:     Past Surgical History:   Procedure Laterality Date    BREAST BIOPSY      CARDIAC SURGERY      COLONOSCOPY N/A 5/25/2020    Procedure: COLONOSCOPY;  Surgeon: José Luis Fonseca MD;  Location: [facility] ENDO;  Service: Endoscopy;  Laterality: N/A;  XARELTO/PLETAL ok    CORONARY ARTERY BYPASS GRAFT      CYSTOSCOPY W/ URETERAL STENT PLACEMENT Bilateral 6/25/2020    Procedure: CYSTOSCOPY, WITH URETERAL STENT INSERTION;  Surgeon: STEPHANY Smyth MD;  Location: [facility] OR;  Service: Urology;  Laterality: Bilateral;    HYSTERECTOMY      LAPAROSCOPIC HEMICOLECTOMY Right 6/25/2020    Procedure: HAND ASSISTED HEMICOLECTOMY, LAPAROSCOPIC;  Surgeon: Louis Rivas III, MD;  Location: [facility]  OR;  Service: General;  Laterality: Right;  RN PREOP 6/19/2020--has cardiac clearance from Dr. Khalil, --COVID NEGATIVE and T/S on 6/23/2020  COMBINED CASE WITH DR PIERCE    TREATMENT OF CARDIAC ARRHYTHMIA N/A 1/10/2022    Procedure: Cardioversion/Defibrillation (FRANCISCO not needed);  Surgeon: José Khalil MD;  Location: [facility] CATH LAB;  Service: Cardiology;  Laterality: N/A;  RN PRE OP, COVID NEGATIVE       ALLERGIES AND MEDICATION:   Review of patient's allergies indicates:  No Known Allergies       Medication List            Accurate as of July 13, 2023 11:27 AM. If you have any questions, ask your nurse or doctor.                CONTINUE taking these medications      atorvastatin 40 MG tablet  Commonly known as: LIPITOR  Take 1 tablet (40 mg total) by mouth once daily.     buPROPion 150 MG TB24 tablet  Commonly known as: WELLBUTRIN XL  TAKE 1 TABLET BY MOUTH EVERY DAY     diclofenac sodium 1 % Gel  Commonly known as: VOLTAREN  Apply 2 g topically 4 (four) times daily as needed (Apply to painful area 4 times a day as needed for pain).     gabapentin 300 MG capsule  Commonly known as: NEURONTIN     hydrALAZINE 100 MG tablet  Commonly known as: APRESOLINE  TAKE 1.5 TABLETS BY MOUTH 2 TIMES A DAY.     ibuprofen 800 MG tablet  Commonly known as: ADVIL,MOTRIN  Take 1 tablet (800 mg total) by mouth 3 (three) times daily. for 14 days     * isosorbide mononitrate 60 MG 24 hr tablet  Commonly known as: IMDUR     * isosorbide mononitrate 120 MG 24 hr tablet  Commonly known as: IMDUR  TAKE 1 TABLET BY MOUTH ONCE DAILY     labetaloL 200 MG tablet  Commonly known as: NORMODYNE     nitroGLYCERIN 0.4 MG SL tablet  Commonly known as: NITROSTAT  Place 1 tablet (0.4 mg total) under the tongue every 5 (five) minutes as needed for Chest pain.     olmesartan 40 MG tablet  Commonly known as: BENICAR     oxyCODONE-acetaminophen 5-325 mg per tablet  Commonly known as: PERCOCET  Take 1 tablet by mouth every 8 (eight) hours as needed  for Pain.     trospium 20 mg Tab tablet  Commonly known as: sanctura  Take 1 tablet (20 mg total) by mouth every 12 (twelve) hours.     XARELTO 20 mg Tab  Generic drug: rivaroxaban  TAKE 1 TABLET (20 MG TOTAL) BY MOUTH DAILY WITH DINNER OR EVENING MEAL.           * This list has 2 medication(s) that are the same as other medications prescribed for you. Read the directions carefully, and ask your doctor or other care provider to review them with you.                STOP taking these medications      acetaminophen 500 MG tablet  Commonly known as: TYLENOL  Stopped by: José Khalil MD     diazePAM 5 MG tablet  Commonly known as: VALIUM  Stopped by: José Khalil MD     estradioL 0.01 % (0.1 mg/gram) vaginal cream  Commonly known as: ESTRACE  Stopped by: José Khalil MD     ketorolac 0.5% 0.5 % Drop  Commonly known as: ACULAR  Stopped by: José Khalil MD     ofloxacin 0.3 % ophthalmic solution  Commonly known as: OCUFLOX  Stopped by: José Khalil MD     prednisoLONE acetate 1 % Drps  Commonly known as: PRED FORTE  Stopped by: José Khalil MD                SOCIAL HISTORY:     Social History     Socioeconomic History    Marital status:     Number of children: 4   Tobacco Use    Smoking status: Never     Passive exposure: Never    Smokeless tobacco: Never   Substance and Sexual Activity    Alcohol use: Not Currently    Drug use: No    Sexual activity: Yes     Partners: Male     Birth control/protection: Surgical   Social History Narrative     since 2001    He is a .  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   HENT:  Negative for nosebleeds.    Eyes:  Negative for blurred  "vision, double vision and photophobia.   Respiratory:  Negative for hemoptysis, shortness of breath and wheezing.    Cardiovascular:  Negative for chest pain, palpitations, orthopnea, claudication, leg swelling and PND.   Gastrointestinal:  Negative for abdominal pain, blood in stool, heartburn, melena, nausea and vomiting.   Genitourinary:  Negative for flank pain and hematuria.   Musculoskeletal:  Positive for joint pain. Negative for falls, myalgias and neck pain.   Skin:  Negative for rash.   Neurological:  Negative for dizziness, seizures, loss of consciousness, weakness and headaches.   Endo/Heme/Allergies:  Negative for polydipsia. Does not bruise/bleed easily.   Psychiatric/Behavioral:  Negative for depression and memory loss. The patient is not nervous/anxious.      PHYSICAL EXAM:     Vitals:    07/13/23 1044   BP: (!) 142/78   Pulse: 62   Resp: 18    Body mass index is 34.78 kg/m².  Weight: 89 kg (196 lb 5.1 oz)   Height: 5' 3" (160 cm)     Physical Exam  Vitals reviewed.   Constitutional:       General: She is not in acute distress.     Appearance: She is well-developed. She is obese. She is not ill-appearing, toxic-appearing or diaphoretic.   HENT:      Head: Normocephalic and atraumatic.   Eyes:      General: No scleral icterus.     Extraocular Movements: Extraocular movements intact.      Conjunctiva/sclera: Conjunctivae normal.      Pupils: Pupils are equal, round, and reactive to light.   Neck:      Thyroid: No thyromegaly.      Vascular: Normal carotid pulses. No carotid bruit or JVD.      Trachea: Trachea normal. No tracheal deviation.   Cardiovascular:      Rate and Rhythm: Normal rate and regular rhythm.      Pulses:           Carotid pulses are 2+ on the right side and 2+ on the left side.     Heart sounds: S1 normal and S2 normal. Heart sounds are distant. No murmur heard.    No friction rub. No gallop.   Pulmonary:      Effort: Pulmonary effort is normal. No respiratory distress.      Breath " sounds: Normal breath sounds. No stridor. No wheezing, rhonchi or rales.   Chest:      Chest wall: No tenderness.   Abdominal:      General: There is no distension.      Palpations: Abdomen is soft.   Musculoskeletal:         General: No swelling or tenderness. Normal range of motion.      Cervical back: Normal range of motion and neck supple. No edema or rigidity.      Right lower leg: No edema.      Left lower leg: No edema.   Feet:      Right foot:      Skin integrity: No ulcer.      Left foot:      Skin integrity: No ulcer.   Skin:     General: Skin is warm and dry.      Coloration: Skin is not jaundiced.   Neurological:      General: No focal deficit present.      Mental Status: She is alert and oriented to person, place, and time.      Cranial Nerves: No cranial nerve deficit.   Psychiatric:         Mood and Affect: Mood normal.         Speech: Speech normal.         Behavior: Behavior normal. Behavior is cooperative.       DATA:   EKG: (personally reviewed tracing(s))  7/1/23 SR 52    Laboratory:  CBC:  Recent Labs   Lab 03/02/23  1157 03/30/23  0932 07/01/23  1146   WBC 6.24 5.79 4.58   Hemoglobin 13.9 13.9 12.4   Hematocrit 41.6 41.5 38.5   Platelets 238 251 201         CHEMISTRIES:  Recent Labs   Lab 06/04/21  0708 01/10/22  1045 01/18/22  1445 03/02/23  1157 03/30/23  0932 07/01/23  1146   Glucose 119 H 97 88 122 H 89  89 79   Sodium 141 145 144 144 144  144 142   Potassium 4.5 3.9 3.7 4.3 4.7  4.7 4.0   BUN 18 22 18 21 19  19 20   Creatinine 1.1 1.0 0.9 1.1 0.9  0.9 1.1   eGFR if African American >60 >60 >60  --   --   --    eGFR if non  54 A >60 >60  --   --   --    Calcium 9.5 9.1 9.9 10.1 9.5  9.5 9.5         CARDIAC BIOMARKERS:  Recent Labs   Lab 01/18/22  1445 07/01/23  1146   Troponin I 0.009 0.010         COAGS:  Recent Labs   Lab 07/01/23  1146   INR 1.1         LIPIDS/LFTS:  Recent Labs   Lab 01/18/22  1445 03/30/23  0932 07/01/23  1146   Cholesterol  --  169  --     Triglycerides  --  72  --    HDL  --  49  --    LDL Cholesterol  --  105.6  --    Non-HDL Cholesterol  --  120  --    AST 14 29 20   ALT 21 32 26       Lab Results   Component Value Date    TSH 1.958 05/22/2019         Cardiovascular Testing:  Carotid US 3/22/23  There is 20-39% right Internal Carotid Stenosis.  There is 0-19% left Internal Carotid Stenosis.  Similar findings noted on prior report 5/7/21.    [place] MPI 2/16/22     Normal myocardial perfusion scan. There is no evidence of myocardial ischemia or infarction.    There is a moderate intensity perfusion abnormality in the anterior wall of the left ventricle, secondary to breast attenuation.    The gated perfusion images showed an ejection fraction of 71% post stress.    There is normal wall motion post stress.    LV cavity size is normal at stress.    The EKG portion of this study is negative for ischemia.    The patient reported no chest pain during the stress test.    There were no arrhythmias during stress.    A PET stress exam is recommended if clinically indicated.    Echo 1/26/22  The left ventricle is normal in size with concentric hypertrophy and normal systolic function.  The estimated ejection fraction is 65%.  Normal left ventricular diastolic function.  Normal right ventricular size with normal right ventricular systolic function.  Mild left atrial enlargement.  Mild-to-moderate mitral regurgitation.  Mild to moderate tricuspid regurgitation.  Normal central venous pressure (3 mmHg).  The estimated PA systolic pressure is 36 mmHg.    Renal art US 1/10/19  -Normal sized kidneys without evidence for nephrolithiasis or hydronephrosis.  -There is slight increased peak systolic velocity within the right renal artery from the proximal to mid aspect from 50 cm/sec to 158 cm/sec which is likely related to vessel tortuosity and turbulent flow in light of the absent elevated resistive indices or ratios however underlying right renal artery stenosis  cannot be excluded.  This could be further evaluated with CTA or MRA imaging.  -No evidence for left renal artery elevated resistive indices or renal artery velocities to suggest renal artery stenosis.    LE art US 5/31/18  R STEFANI 1.09  L STEFANI 1.08  Elevated velocities within the mid to distal right superficial femoral artery strongly suggestive of hemodynamically significant stenoses within the mid to distal right superficial femoral artery.    LE venous US/reflux 5/31/18  No evidence for deep venous thrombosis within either lower extremity.  No evidence for venous insufficiency within the greater or lesser saphenous veins bilaterally.    FRANCISCO/DCCV 3/9/18  Normal bivent size/fxn, EF 55%, normal wall motion  Normal appearing valves.  Trace AI, mild MR, mild TR  No LA/LON thrombus  Succcessful DCCV af->NSR 360J x1.  Plan:  Cont med rx  Cont Xarelto 20mg qd  OK for discharge later on today and follow up with me in the office 1 week for outpatient stress test.    Cath 3/25/11 ([facility], subsequently had CABG)  LM: normal  LAD sequential 90% prox and 80% mid stenoses   D2 prox 80%  LCx: MLI   OM2 prox 80%  RCA: dom, mid-dist 40% sequential stenoses    ASSESSMENT:   # PAF in SR, s/p FRANCISCO/DCCV 3/9/18, DCCV 1/10/22.  CHADS VASC 2 on Xarelto.    # HTN, uncontrolled, pt following in digital med program.  # CAD s/p CABG x3V 4/2011.  MPI 2/2022 neg.  Sxs abated on imdur.   # PAD, bilat buttock and RLE claudication, R SFA stenosis noted on US 5/2018.  Sxs seemd to have resolved (pt thinks it was more related to R hip pain, did not notice much of a difference with pletal rx)  # HLP on atorva 40mg  # CKD3a  # BMI 35, up 1 unit(s) vs last OV  # aortic atherosclerosis (CT A/P 9/25/20)  # preop for colonsocopy (and possible shoulder surgery).  The patient demonstrated good exercise capacity in the office today.    PLAN:   Cont med rx  Cont Xarelto 20mg qd  OK to hold 2d for planned colonsocopy.  Diet/exercise/weight loss  Inc imdur 240mg  qd  Inc atorva 80mg qhs  Cont monitoring in digital med program.  Neg drug: add HCTZ/aldact  The patient is at low cardiac risk for the planned colonsocopy (and possible shoulder surgery) and associated anesthesia.  No further preoperative cardiac testing is required.  Is acceptable to withhold the patient's Xarelto for 2 days prior to the surgery and resume it as soon as possible thereafter.  RTC 1 year with surveillance carotid US and lipids/LFT (July 2024)    If recurrent anginal sxs despite control of BP, consider cardiac PET vs cath.    Consider LE angio in future for eval of LE PAD if limiting claudication recurs      José Khalil MD, FACC

## 2023-07-14 ENCOUNTER — TELEPHONE (OUTPATIENT)
Dept: ORTHOPEDICS | Facility: CLINIC | Age: 63
End: 2023-07-14
Payer: COMMERCIAL

## 2023-07-14 NOTE — TELEPHONE ENCOUNTER
Called to discuss MRI results. I cannot see the CD and will need to look at it Monday when I get back in the office. Read shows tears of the SS and biceps tendons with tendinosis of the IS.  Recommended surgical treatment of the acute tear of the supraspinatus.   Still having a lot of pain.   Will have her come in next week to review images and plan treatment.

## 2023-07-18 ENCOUNTER — TELEPHONE (OUTPATIENT)
Dept: ORTHOPEDICS | Facility: CLINIC | Age: 63
End: 2023-07-18
Payer: COMMERCIAL

## 2023-07-18 DIAGNOSIS — S46.219A BICEPS TENDON TEAR: Primary | ICD-10-CM

## 2023-07-18 DIAGNOSIS — S46.011A TRAUMATIC ROTATOR CUFF TEAR, RIGHT, INITIAL ENCOUNTER: ICD-10-CM

## 2023-07-18 DIAGNOSIS — S46.011A TRAUMATIC COMPLETE TEAR OF RIGHT ROTATOR CUFF, INITIAL ENCOUNTER: ICD-10-CM

## 2023-07-19 ENCOUNTER — OFFICE VISIT (OUTPATIENT)
Dept: ORTHOPEDICS | Facility: CLINIC | Age: 63
End: 2023-07-19
Payer: COMMERCIAL

## 2023-07-19 DIAGNOSIS — S46.011A TRAUMATIC COMPLETE TEAR OF RIGHT ROTATOR CUFF, INITIAL ENCOUNTER: ICD-10-CM

## 2023-07-19 DIAGNOSIS — S46.219A BICEPS TENDON TEAR: Primary | ICD-10-CM

## 2023-07-19 PROCEDURE — 1160F PR REVIEW ALL MEDS BY PRESCRIBER/CLIN PHARMACIST DOCUMENTED: ICD-10-PCS | Mod: CPTII,S$GLB,, | Performed by: ORTHOPAEDIC SURGERY

## 2023-07-19 PROCEDURE — 99999 PR PBB SHADOW E&M-EST. PATIENT-LVL III: ICD-10-PCS | Mod: PBBFAC,,, | Performed by: ORTHOPAEDIC SURGERY

## 2023-07-19 PROCEDURE — 99213 PR OFFICE/OUTPT VISIT, EST, LEVL III, 20-29 MIN: ICD-10-PCS | Mod: S$GLB,,, | Performed by: ORTHOPAEDIC SURGERY

## 2023-07-19 PROCEDURE — 4010F ACE/ARB THERAPY RXD/TAKEN: CPT | Mod: CPTII,S$GLB,, | Performed by: ORTHOPAEDIC SURGERY

## 2023-07-19 PROCEDURE — 3044F HG A1C LEVEL LT 7.0%: CPT | Mod: CPTII,S$GLB,, | Performed by: ORTHOPAEDIC SURGERY

## 2023-07-19 PROCEDURE — 99999 PR PBB SHADOW E&M-EST. PATIENT-LVL III: CPT | Mod: PBBFAC,,, | Performed by: ORTHOPAEDIC SURGERY

## 2023-07-19 PROCEDURE — 1159F MED LIST DOCD IN RCRD: CPT | Mod: CPTII,S$GLB,, | Performed by: ORTHOPAEDIC SURGERY

## 2023-07-19 PROCEDURE — 1160F RVW MEDS BY RX/DR IN RCRD: CPT | Mod: CPTII,S$GLB,, | Performed by: ORTHOPAEDIC SURGERY

## 2023-07-19 PROCEDURE — 99213 OFFICE O/P EST LOW 20 MIN: CPT | Mod: S$GLB,,, | Performed by: ORTHOPAEDIC SURGERY

## 2023-07-19 PROCEDURE — 4010F PR ACE/ARB THEARPY RXD/TAKEN: ICD-10-PCS | Mod: CPTII,S$GLB,, | Performed by: ORTHOPAEDIC SURGERY

## 2023-07-19 PROCEDURE — 3044F PR MOST RECENT HEMOGLOBIN A1C LEVEL <7.0%: ICD-10-PCS | Mod: CPTII,S$GLB,, | Performed by: ORTHOPAEDIC SURGERY

## 2023-07-19 PROCEDURE — 1159F PR MEDICATION LIST DOCUMENTED IN MEDICAL RECORD: ICD-10-PCS | Mod: CPTII,S$GLB,, | Performed by: ORTHOPAEDIC SURGERY

## 2023-07-19 NOTE — PROGRESS NOTES
Assessment: 63 y.o. female with acute R supraspinatus tear, biceps tendon tear      Right shoulder arthroscopy with possible biceps tendon tenotomy versus tenodesis, rotator cuff repair    We have discussed the surgery and anticipated recovery.  The patient understands that there may be limited mobility up to several weeks after surgery depending on procedures that are performed at the time of surgery.    The details of the surgical procedure were explained, including the location of probable incisions and a description of likely hardware and/or grafts to be used.  The patient understands the likely convalescence after surgery, in particular the expected postop rehab and recovery course. Alternatives both operative and non-operative with associated risks and benefits discussed. The outlined risks and potential complications of the proposed procedure include but are not limited to: bleeding, infection, vessel and/or nerve damage, pain, numbness, tingling, compartment syndrome, need for additional surgery, failure to return to pre-injury and/or preoperative functional status, inability to return to work, scar sensitivity, delayed healing, complex regional pain syndrome, weakness, partial and/or incomplete relief of symptoms, persistence of and/or worsening of symptoms, hardware and/or surgical failure, prominent and/or symptomatic hardware possibly necessitating future removal, failure of repair to heal, retear, loss of function, stiffness, functional debility, dysfunction, need for prolonged postoperative rehabilitation, fracture, deep venous thrombosis, pulmonary embolism, arthritis and death.  The patient states an understanding and wishes to proceed with surgery.   All questions were answered.  No guarantees were implied or stated.  Written informed consent was obtained.     I do not recommend a trial of physical therapy prior to surgery given acute nature of the tear and lack of overhead active range of  motion    All questions were answered in detail. The patient is in full agreement with the treatment plan and will proceed accordingly.    Chief Complaint   Patient presents with    Right Shoulder - Pain, Injury       Initial visit (7/6/23): Keyonna Guillen is a 63 y.o. female who presents today complaining of right shoulder pain  Duration of symptoms:  about 1 week  Trauma or new activity: yes - fel a pop with immediate pain after she reached to grab a jar that was falling  Has had pain since then   Ecchymosis to upper arm  Unable to reach overhead due to pain   Disruptive to sleep  Pain is constant    07/19/2023  Here for MRI results   No change in pain  Is still unable to raise her arm overhead despite injuring the shoulder about 3 weeks ago  Has cardiac clearance for surgery      This is the extent of the patient's complaints at this time.     Hand dominance: Right     Occupation:         Review of patient's allergies indicates:   Allergen Reactions    Amlodipine Swelling         Physical Exam:   Vitals:    07/19/23 0745   PainSc:   8   PainLoc: Shoulder       General: Patient is alert, awake and oriented to time, place and person. Mood and affect are appropriate.  Patient does not appear to be in any distress, denies any constitutional symptoms and appears stated age.   HEENT: Pupils are equal and round, sclera are not injected. External examination of ears and nose reveals no abnormalities. Cranial nerves II-X are grossly intact  Skin: no rashes, abrasions or open wounds on the affected extremity   Resp: No respiratory distress or audible wheezing   CV: 2+  pulses, all extremities warm and well perfused   Right Shoulder    Shoulder Range of Motion    Right     Left   (Active/Passive)       Forward Elevation     80/140             165/165  External rotation (arm at side)  45/45             45/45       Range of motion is painful     Acromioclavicular joint is not tender  Crossbody test:  negative    Neer's positive  Hawkin's positive    Josi's positive  Drop arm negative  Belly press negative      Cuff Strength     Right     Left   Supraspinatus        4/5    5/5  Infraspinatus     4/5    5/5  Subscapularis     4/5    5/5    Deltoid testing            5/5    5/5    Gomez's test negative  Speeds positive  Yergasons positive  + bhavin with ecchymosis over the anterior arm    Elbow examination demonstrates no tenderness to palpation and has normal range of motion.     ltsi C5-T1  + epl, io, fds, fdp   2+ RP      Imaging:  [facility] of the right shoulder [facility] shows a full-thickness retracted tear of the supraspinatus.  There appears to be an acute full-thickness tear of the biceps tendon       This note was created by combination of typed  and M-Modal dictation. Transcription and phonetic errors may be present.  If there are any questions, please contact me.      Current Outpatient Medications:     atorvastatin (LIPITOR) 80 MG tablet, Take 1 tablet (80 mg total) by mouth every evening., Disp: 90 tablet, Rfl: 3    buPROPion (WELLBUTRIN XL) 150 MG TB24 tablet, TAKE 1 TABLET BY MOUTH EVERY DAY, Disp: 30 tablet, Rfl: 0    diclofenac sodium (VOLTAREN) 1 % Gel, Apply 2 g topically 4 (four) times daily as needed (Apply to painful area 4 times a day as needed for pain)., Disp: 200 g, Rfl: 0    gabapentin (NEURONTIN) 300 MG capsule, Take 300 mg by mouth every evening., Disp: , Rfl:     hydrALAZINE (APRESOLINE) 100 MG tablet, TAKE 1.5 TABLETS BY MOUTH 2 TIMES A DAY., Disp: 270 tablet, Rfl: 3    ibuprofen (ADVIL,MOTRIN) 800 MG tablet, Take 1 tablet (800 mg total) by mouth 3 (three) times daily. for 14 days, Disp: 42 tablet, Rfl: 0    isosorbide mononitrate (IMDUR) 120 MG 24 hr tablet, Take 2 tablets (240 mg total) by mouth once daily., Disp: 180 tablet, Rfl: 3    labetaloL (NORMODYNE) 200 MG tablet, Take 200 mg by mouth., Disp: , Rfl:     nitroGLYCERIN (NITROSTAT) 0.4 MG SL tablet, Place 1  tablet (0.4 mg total) under the tongue every 5 (five) minutes as needed for Chest pain., Disp: 50 tablet, Rfl: 3    olmesartan (BENICAR) 40 MG tablet, Take 1 tablet (40 mg total) by mouth once daily., Disp: 90 tablet, Rfl: 3    oxyCODONE-acetaminophen (PERCOCET) 5-325 mg per tablet, Take 1 tablet by mouth every 8 (eight) hours as needed for Pain., Disp: 10 tablet, Rfl: 0    rivaroxaban (XARELTO) 20 mg Tab, TAKE 1 TABLET (20 MG TOTAL) BY MOUTH DAILY WITH DINNER OR EVENING MEAL., Disp: 90 tablet, Rfl: 1    trospium (SANCTURA) 20 mg Tab tablet, Take 1 tablet (20 mg total) by mouth every 12 (twelve) hours., Disp: 60 tablet, Rfl: 11    Past Medical History:   Diagnosis Date    Atrial fibrillation     Colon polyp     Coronary artery disease     Disorder of kidney and ureter     Hyperlipidemia     Hypertension        Active Problem List with Overview Notes    Diagnosis Date Noted    Aortic atherosclerosis 12/11/2020    Stage 3b chronic kidney disease 12/11/2020    Mass of colon 06/25/2020    Nausea 05/21/2019    Resistant hypertension 01/31/2019    Non morbid obesity, unspecified obesity type 01/29/2019    PAD (peripheral artery disease) 06/04/2018    Hematuria 04/17/2018    Occlusion of left vertebral artery 04/11/2018    Other hyperlipidemia 03/09/2018    Paroxysmal atrial fibrillation 03/09/2018    Palpitations     Essential hypertension 08/18/2016    Coronary artery disease involving coronary bypass graft of native heart without angina pectoris 08/18/2016    Menopausal state 03/02/2015    Status post hysterectomy 03/02/2015    Hormone replacement therapy (HRT) 03/02/2015    Atrophic vaginitis 03/02/2015       Past Surgical History:   Procedure Laterality Date    BREAST BIOPSY      CARDIAC SURGERY      COLONOSCOPY N/A 5/25/2020    Procedure: COLONOSCOPY;  Surgeon: José Luis Fonseca MD;  Location: [facility];  Service: Endoscopy;  Laterality: N/A;  XARELTO/PLETAL ok    CORONARY ARTERY BYPASS GRAFT      CYSTOSCOPY W/  URETERAL STENT PLACEMENT Bilateral 6/25/2020    Procedure: CYSTOSCOPY, WITH URETERAL STENT INSERTION;  Surgeon: STEPHANY Smyth MD;  Location: [facility] OR;  Service: Urology;  Laterality: Bilateral;    HYSTERECTOMY      LAPAROSCOPIC HEMICOLECTOMY Right 6/25/2020    Procedure: HAND ASSISTED HEMICOLECTOMY, LAPAROSCOPIC;  Surgeon: Louis Rivas III, MD;  Location: [facility] OR;  Service: General;  Laterality: Right;  RN PREOP 6/19/2020--has cardiac clearance from Dr. Khalil, --COVID NEGATIVE and T/S on 6/23/2020  COMBINED CASE WITH DR SMYTH    TREATMENT OF CARDIAC ARRHYTHMIA N/A 1/10/2022    Procedure: Cardioversion/Defibrillation (FRANCISCO not needed);  Surgeon: José Khalil MD;  Location: [facility] CATH LAB;  Service: Cardiology;  Laterality: N/A;  RN PRE OP, COVID NEGATIVE       Social History     Socioeconomic History    Marital status:     Number of children: 4   Tobacco Use    Smoking status: Never     Passive exposure: Never    Smokeless tobacco: Never   Substance and Sexual Activity    Alcohol use: Not Currently    Drug use: No    Sexual activity: Yes     Partners: Male     Birth control/protection: Surgical   Social History Narrative     since 2001    He is a .  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

## 2023-07-22 DIAGNOSIS — F39 MOOD DISORDER: ICD-10-CM

## 2023-07-24 ENCOUNTER — TELEPHONE (OUTPATIENT)
Dept: ORTHOPEDICS | Facility: CLINIC | Age: 63
End: 2023-07-24
Payer: COMMERCIAL

## 2023-07-24 ENCOUNTER — PATIENT MESSAGE (OUTPATIENT)
Dept: SURGERY | Facility: HOSPITAL | Age: 63
End: 2023-07-24
Payer: COMMERCIAL

## 2023-07-24 RX ORDER — BUPROPION HYDROCHLORIDE 150 MG/1
TABLET ORAL
Qty: 30 TABLET | Refills: 0 | Status: SHIPPED | OUTPATIENT
Start: 2023-07-24 | End: 2023-09-21

## 2023-07-24 NOTE — TELEPHONE ENCOUNTER
Refill Routing Note   Medication(s) are not appropriate for processing by [facility] for the following reason(s):      Patient seen in ED/Hospital since LOV with provider  Required vitals abnormal    [facility] action(s):  Defer Care Due:  None identified            Appointments  past 12m or future 3m with PCP    Date Provider   Last Visit   3/29/2023 Morgan Leger MD   Next Visit   Visit date not found Morgan Leger MD   ED visits in past 90 days: 1        Note composed:9:42 AM 07/24/2023

## 2023-07-24 NOTE — TELEPHONE ENCOUNTER
----- Message from Jeffrey Brian sent at 7/24/2023  3:09 PM CDT -----  Regarding: Self [phone]  Type: Patient Call Back    Who called: Self     What is the request in detail: pt called stating that the pt needs to cancel the appt for her procedure.     Can the clinic reply by [facility]? No     Would the patient rather a call back or a response via [facility]? Call back     Best call back number: [phone]     Additional Information:    Thank you.

## 2023-07-24 NOTE — TELEPHONE ENCOUNTER
No care due was identified.  [facility] Embedded Care Due Messages. Reference number: [id number].   7/24/2023 9:33:22 AM CDT

## 2023-08-24 ENCOUNTER — PATIENT MESSAGE (OUTPATIENT)
Dept: ENDOSCOPY | Facility: HOSPITAL | Age: 63
End: 2023-08-24
Payer: COMMERCIAL

## 2023-08-24 ENCOUNTER — TELEPHONE (OUTPATIENT)
Dept: ENDOSCOPY | Facility: HOSPITAL | Age: 63
End: 2023-08-24
Payer: COMMERCIAL

## 2023-08-24 NOTE — TELEPHONE ENCOUNTER
----- Message from Susan Pascual MA sent at 8/24/2023 10:02 AM CDT -----  Regarding: FW: Reschedule Appt  Contact: Pt [phone]  Ruben Mrs. Gretta Guillen is scheduled for 08/29/23 @ 10:30 am with Dr. Masterson.  Patient had surgery and needs to reschedule in October 2023.  Could you please assist her into getting rescheduled.    Thank You   ----- Message -----  From: Eri Browning  Sent: 8/24/2023   9:49 AM CDT  To: [facility] Staff  Subject: Reschedule Appt                                  Pt is calling to reschedule appt please call

## 2023-09-07 ENCOUNTER — PATIENT MESSAGE (OUTPATIENT)
Dept: ADMINISTRATIVE | Facility: OTHER | Age: 63
End: 2023-09-07
Payer: COMMERCIAL

## 2023-09-13 ENCOUNTER — PATIENT MESSAGE (OUTPATIENT)
Dept: ADMINISTRATIVE | Facility: HOSPITAL | Age: 63
End: 2023-09-13
Payer: COMMERCIAL

## 2023-09-20 ENCOUNTER — TELEPHONE (OUTPATIENT)
Dept: CARDIOLOGY | Facility: CLINIC | Age: 63
End: 2023-09-20
Payer: COMMERCIAL

## 2023-09-20 ENCOUNTER — NURSE TRIAGE (OUTPATIENT)
Dept: ADMINISTRATIVE | Facility: CLINIC | Age: 63
End: 2023-09-20
Payer: COMMERCIAL

## 2023-09-20 NOTE — TELEPHONE ENCOUNTER
----- Message from Francoise Cancino sent at 9/20/2023 10:06 AM CDT -----  Regarding: self [phone]  Who called: self       What is the request in detail: pt stated she been Afib since last night, pt stated she doesn't know what to do, pt call was transferred over to on call nurse while pt is waiting for call from nurse       Can the clinic reply by [facility]? No        Would the patient rather a call back or a response via [facility]? Call back       Best call back number:[phone]

## 2023-09-20 NOTE — TELEPHONE ENCOUNTER
Please advise, per nurse triage on yesterday:     Spoke with Pt who reports that she is in A.fib states that she woke up around early this morning and HR was 142. Sates she took Hr at 1003 and it was 146. Reports SOB with exertion Pt advised to be seen in ED. Pt verbalized understanding.    Pt not seen in ed.

## 2023-09-20 NOTE — TELEPHONE ENCOUNTER
Reason for Disposition   Heart beating very rapidly (e.g., > 140 / minute) and present now  (Exception: During exercise.)    Additional Information   Negative: Passed out (i.e., lost consciousness, collapsed and was not responding)   Negative: Shock suspected (e.g., cold/pale/clammy skin, too weak to stand, low BP, rapid pulse)   Negative: Difficult to awaken or acting confused (e.g., disoriented, slurred speech)   Negative: Visible sweat on face or sweat dripping down face   Negative: Unable to walk, or can only walk with assistance (e.g., requires support)   Negative: Received SHOCK from implantable cardiac defibrillator and has persisting symptoms (i.e., palpitations, lightheadedness)   Negative: Dizziness, lightheadedness, or weakness and heart beating very rapidly (e.g., > 140 / minute)   Negative: Dizziness, lightheadedness, or weakness and heart beating very slowly (e.g., < 50 / minute)   Negative: Sounds like a life-threatening emergency to the triager   Negative: Difficulty breathing   Negative: Dizziness, lightheadedness, or weakness    Protocols used: Heart Rate and Heartbeat [id number]  Spoke with Pt who reports that she is in A.fib states that she woke up around early this morning and HR was 142. Sates she took Hr at 1003 and it was 146. Reports SOB with exertion Pt advised to be seen in ED. Pt verbalized understanding.

## 2023-09-21 ENCOUNTER — OFFICE VISIT (OUTPATIENT)
Dept: CARDIOLOGY | Facility: CLINIC | Age: 63
End: 2023-09-21
Payer: COMMERCIAL

## 2023-09-21 VITALS
OXYGEN SATURATION: 98 % | WEIGHT: 194.88 LBS | RESPIRATION RATE: 15 BRPM | SYSTOLIC BLOOD PRESSURE: 122 MMHG | DIASTOLIC BLOOD PRESSURE: 86 MMHG | HEIGHT: 63 IN | BODY MASS INDEX: 34.53 KG/M2 | HEART RATE: 66 BPM

## 2023-09-21 DIAGNOSIS — Z01.810 PREOP CARDIOVASCULAR EXAM: ICD-10-CM

## 2023-09-21 DIAGNOSIS — E66.9 NON MORBID OBESITY, UNSPECIFIED OBESITY TYPE: ICD-10-CM

## 2023-09-21 DIAGNOSIS — I48.0 PAROXYSMAL ATRIAL FIBRILLATION: Primary | ICD-10-CM

## 2023-09-21 DIAGNOSIS — I73.9 PAD (PERIPHERAL ARTERY DISEASE): ICD-10-CM

## 2023-09-21 DIAGNOSIS — I70.0 AORTIC ATHEROSCLEROSIS: ICD-10-CM

## 2023-09-21 DIAGNOSIS — I65.29 CAROTID ATHEROSCLEROSIS, UNSPECIFIED LATERALITY: ICD-10-CM

## 2023-09-21 DIAGNOSIS — E78.49 OTHER HYPERLIPIDEMIA: ICD-10-CM

## 2023-09-21 DIAGNOSIS — I10 ESSENTIAL HYPERTENSION: ICD-10-CM

## 2023-09-21 DIAGNOSIS — I25.708 CORONARY ARTERY DISEASE INVOLVING CORONARY BYPASS GRAFT OF NATIVE HEART WITH OTHER FORMS OF ANGINA PECTORIS: ICD-10-CM

## 2023-09-21 PROCEDURE — 4010F ACE/ARB THERAPY RXD/TAKEN: CPT | Mod: CPTII,S$GLB,, | Performed by: INTERNAL MEDICINE

## 2023-09-21 PROCEDURE — 1160F RVW MEDS BY RX/DR IN RCRD: CPT | Mod: CPTII,S$GLB,, | Performed by: INTERNAL MEDICINE

## 2023-09-21 PROCEDURE — 3044F PR MOST RECENT HEMOGLOBIN A1C LEVEL <7.0%: ICD-10-PCS | Mod: CPTII,S$GLB,, | Performed by: INTERNAL MEDICINE

## 2023-09-21 PROCEDURE — 99215 OFFICE O/P EST HI 40 MIN: CPT | Mod: S$GLB,,, | Performed by: INTERNAL MEDICINE

## 2023-09-21 PROCEDURE — 3044F HG A1C LEVEL LT 7.0%: CPT | Mod: CPTII,S$GLB,, | Performed by: INTERNAL MEDICINE

## 2023-09-21 PROCEDURE — 3074F SYST BP LT 130 MM HG: CPT | Mod: CPTII,S$GLB,, | Performed by: INTERNAL MEDICINE

## 2023-09-21 PROCEDURE — 3008F BODY MASS INDEX DOCD: CPT | Mod: CPTII,S$GLB,, | Performed by: INTERNAL MEDICINE

## 2023-09-21 PROCEDURE — 1160F PR REVIEW ALL MEDS BY PRESCRIBER/CLIN PHARMACIST DOCUMENTED: ICD-10-PCS | Mod: CPTII,S$GLB,, | Performed by: INTERNAL MEDICINE

## 2023-09-21 PROCEDURE — 1159F PR MEDICATION LIST DOCUMENTED IN MEDICAL RECORD: ICD-10-PCS | Mod: CPTII,S$GLB,, | Performed by: INTERNAL MEDICINE

## 2023-09-21 PROCEDURE — 3074F PR MOST RECENT SYSTOLIC BLOOD PRESSURE < 130 MM HG: ICD-10-PCS | Mod: CPTII,S$GLB,, | Performed by: INTERNAL MEDICINE

## 2023-09-21 PROCEDURE — 4010F PR ACE/ARB THEARPY RXD/TAKEN: ICD-10-PCS | Mod: CPTII,S$GLB,, | Performed by: INTERNAL MEDICINE

## 2023-09-21 PROCEDURE — 99999 PR PBB SHADOW E&M-EST. PATIENT-LVL IV: ICD-10-PCS | Mod: PBBFAC,,, | Performed by: INTERNAL MEDICINE

## 2023-09-21 PROCEDURE — 3079F DIAST BP 80-89 MM HG: CPT | Mod: CPTII,S$GLB,, | Performed by: INTERNAL MEDICINE

## 2023-09-21 PROCEDURE — 3008F PR BODY MASS INDEX (BMI) DOCUMENTED: ICD-10-PCS | Mod: CPTII,S$GLB,, | Performed by: INTERNAL MEDICINE

## 2023-09-21 PROCEDURE — 93000 EKG 12-LEAD: ICD-10-PCS | Mod: S$GLB,,, | Performed by: INTERNAL MEDICINE

## 2023-09-21 PROCEDURE — 93000 ELECTROCARDIOGRAM COMPLETE: CPT | Mod: S$GLB,,, | Performed by: INTERNAL MEDICINE

## 2023-09-21 PROCEDURE — 99999 PR PBB SHADOW E&M-EST. PATIENT-LVL IV: CPT | Mod: PBBFAC,,, | Performed by: INTERNAL MEDICINE

## 2023-09-21 PROCEDURE — 99215 PR OFFICE/OUTPT VISIT, EST, LEVL V, 40-54 MIN: ICD-10-PCS | Mod: S$GLB,,, | Performed by: INTERNAL MEDICINE

## 2023-09-21 PROCEDURE — 1159F MED LIST DOCD IN RCRD: CPT | Mod: CPTII,S$GLB,, | Performed by: INTERNAL MEDICINE

## 2023-09-21 PROCEDURE — 3079F PR MOST RECENT DIASTOLIC BLOOD PRESSURE 80-89 MM HG: ICD-10-PCS | Mod: CPTII,S$GLB,, | Performed by: INTERNAL MEDICINE

## 2023-09-21 RX ORDER — AMIODARONE HYDROCHLORIDE 200 MG/1
TABLET ORAL
Qty: 132 TABLET | Refills: 2 | Status: SHIPPED | OUTPATIENT
Start: 2023-09-21 | End: 2024-01-03

## 2023-09-21 NOTE — H&P (VIEW-ONLY)
CARDIOVASCULAR PROGRESS NOTE    REASON FOR CONSULT:   Keyonna Guillen is a 63 y.o. female who presents for follow up of PAF, CAD/CABG, HTN, PAD  PCP: [place]  Ortho: [facility]  HISTORY OF PRESENT ILLNESS:   The patient returns today at her request.  She describes some palpitations and jaw pain yesterday and a home EKG monitor noted atrial fibrillation with increased ventricular response.  Her heart rate is currently controlled in the office, but she does have atrial fibrillation.  She continues take her Xarelto, but had to hold it recently for shoulder surgery.  She otherwise has had no chest discomfort, aside from the jaw pain.  She denies any shortness of breath or syncope.  There has been no PND, orthopnea, melena, hematuria, or claudication symptoms.    CARDIOVASCULAR HISTORY:   CAD s/p CABG 4/5/11: LIMA-LAD, SVG-D, SVG-OM  PAF s/p FRANCISCO/DCCV 3/8/18, DCCV 1/10/22, on Xarelto, CHADS VASC 2  Hx L vert art occlusion  PAD, ?R SFA stenosis (US 5/2018)    PAST MEDICAL HISTORY:     Past Medical History:   Diagnosis Date    Atrial fibrillation     Colon polyp     Coronary artery disease     Disorder of kidney and ureter     Hyperlipidemia     Hypertension        PAST SURGICAL HISTORY:     Past Surgical History:   Procedure Laterality Date    BREAST BIOPSY      CARDIAC SURGERY      COLONOSCOPY N/A 5/25/2020    Procedure: COLONOSCOPY;  Surgeon: José Luis Fonseca MD;  Location: [facility];  Service: Endoscopy;  Laterality: N/A;  XARELTO/PLETAL ok    CORONARY ARTERY BYPASS GRAFT      CYSTOSCOPY W/ URETERAL STENT PLACEMENT Bilateral 6/25/2020    Procedure: CYSTOSCOPY, WITH URETERAL STENT INSERTION;  Surgeon: STEPHANY Smyth MD;  Location: [facility] OR;  Service: Urology;  Laterality: Bilateral;    HYSTERECTOMY      LAPAROSCOPIC HEMICOLECTOMY Right 6/25/2020    Procedure: HAND ASSISTED HEMICOLECTOMY, LAPAROSCOPIC;  Surgeon: Louis Rivas III, MD;  Location: [facility] OR;  Service: General;  Laterality: Right;  RN PREOP 6/19/2020--has  cardiac clearance from Dr. Khalil, --COVID NEGATIVE and T/S on 6/23/2020  COMBINED CASE WITH DR PIERCE    TREATMENT OF CARDIAC ARRHYTHMIA N/A 1/10/2022    Procedure: Cardioversion/Defibrillation (FRANCISCO not needed);  Surgeon: José Khalil MD;  Location: [facility] CATH LAB;  Service: Cardiology;  Laterality: N/A;  RN PRE OP, COVID NEGATIVE       ALLERGIES AND MEDICATION:   Review of patient's allergies indicates:  No Known Allergies       Medication List            Accurate as of September 21, 2023  8:40 AM. If you have any questions, ask your nurse or doctor.                CONTINUE taking these medications      atorvastatin 80 MG tablet  Commonly known as: LIPITOR  Take 1 tablet (80 mg total) by mouth every evening.     gabapentin 300 MG capsule  Commonly known as: NEURONTIN     hydrALAZINE 100 MG tablet  Commonly known as: APRESOLINE  TAKE 1.5 TABLETS BY MOUTH 2 TIMES A DAY.     isosorbide mononitrate 120 MG 24 hr tablet  Commonly known as: IMDUR  Take 2 tablets (240 mg total) by mouth once daily.     labetaloL 200 MG tablet  Commonly known as: NORMODYNE  TAKE 1 TABLET BY MOUTH EVERY 12 HOURS.     nitroGLYCERIN 0.4 MG SL tablet  Commonly known as: NITROSTAT  Place 1 tablet (0.4 mg total) under the tongue every 5 (five) minutes as needed for Chest pain.     olmesartan 40 MG tablet  Commonly known as: BENICAR  Take 1 tablet (40 mg total) by mouth once daily.     oxyCODONE-acetaminophen 5-325 mg per tablet  Commonly known as: PERCOCET  Take 1 tablet by mouth every 8 (eight) hours as needed for Pain.     trospium 20 mg Tab tablet  Commonly known as: sanctura  Take 1 tablet (20 mg total) by mouth every 12 (twelve) hours.     XARELTO 20 mg Tab  Generic drug: rivaroxaban  TAKE 1 TABLET BY MOUTH EVERY DAY WITH DINNER OR EVENING MEAL            STOP taking these medications      buPROPion 150 MG TB24 tablet  Commonly known as: WELLBUTRIN XL  Stopped by: José Khalil MD     diclofenac sodium 1 % Gel  Commonly known  "as: ALDO  Stopped by: José Khalil MD                SOCIAL HISTORY:     Social History     Socioeconomic History    Marital status:     Number of children: 4   Tobacco Use    Smoking status: Never     Passive exposure: Never    Smokeless tobacco: Never   Substance and Sexual Activity    Alcohol use: Not Currently    Drug use: No    Sexual activity: Yes     Partners: Male     Birth control/protection: Surgical   Social History Narrative     since 2001    He is a .  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   HENT:  Negative for nosebleeds.    Eyes:  Negative for blurred vision, double vision and photophobia.   Respiratory:  Negative for hemoptysis, shortness of breath and wheezing.    Cardiovascular:  Positive for chest pain (jaw pain). Negative for palpitations, orthopnea, claudication, leg swelling and PND.   Gastrointestinal:  Negative for abdominal pain, blood in stool, heartburn, melena, nausea and vomiting.   Genitourinary:  Negative for flank pain and hematuria.   Musculoskeletal:  Positive for joint pain. Negative for falls, myalgias and neck pain.   Skin:  Negative for rash.   Neurological:  Negative for dizziness, seizures, loss of consciousness, weakness and headaches.   Endo/Heme/Allergies:  Negative for polydipsia. Does not bruise/bleed easily.   Psychiatric/Behavioral:  Negative for depression and memory loss. The patient is not nervous/anxious.        PHYSICAL EXAM:     Vitals:    09/21/23 0829   BP: 122/86   Pulse: 66   Resp: 15    Body mass index is 34.52 kg/m².  Weight: 88.4 kg (194 lb 14.2 oz)   Height: 5' 3" (160 cm)     Physical Exam  Vitals reviewed.   Constitutional:       " General: She is not in acute distress.     Appearance: She is well-developed. She is obese. She is not ill-appearing, toxic-appearing or diaphoretic.   HENT:      Head: Normocephalic and atraumatic.   Eyes:      General: No scleral icterus.     Extraocular Movements: Extraocular movements intact.      Conjunctiva/sclera: Conjunctivae normal.      Pupils: Pupils are equal, round, and reactive to light.   Neck:      Thyroid: No thyromegaly.      Vascular: Normal carotid pulses. No carotid bruit or JVD.      Trachea: Trachea normal. No tracheal deviation.   Cardiovascular:      Rate and Rhythm: Normal rate. Rhythm irregularly irregular.      Pulses:           Carotid pulses are 2+ on the right side and 2+ on the left side.     Heart sounds: S1 normal and S2 normal. Heart sounds are distant. No murmur heard.     No friction rub. No gallop.   Pulmonary:      Effort: Pulmonary effort is normal. No respiratory distress.      Breath sounds: Normal breath sounds. No stridor. No wheezing, rhonchi or rales.   Chest:      Chest wall: No tenderness.   Abdominal:      General: There is no distension.      Palpations: Abdomen is soft.   Musculoskeletal:         General: No swelling or tenderness. Normal range of motion.      Cervical back: Normal range of motion and neck supple. No edema or rigidity.      Right lower leg: No edema.      Left lower leg: No edema.   Feet:      Right foot:      Skin integrity: No ulcer.      Left foot:      Skin integrity: No ulcer.   Skin:     General: Skin is warm and dry.      Coloration: Skin is not jaundiced.   Neurological:      General: No focal deficit present.      Mental Status: She is alert and oriented to person, place, and time.      Cranial Nerves: No cranial nerve deficit.   Psychiatric:         Mood and Affect: Mood normal.         Speech: Speech normal.         Behavior: Behavior normal. Behavior is cooperative.         DATA:   EKG: (personally reviewed tracing(s))  9/21/23 AF 88,  NSSTTW changes, was in SR 7/1/23    Laboratory:  CBC:  Recent Labs   Lab 03/02/23  1157 03/30/23  0932 07/01/23  1146   WBC 6.24 5.79 4.58   Hemoglobin 13.9 13.9 12.4   Hematocrit 41.6 41.5 38.5   Platelets 238 251 201         CHEMISTRIES:  Recent Labs   Lab 06/04/21  0708 01/10/22  1045 01/18/22  1445 03/02/23  1157 03/30/23  0932 07/01/23  1146   Glucose 119 H 97 88 122 H 89  89 79   Sodium 141 145 144 144 144  144 142   Potassium 4.5 3.9 3.7 4.3 4.7  4.7 4.0   BUN 18 22 18 21 19 19 20   Creatinine 1.1 1.0 0.9 1.1 0.9  0.9 1.1   eGFR if African American >60 >60 >60  --   --   --    eGFR if non  54 A >60 >60  --   --   --    Calcium 9.5 9.1 9.9 10.1 9.5  9.5 9.5         CARDIAC BIOMARKERS:  Recent Labs   Lab 01/18/22  1445 07/01/23  1146   Troponin I 0.009 0.010         COAGS:  Recent Labs   Lab 07/01/23  1146   INR 1.1         LIPIDS/LFTS:  Recent Labs   Lab 01/18/22  1445 03/30/23  0932 07/01/23  1146   Cholesterol  --  169  --    Triglycerides  --  72  --    HDL  --  49  --    LDL Cholesterol  --  105.6  --    Non-HDL Cholesterol  --  120  --    AST 14 29 20   ALT 21 32 26       Lab Results   Component Value Date    TSH 1.958 05/22/2019         Cardiovascular Testing:  Carotid US 3/22/23  There is 20-39% right Internal Carotid Stenosis.  There is 0-19% left Internal Carotid Stenosis.  Similar findings noted on prior report 5/7/21.    [place] MPI 2/16/22     Normal myocardial perfusion scan. There is no evidence of myocardial ischemia or infarction.    There is a moderate intensity perfusion abnormality in the anterior wall of the left ventricle, secondary to breast attenuation.    The gated perfusion images showed an ejection fraction of 71% post stress.    There is normal wall motion post stress.    LV cavity size is normal at stress.    The EKG portion of this study is negative for ischemia.    The patient reported no chest pain during the stress test.    There were no arrhythmias during  stress.    A PET stress exam is recommended if clinically indicated.    Echo 1/26/22  The left ventricle is normal in size with concentric hypertrophy and normal systolic function.  The estimated ejection fraction is 65%.  Normal left ventricular diastolic function.  Normal right ventricular size with normal right ventricular systolic function.  Mild left atrial enlargement.  Mild-to-moderate mitral regurgitation.  Mild to moderate tricuspid regurgitation.  Normal central venous pressure (3 mmHg).  The estimated PA systolic pressure is 36 mmHg.    Renal art US 1/10/19  -Normal sized kidneys without evidence for nephrolithiasis or hydronephrosis.  -There is slight increased peak systolic velocity within the right renal artery from the proximal to mid aspect from 50 cm/sec to 158 cm/sec which is likely related to vessel tortuosity and turbulent flow in light of the absent elevated resistive indices or ratios however underlying right renal artery stenosis cannot be excluded.  This could be further evaluated with CTA or MRA imaging.  -No evidence for left renal artery elevated resistive indices or renal artery velocities to suggest renal artery stenosis.    LE art US 5/31/18  R STEFANI 1.09  L STEFANI 1.08  Elevated velocities within the mid to distal right superficial femoral artery strongly suggestive of hemodynamically significant stenoses within the mid to distal right superficial femoral artery.    LE venous US/reflux 5/31/18  No evidence for deep venous thrombosis within either lower extremity.  No evidence for venous insufficiency within the greater or lesser saphenous veins bilaterally.    FRANCISCO/DCCV 3/9/18  Normal bivent size/fxn, EF 55%, normal wall motion  Normal appearing valves.  Trace AI, mild MR, mild TR  No LA/LON thrombus  Succcessful DCCV af->NSR 360J x1.  Plan:  Cont med rx  Cont Xarelto 20mg qd  OK for discharge later on today and follow up with me in the office 1 week for outpatient stress test.    Cath  3/25/11 ([facility], subsequently had CABG)  LM: normal  LAD sequential 90% prox and 80% mid stenoses   D2 prox 80%  LCx: MLI   OM2 prox 80%  RCA: dom, mid-dist 40% sequential stenoses    ASSESSMENT:   # PAF in SR, s/p FRANCISCO/DCCV 3/9/18, DCCV 1/10/22.  CHADS VASC 2 on Xarelto.    # HTN, controlled, pt following in digital med program.  # CAD s/p CABG x3V 4/2011.  MPI 2/2022 neg.  Had jaw pain with recurrent PAF and inc VR.   # PAD, bilat buttock and RLE claudication, R SFA stenosis noted on US 5/2018.  Sxs seemd to have resolved (pt thinks it was more related to R hip pain, did not notice much of a difference with pletal rx)  # HLP on atorva 80mg  # CKD3a  # BMI 35, stable vs last OV  # aortic atherosclerosis (CT A/P 9/25/20)  # preop for colonsocopy     PLAN:   Cont med rx  Cont Xarelto 20mg qd   Add amio 400mg bid for 1 week then 200mg bid then 200mg qd  FRANCISCO/DCCV 10/5/23 at 12pm  Diet/exercise/weight loss  Cont monitoring in digital med program.  Neg drug: add HCTZ/aldact  The patient is at low cardiac risk for the planned colonsocopy and associated anesthesia (planned on 11/5/23).  No further preoperative cardiac testing is required.  Is acceptable to withhold the patient's Xarelto for 2 days prior to the surgery and resume it as soon as possible thereafter.  RTC 1 month (Oct 2023)  Surveillance carotid US and lipids/LFT 1 year (July 2024)    If recurrent anginal sxs despite control of BP, consider cardiac PET vs cath.    Consider LE angio in future for eval of LE PAD if limiting claudication recurs    Risks, benefits and alternatives of the FRANCISCO/Cardioversion procedure were discussed with the patient including throat irritation, aspiration, anesthetic complications, esophageal irritation/perforation, skin irritation, arrhythmia, etc.  Patient understands and agrees to proceed.  Permits signed.        José Khalil MD, FACC

## 2023-09-21 NOTE — PROGRESS NOTES
CARDIOVASCULAR PROGRESS NOTE    REASON FOR CONSULT:   Keyonna Guillen is a 63 y.o. female who presents for follow up of PAF, CAD/CABG, HTN, PAD  PCP: [place]  Ortho: [facility]  HISTORY OF PRESENT ILLNESS:   The patient returns today at her request.  She describes some palpitations and jaw pain yesterday and a home EKG monitor noted atrial fibrillation with increased ventricular response.  Her heart rate is currently controlled in the office, but she does have atrial fibrillation.  She continues take her Xarelto, but had to hold it recently for shoulder surgery.  She otherwise has had no chest discomfort, aside from the jaw pain.  She denies any shortness of breath or syncope.  There has been no PND, orthopnea, melena, hematuria, or claudication symptoms.    CARDIOVASCULAR HISTORY:   CAD s/p CABG 4/5/11: LIMA-LAD, SVG-D, SVG-OM  PAF s/p FRANCISCO/DCCV 3/8/18, DCCV 1/10/22, on Xarelto, CHADS VASC 2  Hx L vert art occlusion  PAD, ?R SFA stenosis (US 5/2018)    PAST MEDICAL HISTORY:     Past Medical History:   Diagnosis Date    Atrial fibrillation     Colon polyp     Coronary artery disease     Disorder of kidney and ureter     Hyperlipidemia     Hypertension        PAST SURGICAL HISTORY:     Past Surgical History:   Procedure Laterality Date    BREAST BIOPSY      CARDIAC SURGERY      COLONOSCOPY N/A 5/25/2020    Procedure: COLONOSCOPY;  Surgeon: José Luis Fonseca MD;  Location: [facility];  Service: Endoscopy;  Laterality: N/A;  XARELTO/PLETAL ok    CORONARY ARTERY BYPASS GRAFT      CYSTOSCOPY W/ URETERAL STENT PLACEMENT Bilateral 6/25/2020    Procedure: CYSTOSCOPY, WITH URETERAL STENT INSERTION;  Surgeon: STEPHANY Smyth MD;  Location: [facility] OR;  Service: Urology;  Laterality: Bilateral;    HYSTERECTOMY      LAPAROSCOPIC HEMICOLECTOMY Right 6/25/2020    Procedure: HAND ASSISTED HEMICOLECTOMY, LAPAROSCOPIC;  Surgeon: Loius Rivas III, MD;  Location: [facility] OR;  Service: General;  Laterality: Right;  RN PREOP 6/19/2020--has  cardiac clearance from Dr. Khalil, --COVID NEGATIVE and T/S on 6/23/2020  COMBINED CASE WITH DR PIERCE    TREATMENT OF CARDIAC ARRHYTHMIA N/A 1/10/2022    Procedure: Cardioversion/Defibrillation (FRANCISCO not needed);  Surgeon: José Khalil MD;  Location: [facility] CATH LAB;  Service: Cardiology;  Laterality: N/A;  RN PRE OP, COVID NEGATIVE       ALLERGIES AND MEDICATION:   Review of patient's allergies indicates:  No Known Allergies       Medication List            Accurate as of September 21, 2023  8:40 AM. If you have any questions, ask your nurse or doctor.                CONTINUE taking these medications      atorvastatin 80 MG tablet  Commonly known as: LIPITOR  Take 1 tablet (80 mg total) by mouth every evening.     gabapentin 300 MG capsule  Commonly known as: NEURONTIN     hydrALAZINE 100 MG tablet  Commonly known as: APRESOLINE  TAKE 1.5 TABLETS BY MOUTH 2 TIMES A DAY.     isosorbide mononitrate 120 MG 24 hr tablet  Commonly known as: IMDUR  Take 2 tablets (240 mg total) by mouth once daily.     labetaloL 200 MG tablet  Commonly known as: NORMODYNE  TAKE 1 TABLET BY MOUTH EVERY 12 HOURS.     nitroGLYCERIN 0.4 MG SL tablet  Commonly known as: NITROSTAT  Place 1 tablet (0.4 mg total) under the tongue every 5 (five) minutes as needed for Chest pain.     olmesartan 40 MG tablet  Commonly known as: BENICAR  Take 1 tablet (40 mg total) by mouth once daily.     oxyCODONE-acetaminophen 5-325 mg per tablet  Commonly known as: PERCOCET  Take 1 tablet by mouth every 8 (eight) hours as needed for Pain.     trospium 20 mg Tab tablet  Commonly known as: sanctura  Take 1 tablet (20 mg total) by mouth every 12 (twelve) hours.     XARELTO 20 mg Tab  Generic drug: rivaroxaban  TAKE 1 TABLET BY MOUTH EVERY DAY WITH DINNER OR EVENING MEAL            STOP taking these medications      buPROPion 150 MG TB24 tablet  Commonly known as: WELLBUTRIN XL  Stopped by: José Khalil MD     diclofenac sodium 1 % Gel  Commonly known  "as: ALDO  Stopped by: José Khalil MD                SOCIAL HISTORY:     Social History     Socioeconomic History    Marital status:     Number of children: 4   Tobacco Use    Smoking status: Never     Passive exposure: Never    Smokeless tobacco: Never   Substance and Sexual Activity    Alcohol use: Not Currently    Drug use: No    Sexual activity: Yes     Partners: Male     Birth control/protection: Surgical   Social History Narrative     since 2001    He is a .  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   HENT:  Negative for nosebleeds.    Eyes:  Negative for blurred vision, double vision and photophobia.   Respiratory:  Negative for hemoptysis, shortness of breath and wheezing.    Cardiovascular:  Positive for chest pain (jaw pain). Negative for palpitations, orthopnea, claudication, leg swelling and PND.   Gastrointestinal:  Negative for abdominal pain, blood in stool, heartburn, melena, nausea and vomiting.   Genitourinary:  Negative for flank pain and hematuria.   Musculoskeletal:  Positive for joint pain. Negative for falls, myalgias and neck pain.   Skin:  Negative for rash.   Neurological:  Negative for dizziness, seizures, loss of consciousness, weakness and headaches.   Endo/Heme/Allergies:  Negative for polydipsia. Does not bruise/bleed easily.   Psychiatric/Behavioral:  Negative for depression and memory loss. The patient is not nervous/anxious.        PHYSICAL EXAM:     Vitals:    09/21/23 0829   BP: 122/86   Pulse: 66   Resp: 15    Body mass index is 34.52 kg/m².  Weight: 88.4 kg (194 lb 14.2 oz)   Height: 5' 3" (160 cm)     Physical Exam  Vitals reviewed.   Constitutional:       " General: She is not in acute distress.     Appearance: She is well-developed. She is obese. She is not ill-appearing, toxic-appearing or diaphoretic.   HENT:      Head: Normocephalic and atraumatic.   Eyes:      General: No scleral icterus.     Extraocular Movements: Extraocular movements intact.      Conjunctiva/sclera: Conjunctivae normal.      Pupils: Pupils are equal, round, and reactive to light.   Neck:      Thyroid: No thyromegaly.      Vascular: Normal carotid pulses. No carotid bruit or JVD.      Trachea: Trachea normal. No tracheal deviation.   Cardiovascular:      Rate and Rhythm: Normal rate. Rhythm irregularly irregular.      Pulses:           Carotid pulses are 2+ on the right side and 2+ on the left side.     Heart sounds: S1 normal and S2 normal. Heart sounds are distant. No murmur heard.     No friction rub. No gallop.   Pulmonary:      Effort: Pulmonary effort is normal. No respiratory distress.      Breath sounds: Normal breath sounds. No stridor. No wheezing, rhonchi or rales.   Chest:      Chest wall: No tenderness.   Abdominal:      General: There is no distension.      Palpations: Abdomen is soft.   Musculoskeletal:         General: No swelling or tenderness. Normal range of motion.      Cervical back: Normal range of motion and neck supple. No edema or rigidity.      Right lower leg: No edema.      Left lower leg: No edema.   Feet:      Right foot:      Skin integrity: No ulcer.      Left foot:      Skin integrity: No ulcer.   Skin:     General: Skin is warm and dry.      Coloration: Skin is not jaundiced.   Neurological:      General: No focal deficit present.      Mental Status: She is alert and oriented to person, place, and time.      Cranial Nerves: No cranial nerve deficit.   Psychiatric:         Mood and Affect: Mood normal.         Speech: Speech normal.         Behavior: Behavior normal. Behavior is cooperative.         DATA:   EKG: (personally reviewed tracing(s))  9/21/23 AF 88,  NSSTTW changes, was in SR 7/1/23    Laboratory:  CBC:  Recent Labs   Lab 03/02/23  1157 03/30/23  0932 07/01/23  1146   WBC 6.24 5.79 4.58   Hemoglobin 13.9 13.9 12.4   Hematocrit 41.6 41.5 38.5   Platelets 238 251 201         CHEMISTRIES:  Recent Labs   Lab 06/04/21  0708 01/10/22  1045 01/18/22  1445 03/02/23  1157 03/30/23  0932 07/01/23  1146   Glucose 119 H 97 88 122 H 89  89 79   Sodium 141 145 144 144 144  144 142   Potassium 4.5 3.9 3.7 4.3 4.7  4.7 4.0   BUN 18 22 18 21 19 19 20   Creatinine 1.1 1.0 0.9 1.1 0.9  0.9 1.1   eGFR if African American >60 >60 >60  --   --   --    eGFR if non  54 A >60 >60  --   --   --    Calcium 9.5 9.1 9.9 10.1 9.5  9.5 9.5         CARDIAC BIOMARKERS:  Recent Labs   Lab 01/18/22  1445 07/01/23  1146   Troponin I 0.009 0.010         COAGS:  Recent Labs   Lab 07/01/23  1146   INR 1.1         LIPIDS/LFTS:  Recent Labs   Lab 01/18/22  1445 03/30/23  0932 07/01/23  1146   Cholesterol  --  169  --    Triglycerides  --  72  --    HDL  --  49  --    LDL Cholesterol  --  105.6  --    Non-HDL Cholesterol  --  120  --    AST 14 29 20   ALT 21 32 26       Lab Results   Component Value Date    TSH 1.958 05/22/2019         Cardiovascular Testing:  Carotid US 3/22/23  There is 20-39% right Internal Carotid Stenosis.  There is 0-19% left Internal Carotid Stenosis.  Similar findings noted on prior report 5/7/21.    [place] MPI 2/16/22     Normal myocardial perfusion scan. There is no evidence of myocardial ischemia or infarction.    There is a moderate intensity perfusion abnormality in the anterior wall of the left ventricle, secondary to breast attenuation.    The gated perfusion images showed an ejection fraction of 71% post stress.    There is normal wall motion post stress.    LV cavity size is normal at stress.    The EKG portion of this study is negative for ischemia.    The patient reported no chest pain during the stress test.    There were no arrhythmias during  stress.    A PET stress exam is recommended if clinically indicated.    Echo 1/26/22  The left ventricle is normal in size with concentric hypertrophy and normal systolic function.  The estimated ejection fraction is 65%.  Normal left ventricular diastolic function.  Normal right ventricular size with normal right ventricular systolic function.  Mild left atrial enlargement.  Mild-to-moderate mitral regurgitation.  Mild to moderate tricuspid regurgitation.  Normal central venous pressure (3 mmHg).  The estimated PA systolic pressure is 36 mmHg.    Renal art US 1/10/19  -Normal sized kidneys without evidence for nephrolithiasis or hydronephrosis.  -There is slight increased peak systolic velocity within the right renal artery from the proximal to mid aspect from 50 cm/sec to 158 cm/sec which is likely related to vessel tortuosity and turbulent flow in light of the absent elevated resistive indices or ratios however underlying right renal artery stenosis cannot be excluded.  This could be further evaluated with CTA or MRA imaging.  -No evidence for left renal artery elevated resistive indices or renal artery velocities to suggest renal artery stenosis.    LE art US 5/31/18  R STEFANI 1.09  L STEFANI 1.08  Elevated velocities within the mid to distal right superficial femoral artery strongly suggestive of hemodynamically significant stenoses within the mid to distal right superficial femoral artery.    LE venous US/reflux 5/31/18  No evidence for deep venous thrombosis within either lower extremity.  No evidence for venous insufficiency within the greater or lesser saphenous veins bilaterally.    FRANCISCO/DCCV 3/9/18  Normal bivent size/fxn, EF 55%, normal wall motion  Normal appearing valves.  Trace AI, mild MR, mild TR  No LA/LON thrombus  Succcessful DCCV af->NSR 360J x1.  Plan:  Cont med rx  Cont Xarelto 20mg qd  OK for discharge later on today and follow up with me in the office 1 week for outpatient stress test.    Cath  3/25/11 ([facility], subsequently had CABG)  LM: normal  LAD sequential 90% prox and 80% mid stenoses   D2 prox 80%  LCx: MLI   OM2 prox 80%  RCA: dom, mid-dist 40% sequential stenoses    ASSESSMENT:   # PAF in SR, s/p FRANCISCO/DCCV 3/9/18, DCCV 1/10/22.  CHADS VASC 2 on Xarelto.    # HTN, controlled, pt following in digital med program.  # CAD s/p CABG x3V 4/2011.  MPI 2/2022 neg.  Had jaw pain with recurrent PAF and inc VR.   # PAD, bilat buttock and RLE claudication, R SFA stenosis noted on US 5/2018.  Sxs seemd to have resolved (pt thinks it was more related to R hip pain, did not notice much of a difference with pletal rx)  # HLP on atorva 80mg  # CKD3a  # BMI 35, stable vs last OV  # aortic atherosclerosis (CT A/P 9/25/20)  # preop for colonsocopy     PLAN:   Cont med rx  Cont Xarelto 20mg qd   Add amio 400mg bid for 1 week then 200mg bid then 200mg qd  FRANCISCO/DCCV 10/5/23 at 12pm  Diet/exercise/weight loss  Cont monitoring in digital med program.  Neg drug: add HCTZ/aldact  The patient is at low cardiac risk for the planned colonsocopy and associated anesthesia (planned on 11/5/23).  No further preoperative cardiac testing is required.  Is acceptable to withhold the patient's Xarelto for 2 days prior to the surgery and resume it as soon as possible thereafter.  RTC 1 month (Oct 2023)  Surveillance carotid US and lipids/LFT 1 year (July 2024)    If recurrent anginal sxs despite control of BP, consider cardiac PET vs cath.    Consider LE angio in future for eval of LE PAD if limiting claudication recurs    Risks, benefits and alternatives of the FRANCISCO/Cardioversion procedure were discussed with the patient including throat irritation, aspiration, anesthetic complications, esophageal irritation/perforation, skin irritation, arrhythmia, etc.  Patient understands and agrees to proceed.  Permits signed.        José Khalil MD, FACC

## 2023-09-27 ENCOUNTER — HOSPITAL ENCOUNTER (EMERGENCY)
Facility: HOSPITAL | Age: 63
Discharge: HOME | End: 2023-09-27
Attending: STUDENT IN AN ORGANIZED HEALTH CARE EDUCATION/TRAINING PROGRAM
Payer: COMMERCIAL

## 2023-09-27 ENCOUNTER — TELEPHONE (OUTPATIENT)
Dept: CARDIOLOGY | Facility: CLINIC | Age: 63
End: 2023-09-27
Payer: COMMERCIAL

## 2023-09-27 VITALS
WEIGHT: 194 LBS | SYSTOLIC BLOOD PRESSURE: 141 MMHG | HEIGHT: 63 IN | OXYGEN SATURATION: 97 % | HEART RATE: 90 BPM | DIASTOLIC BLOOD PRESSURE: 98 MMHG | RESPIRATION RATE: 15 BRPM | BODY MASS INDEX: 34.38 KG/M2 | TEMPERATURE: 98 F

## 2023-09-27 DIAGNOSIS — I49.9 ARRHYTHMIA: ICD-10-CM

## 2023-09-27 DIAGNOSIS — R06.09 DYSPNEA ON EXERTION: Primary | ICD-10-CM

## 2023-09-27 DIAGNOSIS — I48.91 A-FIB: ICD-10-CM

## 2023-09-27 LAB
ALBUMIN SERPL BCP-MCNC: 3.8 G/DL (ref 3.5–5.2)
ALP SERPL-CCNC: 122 U/L (ref 55–135)
ALT SERPL W/O P-5'-P-CCNC: 18 U/L (ref 10–44)
ANION GAP SERPL CALC-SCNC: 8 MMOL/L (ref 8–16)
AST SERPL-CCNC: 14 U/L (ref 10–40)
BACTERIA #/AREA URNS HPF: ABNORMAL /HPF
BASOPHILS # BLD AUTO: 0.01 K/UL (ref 0–0.2)
BASOPHILS NFR BLD: 0.2 % (ref 0–1.9)
BILIRUB SERPL-MCNC: 0.5 MG/DL (ref 0.1–1)
BILIRUB UR QL STRIP: NEGATIVE
BNP SERPL-MCNC: 351 PG/ML (ref 0–99)
BUN SERPL-MCNC: 26 MG/DL (ref 8–23)
CALCIUM SERPL-MCNC: 9.2 MG/DL (ref 8.7–10.5)
CHLORIDE SERPL-SCNC: 109 MMOL/L (ref 95–110)
CLARITY UR: CLEAR
CO2 SERPL-SCNC: 25 MMOL/L (ref 23–29)
COLOR UR: YELLOW
CREAT SERPL-MCNC: 1.1 MG/DL (ref 0.5–1.4)
DIFFERENTIAL METHOD: ABNORMAL
EOSINOPHIL # BLD AUTO: 0 K/UL (ref 0–0.5)
EOSINOPHIL NFR BLD: 0.8 % (ref 0–8)
ERYTHROCYTE [DISTWIDTH] IN BLOOD BY AUTOMATED COUNT: 12.5 % (ref 11.5–14.5)
EST. GFR  (NO RACE VARIABLE): 56 ML/MIN/1.73 M^2
GLUCOSE SERPL-MCNC: 82 MG/DL (ref 70–110)
GLUCOSE UR QL STRIP: NEGATIVE
HCT VFR BLD AUTO: 39.7 % (ref 37–48.5)
HGB BLD-MCNC: 12.5 G/DL (ref 12–16)
HGB UR QL STRIP: NEGATIVE
IMM GRANULOCYTES # BLD AUTO: 0.01 K/UL (ref 0–0.04)
IMM GRANULOCYTES NFR BLD AUTO: 0.2 % (ref 0–0.5)
KETONES UR QL STRIP: NEGATIVE
LACTATE SERPL-SCNC: 0.7 MMOL/L (ref 0.5–2.2)
LEUKOCYTE ESTERASE UR QL STRIP: ABNORMAL
LYMPHOCYTES # BLD AUTO: 1.2 K/UL (ref 1–4.8)
LYMPHOCYTES NFR BLD: 23.6 % (ref 18–48)
MAGNESIUM SERPL-MCNC: 2 MG/DL (ref 1.6–2.6)
MCH RBC QN AUTO: 29.8 PG (ref 27–31)
MCHC RBC AUTO-ENTMCNC: 31.5 G/DL (ref 32–36)
MCV RBC AUTO: 95 FL (ref 82–98)
MICROSCOPIC COMMENT: ABNORMAL
MONOCYTES # BLD AUTO: 0.3 K/UL (ref 0.3–1)
MONOCYTES NFR BLD: 6.4 % (ref 4–15)
NEUTROPHILS # BLD AUTO: 3.6 K/UL (ref 1.8–7.7)
NEUTROPHILS NFR BLD: 68.8 % (ref 38–73)
NITRITE UR QL STRIP: NEGATIVE
NRBC BLD-RTO: 0 /100 WBC
PH UR STRIP: 6 [PH] (ref 5–8)
PLATELET # BLD AUTO: 250 K/UL (ref 150–450)
PMV BLD AUTO: 9.6 FL (ref 9.2–12.9)
POTASSIUM SERPL-SCNC: 3.9 MMOL/L (ref 3.5–5.1)
PROT SERPL-MCNC: 6.9 G/DL (ref 6–8.4)
PROT UR QL STRIP: NEGATIVE
RBC # BLD AUTO: 4.2 M/UL (ref 4–5.4)
SODIUM SERPL-SCNC: 142 MMOL/L (ref 136–145)
SP GR UR STRIP: 1.01 (ref 1–1.03)
SQUAMOUS #/AREA URNS HPF: 11 /HPF
TROPONIN I SERPL DL<=0.01 NG/ML-MCNC: <0.006 NG/ML (ref 0–0.03)
TSH SERPL DL<=0.005 MIU/L-ACNC: 1.46 UIU/ML (ref 0.4–4)
URN SPEC COLLECT METH UR: ABNORMAL
UROBILINOGEN UR STRIP-ACNC: NEGATIVE EU/DL
WBC # BLD AUTO: 5.18 K/UL (ref 3.9–12.7)
WBC #/AREA URNS HPF: 7 /HPF (ref 0–5)

## 2023-09-27 PROCEDURE — 93010 ELECTROCARDIOGRAM REPORT: CPT | Mod: ,,, | Performed by: INTERNAL MEDICINE

## 2023-09-27 PROCEDURE — 83880 ASSAY OF NATRIURETIC PEPTIDE: CPT | Performed by: STUDENT IN AN ORGANIZED HEALTH CARE EDUCATION/TRAINING PROGRAM

## 2023-09-27 PROCEDURE — 80053 COMPREHEN METABOLIC PANEL: CPT | Performed by: STUDENT IN AN ORGANIZED HEALTH CARE EDUCATION/TRAINING PROGRAM

## 2023-09-27 PROCEDURE — 93010 EKG 12-LEAD: ICD-10-PCS | Mod: ,,, | Performed by: INTERNAL MEDICINE

## 2023-09-27 PROCEDURE — 81000 URINALYSIS NONAUTO W/SCOPE: CPT | Performed by: STUDENT IN AN ORGANIZED HEALTH CARE EDUCATION/TRAINING PROGRAM

## 2023-09-27 PROCEDURE — 83735 ASSAY OF MAGNESIUM: CPT | Performed by: STUDENT IN AN ORGANIZED HEALTH CARE EDUCATION/TRAINING PROGRAM

## 2023-09-27 PROCEDURE — 96374 THER/PROPH/DIAG INJ IV PUSH: CPT

## 2023-09-27 PROCEDURE — 99285 EMERGENCY DEPT VISIT HI MDM: CPT | Mod: 25

## 2023-09-27 PROCEDURE — 85025 COMPLETE CBC W/AUTO DIFF WBC: CPT | Performed by: STUDENT IN AN ORGANIZED HEALTH CARE EDUCATION/TRAINING PROGRAM

## 2023-09-27 PROCEDURE — 93005 ELECTROCARDIOGRAM TRACING: CPT

## 2023-09-27 PROCEDURE — 63600175 PHARM REV CODE 636 W HCPCS: Performed by: STUDENT IN AN ORGANIZED HEALTH CARE EDUCATION/TRAINING PROGRAM

## 2023-09-27 PROCEDURE — 84484 ASSAY OF TROPONIN QUANT: CPT | Performed by: STUDENT IN AN ORGANIZED HEALTH CARE EDUCATION/TRAINING PROGRAM

## 2023-09-27 PROCEDURE — 83605 ASSAY OF LACTIC ACID: CPT | Performed by: STUDENT IN AN ORGANIZED HEALTH CARE EDUCATION/TRAINING PROGRAM

## 2023-09-27 PROCEDURE — 84443 ASSAY THYROID STIM HORMONE: CPT | Performed by: STUDENT IN AN ORGANIZED HEALTH CARE EDUCATION/TRAINING PROGRAM

## 2023-09-27 RX ORDER — FUROSEMIDE 10 MG/ML
40 INJECTION INTRAMUSCULAR; INTRAVENOUS
Status: COMPLETED | OUTPATIENT
Start: 2023-09-27 | End: 2023-09-27

## 2023-09-27 RX ORDER — FUROSEMIDE 20 MG/1
20 TABLET ORAL DAILY
Qty: 30 TABLET | Refills: 0 | Status: SHIPPED | OUTPATIENT
Start: 2023-09-27 | End: 2023-10-05 | Stop reason: SDUPTHER

## 2023-09-27 RX ORDER — NITROFURANTOIN 25; 75 MG/1; MG/1
100 CAPSULE ORAL 2 TIMES DAILY
Qty: 10 CAPSULE | Refills: 0 | Status: SHIPPED | OUTPATIENT
Start: 2023-09-27 | End: 2023-10-02

## 2023-09-27 RX ADMIN — FUROSEMIDE 40 MG: 10 INJECTION, SOLUTION INTRAVENOUS at 12:09

## 2023-09-27 NOTE — TELEPHONE ENCOUNTER
----- Message from Analia Gorman sent at 9/27/2023  2:34 PM CDT -----  Type: Patient Call Back    Who called:pt     What is the request in detail:has questions in regards to pre  op appt. Call pt     Can the clinic reply by [facility]?    Would the patient rather a call back or a response via [facility]? call    Best call back number:[phone] (home)       Additional Information:

## 2023-09-27 NOTE — ED TRIAGE NOTES
Pt complaining of afib palpitations and SOB on exertion. Pt has hx of afib and can feel when she goes into it. Pt recently had arm surgery 7 weeks ago. Pt denies fever, chills, headache, N/V/D. Placed on cardiac monitor.

## 2023-09-27 NOTE — TELEPHONE ENCOUNTER
Informed patient to keep pre-op appointment and they pre-op nurses will decide if she needs repeat testing. keke

## 2023-09-27 NOTE — ED PROVIDER NOTES
"Encounter Date: 9/27/2023    SCRIBE #1 NOTE: I, Casie Hoffman, am scribing for, and in the presence of,  Yady Cruz MD. I have scribed the following portions of the note - Other sections scribed: HPI, ROS, PE.       History     Chief Complaint   Patient presents with    Atrial Fibrillation     Pt to ER with reports of Afib x 7 days. Pt reports has seen cardiologist and is scheduled for a cardioversion on October 5th. Pt reports feeling weak and dizzy worsening over the past week. + jaw pain     63 y.o. female, with a PMHx of CAD, HTN, A-Fib on Xarelto, CABG, who presents to the ED with atrial fibrillation for the past 7 days. Patient states she was originally diagnosed with A-fib in 2019 and the symptoms would come on every few months, but with this episode of a-fib its persistence is longer than usual. Patient notes she was prescribed amiodarone for her a-fib and she was just able to  the medication yesterday, so she's only had one dose. Patient reports she is scheduled for a cardioversion on the 5th, but wanted to be seen today for her symptoms. Patient is complaining of sob, nausea, palpitations, and bilateral jaw pain with exertion, described as "tightness," that is only alleviated with rest.    Patient notes she had muscle reconstructive surgery on her right arm 7 weeks ago and is concerned the pain she is feeling while in recovery is causing her a-fib. Patient denies leg swelling, chest pain, pain with deep inhalation, cough, fever, sore throat, vomiting, headache, or other associated symptoms. Patient did not take nitro to help with her symptoms.     The history is provided by the patient.     Review of patient's allergies indicates:   Allergen Reactions    Amlodipine Swelling     Past Medical History:   Diagnosis Date    Atrial fibrillation     Colon polyp     Coronary artery disease     Disorder of kidney and ureter     Hyperlipidemia     Hypertension      Past Surgical History:   Procedure " Laterality Date    BREAST BIOPSY      CARDIAC SURGERY      COLONOSCOPY N/A 5/25/2020    Procedure: COLONOSCOPY;  Surgeon: José Luis Fonseca MD;  Location: [facility] ENDO;  Service: Endoscopy;  Laterality: N/A;  XARELTO/PLETAL ok    CORONARY ARTERY BYPASS GRAFT      CYSTOSCOPY W/ URETERAL STENT PLACEMENT Bilateral 6/25/2020    Procedure: CYSTOSCOPY, WITH URETERAL STENT INSERTION;  Surgeon: STEPHANY Smyth MD;  Location: [facility] OR;  Service: Urology;  Laterality: Bilateral;    HYSTERECTOMY      LAPAROSCOPIC HEMICOLECTOMY Right 6/25/2020    Procedure: HAND ASSISTED HEMICOLECTOMY, LAPAROSCOPIC;  Surgeon: Louis Rivas III, MD;  Location: [facility] OR;  Service: General;  Laterality: Right;  RN PREOP 6/19/2020--has cardiac clearance from Dr. Khalil, --COVID NEGATIVE and T/S on 6/23/2020  COMBINED CASE WITH DR SMYTH    TREATMENT OF CARDIAC ARRHYTHMIA N/A 1/10/2022    Procedure: Cardioversion/Defibrillation (FRANCISCO not needed);  Surgeon: José Khalil MD;  Location: [facility] CATH LAB;  Service: Cardiology;  Laterality: N/A;  RN PRE OP, COVID NEGATIVE     Family History   Problem Relation Age of Onset    Cancer Father 78        lung    Cancer Mother 26        oral    Breast cancer Maternal Aunt      Social History     Tobacco Use    Smoking status: Never     Passive exposure: Never    Smokeless tobacco: Never   Substance Use Topics    Alcohol use: Not Currently    Drug use: No     Review of Systems   Constitutional:  Negative for fever.   HENT:  Negative for sore throat.         (+) bilateral jaw pain   Respiratory:  Positive for shortness of breath. Negative for cough.    Cardiovascular:  Positive for palpitations. Negative for chest pain and leg swelling.   Gastrointestinal:  Positive for nausea. Negative for abdominal pain, diarrhea and vomiting.   Genitourinary:  Negative for dysuria and hematuria.   Musculoskeletal:  Negative for back pain and neck pain.   Skin:  Negative for rash.   Neurological:  Negative for syncope  and headaches.   Hematological:  Bruises/bleeds easily.       Physical Exam     Initial Vitals [09/27/23 1022]   BP Pulse Resp Temp SpO2   (!) 145/88 88 18 98 °F (36.7 °C) 98 %      MAP       --         Physical Exam    Nursing note and vitals reviewed.  Constitutional: She appears well-developed and well-nourished.   HENT:   Head: Normocephalic and atraumatic.   Eyes: Conjunctivae and EOM are normal. Pupils are equal, round, and reactive to light.   Neck:   Normal range of motion.  Cardiovascular:  Normal rate. An irregular rhythm present.           No murmurs   Pulmonary/Chest: No respiratory distress.   No chest wall tenderness   Abdominal: She exhibits no distension.   No abdominal tenderness   Musculoskeletal:         General: Normal range of motion.      Cervical back: Normal range of motion.      Comments: No leg swelling     Neurological: She is alert. No cranial nerve deficit. GCS score is 15. GCS eye subscore is 4. GCS verbal subscore is 5. GCS motor subscore is 6.   Skin: Skin is warm and dry.   Psychiatric: She has a normal mood and affect. Thought content normal.         ED Course   Procedures  Labs Reviewed   CBC W/ AUTO DIFFERENTIAL   B-TYPE NATRIURETIC PEPTIDE   COMPREHENSIVE METABOLIC PANEL   MAGNESIUM   LACTIC ACID, PLASMA   TROPONIN I   TSH   URINALYSIS, REFLEX TO URINE CULTURE          Imaging Results    None          Medications - No data to display  Medical Decision Making  63-year-old woman with history of AFib, on Xarelto, amiodarone, CABG, presenting with 1 week of palpitations, shortness breath with exertion, jaw pain.    EKG with AFib, no rapid ventricular rate  Differential includes:  ACS versus intermittent Afib RVR versus metabolic or infectious etiology  Less concern for PE given patient is compliant on Xarelto, no pleuritic chest pain, satting 98% on room air  BNP elevation and bilateral pitting edema suggestive of CHF may be exacerbated by afib rhythm, satting 97%. Does not require  admission/observation for CHF exacerbation at this time. Given Lasix 40 dose in ED, 20 PO lasix, had been taking lasix previously but was taken off her medication list several months ago. Follow up with cardiology for cardioversion, with patient advised to track her daily weight and edema in legs.    Amount and/or Complexity of Data Reviewed  Labs: ordered.  Radiology: ordered.  ECG/medicine tests: ordered.    Risk  Prescription drug management.            Scribe Attestation:   Scribe #1: I performed the above scribed service and the documentation accurately describes the services I performed. I attest to the accuracy of the note.              I, Yady Cruz, personally performed the services described in this documentation. All medical record entries made by the scribe were at my direction and in my presence. I have reviewed the chart and agree that the record reflects my personal performance and is accurate and complete.           Clinical Impression:   Final diagnoses:  [I48.91] A-fib  [I49.9] Arrhythmia               Yady Cruz MD  09/27/23 [id number]

## 2023-09-27 NOTE — DISCHARGE INSTRUCTIONS
You were seen in the ED today for your shortness of breath, most likely due to extra fluid in your lungs, may be because of your atrial fibrillation.  You were given Lasix for the extra fluid, please take 20 mg daily, and monitor your weight on a scale and leg swelling.  Follow up with Dr. Khalil as scheduled regarding Lasix and also your cardioversion.  Return to the ER if you have severe chest pain, severe shortness of breath, dizziness or syncope.  Please take Macrobid for your UTI.

## 2023-09-29 ENCOUNTER — HOSPITAL ENCOUNTER (OUTPATIENT)
Dept: PREADMISSION TESTING | Facility: HOSPITAL | Age: 63
Discharge: HOME | End: 2023-09-29
Attending: INTERNAL MEDICINE
Payer: COMMERCIAL

## 2023-09-29 VITALS
DIASTOLIC BLOOD PRESSURE: 78 MMHG | OXYGEN SATURATION: 98 % | TEMPERATURE: 97 F | BODY MASS INDEX: 34.42 KG/M2 | SYSTOLIC BLOOD PRESSURE: 117 MMHG | HEIGHT: 63 IN | HEART RATE: 96 BPM | WEIGHT: 194.25 LBS | RESPIRATION RATE: 16 BRPM

## 2023-09-29 RX ORDER — TIZANIDINE 4 MG/1
4 TABLET ORAL
COMMUNITY
Start: 2023-08-03 | End: 2023-09-29 | Stop reason: CLARIF

## 2023-09-29 RX ORDER — CYCLOBENZAPRINE HCL 10 MG
TABLET ORAL
COMMUNITY
Start: 2023-09-28

## 2023-09-29 NOTE — PLAN OF CARE
Pre-operative instructions, medication directives and pain scales reviewed with patient. All questions the patient had were answered. Re-assurance about surgical procedure and day of surgery routine given as needed, patient verbalized understanding of the pre-op instructions.  Patient instructed to report to [facility] for surgery.   PROCEDURE MOVED TO 10/3/23 PER PT REQUEST, CONFIRMED BY MARII

## 2023-09-29 NOTE — DISCHARGE INSTRUCTIONS
YOUR PROCEDURE WILL BE AT [facility] at [address] [place] [address]  Before 7 AM, enter through the Emergency Entrance..   After 7 AM enter through the Main Entrance.    Your procedure  is scheduled for ___10/3/23_______.    Call [phone] between 2pm and 5pm on __10/2/23_____to find out your arrival time for the day of surgery.    You may have two visitors.  No children under 12 years old.     You will be going to the Same Day Surgery Unit on the 2nd floor of the hospital.    Important instructions:  Do not eat anything after midnight.  You may have plain water, non carbonated.  You may also have Gatorade or Powerade after midnight.    Stop all fluids 2 hours before your surgery.    It is okay to brush your teeth.  Do not have gum, candy or mints.    SEE MEDICATION SHEET.   TAKE MEDICATIONS AS DIRECTED WITH SIPS OF WATER.      STOP taking Aspirin, Ibuprofen,  Advil, Motrin, Mobic(meloxicam), Aleve (naproxen), Fish oil, and Vitamin E for at least 7 days before your surgery.     You may take Tylenol if needed which is not a blood thinner.    Please shower the night before and the morning of your surgery.      Follow any Prep Instructions given by your surgeon.      Please place clean linens on your bed the night before surgery. Please wear fresh clean clothing after each shower.    No shaving of procedural area at least 4-5 days before surgery due to increased risk of skin irritation and/or possible infection.    Contact lenses and removable denture work may not be worn during your procedure.    You may wear deodorant only. If you are having breast surgery, do not wear deodorant on the operative side.    Do not wear powder, body lotion, perfume/cologne or make-up.    Do not wear any jewelry or have any metal on your body.    You will be asked to remove any dentures or partials for the procedure.    If you are going home on the same day of surgery, you must arrange for a family  member or a friend to drive you home.  Public transportation is prohibited.  You will not be able to drive home if you were given anesthesia or sedation.    Patients who want to have their Post-op prescriptions filled from our in-house [facility], bring a Credit/Debit Card or cash with you. A co-pay may be required.  The pharmacy closes at 5:30 pm.    Wear loose fitting clothes allowing for bandages.    Please leave money and valuables home.      You may bring your cell phone.    Call the doctor if fever or illness should occur before your surgery.    Call [phone] to contact us here if needed.

## 2023-10-02 ENCOUNTER — TELEPHONE (OUTPATIENT)
Dept: SURGERY | Facility: HOSPITAL | Age: 63
End: 2023-10-02
Payer: COMMERCIAL

## 2023-10-02 VITALS — BODY MASS INDEX: 34.4 KG/M2 | WEIGHT: 194.19 LBS

## 2023-10-03 ENCOUNTER — HOSPITAL ENCOUNTER (OUTPATIENT)
Facility: HOSPITAL | Age: 63
Discharge: HOME | End: 2023-10-03
Attending: INTERNAL MEDICINE | Admitting: INTERNAL MEDICINE
Payer: COMMERCIAL

## 2023-10-03 DIAGNOSIS — I48.0 PAF (PAROXYSMAL ATRIAL FIBRILLATION): ICD-10-CM

## 2023-10-03 DIAGNOSIS — I48.0 PAROXYSMAL ATRIAL FIBRILLATION: ICD-10-CM

## 2023-10-03 PROCEDURE — 99499 UNLISTED E&M SERVICE: CPT | Mod: ,,, | Performed by: INTERNAL MEDICINE

## 2023-10-03 PROCEDURE — 93010 EKG 12-LEAD: ICD-10-PCS | Mod: ,,, | Performed by: INTERNAL MEDICINE

## 2023-10-03 PROCEDURE — 99499 NO LOS: ICD-10-PCS | Mod: ,,, | Performed by: INTERNAL MEDICINE

## 2023-10-03 PROCEDURE — 93010 ELECTROCARDIOGRAM REPORT: CPT | Mod: ,,, | Performed by: INTERNAL MEDICINE

## 2023-10-03 PROCEDURE — 93005 ELECTROCARDIOGRAM TRACING: CPT

## 2023-10-03 RX ORDER — SODIUM CHLORIDE 9 MG/ML
INJECTION, SOLUTION INTRAVENOUS CONTINUOUS
Status: DISCONTINUED | OUTPATIENT
Start: 2023-10-03 | End: 2023-10-03 | Stop reason: HOSPADM

## 2023-10-03 RX ORDER — LIDOCAINE HYDROCHLORIDE 10 MG/ML
1 INJECTION, SOLUTION EPIDURAL; INFILTRATION; INTRACAUDAL; PERINEURAL ONCE
Status: DISCONTINUED | OUTPATIENT
Start: 2023-10-03 | End: 2023-10-03 | Stop reason: HOSPADM

## 2023-10-04 ENCOUNTER — TELEPHONE (OUTPATIENT)
Dept: CARDIOLOGY | Facility: CLINIC | Age: 63
End: 2023-10-04
Payer: COMMERCIAL

## 2023-10-04 NOTE — TELEPHONE ENCOUNTER
Patient called in regards of fluid around her heart and lungs that she found out about in the Emergency room right before a pre op visit. She stated that she had been in Afib for 11 days, then after was no longer in a fib. Patient seeking advise on what to do about the fluid. She is currently taking lasix.     Please advise

## 2023-10-04 NOTE — TELEPHONE ENCOUNTER
Patient was called back about a question she had for Dr. Khalil earlier today. Patient had been told that she has fluid build up around her heart and lungs. She states that she has been feeling short of breath. Patient also has questions about medications that was started for her a fib, which she informs me that she is not in currently. Scheduled an appointment first thing in the morning.

## 2023-10-05 ENCOUNTER — OFFICE VISIT (OUTPATIENT)
Dept: CARDIOLOGY | Facility: CLINIC | Age: 63
End: 2023-10-05
Payer: COMMERCIAL

## 2023-10-05 VITALS
OXYGEN SATURATION: 95 % | BODY MASS INDEX: 34.86 KG/M2 | DIASTOLIC BLOOD PRESSURE: 84 MMHG | HEART RATE: 58 BPM | RESPIRATION RATE: 20 BRPM | SYSTOLIC BLOOD PRESSURE: 162 MMHG | WEIGHT: 196.75 LBS | HEIGHT: 63 IN

## 2023-10-05 DIAGNOSIS — I50.33 ACUTE ON CHRONIC HEART FAILURE WITH PRESERVED EJECTION FRACTION: Primary | ICD-10-CM

## 2023-10-05 DIAGNOSIS — I10 ESSENTIAL HYPERTENSION: ICD-10-CM

## 2023-10-05 DIAGNOSIS — I48.0 PAROXYSMAL ATRIAL FIBRILLATION: ICD-10-CM

## 2023-10-05 DIAGNOSIS — I70.0 AORTIC ATHEROSCLEROSIS: ICD-10-CM

## 2023-10-05 DIAGNOSIS — I25.708 CORONARY ARTERY DISEASE INVOLVING CORONARY BYPASS GRAFT OF NATIVE HEART WITH OTHER FORMS OF ANGINA PECTORIS: ICD-10-CM

## 2023-10-05 DIAGNOSIS — E66.9 NON MORBID OBESITY, UNSPECIFIED OBESITY TYPE: ICD-10-CM

## 2023-10-05 DIAGNOSIS — R06.02 SHORTNESS OF BREATH: ICD-10-CM

## 2023-10-05 DIAGNOSIS — I65.29 CAROTID ATHEROSCLEROSIS, UNSPECIFIED LATERALITY: ICD-10-CM

## 2023-10-05 DIAGNOSIS — G47.33 OSA (OBSTRUCTIVE SLEEP APNEA): ICD-10-CM

## 2023-10-05 PROCEDURE — 3079F PR MOST RECENT DIASTOLIC BLOOD PRESSURE 80-89 MM HG: ICD-10-PCS | Mod: CPTII,S$GLB,, | Performed by: INTERNAL MEDICINE

## 2023-10-05 PROCEDURE — 4010F ACE/ARB THERAPY RXD/TAKEN: CPT | Mod: CPTII,S$GLB,, | Performed by: INTERNAL MEDICINE

## 2023-10-05 PROCEDURE — 3077F SYST BP >= 140 MM HG: CPT | Mod: CPTII,S$GLB,, | Performed by: INTERNAL MEDICINE

## 2023-10-05 PROCEDURE — 99215 OFFICE O/P EST HI 40 MIN: CPT | Mod: S$GLB,,, | Performed by: INTERNAL MEDICINE

## 2023-10-05 PROCEDURE — 93000 EKG 12-LEAD: ICD-10-PCS | Mod: S$GLB,,, | Performed by: INTERNAL MEDICINE

## 2023-10-05 PROCEDURE — 3077F PR MOST RECENT SYSTOLIC BLOOD PRESSURE >= 140 MM HG: ICD-10-PCS | Mod: CPTII,S$GLB,, | Performed by: INTERNAL MEDICINE

## 2023-10-05 PROCEDURE — 99999 PR PBB SHADOW E&M-EST. PATIENT-LVL IV: CPT | Mod: PBBFAC,,, | Performed by: INTERNAL MEDICINE

## 2023-10-05 PROCEDURE — 3044F HG A1C LEVEL LT 7.0%: CPT | Mod: CPTII,S$GLB,, | Performed by: INTERNAL MEDICINE

## 2023-10-05 PROCEDURE — 3079F DIAST BP 80-89 MM HG: CPT | Mod: CPTII,S$GLB,, | Performed by: INTERNAL MEDICINE

## 2023-10-05 PROCEDURE — 3008F BODY MASS INDEX DOCD: CPT | Mod: CPTII,S$GLB,, | Performed by: INTERNAL MEDICINE

## 2023-10-05 PROCEDURE — 1159F PR MEDICATION LIST DOCUMENTED IN MEDICAL RECORD: ICD-10-PCS | Mod: CPTII,S$GLB,, | Performed by: INTERNAL MEDICINE

## 2023-10-05 PROCEDURE — 99999 PR PBB SHADOW E&M-EST. PATIENT-LVL IV: ICD-10-PCS | Mod: PBBFAC,,, | Performed by: INTERNAL MEDICINE

## 2023-10-05 PROCEDURE — 4010F PR ACE/ARB THEARPY RXD/TAKEN: ICD-10-PCS | Mod: CPTII,S$GLB,, | Performed by: INTERNAL MEDICINE

## 2023-10-05 PROCEDURE — 1160F RVW MEDS BY RX/DR IN RCRD: CPT | Mod: CPTII,S$GLB,, | Performed by: INTERNAL MEDICINE

## 2023-10-05 PROCEDURE — 99215 PR OFFICE/OUTPT VISIT, EST, LEVL V, 40-54 MIN: ICD-10-PCS | Mod: S$GLB,,, | Performed by: INTERNAL MEDICINE

## 2023-10-05 PROCEDURE — 1159F MED LIST DOCD IN RCRD: CPT | Mod: CPTII,S$GLB,, | Performed by: INTERNAL MEDICINE

## 2023-10-05 PROCEDURE — 93000 ELECTROCARDIOGRAM COMPLETE: CPT | Mod: S$GLB,,, | Performed by: INTERNAL MEDICINE

## 2023-10-05 PROCEDURE — 3008F PR BODY MASS INDEX (BMI) DOCUMENTED: ICD-10-PCS | Mod: CPTII,S$GLB,, | Performed by: INTERNAL MEDICINE

## 2023-10-05 PROCEDURE — 3044F PR MOST RECENT HEMOGLOBIN A1C LEVEL <7.0%: ICD-10-PCS | Mod: CPTII,S$GLB,, | Performed by: INTERNAL MEDICINE

## 2023-10-05 PROCEDURE — 1160F PR REVIEW ALL MEDS BY PRESCRIBER/CLIN PHARMACIST DOCUMENTED: ICD-10-PCS | Mod: CPTII,S$GLB,, | Performed by: INTERNAL MEDICINE

## 2023-10-05 RX ORDER — SPIRONOLACTONE 25 MG/1
25 TABLET ORAL DAILY
Qty: 90 TABLET | Refills: 3 | Status: SHIPPED | OUTPATIENT
Start: 2023-10-05 | End: 2024-10-04

## 2023-10-05 RX ORDER — FUROSEMIDE 40 MG/1
40 TABLET ORAL DAILY
Qty: 90 TABLET | Refills: 3 | Status: SHIPPED | OUTPATIENT
Start: 2023-10-05 | End: 2024-10-04

## 2023-10-05 NOTE — PROGRESS NOTES
CARDIOVASCULAR PROGRESS NOTE    REASON FOR CONSULT:   Keyonna Guillen is a 63 y.o. female who presents for follow up of PAF, CAD/CABG, HTN, PAD  PCP: [place]  Ortho: [facility]  HISTORY OF PRESENT ILLNESS:   The patient returns for follow up.  In the interim since her last visit, she was seen in the emergency room, and prescribe Lasix for heart failure.  Her BNP was elevated.  Was in AFib at that time, but subsequently spontaneously converted to sinus rhythm.  Her FRANCISCO cardioversion was canceled.  She currently complains of dyspnea on exertion.  She is not having any angina.  She denies any palpitations or syncope.  She continues to take her amiodarone and Xarelto.  There has been no PND, orthopnea, melena, hematuria, or claudication symptoms.    CARDIOVASCULAR HISTORY:   CAD s/p CABG 4/5/11: LIMA-LAD, SVG-D, SVG-OM  PAF s/p FRANCISCO/DCCV 3/8/18, DCCV 1/10/22, on Xarelto/amio, CHADS VASC 2  Hx L vert art occlusion  PAD, ?R SFA stenosis (US 5/2018)    PAST MEDICAL HISTORY:     Past Medical History:   Diagnosis Date    Atrial fibrillation     Colon polyp     Coronary artery disease     Disorder of kidney and ureter     Hyperlipidemia     Hypertension        PAST SURGICAL HISTORY:     Past Surgical History:   Procedure Laterality Date    BREAST BIOPSY      CARDIAC SURGERY      COLONOSCOPY N/A 5/25/2020    Procedure: COLONOSCOPY;  Surgeon: José Luis Fonseca MD;  Location: [facility] ENDO;  Service: Endoscopy;  Laterality: N/A;  XARELTO/PLETAL ok    CORONARY ARTERY BYPASS GRAFT      CYSTOSCOPY W/ URETERAL STENT PLACEMENT Bilateral 6/25/2020    Procedure: CYSTOSCOPY, WITH URETERAL STENT INSERTION;  Surgeon: STEPHANY Smyth MD;  Location: [facility] OR;  Service: Urology;  Laterality: Bilateral;    HYSTERECTOMY      LAPAROSCOPIC HEMICOLECTOMY Right 6/25/2020    Procedure: HAND ASSISTED HEMICOLECTOMY, LAPAROSCOPIC;  Surgeon: Luois Rivas III, MD;  Location: [facility] OR;  Service: General;  Laterality: Right;  RN PREOP 6/19/2020--has cardiac  clearance from Dr. Khalil, --COVID NEGATIVE and T/S on 6/23/2020  COMBINED CASE WITH DR PIERCE    TREATMENT OF CARDIAC ARRHYTHMIA N/A 1/10/2022    Procedure: Cardioversion/Defibrillation (FRANCISCO not needed);  Surgeon: José Khalil MD;  Location: [facility] CATH LAB;  Service: Cardiology;  Laterality: N/A;  RN PRE OP, COVID NEGATIVE       ALLERGIES AND MEDICATION:   Review of patient's allergies indicates:  No Known Allergies       Medication List            Accurate as of October 5, 2023  8:29 AM. If you have any questions, ask your nurse or doctor.                CONTINUE taking these medications      amiodarone 200 MG Tab  Commonly known as: PACERONE  Take 2 tablets (400 mg total) by mouth 2 (two) times daily for 7 days, THEN 1 tablet (200 mg total) 2 (two) times daily for 7 days, THEN 1 tablet (200 mg total) once daily.  Start taking on: September 21, 2023     atorvastatin 80 MG tablet  Commonly known as: LIPITOR  Take 1 tablet (80 mg total) by mouth every evening.     cyclobenzaprine 10 MG tablet  Commonly known as: FLEXERIL     furosemide 20 MG tablet  Commonly known as: LASIX  Take 1 tablet (20 mg total) by mouth once daily.     gabapentin 300 MG capsule  Commonly known as: NEURONTIN     hydrALAZINE 100 MG tablet  Commonly known as: APRESOLINE  TAKE 1.5 TABLETS BY MOUTH 2 TIMES A DAY.     isosorbide mononitrate 120 MG 24 hr tablet  Commonly known as: IMDUR  Take 2 tablets (240 mg total) by mouth once daily.     labetaloL 200 MG tablet  Commonly known as: NORMODYNE  TAKE 1 TABLET BY MOUTH EVERY 12 HOURS.     nitroGLYCERIN 0.4 MG SL tablet  Commonly known as: NITROSTAT  Place 1 tablet (0.4 mg total) under the tongue every 5 (five) minutes as needed for Chest pain.     olmesartan 40 MG tablet  Commonly known as: BENICAR  Take 1 tablet (40 mg total) by mouth once daily.     trospium 20 mg Tab tablet  Commonly known as: sanctura  Take 1 tablet (20 mg total) by mouth every 12 (twelve) hours.     XARELTO 20 mg  "Tab  Generic drug: rivaroxaban  TAKE 1 TABLET BY MOUTH EVERY DAY WITH DINNER OR EVENING MEAL                SOCIAL HISTORY:     Social History     Socioeconomic History    Marital status:     Number of children: 4   Tobacco Use    Smoking status: Never     Passive exposure: Never    Smokeless tobacco: Never   Substance and Sexual Activity    Alcohol use: Not Currently    Drug use: No    Sexual activity: Yes     Partners: Male     Birth control/protection: Surgical   Social History Narrative     since 2001    He is a .  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   HENT:  Negative for nosebleeds.    Eyes:  Negative for blurred vision, double vision and photophobia.   Respiratory:  Positive for shortness of breath. Negative for hemoptysis and wheezing.    Cardiovascular:  Negative for chest pain, palpitations, orthopnea, claudication, leg swelling and PND.   Gastrointestinal:  Negative for abdominal pain, blood in stool, heartburn, melena, nausea and vomiting.   Genitourinary:  Negative for flank pain and hematuria.   Musculoskeletal:  Negative for falls, joint pain, myalgias and neck pain.   Skin:  Negative for rash.   Neurological:  Negative for dizziness, seizures, loss of consciousness, weakness and headaches.   Endo/Heme/Allergies:  Negative for polydipsia. Does not bruise/bleed easily.   Psychiatric/Behavioral:  Negative for depression and memory loss. The patient is not nervous/anxious.        PHYSICAL EXAM:     Vitals:    10/05/23 0816   BP: (!) 162/84   Pulse: (!) 58   Resp: 20    Body mass index is 34.85 kg/m².  Weight: 89.3 kg (196 lb 12.2 oz)   Height: 5' 3" (160 cm)     Physical Exam  Vitals " reviewed.   Constitutional:       General: She is not in acute distress.     Appearance: She is well-developed. She is obese. She is not ill-appearing, toxic-appearing or diaphoretic.   HENT:      Head: Normocephalic and atraumatic.   Eyes:      General: No scleral icterus.     Extraocular Movements: Extraocular movements intact.      Conjunctiva/sclera: Conjunctivae normal.      Pupils: Pupils are equal, round, and reactive to light.   Neck:      Thyroid: No thyromegaly.      Vascular: Normal carotid pulses. No carotid bruit or JVD.      Trachea: Trachea normal. No tracheal deviation.   Cardiovascular:      Rate and Rhythm: Regular rhythm. Bradycardia present.      Pulses:           Carotid pulses are 2+ on the right side and 2+ on the left side.     Heart sounds: S1 normal and S2 normal. Heart sounds are distant. No murmur heard.     No friction rub. No gallop.   Pulmonary:      Effort: Pulmonary effort is normal. No respiratory distress.      Breath sounds: Normal breath sounds. No stridor. No wheezing, rhonchi or rales.   Chest:      Chest wall: No tenderness.   Abdominal:      General: There is no distension.      Palpations: Abdomen is soft.   Musculoskeletal:         General: No swelling or tenderness. Normal range of motion.      Cervical back: Normal range of motion and neck supple. No edema or rigidity.      Right lower leg: No edema.      Left lower leg: No edema.   Feet:      Right foot:      Skin integrity: No ulcer.      Left foot:      Skin integrity: No ulcer.   Skin:     General: Skin is warm and dry.      Coloration: Skin is not jaundiced.   Neurological:      General: No focal deficit present.      Mental Status: She is alert and oriented to person, place, and time.      Cranial Nerves: No cranial nerve deficit.   Psychiatric:         Mood and Affect: Mood normal.         Speech: Speech normal.         Behavior: Behavior normal. Behavior is cooperative.         DATA:   EKG: (personally reviewed  tracing(s))  10/5/23 SR 59, low volt    Laboratory:  CBC:  Recent Labs   Lab 03/30/23  0932 07/01/23  1146 09/27/23  1051   WBC 5.79 4.58 5.18   Hemoglobin 13.9 12.4 12.5   Hematocrit 41.5 38.5 39.7   Platelets 251 201 250         CHEMISTRIES:  Recent Labs   Lab 06/04/21  0708 01/10/22  1045 01/18/22  1445 03/02/23  1157 03/30/23  0932 07/01/23  1146 09/27/23  1051   Glucose 119 H 97 88   < > 89  89 79 82   Sodium 141 145 144   < > 144  144 142 142   Potassium 4.5 3.9 3.7   < > 4.7  4.7 4.0 3.9   BUN 18 22 18   < > 19  19 20 26 H   Creatinine 1.1 1.0 0.9   < > 0.9  0.9 1.1 1.1   eGFR if African American >60 >60 >60  --   --   --   --    eGFR if non  54 A >60 >60  --   --   --   --    Calcium 9.5 9.1 9.9   < > 9.5  9.5 9.5 9.2   Magnesium  --   --   --   --   --   --  2.0    < > = values in this interval not displayed.         CARDIAC BIOMARKERS:  Recent Labs   Lab 01/18/22  1445 07/01/23  1146 09/27/23  1051   Troponin I 0.009 0.010 <0.006         COAGS:  Recent Labs   Lab 07/01/23  1146   INR 1.1         LIPIDS/LFTS:  Recent Labs   Lab 03/30/23  0932 07/01/23  1146 09/27/23  1051   Cholesterol 169  --   --    Triglycerides 72  --   --    HDL 49  --   --    LDL Cholesterol 105.6  --   --    Non-HDL Cholesterol 120  --   --    AST 29 20 14   ALT 32 26 18       Lab Results   Component Value Date    TSH 1.459 09/27/2023         Cardiovascular Testing:  Carotid US 3/22/23  There is 20-39% right Internal Carotid Stenosis.  There is 0-19% left Internal Carotid Stenosis.  Similar findings noted on prior report 5/7/21.    Ofelia MPI 2/16/22     Normal myocardial perfusion scan. There is no evidence of myocardial ischemia or infarction.    There is a moderate intensity perfusion abnormality in the anterior wall of the left ventricle, secondary to breast attenuation.    The gated perfusion images showed an ejection fraction of 71% post stress.    There is normal wall motion post stress.    LV cavity size  is normal at stress.    The EKG portion of this study is negative for ischemia.    The patient reported no chest pain during the stress test.    There were no arrhythmias during stress.    A PET stress exam is recommended if clinically indicated.    Echo 1/26/22  The left ventricle is normal in size with concentric hypertrophy and normal systolic function.  The estimated ejection fraction is 65%.  Normal left ventricular diastolic function.  Normal right ventricular size with normal right ventricular systolic function.  Mild left atrial enlargement.  Mild-to-moderate mitral regurgitation.  Mild to moderate tricuspid regurgitation.  Normal central venous pressure (3 mmHg).  The estimated PA systolic pressure is 36 mmHg.    Renal art US 1/10/19  -Normal sized kidneys without evidence for nephrolithiasis or hydronephrosis.  -There is slight increased peak systolic velocity within the right renal artery from the proximal to mid aspect from 50 cm/sec to 158 cm/sec which is likely related to vessel tortuosity and turbulent flow in light of the absent elevated resistive indices or ratios however underlying right renal artery stenosis cannot be excluded.  This could be further evaluated with CTA or MRA imaging.  -No evidence for left renal artery elevated resistive indices or renal artery velocities to suggest renal artery stenosis.    LE art US 5/31/18  R STEFANI 1.09  L STEFANI 1.08  Elevated velocities within the mid to distal right superficial femoral artery strongly suggestive of hemodynamically significant stenoses within the mid to distal right superficial femoral artery.    LE venous US/reflux 5/31/18  No evidence for deep venous thrombosis within either lower extremity.  No evidence for venous insufficiency within the greater or lesser saphenous veins bilaterally.    FRANCISCO/DCCV 3/9/18  Normal bivent size/fxn, EF 55%, normal wall motion  Normal appearing valves.  Trace AI, mild MR, mild TR  No LA/LON thrombus  Succcessful  DCCV af->NSR 360J x1.  Plan:  Cont med rx  Cont Xarelto 20mg qd  OK for discharge later on today and follow up with me in the office 1 week for outpatient stress test.    Cath 3/25/11 ([facility], subsequently had CABG)  LM: normal  LAD sequential 90% prox and 80% mid stenoses   D2 prox 80%  LCx: MLI   OM2 prox 80%  RCA: dom, mid-dist 40% sequential stenoses    ASSESSMENT:   # PAF in SR, s/p FRANCISCO/DCCV 3/9/18, DCCV 1/10/22.  CHADS VASC 2 on Xareltoamio.    # HTN, uncontrolled, pt following in digital med program.  # CAD s/p CABG x3V 4/2011.  MPI 2/2022 neg.  Had jaw pain with recurrent PAF and inc VR.   # HFpEF, ?vol status.  # PAD, bilat buttock and RLE claudication, R SFA stenosis noted on US 5/2018.  Sxs seemd to have resolved (pt thinks it was more related to R hip pain, did not notice much of a difference with pletal rx)  # HLP on atorva 80mg  # CKD3a  # BMI 35, stable vs last OV  # aortic atherosclerosis (CT A/P 9/25/20)  # preop for colonsocopy     PLAN:   Cont med rx  Cont Xarelto 20mg qd   Cont amio 200mg qd  Inc lasix 40mg qd  Add aldact 25mg qd  Check BMP 1 week  Renal art US  Echo  BUCKY eval  Diet/exercise/weight loss  Cont monitoring in digital med program.   The patient is at low cardiac risk for the planned colonsocopy and associated anesthesia (planned on 11/5/23).  No further preoperative cardiac testing is required.  Is acceptable to withhold the patient's Xarelto for 2 days prior to the procedure and resume it as soon as possible thereafter.  RTC 1 month (Nov 2023).  ?needs eval for ATTR CMP.  ?CardioMEMS candidate.  Surveillance carotid US and lipids/LFT 1 year (July 2024)    If recurrent anginal sxs despite control of BP, consider cardiac PET vs cath.    Consider LE angio in future for eval of LE PAD if limiting claudication recurs          José Khalil MD, FACC

## 2023-10-13 ENCOUNTER — LAB VISIT (OUTPATIENT)
Dept: LAB | Facility: HOSPITAL | Age: 63
End: 2023-10-13
Attending: INTERNAL MEDICINE
Payer: COMMERCIAL

## 2023-10-13 DIAGNOSIS — I50.33 ACUTE ON CHRONIC HEART FAILURE WITH PRESERVED EJECTION FRACTION: ICD-10-CM

## 2023-10-13 DIAGNOSIS — I10 ESSENTIAL HYPERTENSION: ICD-10-CM

## 2023-10-13 LAB
ANION GAP SERPL CALC-SCNC: 10 MMOL/L (ref 8–16)
BUN SERPL-MCNC: 23 MG/DL (ref 8–23)
CALCIUM SERPL-MCNC: 9.5 MG/DL (ref 8.7–10.5)
CHLORIDE SERPL-SCNC: 105 MMOL/L (ref 95–110)
CO2 SERPL-SCNC: 28 MMOL/L (ref 23–29)
CREAT SERPL-MCNC: 1.2 MG/DL (ref 0.5–1.4)
EST. GFR  (NO RACE VARIABLE): 51 ML/MIN/1.73 M^2
GLUCOSE SERPL-MCNC: 121 MG/DL (ref 70–110)
POTASSIUM SERPL-SCNC: 4 MMOL/L (ref 3.5–5.1)
SODIUM SERPL-SCNC: 143 MMOL/L (ref 136–145)

## 2023-10-13 PROCEDURE — 80048 BASIC METABOLIC PNL TOTAL CA: CPT | Performed by: INTERNAL MEDICINE

## 2023-10-13 PROCEDURE — 36415 COLL VENOUS BLD VENIPUNCTURE: CPT | Performed by: INTERNAL MEDICINE

## 2023-10-20 ENCOUNTER — HOSPITAL ENCOUNTER (OUTPATIENT)
Dept: CARDIOLOGY | Facility: HOSPITAL | Age: 63
Discharge: HOME | End: 2023-10-20
Attending: INTERNAL MEDICINE
Payer: COMMERCIAL

## 2023-10-20 DIAGNOSIS — I25.708 CORONARY ARTERY DISEASE INVOLVING CORONARY BYPASS GRAFT OF NATIVE HEART WITH OTHER FORMS OF ANGINA PECTORIS: ICD-10-CM

## 2023-10-20 DIAGNOSIS — I50.33 ACUTE ON CHRONIC HEART FAILURE WITH PRESERVED EJECTION FRACTION: ICD-10-CM

## 2023-10-20 DIAGNOSIS — I10 ESSENTIAL HYPERTENSION: ICD-10-CM

## 2023-10-20 LAB
ABDOMINAL AORTA PROX PSV: 78 CM/S
ASCENDING AORTA: 3.47 CM
AV INDEX (PROSTH): 1.04
AV MEAN GRADIENT: 5 MMHG
AV PEAK GRADIENT: 9 MMHG
AV VALVE AREA BY VELOCITY RATIO: 2.66 CM²
AV VALVE AREA: 3.34 CM²
AV VELOCITY RATIO: 0.83
CV ECHO LV RWT: 0.53 CM
DOP CALC AO PEAK VEL: 1.52 M/S
DOP CALC AO VTI: 30.2 CM
DOP CALC LVOT AREA: 3.2 CM2
DOP CALC LVOT DIAMETER: 2.02 CM
DOP CALC LVOT PEAK VEL: 1.26 M/S
DOP CALC LVOT STROKE VOLUME: 100.9 CM3
DOP CALC MV VTI: 28.5 CM
DOP CALCLVOT PEAK VEL VTI: 31.5 CM
E WAVE DECELERATION TIME: 235.27 MSEC
E/A RATIO: 0.88
E/E' RATIO: 8.11 M/S
ECHO LV POSTERIOR WALL: 1.23 CM (ref 0.6–1.1)
FRACTIONAL SHORTENING: 39 % (ref 28–44)
INTERVENTRICULAR SEPTUM: 1.31 CM (ref 0.6–1.1)
IVC DIAMETER: 0.97 CM
IVRT: 91.34 MSEC
LA MAJOR: 5.1 CM
LA MINOR: 5.51 CM
LA WIDTH: 4.3 CM
LEFT ATRIUM SIZE: 4.35 CM
LEFT ATRIUM VOLUME: 84.22 CM3
LEFT INTERNAL DIMENSION IN SYSTOLE: 2.8 CM (ref 2.1–4)
LEFT RENAL DIST DIAS: 23 CM/S
LEFT RENAL DIST SYS: 91 CM/S
LEFT RENAL MID DIAS: 31 CM/S
LEFT RENAL MID SYS: 113 CM/S
LEFT RENAL ORIGIN DIAS: 15 CM/S
LEFT RENAL ORIGIN SYS: 84 CM/S
LEFT RENAL PROX DIAS: 33 CM/S
LEFT RENAL PROX RAR: 1.81
LEFT RENAL PROX SYS: 141 CM/S
LEFT RENAL ULTRASOUND ACCELERATION TIME MEASUREMENT 1: 162 MS
LEFT RENAL ULTRASOUND ACCELERATION TIME MEASUREMENT 2: 150 MS
LEFT RENAL ULTRASOUND ACCELERATION TIME MEASUREMENT 3: 54 MS
LEFT RENAL ULTRASOUND ACCELERATION TIME MEASUREMENT AVERAGE: 162 MS
LEFT RENAL ULTRASOUND KIDNEY SIZE MEASUREMENT 1: 10.3 CM
LEFT RENAL ULTRASOUND KIDNEY SIZE MEASUREMENT 2: 10.4 CM
LEFT RENAL ULTRASOUND KIDNEY SIZE MEASUREMENT 3: 10.1 CM
LEFT RENAL ULTRASOUND KIDNEY SIZE MEASUREMENT AVERAGE: 10.4 CM
LEFT RENAL ULTRASOUND RESISTIVE INDEX MEASUREMENT 1: 0.65
LEFT RENAL ULTRASOUND RESISTIVE INDEX MEASUREMENT 2: 0.76
LEFT RENAL ULTRASOUND RESISTIVE INDEX MEASUREMENT 3: 0.69
LEFT RENAL ULTRASOUND RESISTIVE INDEX MEASUREMENT AVERAGE: 0.76
LEFT VENTRICLE DIASTOLIC VOLUME: 98.35 ML
LEFT VENTRICLE SYSTOLIC VOLUME: 29.48 ML
LEFT VENTRICULAR INTERNAL DIMENSION IN DIASTOLE: 4.62 CM (ref 3.5–6)
LEFT VENTRICULAR MASS: 223.95 G
LV LATERAL E/E' RATIO: 7 M/S
LV SEPTAL E/E' RATIO: 9.63 M/S
LVOT MG: 3.29 MMHG
LVOT MV: 0.84 CM/S
MV MEAN GRADIENT: 1 MMHG
MV PEAK A VEL: 0.88 M/S
MV PEAK E VEL: 0.77 M/S
MV PEAK GRADIENT: 4 MMHG
MV STENOSIS PRESSURE HALF TIME: 68.23 MS
MV VALVE AREA BY CONTINUITY EQUATION: 3.54 CM2
MV VALVE AREA P 1/2 METHOD: 3.22 CM2
OHS CV LEFT RENAL RAR: 1.81
OHS CV RIGHT RENAL RAR: 1.68
OHS CV US LEFT RENAL HIGHEST EDV: 33
OHS CV US LEFT RENAL HIGHEST PSV: 141
OHS CV US RIGHT RENAL HIGHEST EDV: 29
OHS CV US RIGHT RENAL HIGHEST PSV: 131
PISA TR MAX VEL: 2.42 M/S
PV PEAK GRADIENT: 4 MMHG
PV PEAK VELOCITY: 1.04 M/S
RA MAJOR: 4.65 CM
RA PRESSURE ESTIMATED: 3 MMHG
RA WIDTH: 3.3 CM
RIGHT RENAL DIST DIAS: 25 CM/S
RIGHT RENAL DIST SYS: 111 CM/S
RIGHT RENAL MID DIAS: 29 CM/S
RIGHT RENAL MID SYS: 131 CM/S
RIGHT RENAL ORIGIN DIAS: 25 CM/S
RIGHT RENAL ORIGIN SYS: 98 CM/S
RIGHT RENAL PROX DIAS: 24 CM/S
RIGHT RENAL PROX RAR: 1.35
RIGHT RENAL PROX SYS: 105 CM/S
RIGHT RENAL ULTRASOUND ACCELERATION TIME MEASUREMENT 1: 66 MS
RIGHT RENAL ULTRASOUND ACCELERATION TIME MEASUREMENT 2: 180 MS
RIGHT RENAL ULTRASOUND ACCELERATION TIME MEASUREMENT 3: 54 MS
RIGHT RENAL ULTRASOUND ACCELERATION TIME MEASUREMENT AVERAGE: 180 MS
RIGHT RENAL ULTRASOUND KIDNEY SIZE MEASUREMENT 1: 9.9 CM
RIGHT RENAL ULTRASOUND KIDNEY SIZE MEASUREMENT 2: 9.5 CM
RIGHT RENAL ULTRASOUND KIDNEY SIZE MEASUREMENT 3: 9.6 CM
RIGHT RENAL ULTRASOUND KIDNEY SIZE MEASUREMENT AVERAGE: 9.9 CM
RIGHT RENAL ULTRASOUND RESISTIVE INDEX MEASUREMENT 1: 0.8
RIGHT RENAL ULTRASOUND RESISTIVE INDEX MEASUREMENT 2: 0.78
RIGHT RENAL ULTRASOUND RESISTIVE INDEX MEASUREMENT 3: 0.8
RIGHT RENAL ULTRASOUND RESISTIVE INDEX MEASUREMENT AVERAGE: 0.8
RIGHT VENTRICULAR END-DIASTOLIC DIMENSION: 3.58 CM
RV TB RVSP: 5 MMHG
RV TISSUE DOPPLER FREE WALL SYSTOLIC VELOCITY 1 (APICAL 4 CHAMBER VIEW): 13.49 CM/S
SINUS: 3.11 CM
STJ: 2.37 CM
TDI LATERAL: 0.11 M/S
TDI SEPTAL: 0.08 M/S
TDI: 0.1 M/S
TR MAX PG: 23 MMHG
TRICUSPID ANNULAR PLANE SYSTOLIC EXCURSION: 1.98 CM
TV REST PULMONARY ARTERY PRESSURE: 26 MMHG

## 2023-10-20 PROCEDURE — 93306 ECHO (CUPID ONLY): ICD-10-PCS | Mod: 26,,, | Performed by: INTERNAL MEDICINE

## 2023-10-20 PROCEDURE — 93975 VASCULAR STUDY: CPT

## 2023-10-20 PROCEDURE — 93975 VASCULAR STUDY: CPT | Mod: 26,,, | Performed by: INTERNAL MEDICINE

## 2023-10-20 PROCEDURE — 93975 CV US RENAL ARTERY STENOSIS HYPERTENSION COMPLETE (CUPID ONLY): ICD-10-PCS | Mod: 26,,, | Performed by: INTERNAL MEDICINE

## 2023-10-20 PROCEDURE — 93306 TTE W/DOPPLER COMPLETE: CPT | Mod: 26,,, | Performed by: INTERNAL MEDICINE

## 2023-10-20 PROCEDURE — 93306 TTE W/DOPPLER COMPLETE: CPT

## 2023-10-24 ENCOUNTER — TELEPHONE (OUTPATIENT)
Dept: CARDIOLOGY | Facility: CLINIC | Age: 63
End: 2023-10-24
Payer: COMMERCIAL

## 2023-10-24 NOTE — TELEPHONE ENCOUNTER
----- Message from Charlotte Nichols sent at 10/24/2023 11:50 AM CDT -----  .Type: Patient Call Back    Who called: Self     What is the request in detail: Would like to know do she need today's appointment     Can the clinic reply by [facility]? No     Would the patient rather a call back or a response via [facility]? Call Back     Best call back number: .[phone] (home)       Additional Information:

## 2023-11-05 ENCOUNTER — ANESTHESIA EVENT (OUTPATIENT)
Dept: ENDOSCOPY | Facility: HOSPITAL | Age: 63
End: 2023-11-05
Payer: COMMERCIAL

## 2023-11-06 ENCOUNTER — ANESTHESIA (OUTPATIENT)
Dept: ENDOSCOPY | Facility: HOSPITAL | Age: 63
End: 2023-11-06
Payer: COMMERCIAL

## 2023-11-06 ENCOUNTER — HOSPITAL ENCOUNTER (OUTPATIENT)
Facility: HOSPITAL | Age: 63
Discharge: HOME | End: 2023-11-06
Attending: STUDENT IN AN ORGANIZED HEALTH CARE EDUCATION/TRAINING PROGRAM | Admitting: STUDENT IN AN ORGANIZED HEALTH CARE EDUCATION/TRAINING PROGRAM
Payer: COMMERCIAL

## 2023-11-06 DIAGNOSIS — Z12.11 SCREEN FOR COLON CANCER: ICD-10-CM

## 2023-11-06 PROCEDURE — 37000008 HC ANESTHESIA 1ST 15 MINUTES: Performed by: STUDENT IN AN ORGANIZED HEALTH CARE EDUCATION/TRAINING PROGRAM

## 2023-11-06 PROCEDURE — 45380 COLONOSCOPY AND BIOPSY: CPT | Mod: 59,33,, | Performed by: STUDENT IN AN ORGANIZED HEALTH CARE EDUCATION/TRAINING PROGRAM

## 2023-11-06 PROCEDURE — 45380 PR COLONOSCOPY,BIOPSY: ICD-10-PCS | Mod: 59,33,, | Performed by: STUDENT IN AN ORGANIZED HEALTH CARE EDUCATION/TRAINING PROGRAM

## 2023-11-06 PROCEDURE — 88305 TISSUE EXAM BY PATHOLOGIST: CPT | Mod: 26,,, | Performed by: PATHOLOGY

## 2023-11-06 PROCEDURE — 27201012 HC FORCEPS, HOT/COLD, DISP: Performed by: STUDENT IN AN ORGANIZED HEALTH CARE EDUCATION/TRAINING PROGRAM

## 2023-11-06 PROCEDURE — D9220A PRA ANESTHESIA: ICD-10-PCS | Mod: 33,ANES,, | Performed by: ANESTHESIOLOGY

## 2023-11-06 PROCEDURE — 45380 COLONOSCOPY AND BIOPSY: CPT | Mod: 59,PT | Performed by: STUDENT IN AN ORGANIZED HEALTH CARE EDUCATION/TRAINING PROGRAM

## 2023-11-06 PROCEDURE — D9220A PRA ANESTHESIA: Mod: 33,CRNA,, | Performed by: NURSE ANESTHETIST, CERTIFIED REGISTERED

## 2023-11-06 PROCEDURE — 37000009 HC ANESTHESIA EA ADD 15 MINS: Performed by: STUDENT IN AN ORGANIZED HEALTH CARE EDUCATION/TRAINING PROGRAM

## 2023-11-06 PROCEDURE — 45385 PR COLONOSCOPY,REMV LESN,SNARE: ICD-10-PCS | Mod: 33,,, | Performed by: STUDENT IN AN ORGANIZED HEALTH CARE EDUCATION/TRAINING PROGRAM

## 2023-11-06 PROCEDURE — D9220A PRA ANESTHESIA: Mod: 33,ANES,, | Performed by: ANESTHESIOLOGY

## 2023-11-06 PROCEDURE — 88305 TISSUE EXAM BY PATHOLOGIST: ICD-10-PCS | Mod: 26,,, | Performed by: PATHOLOGY

## 2023-11-06 PROCEDURE — 88305 TISSUE EXAM BY PATHOLOGIST: CPT | Mod: 59 | Performed by: PATHOLOGY

## 2023-11-06 PROCEDURE — 45385 COLONOSCOPY W/LESION REMOVAL: CPT | Mod: PT | Performed by: STUDENT IN AN ORGANIZED HEALTH CARE EDUCATION/TRAINING PROGRAM

## 2023-11-06 PROCEDURE — 27201089 HC SNARE, DISP (ANY): Performed by: STUDENT IN AN ORGANIZED HEALTH CARE EDUCATION/TRAINING PROGRAM

## 2023-11-06 PROCEDURE — D9220A PRA ANESTHESIA: ICD-10-PCS | Mod: 33,CRNA,, | Performed by: NURSE ANESTHETIST, CERTIFIED REGISTERED

## 2023-11-06 PROCEDURE — 25000003 PHARM REV CODE 250: Performed by: NURSE ANESTHETIST, CERTIFIED REGISTERED

## 2023-11-06 PROCEDURE — 63600175 PHARM REV CODE 636 W HCPCS: Performed by: NURSE ANESTHETIST, CERTIFIED REGISTERED

## 2023-11-06 PROCEDURE — 45385 COLONOSCOPY W/LESION REMOVAL: CPT | Mod: 33,,, | Performed by: STUDENT IN AN ORGANIZED HEALTH CARE EDUCATION/TRAINING PROGRAM

## 2023-11-06 RX ORDER — LIDOCAINE HYDROCHLORIDE 10 MG/ML
1 INJECTION, SOLUTION EPIDURAL; INFILTRATION; INTRACAUDAL; PERINEURAL ONCE
Status: DISCONTINUED | OUTPATIENT
Start: 2023-11-06 | End: 2023-11-06 | Stop reason: HOSPADM

## 2023-11-06 RX ORDER — LIDOCAINE HYDROCHLORIDE 20 MG/ML
INJECTION, SOLUTION EPIDURAL; INFILTRATION; INTRACAUDAL; PERINEURAL
Status: DISCONTINUED
Start: 2023-11-06 | End: 2023-11-06 | Stop reason: HOSPADM

## 2023-11-06 RX ORDER — PROPOFOL 10 MG/ML
INJECTION, EMULSION INTRAVENOUS
Status: DISCONTINUED
Start: 2023-11-06 | End: 2023-11-06 | Stop reason: HOSPADM

## 2023-11-06 RX ORDER — LIDOCAINE HYDROCHLORIDE 20 MG/ML
INJECTION INTRAVENOUS
Status: DISCONTINUED | OUTPATIENT
Start: 2023-11-06 | End: 2023-11-06

## 2023-11-06 RX ORDER — PROPOFOL 10 MG/ML
VIAL (ML) INTRAVENOUS
Status: DISCONTINUED | OUTPATIENT
Start: 2023-11-06 | End: 2023-11-06

## 2023-11-06 RX ADMIN — LIDOCAINE HYDROCHLORIDE 80 MG: 20 INJECTION, SOLUTION INTRAVENOUS at 08:11

## 2023-11-06 RX ADMIN — PROPOFOL 100 MG: 10 INJECTION, EMULSION INTRAVENOUS at 08:11

## 2023-11-06 RX ADMIN — PROPOFOL 50 MG: 10 INJECTION, EMULSION INTRAVENOUS at 08:11

## 2023-11-06 RX ADMIN — SODIUM CHLORIDE: 0.9 INJECTION, SOLUTION INTRAVENOUS at 08:11

## 2023-11-06 RX ADMIN — PROPOFOL 30 MG: 10 INJECTION, EMULSION INTRAVENOUS at 08:11

## 2023-11-06 NOTE — H&P
Short Stay Endoscopy History and Physical    PCP - Morgan Leger MD  Referring Physician - PRE-ADMIT, ENDO -[facility]  No address on file    Procedure - Colonoscopy  ASA - per anesthesia  Mallampati - per anesthesia  History of Anesthesia problems - no  Family history Anesthesia problems -  no   Plan of anesthesia - General    HPI  63 y.o. female  Reason for procedure:   Colon cancer screening [Z12.11]  History of colon polyps [Z86.010]        ROS:  Constitutional: No fevers, chills, No weight loss  CV: No chest pain  Pulm: No cough, No shortness of breath  GI: see HPI    Medical History:  has a past medical history of Atrial fibrillation, Colon polyp, Coronary artery disease, Disorder of kidney and ureter, Hyperlipidemia, and Hypertension.    Surgical History:  has a past surgical history that includes Cardiac surgery; Hysterectomy; Breast biopsy; Coronary artery bypass graft; Colonoscopy (N/A, 5/25/2020); Laparoscopic hemicolectomy (Right, 6/25/2020); Cystoscopy w/ ureteral stent placement (Bilateral, 6/25/2020); and Treatment of cardiac arrhythmia (N/A, 1/10/2022).    Family History: family history includes Breast cancer in her maternal aunt; Cancer (age of onset: 26) in her mother; Cancer (age of onset: 78) in her father..    Social History:  reports that she has never smoked. She has never been exposed to tobacco smoke. She has never used smokeless tobacco. She reports that she does not currently use alcohol. She reports that she does not use drugs.    Review of patient's allergies indicates:   Allergen Reactions    Amlodipine Swelling       Medications:   Medications Prior to Admission   Medication Sig Dispense Refill Last Dose    amiodarone (PACERONE) 200 MG Tab Take 2 tablets (400 mg total) by mouth 2 (two) times daily for 7 days, THEN 1 tablet (200 mg total) 2 (two) times daily for 7 days, THEN 1 tablet (200 mg total) once daily. 132 tablet 2     atorvastatin (LIPITOR) 80 MG tablet Take 1  tablet (80 mg total) by mouth every evening. 90 tablet 3     cyclobenzaprine (FLEXERIL) 10 MG tablet        furosemide (LASIX) 40 MG tablet Take 1 tablet (40 mg total) by mouth once daily. 90 tablet 3     gabapentin (NEURONTIN) 300 MG capsule Take 300 mg by mouth every evening.       hydrALAZINE (APRESOLINE) 100 MG tablet TAKE 1.5 TABLETS BY MOUTH 2 TIMES A DAY. 270 tablet 3     isosorbide mononitrate (IMDUR) 120 MG 24 hr tablet Take 2 tablets (240 mg total) by mouth once daily. 180 tablet 3     labetaloL (NORMODYNE) 200 MG tablet TAKE 1 TABLET BY MOUTH EVERY 12 HOURS. 180 tablet 3     nitroGLYCERIN (NITROSTAT) 0.4 MG SL tablet Place 1 tablet (0.4 mg total) under the tongue every 5 (five) minutes as needed for Chest pain. 50 tablet 3     olmesartan (BENICAR) 40 MG tablet Take 1 tablet (40 mg total) by mouth once daily. 90 tablet 3     rivaroxaban (XARELTO) 20 mg Tab TAKE 1 TABLET BY MOUTH EVERY DAY WITH DINNER OR EVENING MEAL 90 tablet 3     spironolactone (ALDACTONE) 25 MG tablet Take 1 tablet (25 mg total) by mouth once daily. 90 tablet 3     trospium (SANCTURA) 20 mg Tab tablet Take 1 tablet (20 mg total) by mouth every 12 (twelve) hours. 60 tablet 11        Physical Exam:    Vital Signs: There were no vitals filed for this visit.    General Appearance: Well appearing in no acute distress  Abdomen: Soft, non tender, non distended with normal bowel sounds, no masses    Labs:  Lab Results   Component Value Date    WBC 5.18 09/27/2023    HGB 12.5 09/27/2023    HCT 39.7 09/27/2023     09/27/2023    CHOL 169 03/30/2023    TRIG 72 03/30/2023    HDL 49 03/30/2023    ALT 18 09/27/2023    AST 14 09/27/2023     10/13/2023    K 4.0 10/13/2023     10/13/2023    CREATININE 1.2 10/13/2023    BUN 23 10/13/2023    CO2 28 10/13/2023    TSH 1.459 09/27/2023    INR 1.1 07/01/2023    HGBA1C 5.6 03/30/2023       I have explained the risks and benefits of this endoscopic procedure to the patient including but not  limited to bleeding, inflammation, infection, perforation, and death.      Rocky Frankel MD

## 2023-11-06 NOTE — PLAN OF CARE
Procedure and recovery complete. Pt awake and alert. MD Frankel  at bedside, procedure findings and suggestions discussed. Discharge instructions given, pt verbalized understanding of instruction. Iv was D/c'd, inner cannula intact. No distress noted. No c/o pain. Gait steady, able to ambulate without assistance. Pt walked out accompanied by nurse. Patient's  waiting outside parking garage.

## 2023-11-06 NOTE — ANESTHESIA POSTPROCEDURE EVALUATION
Anesthesia Post Evaluation    Patient: Keyonna Guillen    Procedure(s) Performed: Procedure(s) (LRB):  COLONOSCOPY (N/A)    Final Anesthesia Type: general      Patient location during evaluation: GI PACU  Patient participation: Yes- Able to Participate  Level of consciousness: awake and alert  Post-procedure vital signs: reviewed and stable  Airway patency: patent    PONV status at discharge: No PONV  Anesthetic complications: no      Cardiovascular status: blood pressure returned to baseline and hemodynamically stable  Respiratory status: unassisted, spontaneous ventilation and room air  Hydration status: euvolemic  Follow-up not needed.          Vitals Value Taken Time   /70 11/06/23 0900   Temp 36.9 °C (98.4 °F) 11/06/23 0848   Pulse 54 11/06/23 0900   Resp 24 11/06/23 0900   SpO2 96 % 11/06/23 0900         No case tracking events are documented in the log.      Pain/Hilario Score: Hilario Score: 10 (11/6/2023  9:00 AM)

## 2023-11-06 NOTE — TRANSFER OF CARE
Anesthesia Transfer of Care Note    Patient: Keyonna Guillen    Procedure(s) Performed: Procedure(s) (LRB):  COLONOSCOPY (N/A)    Patient location: GI    Anesthesia Type: general    Transport from OR: Transported from OR on room air with adequate spontaneous ventilation    Post pain: adequate analgesia    Post assessment: no apparent anesthetic complications and tolerated procedure well    Post vital signs: stable    Level of consciousness: lethargic and responds to stimulation    Nausea/Vomiting: no nausea/vomiting    Complications: none    Transfer of care protocol was followed      Last vitals:   Visit Vitals  /62 (BP Location: Left arm, Patient Position: Lying)   Pulse 62   Temp 36.9 °C (98.4 °F)   Resp 16   SpO2 99%   Breastfeeding No

## 2023-11-06 NOTE — PROVATION PATIENT INSTRUCTIONS
Discharge Summary/Instructions after an Endoscopic Procedure  Patient Name: Keyonna Dunham  Patient MRN: [id number]  Patient YOB: 1960 Monday, November 6, 2023  Rocky Frankel MD  Dear patient,  As a result of recent federal legislation (The Federal Cures Act), you may   receive lab or pathology results from your procedure in your [facility]   account before your physician is able to contact you. Your physician or   their representative will relay the results to you with their   recommendations at their soonest availability.  Thank you,  RESTRICTIONS:  During your procedure today, you received medications for sedation.  These   medications may affect your judgment, balance and coordination.  Therefore,   for 24 hours, you have the following restrictions:   - DO NOT drive a car, operate machinery, make legal/financial decisions,   sign important papers or drink alcohol.    ACTIVITY:  Today: no heavy lifting, straining or running due to procedural   sedation/anesthesia.  The following day: return to full activity including work.  DIET:  Eat and drink normally unless instructed otherwise.     TREATMENT FOR COMMON SIDE EFFECTS:  - Mild abdominal pain, nausea, belching, bloating or excessive gas:  rest,   eat lightly and use a heating pad.  - Sore Throat: treat with throat lozenges and/or gargle with warm salt   water.  - Because air was used during the procedure, expelling large amounts of air   from your rectum or belching is normal.  - If a bowel prep was taken, you may not have a bowel movement for 1-3 days.    This is normal.  SYMPTOMS TO WATCH FOR AND REPORT TO YOUR PHYSICIAN:  1. Abdominal pain or bloating, other than gas cramps.  2. Chest pain.  3. Back pain.  4. Signs of infection such as: chills or fever occurring within 24 hours   after the procedure.  5. Rectal bleeding, which would show as bright red, maroon, or black stools.   (A tablespoon of blood from the rectum is not serious, especially  if   hemorrhoids are present.)  6. Vomiting.  7. Weakness or dizziness.  GO DIRECTLY TO THE NEAREST EMERGENCY ROOM IF YOU HAVE ANY OF THE FOLLOWING:      Difficulty breathing              Chills and/or fever over 101 F   Persistent vomiting and/or vomiting blood   Severe abdominal pain   Severe chest pain   Black, tarry stools   Bleeding- more than one tablespoon   Any other symptom or condition that you feel may need urgent attention  Your doctor recommends these additional instructions:  If any biopsies were taken, your doctors clinic will contact you in 1 to 2   weeks with any results.  - Discharge patient to home (ambulatory).   - Patient has a contact number available for emergencies.  The signs and   symptoms of potential delayed complications were discussed with the   patient.  Return to normal activities tomorrow.  Written discharge   instructions were provided to the patient.   - Resume previous diet.   - Continue present medications.   - Return to primary care physician as previously scheduled.   - Repeat colonoscopy in 3 years for surveillance.  For questions, problems or results please call your physician - Rocky Frankel MD at Work:  [phone].  [facility] can be reached at [phone]     IF A COMPLICATION OR EMERGENCY SITUATION ARISES AND YOU ARE UNABLE TO REACH   YOUR PHYSICIAN - GO DIRECTLY TO THE EMERGENCY ROOM.  Rocky Frankel MD  11/6/2023 8:53:04 AM  This report has been verified and signed electronically.  Dear patient,  As a result of recent federal legislation (The Federal Cures Act), you may   receive lab or pathology results from your procedure in your [facility]   account before your physician is able to contact you. Your physician or   their representative will relay the results to you with their   recommendations at their soonest availability.  Thank you,  PROVATION

## 2023-11-07 LAB
FINAL PATHOLOGIC DIAGNOSIS: NORMAL
GROSS: NORMAL
Lab: NORMAL

## 2023-11-09 VITALS
RESPIRATION RATE: 25 BRPM | DIASTOLIC BLOOD PRESSURE: 106 MMHG | OXYGEN SATURATION: 96 % | HEART RATE: 50 BPM | SYSTOLIC BLOOD PRESSURE: 163 MMHG | TEMPERATURE: 98 F

## 2024-03-20 DIAGNOSIS — I10 ESSENTIAL (PRIMARY) HYPERTENSION: ICD-10-CM

## 2024-03-20 RX ORDER — LABETALOL 200 MG/1
200 TABLET, FILM COATED ORAL EVERY 12 HOURS
Qty: 180 TABLET | Refills: 1 | Status: SHIPPED | OUTPATIENT
Start: 2024-03-20

## 2024-04-03 ENCOUNTER — TELEPHONE (OUTPATIENT)
Dept: FAMILY MEDICINE | Facility: CLINIC | Age: 64
End: 2024-04-03
Payer: COMMERCIAL

## 2024-04-03 DIAGNOSIS — Z12.31 VISIT FOR SCREENING MAMMOGRAM: Primary | ICD-10-CM

## 2024-04-04 ENCOUNTER — HOSPITAL ENCOUNTER (OUTPATIENT)
Dept: RADIOLOGY | Facility: HOSPITAL | Age: 64
Discharge: HOME | End: 2024-04-04
Payer: COMMERCIAL

## 2024-04-04 DIAGNOSIS — Z12.31 SCREENING MAMMOGRAM FOR HIGH-RISK PATIENT: ICD-10-CM

## 2024-04-04 PROCEDURE — 77067 SCR MAMMO BI INCL CAD: CPT | Mod: TC

## 2024-04-04 PROCEDURE — 77063 BREAST TOMOSYNTHESIS BI: CPT | Mod: 26,,, | Performed by: RADIOLOGY

## 2024-04-04 PROCEDURE — 77067 SCR MAMMO BI INCL CAD: CPT | Mod: 26,,, | Performed by: RADIOLOGY

## 2024-04-05 ENCOUNTER — TELEPHONE (OUTPATIENT)
Dept: FAMILY MEDICINE | Facility: CLINIC | Age: 64
End: 2024-04-05
Payer: COMMERCIAL

## 2024-04-05 DIAGNOSIS — I10 ESSENTIAL HYPERTENSION: Chronic | ICD-10-CM

## 2024-04-05 RX ORDER — HYDRALAZINE HYDROCHLORIDE 100 MG/1
TABLET, FILM COATED ORAL
Qty: 270 TABLET | Refills: 3 | Status: SHIPPED | OUTPATIENT
Start: 2024-04-05

## 2024-05-16 ENCOUNTER — TELEPHONE (OUTPATIENT)
Dept: ENDOSCOPY | Facility: HOSPITAL | Age: 64
End: 2024-05-16
Payer: COMMERCIAL

## 2024-05-16 NOTE — TELEPHONE ENCOUNTER
Received Message: Pt MRN:[id number] IS CALLING TO SCHEDULE       Contacted the patient to schedule an endoscopy procedure(s) . The patient did not answer the call and left a voice message requesting a call back.

## 2024-05-17 ENCOUNTER — TELEPHONE (OUTPATIENT)
Dept: FAMILY MEDICINE | Facility: CLINIC | Age: 64
End: 2024-05-17
Payer: COMMERCIAL

## 2024-05-17 NOTE — TELEPHONE ENCOUNTER
phone pt to brady appt per  pt has been recsh from 05/26/24 to 06/26/24 @ 2:00 pm  will send a my chart message  and mail out new appt letter if you need refill  on your medication please send me a my chart message /call the offce vs

## 2024-06-12 DIAGNOSIS — Z78.0 MENOPAUSE: ICD-10-CM

## 2024-06-27 ENCOUNTER — TELEPHONE (OUTPATIENT)
Dept: FAMILY MEDICINE | Facility: CLINIC | Age: 64
End: 2024-06-27

## 2024-06-27 ENCOUNTER — OFFICE VISIT (OUTPATIENT)
Dept: FAMILY MEDICINE | Facility: CLINIC | Age: 64
End: 2024-06-27
Payer: COMMERCIAL

## 2024-06-27 VITALS
RESPIRATION RATE: 18 BRPM | WEIGHT: 198.88 LBS | TEMPERATURE: 98 F | HEIGHT: 63 IN | BODY MASS INDEX: 35.24 KG/M2 | DIASTOLIC BLOOD PRESSURE: 74 MMHG | SYSTOLIC BLOOD PRESSURE: 132 MMHG | HEART RATE: 67 BPM | OXYGEN SATURATION: 96 %

## 2024-06-27 DIAGNOSIS — I50.32 DIASTOLIC DYSFUNCTION WITH CHRONIC HEART FAILURE: ICD-10-CM

## 2024-06-27 DIAGNOSIS — I48.0 PAROXYSMAL ATRIAL FIBRILLATION: ICD-10-CM

## 2024-06-27 DIAGNOSIS — I25.709 CORONARY ARTERY DISEASE INVOLVING CORONARY BYPASS GRAFT OF NATIVE HEART WITH ANGINA PECTORIS: ICD-10-CM

## 2024-06-27 DIAGNOSIS — F39 MOOD DISORDER: ICD-10-CM

## 2024-06-27 DIAGNOSIS — N18.32 STAGE 3B CHRONIC KIDNEY DISEASE: ICD-10-CM

## 2024-06-27 DIAGNOSIS — I10 ESSENTIAL HYPERTENSION: Chronic | ICD-10-CM

## 2024-06-27 DIAGNOSIS — I70.0 AORTIC ATHEROSCLEROSIS: ICD-10-CM

## 2024-06-27 DIAGNOSIS — Z00.00 WELL WOMAN EXAM WITHOUT GYNECOLOGICAL EXAM: Primary | ICD-10-CM

## 2024-06-27 PROBLEM — I51.89 DIASTOLIC DYSFUNCTION: Status: ACTIVE | Noted: 2024-06-27

## 2024-06-27 PROCEDURE — 3078F DIAST BP <80 MM HG: CPT | Mod: CPTII,S$GLB,, | Performed by: FAMILY MEDICINE

## 2024-06-27 PROCEDURE — 1101F PT FALLS ASSESS-DOCD LE1/YR: CPT | Mod: CPTII,S$GLB,, | Performed by: FAMILY MEDICINE

## 2024-06-27 PROCEDURE — 99999 PR PBB SHADOW E&M-EST. PATIENT-LVL V: CPT | Mod: PBBFAC,,, | Performed by: FAMILY MEDICINE

## 2024-06-27 PROCEDURE — 1160F RVW MEDS BY RX/DR IN RCRD: CPT | Mod: CPTII,S$GLB,, | Performed by: FAMILY MEDICINE

## 2024-06-27 PROCEDURE — 1159F MED LIST DOCD IN RCRD: CPT | Mod: CPTII,S$GLB,, | Performed by: FAMILY MEDICINE

## 2024-06-27 PROCEDURE — 3075F SYST BP GE 130 - 139MM HG: CPT | Mod: CPTII,S$GLB,, | Performed by: FAMILY MEDICINE

## 2024-06-27 PROCEDURE — 99397 PER PM REEVAL EST PAT 65+ YR: CPT | Mod: S$GLB,,, | Performed by: FAMILY MEDICINE

## 2024-06-27 PROCEDURE — 3288F FALL RISK ASSESSMENT DOCD: CPT | Mod: CPTII,S$GLB,, | Performed by: FAMILY MEDICINE

## 2024-06-27 PROCEDURE — 4010F ACE/ARB THERAPY RXD/TAKEN: CPT | Mod: CPTII,S$GLB,, | Performed by: FAMILY MEDICINE

## 2024-06-27 PROCEDURE — 3008F BODY MASS INDEX DOCD: CPT | Mod: CPTII,S$GLB,, | Performed by: FAMILY MEDICINE

## 2024-06-27 RX ORDER — LOSARTAN POTASSIUM 100 MG/1
TABLET ORAL
Status: CANCELLED | OUTPATIENT
Start: 2024-06-27

## 2024-06-27 RX ORDER — LOSARTAN POTASSIUM 100 MG/1
TABLET ORAL
COMMUNITY

## 2024-06-27 RX ORDER — FUROSEMIDE 40 MG/1
40 TABLET ORAL DAILY
Qty: 90 TABLET | Refills: 3 | Status: CANCELLED | OUTPATIENT
Start: 2024-06-27 | End: 2025-06-27

## 2024-06-27 RX ORDER — AMLODIPINE BESYLATE 5 MG/1
TABLET ORAL
Status: CANCELLED | OUTPATIENT
Start: 2024-06-27

## 2024-06-27 RX ORDER — AMLODIPINE BESYLATE 5 MG/1
TABLET ORAL
COMMUNITY

## 2024-06-27 RX ORDER — CELECOXIB 200 MG/1
CAPSULE ORAL
Status: CANCELLED | OUTPATIENT
Start: 2024-06-27

## 2024-06-27 RX ORDER — HYDROCODONE BITARTRATE AND ACETAMINOPHEN 7.5; 325 MG/1; MG/1
TABLET ORAL
COMMUNITY

## 2024-06-27 RX ORDER — FLUTICASONE PROPIONATE 50 MCG
SPRAY, SUSPENSION (ML) NASAL
COMMUNITY

## 2024-06-27 RX ORDER — SERTRALINE HYDROCHLORIDE 50 MG/1
TABLET, FILM COATED ORAL
Status: CANCELLED | OUTPATIENT
Start: 2024-06-27

## 2024-06-27 RX ORDER — NITROGLYCERIN 0.4 MG/1
0.4 TABLET SUBLINGUAL EVERY 5 MIN PRN
Qty: 50 TABLET | Refills: 3 | Status: CANCELLED | OUTPATIENT
Start: 2024-06-27 | End: 2025-06-27

## 2024-06-27 RX ORDER — OLMESARTAN MEDOXOMIL 40 MG/1
40 TABLET ORAL DAILY
Qty: 90 TABLET | Refills: 3 | Status: CANCELLED | OUTPATIENT
Start: 2024-06-27

## 2024-06-27 RX ORDER — ATORVASTATIN CALCIUM 80 MG/1
80 TABLET, FILM COATED ORAL NIGHTLY
Qty: 90 TABLET | Refills: 3 | Status: CANCELLED | OUTPATIENT
Start: 2024-06-27

## 2024-06-27 RX ORDER — CELECOXIB 200 MG/1
CAPSULE ORAL
COMMUNITY
Start: 2024-04-14

## 2024-06-27 RX ORDER — NITROGLYCERIN 0.4 MG/1
0.4 TABLET SUBLINGUAL EVERY 5 MIN PRN
Qty: 50 TABLET | Refills: 3 | Status: SHIPPED | OUTPATIENT
Start: 2024-06-27 | End: 2025-06-27

## 2024-06-27 RX ORDER — HYDRALAZINE HYDROCHLORIDE 100 MG/1
TABLET, FILM COATED ORAL
Qty: 270 TABLET | Refills: 3 | Status: CANCELLED | OUTPATIENT
Start: 2024-06-27

## 2024-06-27 RX ORDER — BENZONATATE 200 MG/1
CAPSULE ORAL
COMMUNITY

## 2024-06-27 RX ORDER — CLONIDINE HYDROCHLORIDE 0.2 MG/1
TABLET ORAL
COMMUNITY

## 2024-06-27 RX ORDER — METOPROLOL SUCCINATE 50 MG/1
TABLET, EXTENDED RELEASE ORAL
COMMUNITY

## 2024-06-27 RX ORDER — SERTRALINE HYDROCHLORIDE 50 MG/1
TABLET, FILM COATED ORAL
COMMUNITY

## 2024-06-27 RX ORDER — SEMAGLUTIDE 0.68 MG/ML
0.25 INJECTION, SOLUTION SUBCUTANEOUS
Qty: 3 ML | Refills: 0 | Status: SHIPPED | OUTPATIENT
Start: 2024-06-27

## 2024-06-27 RX ORDER — BENZONATATE 200 MG/1
CAPSULE ORAL
Status: CANCELLED | OUTPATIENT
Start: 2024-06-27

## 2024-06-27 RX ORDER — METOPROLOL SUCCINATE 50 MG/1
TABLET, EXTENDED RELEASE ORAL
Status: CANCELLED | OUTPATIENT
Start: 2024-06-27

## 2024-06-27 NOTE — PROGRESS NOTES
"Well Woman VISIT      CHIEF COMPLAINT  Chief Complaint   Patient presents with    Follow-up    Hypertension       HPI  Keyonna Guillen is a 64 y.o. female who presents for wellness.     Social Factors  Tobacco use: No  Ready to Quit: No  Alcohol: No  Intimate partner violence screening  Regular Exercise: No    Depression  Over the past two weeks, have you felt down, depressed, or hopeless? No  Over the past two weeks, have you felt little interest or pleasure in doing things? No    Reproductive Health  Complete hysterectomy with oophorectomy    CHD, HTN, DM2  CHD Risk Factors: hypertension and obesity (BMI >= 30 kg/m2)  Women 45 years and older should be screened for dyslipidemia if at increased risk of CHD  Women 20 to 45 years of age should be screened for dyslipidemia if at increased risk of CHD  Asymptomatic adults with sustained blood pressure greater than 135/80 mm Hg (treated or untreated) should be screened for type 2 diabetes mellitus    Estimated body mass index is 35.23 kg/m² as calculated from the following:    Height as of this encounter: 5' 3" (1.6 m).    Weight as of this encounter: 90.2 kg (198 lb 13.7 oz).      Screening  Mammogram needed: ordered  Colonoscopy needed: ordered  Osteoporosis screen needed: ordered     Women 50 to 74 years of age should be screened for breast cancer with mammography biennially.  Women should be screened for cervical cancer with Pap tests beginning at 21 years of age. Low-risk women should receive Pap testing every three years. Co-testing for human papillomavirus is an option beginning at 30 years of age, and can extend the screening interval to five years. Cervical cancer screening should be discontinued at 65 years of age or after total hysterectomy if the woman has a benign gynecologic history  Adults 50 to 75 years of age should be screened for colorectal cancer with an FOBT annually, sigmoidoscopy every five years with an FOBT every three years, or colonoscopy " "every 10 years.  Women 65 years and older should be screened for osteoporosis. Women younger than 65 years should be screened if the risk of fracture is greater than or equal to that of a 65-year-old white woman without additional risk factors.      Immunizations  delayed    ALLERGIES and MEDS were verified.   PMHx, PSHx, FHx, SOCIALHx were updated as pertinent.    REVIEW OF SYSTEMS  Review of Systems   Constitutional: Negative.    HENT: Negative.     Eyes: Negative.    Respiratory: Negative.     Cardiovascular: Negative.    Gastrointestinal: Negative.    Genitourinary:  Positive for frequency and hematuria.   Musculoskeletal: Negative.    Skin: Negative.          PHYSICAL EXAM  VITAL SIGNS: /74   Pulse 67   Temp 98 °F (36.7 °C) (Oral)   Resp 18   Ht 5' 3" (1.6 m)   Wt 90.2 kg (198 lb 13.7 oz)   SpO2 96%   BMI 35.23 kg/m²   GEN: Well developed, Well nourished, No acute distress.  HENT: Normocephalic, Atraumatic, Bilateral external ears normal, Nose normal, Oropharynx moist, No oral exudates.   Eyes: PERRL, EOMI, Conjunctiva normal, No discharge.   Neck: Supple, No tenderness.  Lymphatic: No cervical or supraclavicular lymphadenopathy noted.   Cardiovascular: Normal heart rate, Normal rhythm, No murmurs, No rubs, No gallops.   Thorax & Lungs: Normal breath sounds, No respiratory distress, No wheezing.  Abdomen: Soft, No tenderness, Bowel sounds normal.  Skin: Warm, Dry, No erythema, No rash.   Extremities: No edema, No tenderness.       ASSESSMENT/PLAN    Keyonna was seen today for follow-up and hypertension.    Diagnoses and all orders for this visit:    Well woman exam without gynecological exam  -     CBC Auto Differential; Future  -     COMPREHENSIVE METABOLIC PANEL; Future  -     Lipid Panel; Future  -     Urinalysis; Future  -     Hemoglobin A1C; Future  - Labs to be done when fasting    Essential hypertension  -     COMPREHENSIVE METABOLIC PANEL; Future  - Stable today  - Continue current medication " regimen    Paroxysmal atrial fibrillation  - Asymptomatic today  - Is taking anticoagulation therapy    Coronary artery disease involving coronary bypass graft of native heart with angina pectoris  -     nitroGLYCERIN (NITROSTAT) 0.4 MG SL tablet; Place 1 tablet (0.4 mg total) under the tongue every 5 (five) minutes as needed for Chest pain.  -     Lipid Panel; Future  - Labs to be done when fasting  - Following up with cardiology    Diastolic dysfunction with chronic heart failure  -     semaglutide (OZEMPIC) 0.25 mg or 0.5 mg (2 mg/3 mL) pen injector; Inject 0.25 mg into the skin every 7 days.  - Documented on last echo with preserved EF    Mood disorder  - Stable on current medication regimen  - No SI/HI or AH/VH  - No mixing medication with alcohol    Stage 3b chronic kidney disease  - Stable  - Rechecking labs to follow    Aortic atherosclerosis  - Continue statin therapy as she is tolerating             FOLLOW UP: 6 months or sooner if needed      Morgan Leger MD

## 2024-06-27 NOTE — TELEPHONE ENCOUNTER
----- Message from Dao Suggs sent at 6/27/2024  4:26 PM CDT -----  Type: Patient Call Back    Who called:Self    What is the request in detail:the name of the medication that dropped bp too low is labetaloL (NORMODYNE) 200 MG tablet and pt stopped taking spironolactone (ALDACTONE) 25 MG tablet    Can the clinic reply by [facility]?No    Would the patient rather a call back or a response via [facility]? Call    Best call back number:[phone] (home)       Additional Information:

## 2024-07-01 ENCOUNTER — LAB VISIT (OUTPATIENT)
Dept: LAB | Facility: HOSPITAL | Age: 64
End: 2024-07-01
Attending: FAMILY MEDICINE
Payer: COMMERCIAL

## 2024-07-01 DIAGNOSIS — Z00.00 WELL WOMAN EXAM WITHOUT GYNECOLOGICAL EXAM: ICD-10-CM

## 2024-07-01 DIAGNOSIS — I10 ESSENTIAL HYPERTENSION: Chronic | ICD-10-CM

## 2024-07-01 DIAGNOSIS — I25.709 CORONARY ARTERY DISEASE INVOLVING CORONARY BYPASS GRAFT OF NATIVE HEART WITH ANGINA PECTORIS: ICD-10-CM

## 2024-07-01 LAB
ALBUMIN SERPL BCP-MCNC: 3.6 G/DL (ref 3.5–5.2)
ALP SERPL-CCNC: 130 U/L (ref 55–135)
ALT SERPL W/O P-5'-P-CCNC: 44 U/L (ref 10–44)
ANION GAP SERPL CALC-SCNC: 8 MMOL/L (ref 8–16)
AST SERPL-CCNC: 26 U/L (ref 10–40)
BASOPHILS # BLD AUTO: 0.01 K/UL (ref 0–0.2)
BASOPHILS NFR BLD: 0.2 % (ref 0–1.9)
BILIRUB SERPL-MCNC: 0.5 MG/DL (ref 0.1–1)
BUN SERPL-MCNC: 23 MG/DL (ref 8–23)
CALCIUM SERPL-MCNC: 9.9 MG/DL (ref 8.7–10.5)
CHLORIDE SERPL-SCNC: 109 MMOL/L (ref 95–110)
CHOLEST SERPL-MCNC: 168 MG/DL (ref 120–199)
CHOLEST/HDLC SERPL: 3.6 {RATIO} (ref 2–5)
CO2 SERPL-SCNC: 28 MMOL/L (ref 23–29)
CREAT SERPL-MCNC: 1.1 MG/DL (ref 0.5–1.4)
DIFFERENTIAL METHOD BLD: NORMAL
EOSINOPHIL # BLD AUTO: 0.1 K/UL (ref 0–0.5)
EOSINOPHIL NFR BLD: 1 % (ref 0–8)
ERYTHROCYTE [DISTWIDTH] IN BLOOD BY AUTOMATED COUNT: 12.1 % (ref 11.5–14.5)
EST. GFR  (NO RACE VARIABLE): 56 ML/MIN/1.73 M^2
ESTIMATED AVG GLUCOSE: 111 MG/DL (ref 68–131)
GLUCOSE SERPL-MCNC: 109 MG/DL (ref 70–110)
HBA1C MFR BLD: 5.5 % (ref 4–5.6)
HCT VFR BLD AUTO: 41.3 % (ref 37–48.5)
HDLC SERPL-MCNC: 47 MG/DL (ref 40–75)
HDLC SERPL: 28 % (ref 20–50)
HGB BLD-MCNC: 13.5 G/DL (ref 12–16)
IMM GRANULOCYTES # BLD AUTO: 0.01 K/UL (ref 0–0.04)
IMM GRANULOCYTES NFR BLD AUTO: 0.2 % (ref 0–0.5)
LDLC SERPL CALC-MCNC: 96.8 MG/DL (ref 63–159)
LYMPHOCYTES # BLD AUTO: 1.2 K/UL (ref 1–4.8)
LYMPHOCYTES NFR BLD: 22.7 % (ref 18–48)
MCH RBC QN AUTO: 30.7 PG (ref 27–31)
MCHC RBC AUTO-ENTMCNC: 32.7 G/DL (ref 32–36)
MCV RBC AUTO: 94 FL (ref 82–98)
MONOCYTES # BLD AUTO: 0.3 K/UL (ref 0.3–1)
MONOCYTES NFR BLD: 6.1 % (ref 4–15)
NEUTROPHILS # BLD AUTO: 3.7 K/UL (ref 1.8–7.7)
NEUTROPHILS NFR BLD: 69.8 % (ref 38–73)
NONHDLC SERPL-MCNC: 121 MG/DL
NRBC BLD-RTO: 0 /100 WBC
PLATELET # BLD AUTO: 217 K/UL (ref 150–450)
PMV BLD AUTO: 9.5 FL (ref 9.2–12.9)
POTASSIUM SERPL-SCNC: 4.8 MMOL/L (ref 3.5–5.1)
PROT SERPL-MCNC: 6.8 G/DL (ref 6–8.4)
RBC # BLD AUTO: 4.4 M/UL (ref 4–5.4)
SODIUM SERPL-SCNC: 145 MMOL/L (ref 136–145)
TRIGL SERPL-MCNC: 121 MG/DL (ref 30–150)
WBC # BLD AUTO: 5.25 K/UL (ref 3.9–12.7)

## 2024-07-01 PROCEDURE — 85025 COMPLETE CBC W/AUTO DIFF WBC: CPT | Performed by: FAMILY MEDICINE

## 2024-07-01 PROCEDURE — 83036 HEMOGLOBIN GLYCOSYLATED A1C: CPT | Performed by: FAMILY MEDICINE

## 2024-07-01 PROCEDURE — 80061 LIPID PANEL: CPT | Performed by: FAMILY MEDICINE

## 2024-07-01 PROCEDURE — 36415 COLL VENOUS BLD VENIPUNCTURE: CPT | Performed by: FAMILY MEDICINE

## 2024-07-01 PROCEDURE — 80053 COMPREHEN METABOLIC PANEL: CPT | Performed by: FAMILY MEDICINE

## 2024-07-03 DIAGNOSIS — I48.0 PAROXYSMAL ATRIAL FIBRILLATION: ICD-10-CM

## 2024-07-03 DIAGNOSIS — I25.709 CORONARY ARTERY DISEASE INVOLVING CORONARY BYPASS GRAFT OF NATIVE HEART WITH ANGINA PECTORIS: ICD-10-CM

## 2024-07-03 RX ORDER — RIVAROXABAN 20 MG/1
TABLET, FILM COATED ORAL
Qty: 90 TABLET | Refills: 1 | Status: SHIPPED | OUTPATIENT
Start: 2024-07-03

## 2024-07-03 RX ORDER — AMIODARONE HYDROCHLORIDE 200 MG/1
200 TABLET ORAL DAILY
Qty: 90 TABLET | Refills: 1 | Status: SHIPPED | OUTPATIENT
Start: 2024-07-03

## 2024-07-03 RX ORDER — ATORVASTATIN CALCIUM 80 MG/1
80 TABLET, FILM COATED ORAL NIGHTLY
Qty: 90 TABLET | Refills: 1 | Status: SHIPPED | OUTPATIENT
Start: 2024-07-03

## 2024-07-03 RX ORDER — OLMESARTAN MEDOXOMIL 40 MG/1
40 TABLET ORAL
Qty: 90 TABLET | Refills: 1 | Status: SHIPPED | OUTPATIENT
Start: 2024-07-03

## 2024-07-25 ENCOUNTER — TELEPHONE (OUTPATIENT)
Dept: FAMILY MEDICINE | Facility: CLINIC | Age: 64
End: 2024-07-25
Payer: MEDICARE

## 2024-08-08 DIAGNOSIS — I25.810 CORONARY ARTERY DISEASE INVOLVING CORONARY BYPASS GRAFT OF NATIVE HEART WITHOUT ANGINA PECTORIS: Primary | ICD-10-CM

## 2024-08-08 DIAGNOSIS — I73.9 PAD (PERIPHERAL ARTERY DISEASE): ICD-10-CM

## 2024-08-08 RX ORDER — SEMAGLUTIDE 0.25 MG/.5ML
0.25 INJECTION, SOLUTION SUBCUTANEOUS
Qty: 2 ML | Refills: 0 | Status: SHIPPED | OUTPATIENT
Start: 2024-08-08

## 2024-08-23 NOTE — ANESTHESIA PREPROCEDURE EVALUATION
11/06/2023  Keyonna Guillen is a 63 y.o., female.      Pre-op Assessment    I have reviewed the Patient Summary Reports.     I have reviewed the Nursing Notes. I have reviewed the NPO Status.   I have reviewed the Medications.     Review of Systems  Anesthesia Hx:  No problems with previous Anesthesia  Denies Family Hx of Anesthesia complications.   Denies Personal Hx of Anesthesia complications.   Social:  Non-Smoker, No Alcohol Use    Cardiovascular:   Hypertension CAD  CABG/stent Dysrhythmias  hyperlipidemia    Renal/:   Chronic Renal Disease, CKD    Neurological:  Neurology Normal    Endocrine:  Obesity / BMI > 30      Physical Exam  General: Well nourished, Cooperative, Alert and Oriented    Airway:  Mallampati: II   Mouth Opening: Normal  TM Distance: Normal  Tongue: Normal  Neck ROM: Normal ROM    Dental:  Intact        Anesthesia Plan  Type of Anesthesia, risks & benefits discussed:    Anesthesia Type: Gen Natural Airway  Intra-op Monitoring Plan: Standard ASA Monitors  Induction:  IV  Informed Consent: Informed consent signed with the Patient and all parties understand the risks and agree with anesthesia plan.  All questions answered. Patient consented to blood products? No  ASA Score: 3    Ready For Surgery From Anesthesia Perspective.     .       MAURICIO CRUZ

## 2024-08-28 ENCOUNTER — OFFICE VISIT (OUTPATIENT)
Dept: CARDIOLOGY | Facility: CLINIC | Age: 64
End: 2024-08-28
Payer: MEDICARE

## 2024-08-28 VITALS
SYSTOLIC BLOOD PRESSURE: 132 MMHG | HEART RATE: 73 BPM | BODY MASS INDEX: 35.53 KG/M2 | OXYGEN SATURATION: 95 % | WEIGHT: 200.5 LBS | HEIGHT: 63 IN | RESPIRATION RATE: 18 BRPM | DIASTOLIC BLOOD PRESSURE: 76 MMHG

## 2024-08-28 DIAGNOSIS — I10 ESSENTIAL HYPERTENSION: ICD-10-CM

## 2024-08-28 DIAGNOSIS — I48.0 PAROXYSMAL ATRIAL FIBRILLATION: Primary | ICD-10-CM

## 2024-08-28 DIAGNOSIS — I65.22 ATHEROSCLEROSIS OF LEFT CAROTID ARTERY: ICD-10-CM

## 2024-08-28 DIAGNOSIS — I25.709 CORONARY ARTERY DISEASE INVOLVING CORONARY BYPASS GRAFT OF NATIVE HEART WITH ANGINA PECTORIS: ICD-10-CM

## 2024-08-28 DIAGNOSIS — G47.33 OSA (OBSTRUCTIVE SLEEP APNEA): ICD-10-CM

## 2024-08-28 DIAGNOSIS — N18.31 STAGE 3A CHRONIC KIDNEY DISEASE: ICD-10-CM

## 2024-08-28 DIAGNOSIS — I10 ESSENTIAL (PRIMARY) HYPERTENSION: ICD-10-CM

## 2024-08-28 DIAGNOSIS — R00.2 PALPITATIONS: ICD-10-CM

## 2024-08-28 DIAGNOSIS — I65.29 CAROTID ATHEROSCLEROSIS, UNSPECIFIED LATERALITY: ICD-10-CM

## 2024-08-28 DIAGNOSIS — E66.01 SEVERE OBESITY (BMI 35.0-39.9) WITH COMORBIDITY: ICD-10-CM

## 2024-08-28 DIAGNOSIS — I70.0 AORTIC ATHEROSCLEROSIS: ICD-10-CM

## 2024-08-28 DIAGNOSIS — I73.9 PAD (PERIPHERAL ARTERY DISEASE): ICD-10-CM

## 2024-08-28 LAB
OHS QRS DURATION: 86 MS
OHS QTC CALCULATION: 509 MS

## 2024-08-28 PROCEDURE — 93010 ELECTROCARDIOGRAM REPORT: CPT | Mod: S$PBB,,, | Performed by: INTERNAL MEDICINE

## 2024-08-28 PROCEDURE — 99215 OFFICE O/P EST HI 40 MIN: CPT | Mod: PBBFAC | Performed by: INTERNAL MEDICINE

## 2024-08-28 PROCEDURE — 99999 PR PBB SHADOW E&M-EST. PATIENT-LVL V: CPT | Mod: PBBFAC,,, | Performed by: INTERNAL MEDICINE

## 2024-08-28 PROCEDURE — 99214 OFFICE O/P EST MOD 30 MIN: CPT | Mod: S$PBB,,, | Performed by: INTERNAL MEDICINE

## 2024-08-28 PROCEDURE — 93005 ELECTROCARDIOGRAM TRACING: CPT | Mod: PBBFAC | Performed by: INTERNAL MEDICINE

## 2024-08-28 PROCEDURE — G2211 COMPLEX E/M VISIT ADD ON: HCPCS | Mod: S$PBB,,, | Performed by: INTERNAL MEDICINE

## 2024-08-28 RX ORDER — ISOSORBIDE MONONITRATE 120 MG/1
240 TABLET, EXTENDED RELEASE ORAL DAILY
Qty: 180 TABLET | Refills: 0 | Status: SHIPPED | OUTPATIENT
Start: 2024-08-28

## 2024-08-28 NOTE — PROGRESS NOTES
CARDIOVASCULAR PROGRESS NOTE    REASON FOR CONSULT:   Keyonna Guillen is a 64 y.o. female who presents for follow up of PAF, CAD/CABG, HTN, PAD  PCP: [place]  Ortho: [facility]  HISTORY OF PRESENT ILLNESS:   The patient returns for follow up.  She tells me she thought she was back in AFib yesterday and took a dose of amiodarone (she was not taking this daily as instructed).  She tells me she converted this morning.  She describes jaw pain with the AFib.  She otherwise has had no further symptoms nor any palpitations or syncope.  She denies any PND, orthopnea, melena, hematuria, or claudication symptoms.  She continues take her Xarelto.  We discussed referral to electrophysiology and she is agreeable.  I also discussed re referral to sleep medicine and she was agreeable.    CARDIOVASCULAR HISTORY:   CAD s/p CABG 4/5/11: LIMA-LAD, SVG-D, SVG-OM  PAF s/p FRANCISCO/DCCV 3/8/18, DCCV 1/10/22, on Xarelto/amio, CHADS VASC 2  Hx L vert art occlusion  PAD, ?R SFA stenosis (US 5/2018)    PAST MEDICAL HISTORY:     Past Medical History:   Diagnosis Date    Atrial fibrillation     Colon polyp     Coronary artery disease     Disorder of kidney and ureter     Hyperlipidemia     Hypertension        PAST SURGICAL HISTORY:     Past Surgical History:   Procedure Laterality Date    BREAST BIOPSY      CARDIAC SURGERY      COLONOSCOPY N/A 5/25/2020    Procedure: COLONOSCOPY;  Surgeon: José Luis Fonseca MD;  Location: [facility] ENDO;  Service: Endoscopy;  Laterality: N/A;  XARELTO/PLETAL ok    COLONOSCOPY N/A 11/6/2023    Procedure: COLONOSCOPY;  Surgeon: Rocky Frankel MD;  Location: [facility] ENDO;  Service: Endoscopy;  Laterality: N/A;  Ref by Trevor Street, instr via portal - PC  ok to hold Xarelto x 2 days per Dr Kenji GAXIOLA [facility] to MarlysJOSE  10/30- pre call complete.  DBM    CORONARY ARTERY BYPASS GRAFT      CYSTOSCOPY W/ URETERAL STENT PLACEMENT Bilateral 6/25/2020    Procedure: CYSTOSCOPY, WITH URETERAL STENT INSERTION;  Surgeon: STEPHANY  lCifford Smyth MD;  Location: [facility] OR;  Service: Urology;  Laterality: Bilateral;    HYSTERECTOMY      LAPAROSCOPIC HEMICOLECTOMY Right 6/25/2020    Procedure: HAND ASSISTED HEMICOLECTOMY, LAPAROSCOPIC;  Surgeon: Louis Rivas III, MD;  Location: [facility] OR;  Service: General;  Laterality: Right;  RN PREOP 6/19/2020--has cardiac clearance from Dr. Khalil, --COVID NEGATIVE and T/S on 6/23/2020  COMBINED CASE WITH DR SMYTH    TREATMENT OF CARDIAC ARRHYTHMIA N/A 1/10/2022    Procedure: Cardioversion/Defibrillation (FRANCISCO not needed);  Surgeon: José Khalil MD;  Location: [facility] CATH LAB;  Service: Cardiology;  Laterality: N/A;  RN PRE OP, COVID NEGATIVE       ALLERGIES AND MEDICATION:   Review of patient's allergies indicates:  No Known Allergies       Medication List            Accurate as of August 28, 2024  3:07 PM. If you have any questions, ask your nurse or doctor.                CONTINUE taking these medications      amiodarone 200 MG Tab  Commonly known as: PACERONE  Take 1 tablet (200 mg total) by mouth once daily.     atorvastatin 80 MG tablet  Commonly known as: LIPITOR  TAKE 1 TABLET BY MOUTH EVERY EVENING     cyclobenzaprine 10 MG tablet  Commonly known as: FLEXERIL     fluticasone propionate 50 mcg/actuation nasal spray  Commonly known as: FLONASE     furosemide 40 MG tablet  Commonly known as: LASIX  Take 1 tablet (40 mg total) by mouth once daily.     gabapentin 300 MG capsule  Commonly known as: NEURONTIN     hydrALAZINE 100 MG tablet  Commonly known as: APRESOLINE  TAKE 1 AND 1/2 TABLETS BY MOUTH 2 TIMES A DAY     isosorbide mononitrate 120 MG 24 hr tablet  Commonly known as: IMDUR  TAKE 2 TABLETS (240 MG TOTAL) BY MOUTH ONCE DAILY.     labetaloL 200 MG tablet  Commonly known as: NORMODYNE  TAKE 1 TABLET BY MOUTH EVERY 12 HOURS     nitroGLYCERIN 0.4 MG SL tablet  Commonly known as: NITROSTAT  Place 1 tablet (0.4 mg total) under the tongue every 5 (five) minutes as needed for Chest pain.      olmesartan 40 MG tablet  Commonly known as: BENICAR  TAKE 1 TABLET BY MOUTH EVERY DAY     trospium 20 mg Tab tablet  Commonly known as: sanctura  Take 1 tablet (20 mg total) by mouth every 12 (twelve) hours.     WEGOVY 0.25 mg/0.5 mL Pnij  Generic drug: semaglutide (weight loss)  Inject 0.25 mg into the skin every 7 days.     XARELTO 20 mg Tab  Generic drug: rivaroxaban  TAKE 1 TABLET BY MOUTH EVERY DAY WITH DINNER OR EVENING MEAL            STOP taking these medications      amLODIPine 5 MG tablet  Commonly known as: NORVASC  Stopped by: José Khalil MD     benzonatate 200 MG capsule  Commonly known as: TESSALON  Stopped by: José Khalil MD     celecoxib 200 MG capsule  Commonly known as: CeleBREX  Stopped by: José Khalil MD     cloNIDine 0.2 MG tablet  Commonly known as: CATAPRES  Stopped by: José Khalil MD     metoprolol succinate 50 MG 24 hr tablet  Commonly known as: TOPROL-XL  Stopped by: José Khalil MD     spironolactone 25 MG tablet  Commonly known as: ALDACTONE  Stopped by: José Khalil MD               Where to Get Your Medications        These medications were sent to [facility] #[id number] - [place] - [address]  [address] [place] [address]      Phone: [phone]   isosorbide mononitrate 120 MG 24 hr tablet           SOCIAL HISTORY:     Social History     Socioeconomic History    Marital status:     Number of children: 4   Tobacco Use    Smoking status: Never     Passive exposure: Never    Smokeless tobacco: Never   Substance and Sexual Activity    Alcohol use: Not Currently    Drug use: No    Sexual activity: Yes     Partners: Male     Birth control/protection: Surgical   Social History Narrative     since 2001    He is a .  Heavy equipment    She  used to work for an oral surgeon      Social Determinants of Health     Financial Resource Strain: Patient Declined (6/27/2024)    Overall Financial Resource Strain  (CARDIA)     Difficulty of Paying Living Expenses: Patient declined   Food Insecurity: Patient Declined (6/27/2024)    Hunger Vital Sign     Worried About Running Out of Food in the Last Year: Patient declined     Ran Out of Food in the Last Year: Patient declined   Physical Activity: Unknown (6/27/2024)    Exercise Vital Sign     Days of Exercise per Week: Patient declined   Stress: Patient Declined (6/27/2024)    [employer] [place] [employer] - Occupational Stress Questionnaire     Feeling of Stress : Patient declined   Housing Stability: Unknown (6/27/2024)    Housing Stability Vital Sign     Unable to Pay for Housing in the Last Year: Patient declined       FAMILY HISTORY:     Family History   Problem Relation Name Age of Onset    Cancer Father  78        lung    Cancer Mother  26        oral    Breast cancer Maternal Aunt         REVIEW OF SYSTEMS:   Review of Systems   Constitutional:  Negative for chills, diaphoresis and fever.   HENT:  Negative for nosebleeds.    Eyes:  Negative for blurred vision, double vision and photophobia.   Respiratory:  Positive for shortness of breath. Negative for hemoptysis and wheezing.    Cardiovascular:  Positive for chest pain (jaw pain with PAF). Negative for palpitations, orthopnea, claudication, leg swelling and PND.   Gastrointestinal:  Negative for abdominal pain, blood in stool, heartburn, melena, nausea and vomiting.   Genitourinary:  Negative for flank pain and hematuria.   Musculoskeletal:  Negative for falls, joint pain, myalgias and neck pain.   Skin:  Negative for rash.   Neurological:  Negative for dizziness, seizures, loss of consciousness, weakness and headaches.   Endo/Heme/Allergies:  Negative for polydipsia. Does not bruise/bleed easily.   Psychiatric/Behavioral:  Negative for depression and memory loss. The patient is not nervous/anxious.        PHYSICAL EXAM:     Vitals:    08/28/24 1456   BP: 132/76   Pulse: 73   Resp: 18    Body mass index is  "35.52 kg/m².  Weight: 90.9 kg (200 lb 8.1 oz)   Height: 5' 3" (160 cm)     Physical Exam  Vitals reviewed.   Constitutional:       General: She is not in acute distress.     Appearance: She is well-developed. She is obese. She is not ill-appearing, toxic-appearing or diaphoretic.   HENT:      Head: Normocephalic and atraumatic.   Eyes:      General: No scleral icterus.     Extraocular Movements: Extraocular movements intact.      Conjunctiva/sclera: Conjunctivae normal.      Pupils: Pupils are equal, round, and reactive to light.   Neck:      Thyroid: No thyromegaly.      Vascular: Normal carotid pulses. No carotid bruit or JVD.      Trachea: Trachea normal. No tracheal deviation.   Cardiovascular:      Rate and Rhythm: Normal rate and regular rhythm.      Pulses:           Carotid pulses are 2+ on the right side and 2+ on the left side.     Heart sounds: S1 normal and S2 normal. Heart sounds are distant. No murmur heard.     No friction rub. No gallop.   Pulmonary:      Effort: Pulmonary effort is normal. No respiratory distress.      Breath sounds: Normal breath sounds. No stridor. No wheezing, rhonchi or rales.   Chest:      Chest wall: No tenderness.   Abdominal:      General: There is no distension.      Palpations: Abdomen is soft.   Musculoskeletal:         General: No swelling or tenderness. Normal range of motion.      Cervical back: Normal range of motion and neck supple. No edema or rigidity.      Right lower leg: No edema.      Left lower leg: No edema.   Feet:      Right foot:      Skin integrity: No ulcer.      Left foot:      Skin integrity: No ulcer.   Skin:     General: Skin is warm and dry.      Coloration: Skin is not jaundiced.   Neurological:      General: No focal deficit present.      Mental Status: She is alert and oriented to person, place, and time.      Cranial Nerves: No cranial nerve deficit.   Psychiatric:         Mood and Affect: Mood normal.         Speech: Speech normal.         " Behavior: Behavior normal. Behavior is cooperative.         DATA:   EKG: (personally reviewed tracing(s))  8/28/24 SR 70, NSSTTW changes    Laboratory:  CBC:  Recent Labs   Lab 07/01/23  1146 09/27/23  1051 07/01/24  0813   WBC 4.58 5.18 5.25   Hemoglobin 12.4 12.5 13.5   Hematocrit 38.5 39.7 41.3   Platelets 201 250 217       CHEMISTRIES:  Recent Labs   Lab 01/10/22  1045 01/18/22  1445 03/02/23  1157 09/27/23  1051 10/13/23  1045 07/01/24  0813   Glucose 97 88   < > 82 121 H 109   Sodium 145 144   < > 142 143 145   Potassium 3.9 3.7   < > 3.9 4.0 4.8   BUN 22 18   < > 26 H 23 23   Creatinine 1.0 0.9   < > 1.1 1.2 1.1   eGFR if  >60 >60  --   --   --   --    eGFR if non  >60 >60  --   --   --   --    Calcium 9.1 9.9   < > 9.2 9.5 9.9   Magnesium  --   --   --  2.0  --   --     < > = values in this interval not displayed.       CARDIAC BIOMARKERS:  Recent Labs   Lab 01/18/22  1445 07/01/23  1146 09/27/23  1051   Troponin I 0.009 0.010 <0.006       COAGS:  Recent Labs   Lab 07/01/23  1146   INR 1.1       LIPIDS/LFTS:  Recent Labs   Lab 03/30/23  0932 07/01/23  1146 09/27/23  1051 07/01/24  0813   Cholesterol 169  --   --  168   Triglycerides 72  --   --  121   HDL 49  --   --  47   LDL Cholesterol 105.6  --   --  96.8   Non-HDL Cholesterol 120  --   --  121   AST 29 20 14 26   ALT 32 26 18 44     Lab Results   Component Value Date    TSH 1.459 09/27/2023         Cardiovascular Testing:  Renal art US 10/20/23    There is insignificant stenosis (0-59%) in the Right Renal Artery.    Elevated right renal resistive index suggestive of intrinsic kidney disease.    Right kidney 9.90 cm.    There is insignificant stenosis (0-59%) in the Left Renal Artery.    Elevated left renal resistive index suggestive of intrinsic kidney disease.    Left kidney 10.40 cm.    Echo 10/20/23    Left Ventricle: The left ventricle is normal in size. Mildly increased wall thickness. Normal wall motion. There is  normal systolic function with a visually estimated ejection fraction of 55 - 60%. Grade I diastolic dysfunction.    Left Atrium: Left atrium is moderately dilated.    Right Ventricle: Normal right ventricular cavity size. Systolic function is normal.    Mitral Valve: There is mild to moderate regurgitation.    Tricuspid Valve: There is mild regurgitation.    Pulmonary Artery: The estimated pulmonary artery systolic pressure is 26 mmHg.    IVC/SVC: Normal venous pressure at 3 mmHg.    Carotid US 3/22/23  There is 20-39% right Internal Carotid Stenosis.  There is 0-19% left Internal Carotid Stenosis.  Similar findings noted on prior report 5/7/21.    [facility] MPI 2/16/22     Normal myocardial perfusion scan. There is no evidence of myocardial ischemia or infarction.    There is a moderate intensity perfusion abnormality in the anterior wall of the left ventricle, secondary to breast attenuation.    The gated perfusion images showed an ejection fraction of 71% post stress.    There is normal wall motion post stress.    LV cavity size is normal at stress.    The EKG portion of this study is negative for ischemia.    The patient reported no chest pain during the stress test.    There were no arrhythmias during stress.    A PET stress exam is recommended if clinically indicated.    LE art US 5/31/18  R STEFANI 1.09  L STEFANI 1.08  Elevated velocities within the mid to distal right superficial femoral artery strongly suggestive of hemodynamically significant stenoses within the mid to distal right superficial femoral artery.    LE venous US/reflux 5/31/18  No evidence for deep venous thrombosis within either lower extremity.  No evidence for venous insufficiency within the greater or lesser saphenous veins bilaterally.    FRANCISCO/DCCV 3/9/18  Normal bivent size/fxn, EF 55%, normal wall motion  Normal appearing valves.  Trace AI, mild MR, mild TR  No LA/LON thrombus  Succcessful DCCV af->NSR 360J x1.  Plan:  Cont med rx  Cont Xarelto 20mg  qd  OK for discharge later on today and follow up with me in the office 1 week for outpatient stress test.    Cath 3/25/11 ([facility], subsequently had CABG)  LM: normal  LAD sequential 90% prox and 80% mid stenoses   D2 prox 80%  LCx: MLI   OM2 prox 80%  RCA: dom, mid-dist 40% sequential stenoses    ASSESSMENT:   # PAF in SR, s/p FRACNISCO/DCCV 3/9/18, DCCV 1/10/22.  CHADS VASC 2 on Xarelto/amio.    # HTN, controlled, pt following in digital med program.  # CAD s/p CABG x3V 4/2011.  MPI 2/2022 neg.  Had jaw pain with recurrent PAF and inc VR.   # HFpEF, appears euvolemic.  # PAD, bilat buttock and RLE claudication, R SFA stenosis noted on US 5/2018.  Sxs seemd to have resolved (pt thinks it was more related to R hip pain, did not notice much of a difference with pletal rx)  # HLP on atorva 80mg  # CKD3a  # BMI 36, up 1 unit(s) vs last OV  # aortic atherosclerosis (CT A/P 9/25/20)    PLAN:   Cont med rx  Cont Xarelto 20mg qd   Cont amio 200mg qd.  Patient instructed that this is medication be taken daily.  BUCKY eval reordered  EP eval for possible ablation  Diet/exercise/weight loss  Cont monitoring in digital med program.   Surveillance carotid US  RTC 6 months (Feb 2025)  If recurrent anginal sxs despite control of BP, consider cardiac PET vs cath.    Consider LE angio in future for eval of LE PAD if limiting claudication recurs    The above documents medical care services that are part of ongoing care related to this patient's serious/complex condition (Code ) (CAD/PAD/PAF).      José Khalil MD, FACC

## 2024-09-16 DIAGNOSIS — I73.9 PAD (PERIPHERAL ARTERY DISEASE): ICD-10-CM

## 2024-09-16 DIAGNOSIS — I25.709 CORONARY ARTERY DISEASE INVOLVING CORONARY BYPASS GRAFT OF NATIVE HEART WITH ANGINA PECTORIS: ICD-10-CM

## 2024-09-16 DIAGNOSIS — I48.0 PAROXYSMAL ATRIAL FIBRILLATION: ICD-10-CM

## 2024-09-16 DIAGNOSIS — I10 ESSENTIAL (PRIMARY) HYPERTENSION: ICD-10-CM

## 2024-09-16 DIAGNOSIS — I10 ESSENTIAL HYPERTENSION: ICD-10-CM

## 2024-09-16 DIAGNOSIS — I25.810 CORONARY ARTERY DISEASE INVOLVING CORONARY BYPASS GRAFT OF NATIVE HEART WITHOUT ANGINA PECTORIS: ICD-10-CM

## 2024-09-16 RX ORDER — LABETALOL 200 MG/1
200 TABLET, FILM COATED ORAL EVERY 12 HOURS
Qty: 180 TABLET | Refills: 3 | Status: SHIPPED | OUTPATIENT
Start: 2024-09-16

## 2024-09-16 RX ORDER — ISOSORBIDE MONONITRATE 120 MG/1
240 TABLET, EXTENDED RELEASE ORAL DAILY
Qty: 180 TABLET | Refills: 3 | Status: SHIPPED | OUTPATIENT
Start: 2024-09-16

## 2024-09-16 RX ORDER — AMIODARONE HYDROCHLORIDE 200 MG/1
200 TABLET ORAL DAILY
Qty: 90 TABLET | Refills: 3 | Status: SHIPPED | OUTPATIENT
Start: 2024-09-16

## 2024-09-16 RX ORDER — ATORVASTATIN CALCIUM 80 MG/1
80 TABLET, FILM COATED ORAL NIGHTLY
Qty: 90 TABLET | Refills: 3 | Status: SHIPPED | OUTPATIENT
Start: 2024-09-16

## 2024-09-16 RX ORDER — OLMESARTAN MEDOXOMIL 40 MG/1
40 TABLET ORAL DAILY
Qty: 90 TABLET | Refills: 3 | Status: SHIPPED | OUTPATIENT
Start: 2024-09-16

## 2024-09-16 NOTE — TELEPHONE ENCOUNTER
No care due was identified.  Health [facility] Embedded Care Due Messages. Reference number: [id number].   9/16/2024 9:56:09 AM CDT

## 2024-09-17 NOTE — TELEPHONE ENCOUNTER
Refill Routing Note   Medication(s) are not appropriate for processing by [facility] for the following reason(s):        New or recently adjusted medication    [facility] action(s):  Defer               Appointments  past 12m or future 3m with PCP    Date Provider   Last Visit   6/27/2024 Morgan Leger MD   Next Visit   1/13/2025 Morgan Leger MD   ED visits in past 90 days: 0        Note composed:8:38 AM 09/17/2024

## 2024-09-17 NOTE — TELEPHONE ENCOUNTER
Please check with patient to see if patient wants to stay on current dose of Wegovy.    Thanks  Dr. Leger

## 2024-09-18 ENCOUNTER — PATIENT MESSAGE (OUTPATIENT)
Dept: ADMINISTRATIVE | Facility: HOSPITAL | Age: 64
End: 2024-09-18
Payer: MEDICARE

## 2024-09-18 ENCOUNTER — HOSPITAL ENCOUNTER (OUTPATIENT)
Dept: CARDIOLOGY | Facility: HOSPITAL | Age: 64
Discharge: HOME | End: 2024-09-18
Attending: INTERNAL MEDICINE
Payer: MEDICARE

## 2024-09-18 ENCOUNTER — PATIENT OUTREACH (OUTPATIENT)
Dept: ADMINISTRATIVE | Facility: HOSPITAL | Age: 64
End: 2024-09-18
Payer: MEDICARE

## 2024-09-18 DIAGNOSIS — N18.32 STAGE 3B CHRONIC KIDNEY DISEASE: Primary | ICD-10-CM

## 2024-09-18 DIAGNOSIS — I65.29 CAROTID ATHEROSCLEROSIS, UNSPECIFIED LATERALITY: ICD-10-CM

## 2024-09-18 DIAGNOSIS — I65.22 ATHEROSCLEROSIS OF LEFT CAROTID ARTERY: ICD-10-CM

## 2024-09-18 LAB
LEFT CBA DIAS: 21 CM/S
LEFT CBA SYS: 71 CM/S
LEFT CCA DIST DIAS: 23 CM/S
LEFT CCA DIST SYS: 76 CM/S
LEFT CCA MID DIAS: 19 CM/S
LEFT CCA MID SYS: 80 CM/S
LEFT CCA PROX DIAS: 17 CM/S
LEFT CCA PROX SYS: 88 CM/S
LEFT ECA DIAS: 4 CM/S
LEFT ECA SYS: 109 CM/S
LEFT ICA DIST DIAS: 27 CM/S
LEFT ICA DIST SYS: 75 CM/S
LEFT ICA MID DIAS: 29 CM/S
LEFT ICA MID SYS: 81 CM/S
LEFT ICA PROX DIAS: 22 CM/S
LEFT ICA PROX SYS: 82 CM/S
LEFT VERTEBRAL SYS: 0 CM/S
OHS CV CAROTID RIGHT ICA EDV HIGHEST: 25
OHS CV CAROTID ULTRASOUND LEFT ICA/CCA RATIO: 1.08
OHS CV CAROTID ULTRASOUND RIGHT ICA/CCA RATIO: 1.43
OHS CV PV CAROTID LEFT HIGHEST CCA: 88
OHS CV PV CAROTID LEFT HIGHEST ICA: 82
OHS CV PV CAROTID RIGHT HIGHEST CCA: 88
OHS CV PV CAROTID RIGHT HIGHEST ICA: 114
OHS CV US CAROTID LEFT HIGHEST EDV: 29
RIGHT CBA DIAS: 21 CM/S
RIGHT CBA SYS: 118 CM/S
RIGHT CCA DIST DIAS: 17 CM/S
RIGHT CCA DIST SYS: 80 CM/S
RIGHT CCA MID DIAS: 15 CM/S
RIGHT CCA MID SYS: 79 CM/S
RIGHT CCA PROX DIAS: 11 CM/S
RIGHT CCA PROX SYS: 88 CM/S
RIGHT ECA DIAS: 10 CM/S
RIGHT ECA SYS: 98 CM/S
RIGHT ICA DIST DIAS: 21 CM/S
RIGHT ICA DIST SYS: 57 CM/S
RIGHT ICA MID DIAS: 25 CM/S
RIGHT ICA MID SYS: 83 CM/S
RIGHT ICA PROX DIAS: 14 CM/S
RIGHT ICA PROX SYS: 114 CM/S
RIGHT VERTEBRAL DIAS: 18 CM/S
RIGHT VERTEBRAL SYS: 39 CM/S

## 2024-09-18 PROCEDURE — 93880 EXTRACRANIAL BILAT STUDY: CPT | Mod: 26,,, | Performed by: INTERNAL MEDICINE

## 2024-09-18 PROCEDURE — 93880 EXTRACRANIAL BILAT STUDY: CPT

## 2024-09-18 RX ORDER — SEMAGLUTIDE 0.25 MG/.5ML
0.25 INJECTION, SOLUTION SUBCUTANEOUS
Qty: 6 ML | Refills: 1 | Status: SHIPPED | OUTPATIENT
Start: 2024-09-18

## 2024-09-18 NOTE — TELEPHONE ENCOUNTER
Pt states she would like to stay at her current dose as it has been effective, reports losing 8 Lbs.

## 2024-09-19 ENCOUNTER — TELEPHONE (OUTPATIENT)
Dept: CARDIOLOGY | Facility: CLINIC | Age: 64
End: 2024-09-19
Payer: MEDICARE

## 2024-09-19 NOTE — TELEPHONE ENCOUNTER
----- Message from Renay Ramos MA sent at 9/19/2024  8:50 AM CDT -----  Type:  Test Results    Who Called: Self    Name of Test (Lab/Mammo/Etc): Vascular US.. states she has a few questions pertaining to her results..     Date of Test: 9/18/24    Ordering Provider: Simran    Where the test was performed: [facility]    Would the patient rather a call back or a response via [facility]? Yes, call     Best Call Back Number:  [phone] (home)

## 2024-09-19 NOTE — TELEPHONE ENCOUNTER
Patient states that she is concerned about her us results. She would like clarification regarding the results.    Subjective   Pt is a 79 yo female presenting to ED with complaints of abdominal pain and blood in stool. Pt reports noticing bright red blood in stool about 2 weeks ago. She began having lower abdominal several days ago and is worse with having bowel movements. She denies rectal pain or hx of hemorrhoids. She reports feeling lightheaded but denies dizziness, CP, SOB, fever, cough, congestion, nausea, vomiting, diarrhea or urinary sx. She denies use of blood thinners but does take Plavix. She is a resident at [facility]. Reports of H and H of 6.4 and 19.9. Pt denies prior hx of GI bleed and can't recall last colonoscopy. She denies recent antibiotics. PMHx significant for HTN, HLD, CAD, CVA, Migraines, and Arthritis.       History provided by:  Patient, medical records and nursing home  Abdominal Pain   Pain location: lower abdomen.  Pain quality: cramping and sharp    Pain radiates to:  Does not radiate  Pain severity:  Moderate  Timing:  Intermittent  Chronicity:  New  Context: not alcohol use, not diet changes, not recent illness, not recent travel, not sick contacts, not suspicious food intake and not trauma    Relieved by:  Nothing  Worsened by:  Nothing  Ineffective treatments:  None tried  Associated symptoms: hematochezia    Associated symptoms: no chest pain, no chills, no constipation, no cough, no diarrhea, no dysuria, no fatigue, no fever, no hematuria, no melena, no nausea, no shortness of breath and no vomiting    Risk factors: no alcohol abuse and has not had multiple surgeries        Review of Systems   Constitutional: Negative for chills, fatigue and fever.   Respiratory: Negative for cough and shortness of breath.    Cardiovascular: Negative for chest pain.   Gastrointestinal: Positive for abdominal pain and hematochezia. Negative for blood in stool, constipation, diarrhea, melena, nausea, rectal pain and vomiting.   Genitourinary: Negative for difficulty urinating, dysuria, hematuria and urgency.    Musculoskeletal: Negative for arthralgias and back pain.   Neurological: Positive for weakness (generalized) and light-headedness. Negative for dizziness.   All other systems reviewed and are negative.      Past Medical History:   Diagnosis Date   • Arthritis    • Carotid stenosis    • Cataracts, bilateral    • Coronary artery disease    • CVA (cerebral vascular accident) (CMS/HCC)    • Hx of migraines    • Hypertension        Allergies   Allergen Reactions   • Hydrocodone Hives   • Oxycodone Hives   • Penicillins Hives   • Sulfa Antibiotics Hives   • Doxycycline    • Lyrica [Pregabalin]        Past Surgical History:   Procedure Laterality Date   • ABDOMINAL SURGERY     • CHOLECYSTECTOMY     • HYSTERECTOMY         Family History   Problem Relation Age of Onset   • Arthritis Mother    • Early death Mother    • Heart disease Father    • Hypertension Father    • Hyperlipidemia Father    • Diabetes Father    • Hypertension Daughter        Social History     Socioeconomic History   • Marital status:      Spouse name: Not on file   • Number of children: Not on file   • Years of education: Not on file   • Highest education level: Not on file   Tobacco Use   • Smoking status: Former Smoker     Packs/day: 1.00     Years: 15.00     Pack years: 15.00     Last attempt to quit: 9/21/2016     Years since quitting: 3.5   • Smokeless tobacco: Never Used   Substance and Sexual Activity   • Alcohol use: No   • Drug use: No   • Sexual activity: Never           Objective   Physical Exam   Constitutional: She is oriented to person, place, and time. Vital signs are normal. She appears well-developed.   HENT:   Head: Atraumatic.   Nose: Nose normal.   Mouth/Throat: Mucous membranes are normal.   Eyes: Pupils are equal, round, and reactive to light. Conjunctivae, EOM and lids are normal.   Neck: Normal range of motion. Neck supple.   Cardiovascular: Normal rate, regular rhythm and normal heart sounds.   Pulmonary/Chest: Effort  normal and breath sounds normal. She has no wheezes.   Abdominal: Soft. She exhibits no distension. There is tenderness in the suprapubic area. There is no rebound, no guarding and no CVA tenderness.   Musculoskeletal: Normal range of motion. She exhibits no edema or tenderness.   Neurological: She is alert and oriented to person, place, and time. No sensory deficit.   Skin: Skin is warm and dry. No rash noted. No erythema.   Psychiatric: She has a normal mood and affect. Her speech is normal and behavior is normal.   Nursing note and vitals reviewed.      Procedures           ED Course  ED Course as of Apr 03 1621 Fri Apr 03, 2020   1531 Spoke with daughter Mitali who is POA and patient allowed update on ED workup. Agreeable with admission. She denies any known hx of cancer. She also reports patient receives hydrocodone at nursing home and can tolerate narcotics. Pt with hx of prior CVA and memory issues. She provided her phone number for further questions / updates. [phone]    [RT]      ED Course User Index  [RT] Ariela Valera PA      Re-examined patient several times in ED. Updated patient on ED results and plan for admission. Pt denies prior hx of cancer.     Discussed patient with Dr. Hawley who is agreeable with plan.     Discussed admission with hospitalist Dr. Worley.     Reviewed old records.     Recent Results (from the past 24 hour(s))   Comprehensive Metabolic Panel    Collection Time: 04/03/20  1:03 PM   Result Value Ref Range    Glucose 127 (H) 65 - 99 mg/dL    BUN 12 8 - 23 mg/dL    Creatinine 0.82 0.57 - 1.00 mg/dL    Sodium 137 136 - 145 mmol/L    Potassium 4.2 3.5 - 5.2 mmol/L    Chloride 100 98 - 107 mmol/L    CO2 27.0 22.0 - 29.0 mmol/L    Calcium 9.2 8.6 - 10.5 mg/dL    Total Protein 7.3 6.0 - 8.5 g/dL    Albumin 3.90 3.50 - 5.20 g/dL    ALT (SGPT) 7 1 - 33 U/L    AST (SGOT) 13 1 - 32 U/L    Alkaline Phosphatase 69 39 - 117 U/L    Total Bilirubin 0.2 0.2 - 1.2 mg/dL    eGFR Non African  Amer 67 >60 mL/min/1.73    Globulin 3.4 gm/dL    A/G Ratio 1.1 g/dL    BUN/Creatinine Ratio 14.6 7.0 - 25.0    Anion Gap 10.0 5.0 - 15.0 mmol/L   Green Top (Gel)    Collection Time: 04/03/20  1:03 PM   Result Value Ref Range    Extra Tube Hold for add-ons.    Lipase    Collection Time: 04/03/20  1:03 PM   Result Value Ref Range    Lipase 15 13 - 60 U/L   BNP    Collection Time: 04/03/20  1:03 PM   Result Value Ref Range    proBNP 486.4 5.0-1,800.0 pg/mL   Troponin    Collection Time: 04/03/20  1:03 PM   Result Value Ref Range    Troponin T <0.010 0.000 - 0.030 ng/mL   Light Blue Top    Collection Time: 04/03/20  1:04 PM   Result Value Ref Range    Extra Tube hold for add-on    Lavender Top    Collection Time: 04/03/20  1:04 PM   Result Value Ref Range    Extra Tube hold for add-on    CBC Auto Differential    Collection Time: 04/03/20  1:04 PM   Result Value Ref Range    WBC 7.67 3.40 - 10.80 10*3/mm3    RBC 2.73 (L) 3.77 - 5.28 10*6/mm3    Hemoglobin 7.8 (L) 12.0 - 15.9 g/dL    Hematocrit 25.8 (L) 34.0 - 46.6 %    MCV 94.5 79.0 - 97.0 fL    MCH 28.6 26.6 - 33.0 pg    MCHC 30.2 (L) 31.5 - 35.7 g/dL    RDW 13.8 12.3 - 15.4 %    RDW-SD 46.9 37.0 - 54.0 fl    MPV 10.6 6.0 - 12.0 fL    Platelets 284 140 - 450 10*3/mm3    Neutrophil % 75.0 42.7 - 76.0 %    Lymphocyte % 16.0 (L) 19.6 - 45.3 %    Monocyte % 6.0 5.0 - 12.0 %    Eosinophil % 2.0 0.3 - 6.2 %    Basophil % 0.7 0.0 - 1.5 %    Immature Grans % 0.3 0.0 - 0.5 %    Neutrophils, Absolute 5.76 1.70 - 7.00 10*3/mm3    Lymphocytes, Absolute 1.23 0.70 - 3.10 10*3/mm3    Monocytes, Absolute 0.46 0.10 - 0.90 10*3/mm3    Eosinophils, Absolute 0.15 0.00 - 0.40 10*3/mm3    Basophils, Absolute 0.05 0.00 - 0.20 10*3/mm3    Immature Grans, Absolute 0.02 0.00 - 0.05 10*3/mm3    nRBC 0.0 0.0 - 0.2 /100 WBC   Protime-INR    Collection Time: 04/03/20  1:04 PM   Result Value Ref Range    Protime 13.6 11.5 - 14.0 Seconds    INR 1.07 0.85 - 1.16   [facility]    Collection Time:  04/03/20  1:10 PM   Result Value Ref Range    Extra Tube Hold for add-ons.    ABO RH Specimen Verification    Collection Time: 04/03/20  1:25 PM   Result Value Ref Range    ABO Type O     RH type Positive    POCT Occult Blood, stool    Collection Time: 04/03/20  1:42 PM   Result Value Ref Range    Fecal Occult Blood Positive (A) Negative    Lot Number [id number]     Expiration Date 8/22     DEVELOPER LOT NUMBER 1     DEVELOPER EXPIRATION DATE 1     Positive Control Positive Positive    Negative Control Negative Negative   Urinalysis With Culture If Indicated - Urine, Catheter In/Out    Collection Time: 04/03/20  1:42 PM   Result Value Ref Range    Color, UA Yellow Yellow, Straw    Appearance, UA Clear Clear    pH, UA 6.0 5.0 - 8.0    Specific Gravity, UA 1.007 1.001 - 1.030    Glucose, UA Negative Negative    Ketones, UA Negative Negative    Bilirubin, UA Negative Negative    Blood, UA Negative Negative    Protein, UA Negative Negative    Leuk Esterase, UA Negative Negative    Nitrite, UA Negative Negative    Urobilinogen, UA 0.2 E.U./dL 0.2 - 1.0 E.U./dL   Type & Screen    Collection Time: 04/03/20  3:06 PM   Result Value Ref Range    ABO Type O     RH type Positive     Antibody Screen Negative     T&S Expiration Date 4/6/2020 11:59:59 PM      Note: In addition to lab results from this visit, the labs listed above may include labs taken at another facility or during a different encounter within the last 24 hours. Please correlate lab times with ED admission and discharge times for further clarification of the services performed during this visit.    XR Chest 1 View   Preliminary Result   Prominence of the pulmonary vascularity. Heart is enlarged.   No acute parenchymal disease.              CT Abdomen Pelvis With Contrast   Final Result   1. Abnormal wall thickening identified of the rectum and malignancy   cannot be excluded. Correlation to colonoscopy is recommended for   further evaluation. Adenopathy identified  throughout the   retroperitoneum. Findings concerning for metastatic disease.   3. Negative for pneumonia at the lung bases.   4. Moderate dilatation seen of the right renal collecting system to the   UVJ where there is no evidence of obstruction or obstructing lesion.   Contrast seen to the bladder.       DICTATED:   04/03/2020   EDITED/ls :   04/03/2020        This report was finalized on 4/3/2020 3:31 PM by Dr. Inés Briscoe MD.            Vitals:    04/03/20 1540 04/03/20 1545 04/03/20 1552 04/03/20 1600   BP:  135/58  120/50   BP Location:       Patient Position:       Pulse: 67  62    Resp:       Temp:       TempSrc:       SpO2: 92%  90%    Weight:       Height:         Medications   sodium chloride 0.9 % flush 10 mL (has no administration in time range)   pantoprazole (PROTONIX) injection 40 mg (40 mg Intravenous Given 4/3/20 1351)   iopamidol (ISOVUE-300) 61 % injection 100 mL (95 mL Intravenous Given 4/3/20 1418)   ondansetron (ZOFRAN) injection 4 mg (4 mg Intravenous Given 4/3/20 1523)   acetaminophen (TYLENOL) tablet 650 mg (650 mg Oral Given 4/3/20 1521)     ECG/EMG Results (last 24 hours)     ** No results found for the last 24 hours. **        ECG 12 Lead    (Results Pending)              COVID-19 RISK SCREEN     1. Has the patient been exposed to a known COVID-19 (+) person or a person under investigation (PUI) for COVID-19 infection?  -- No     2. Has the patient traveled to or are they from a Level III endemic country? --  No    3. Has the patient traveled to or are they from a local community endemic area?   --  Yes    4. Does the patient have baseline higher exposure risk such as working in healthcare field or currently residing in healthcare facility?  --  Yes   Helpful websites:  https://www.cdc.gov/coronavirus/2019-ncov/travelers/map-and-travel-notices.html#travel-1  [email or web address]                                        MDM    Final diagnoses:   Rectal bleeding    Anemia, unspecified type   Generalized weakness            Ariela Valera PA  04/03/20 1619       Ariela Valera PA  04/03/20 162

## 2024-09-24 ENCOUNTER — HOSPITAL ENCOUNTER (OUTPATIENT)
Dept: CARDIOLOGY | Facility: CLINIC | Age: 64
Discharge: HOME | End: 2024-09-24
Payer: MEDICARE

## 2024-09-24 ENCOUNTER — OFFICE VISIT (OUTPATIENT)
Dept: ELECTROPHYSIOLOGY | Facility: CLINIC | Age: 64
End: 2024-09-24
Payer: MEDICARE

## 2024-09-24 VITALS
HEART RATE: 66 BPM | SYSTOLIC BLOOD PRESSURE: 130 MMHG | DIASTOLIC BLOOD PRESSURE: 84 MMHG | HEIGHT: 63 IN | BODY MASS INDEX: 34.61 KG/M2 | WEIGHT: 195.31 LBS

## 2024-09-24 DIAGNOSIS — I25.810 CORONARY ARTERY DISEASE INVOLVING CORONARY BYPASS GRAFT OF NATIVE HEART WITHOUT ANGINA PECTORIS: ICD-10-CM

## 2024-09-24 DIAGNOSIS — I48.0 PAROXYSMAL ATRIAL FIBRILLATION: ICD-10-CM

## 2024-09-24 DIAGNOSIS — R00.2 PALPITATIONS: ICD-10-CM

## 2024-09-24 DIAGNOSIS — I48.0 PAROXYSMAL ATRIAL FIBRILLATION: Primary | ICD-10-CM

## 2024-09-24 LAB
OHS QRS DURATION: 92 MS
OHS QTC CALCULATION: 415 MS

## 2024-09-24 PROCEDURE — 93010 ELECTROCARDIOGRAM REPORT: CPT | Mod: S$PBB,,, | Performed by: INTERNAL MEDICINE

## 2024-09-24 PROCEDURE — 99999 PR PBB SHADOW E&M-EST. PATIENT-LVL III: CPT | Mod: PBBFAC,,, | Performed by: INTERNAL MEDICINE

## 2024-09-24 PROCEDURE — 99204 OFFICE O/P NEW MOD 45 MIN: CPT | Mod: S$PBB,,, | Performed by: INTERNAL MEDICINE

## 2024-09-24 PROCEDURE — 99213 OFFICE O/P EST LOW 20 MIN: CPT | Mod: PBBFAC,25 | Performed by: INTERNAL MEDICINE

## 2024-09-24 PROCEDURE — 93005 ELECTROCARDIOGRAM TRACING: CPT | Mod: PBBFAC | Performed by: INTERNAL MEDICINE

## 2024-09-24 NOTE — PROGRESS NOTES
Subjective   Patient ID:  Keyonna Guillen is a 64 y.o. female who presents for evaluation of Atrial Fibrillation      64 yoF here for arrhythmia management. CAD/CABG, HTN, PAD and pAF. She was on amiodarone QD but has taken it PRN. She had a recent episode of AF with associated angina. She has felt sleepy on amiodarone and remains taking it PRN. She was on metoprolol but has been off it.     CHADSVASC of 4    CAD s/p CABG 4/5/11: LIMA-LAD, SVG-D, SVG-OM  PAF s/p FRANCISCO/DCCV 3/8/18, DCCV 1/10/22, on Xarelto/amio, CHADS VASC 2  Hx L vert art occlusion  PAD, ?R SFA stenosis (US 5/2018)    Echo 10/23:  ·  Left Ventricle: The left ventricle is normal in size. Mildly increased wall thickness. Normal wall motion. There is normal systolic function with a visually estimated ejection fraction of 55 - 60%. Grade I diastolic dysfunction.  ·  Left Atrium: Left atrium is moderately dilated.  ·  Right Ventricle: Normal right ventricular cavity size. Systolic function is normal.  ·  Mitral Valve: There is mild to moderate regurgitation.  ·  Tricuspid Valve: There is mild regurgitation.  ·  Pulmonary Artery: The estimated pulmonary artery systolic pressure is 26 mmHg.  ·  IVC/SVC: Normal venous pressure at 3 mmHg.    My interpretation of the ECG is:  NSR nl MD, QRS, QTC    Past Medical History:  No date: Atrial fibrillation  No date: Colon polyp  No date: Coronary artery disease  No date: Disorder of kidney and ureter  No date: Hyperlipidemia  No date: Hypertension    Past Surgical History:  No date: BREAST BIOPSY  No date: CARDIAC SURGERY  5/25/2020: COLONOSCOPY; N/A      Comment:  Procedure: COLONOSCOPY;  Surgeon: José Luis Fonseca MD;                 Location: [facility] ENDO;  Service: Endoscopy;  Laterality:                N/A;  XARELTO/PLETAL ok  11/6/2023: COLONOSCOPY; N/A      Comment:  Procedure: COLONOSCOPY;  Surgeon: Rocky Frankel MD;  Location: [facility] ENDO;  Service: Endoscopy;                 Laterality: N/A;   Ref by [address], instr via                portal - PCok to hold Xarelto x 2 days per Dr Khalil-[facility] to Frankel-10/30- pre call                complete.  DBM  No date: CORONARY ARTERY BYPASS GRAFT  6/25/2020: CYSTOSCOPY W/ URETERAL STENT PLACEMENT; Bilateral      Comment:  Procedure: CYSTOSCOPY, WITH URETERAL STENT INSERTION;                 Surgeon: STEPHANY Smyth MD;  Location: [facility] OR;                 Service: Urology;  Laterality: Bilateral;  No date: HYSTERECTOMY  6/25/2020: LAPAROSCOPIC HEMICOLECTOMY; Right      Comment:  Procedure: HAND ASSISTED HEMICOLECTOMY, LAPAROSCOPIC;                 Surgeon: Louis Rivas III, MD;  Location: [facility] OR;               Service: General;  Laterality: Right;  RN PREOP                6/19/2020--has cardiac clearance from Dr. Khalil,                --COVID NEGATIVE and T/S on 6/23/2020COMBINED CASE WITH               DR SMYTH  1/10/2022: TREATMENT OF CARDIAC ARRHYTHMIA; N/A      Comment:  Procedure: Cardioversion/Defibrillation (FRANCISCO not                needed);  Surgeon: José Khalil MD;  Location:                [facility] CATH LAB;  Service: Cardiology;  Laterality: N/A;                 RN PRE OP, COVID NEGATIVE    Social History    Socioeconomic History      Marital status:       Number of children: 4    Tobacco Use      Smoking status: Never        Passive exposure: Never      Smokeless tobacco: Never    Substance and Sexual Activity      Alcohol use: Not Currently      Drug use: No      Sexual activity: Yes        Partners: Male        Birth control/protection: Surgical    Social History Narrative       since 2001      He is a .  Heavy equipment      She  used to work for an oral surgeon     Social Determinants of Health  Financial Resource Strain: Patient Declined (6/27/2024)      Overall Financial Resource Strain (CARDIA)          Difficulty of Paying Living Expenses: Patient declined  Food  Insecurity: Patient Declined (6/27/2024)      Hunger Vital Sign          Worried About Running Out of Food in the Last Year: Patient declined           Ran Out of Food in the Last Year: Patient declined  Physical Activity: Unknown (6/27/2024)      Exercise Vital Sign          Days of Exercise per Week: Patient declined  Stress: Patient Declined (6/27/2024)      [employer] - Occupational Stress Questionnaire          Feeling of Stress : Patient declined  Housing Stability: Unknown (6/27/2024)      Housing Stability Vital Sign          Unable to Pay for Housing in the Last Year: Patient declined    Review of patient's family history indicates:  Problem: Cancer      Relation: Father          Name:               Age of Onset: 78              Comment: lung  Problem: Cancer      Relation: Mother          Name:               Age of Onset: 26              Comment: oral  Problem: Breast cancer      Relation: Maternal Aunt          Name:               Age of Onset: (Not Specified)      Current Outpatient Medications:  amiodarone (PACERONE) 200 MG Tab, Take 1 tablet (200 mg total) by mouth once daily., Disp: 90 tablet, Rfl: 3  atorvastatin (LIPITOR) 80 MG tablet, Take 1 tablet (80 mg total) by mouth every evening., Disp: 90 tablet, Rfl: 3  cyclobenzaprine (FLEXERIL) 10 MG tablet, , Disp: , Rfl:   fluticasone propionate (FLONASE) 50 mcg/actuation nasal spray, , Disp: , Rfl:   furosemide (LASIX) 40 MG tablet, Take 1 tablet (40 mg total) by mouth once daily., Disp: 90 tablet, Rfl: 3  gabapentin (NEURONTIN) 300 MG capsule, Take 300 mg by mouth every evening., Disp: , Rfl:   hydrALAZINE (APRESOLINE) 100 MG tablet, TAKE 1 AND 1/2 TABLETS BY MOUTH 2 TIMES A DAY, Disp: 270 tablet, Rfl: 3  isosorbide mononitrate (IMDUR) 120 MG 24 hr tablet, Take 2 tablets (240 mg total) by mouth once daily., Disp: 180 tablet, Rfl: 3  labetaloL (NORMODYNE) 200 MG tablet, Take 1 tablet (200 mg total) by mouth every 12  (twelve) hours., Disp: 180 tablet, Rfl: 3  nitroGLYCERIN (NITROSTAT) 0.4 MG SL tablet, Place 1 tablet (0.4 mg total) under the tongue every 5 (five) minutes as needed for Chest pain., Disp: 50 tablet, Rfl: 3  olmesartan (BENICAR) 40 MG tablet, Take 1 tablet (40 mg total) by mouth once daily., Disp: 90 tablet, Rfl: 3  rivaroxaban (XARELTO) 20 mg Tab, Take 1 tablet (20 mg total) by mouth once daily., Disp: 90 tablet, Rfl: 3  semaglutide, weight loss, (WEGOVY) 0.25 mg/0.5 mL PnIj, Inject 0.25 mg into the skin every 7 days., Disp: 6 mL, Rfl: 1  trospium (SANCTURA) 20 mg Tab tablet, Take 1 tablet (20 mg total) by mouth every 12 (twelve) hours., Disp: 60 tablet, Rfl: 11    No current facility-administered medications for this visit.          Review of Systems   Constitutional: Negative.   HENT: Negative.     Eyes: Negative.    Cardiovascular:  Negative for chest pain, dyspnea on exertion, leg swelling, near-syncope, palpitations and syncope.   Respiratory: Negative.  Negative for shortness of breath.    Endocrine: Negative.    Hematologic/Lymphatic: Negative.    Skin: Negative.    Musculoskeletal: Negative.    Gastrointestinal: Negative.    Genitourinary: Negative.    Neurological: Negative.  Negative for dizziness and light-headedness.   Psychiatric/Behavioral: Negative.     Allergic/Immunologic: Negative.           Objective     Physical Exam  Vitals reviewed.   Constitutional:       General: She is not in acute distress.     Appearance: She is well-developed.   HENT:      Head: Normocephalic and atraumatic.   Eyes:      Pupils: Pupils are equal, round, and reactive to light.   Neck:      Thyroid: No thyromegaly.      Vascular: No JVD.   Cardiovascular:      Rate and Rhythm: Normal rate and regular rhythm.      Chest Wall: PMI is not displaced.      Heart sounds: Normal heart sounds, S1 normal and S2 normal. No murmur heard.     No friction rub. No gallop.   Pulmonary:      Effort: Pulmonary effort is normal. No  respiratory distress.      Breath sounds: Normal breath sounds. No wheezing or rales.   Abdominal:      General: Bowel sounds are normal. There is no distension.      Palpations: Abdomen is soft.      Tenderness: There is no abdominal tenderness. There is no guarding or rebound.   Musculoskeletal:         General: No tenderness. Normal range of motion.      Cervical back: Normal range of motion.   Skin:     General: Skin is warm and dry.      Findings: No erythema or rash.   Neurological:      Mental Status: She is alert and oriented to person, place, and time.      Cranial Nerves: No cranial nerve deficit.   Psychiatric:         Behavior: Behavior normal.         Thought Content: Thought content normal.         Judgment: Judgment normal.            Assessment and Plan     1. Paroxysmal atrial fibrillation    2. Coronary artery disease involving coronary bypass graft of native heart without angina pectoris        Plan:  64 yoF here for AF management. Her events are controled on labetolol and PRN amio. Will reasess in 3m. Continue OAC

## 2024-10-01 RX ORDER — FUROSEMIDE 40 MG/1
40 TABLET ORAL
Qty: 90 TABLET | Refills: 3 | Status: SHIPPED | OUTPATIENT
Start: 2024-10-01

## 2024-10-01 RX ORDER — SPIRONOLACTONE 25 MG/1
25 TABLET ORAL
Qty: 90 TABLET | Refills: 3 | OUTPATIENT
Start: 2024-10-01

## 2024-10-25 ENCOUNTER — HOSPITAL ENCOUNTER (OUTPATIENT)
Dept: RADIOLOGY | Facility: CLINIC | Age: 64
Discharge: HOME | End: 2024-10-25
Attending: FAMILY MEDICINE
Payer: MEDICARE

## 2024-10-25 DIAGNOSIS — Z78.0 MENOPAUSE: ICD-10-CM

## 2024-10-25 PROCEDURE — 77080 DXA BONE DENSITY AXIAL: CPT | Mod: 26,ICN,, | Performed by: INTERNAL MEDICINE

## 2024-10-25 PROCEDURE — 77080 DXA BONE DENSITY AXIAL: CPT | Mod: TC,PO

## 2024-11-13 ENCOUNTER — TELEPHONE (OUTPATIENT)
Dept: CARDIOLOGY | Facility: CLINIC | Age: 64
End: 2024-11-13
Payer: MEDICARE

## 2024-11-13 NOTE — TELEPHONE ENCOUNTER
Patient wants to know if there is an alternative to  xarelto or if she really needs to be on it? Patient states that xarelto is too expensive. Please advise

## 2024-11-13 NOTE — TELEPHONE ENCOUNTER
Patient called and informed per Dr. Khalil that she does need to stay on xarelto. The other alternative is coumadin and that would require frequent blood draw and dietary restriction. Patient stated she will stay on the xarelto. Patient also informed about the PAP for xarelto and given their number so that she can call and find out if she qualifies. Patient verbalized understanding.

## 2024-11-22 ENCOUNTER — HOSPITAL ENCOUNTER (EMERGENCY)
Facility: HOSPITAL | Age: 64
Discharge: HOME | End: 2024-11-22
Attending: EMERGENCY MEDICINE
Payer: MEDICARE

## 2024-11-22 VITALS
TEMPERATURE: 98 F | BODY MASS INDEX: 35.44 KG/M2 | RESPIRATION RATE: 20 BRPM | DIASTOLIC BLOOD PRESSURE: 100 MMHG | HEART RATE: 83 BPM | OXYGEN SATURATION: 95 % | SYSTOLIC BLOOD PRESSURE: 157 MMHG | WEIGHT: 200 LBS | HEIGHT: 63 IN

## 2024-11-22 DIAGNOSIS — R42 LIGHTHEADED: ICD-10-CM

## 2024-11-22 DIAGNOSIS — I48.0 PAROXYSMAL ATRIAL FIBRILLATION: Primary | ICD-10-CM

## 2024-11-22 LAB
ALBUMIN SERPL BCP-MCNC: 3.8 G/DL (ref 3.5–5.2)
ALP SERPL-CCNC: 122 U/L (ref 40–150)
ALT SERPL W/O P-5'-P-CCNC: 28 U/L (ref 10–44)
ANION GAP SERPL CALC-SCNC: 12 MMOL/L (ref 8–16)
AST SERPL-CCNC: 24 U/L (ref 10–40)
BASOPHILS # BLD AUTO: 0.01 K/UL (ref 0–0.2)
BASOPHILS NFR BLD: 0.2 % (ref 0–1.9)
BILIRUB SERPL-MCNC: 0.6 MG/DL (ref 0.1–1)
BILIRUB UR QL STRIP: NEGATIVE
BNP SERPL-MCNC: 180 PG/ML (ref 0–99)
BUN SERPL-MCNC: 23 MG/DL (ref 8–23)
CALCIUM SERPL-MCNC: 9.5 MG/DL (ref 8.7–10.5)
CHLORIDE SERPL-SCNC: 109 MMOL/L (ref 95–110)
CLARITY UR: CLEAR
CO2 SERPL-SCNC: 22 MMOL/L (ref 23–29)
COLOR UR: YELLOW
CREAT SERPL-MCNC: 1.1 MG/DL (ref 0.5–1.4)
DIFFERENTIAL METHOD BLD: NORMAL
EOSINOPHIL # BLD AUTO: 0 K/UL (ref 0–0.5)
EOSINOPHIL NFR BLD: 0.7 % (ref 0–8)
ERYTHROCYTE [DISTWIDTH] IN BLOOD BY AUTOMATED COUNT: 12.3 % (ref 11.5–14.5)
EST. GFR  (NO RACE VARIABLE): 56 ML/MIN/1.73 M^2
GLUCOSE SERPL-MCNC: 95 MG/DL (ref 70–110)
GLUCOSE UR QL STRIP: NEGATIVE
HCT VFR BLD AUTO: 45.5 % (ref 37–48.5)
HGB BLD-MCNC: 14.9 G/DL (ref 12–16)
HGB UR QL STRIP: NEGATIVE
IMM GRANULOCYTES # BLD AUTO: 0.01 K/UL (ref 0–0.04)
IMM GRANULOCYTES NFR BLD AUTO: 0.2 % (ref 0–0.5)
KETONES UR QL STRIP: NEGATIVE
LEUKOCYTE ESTERASE UR QL STRIP: NEGATIVE
LYMPHOCYTES # BLD AUTO: 1.6 K/UL (ref 1–4.8)
LYMPHOCYTES NFR BLD: 27.6 % (ref 18–48)
MAGNESIUM SERPL-MCNC: 2 MG/DL (ref 1.6–2.6)
MCH RBC QN AUTO: 30.2 PG (ref 27–31)
MCHC RBC AUTO-ENTMCNC: 32.7 G/DL (ref 32–36)
MCV RBC AUTO: 92 FL (ref 82–98)
MONOCYTES # BLD AUTO: 0.3 K/UL (ref 0.3–1)
MONOCYTES NFR BLD: 5.8 % (ref 4–15)
NEUTROPHILS # BLD AUTO: 3.8 K/UL (ref 1.8–7.7)
NEUTROPHILS NFR BLD: 65.5 % (ref 38–73)
NITRITE UR QL STRIP: NEGATIVE
NRBC BLD-RTO: 0 /100 WBC
PH UR STRIP: 6 [PH] (ref 5–8)
PLATELET # BLD AUTO: 238 K/UL (ref 150–450)
PMV BLD AUTO: 9.7 FL (ref 9.2–12.9)
POTASSIUM SERPL-SCNC: 4.3 MMOL/L (ref 3.5–5.1)
PROT SERPL-MCNC: 7.4 G/DL (ref 6–8.4)
PROT UR QL STRIP: NEGATIVE
RBC # BLD AUTO: 4.93 M/UL (ref 4–5.4)
SODIUM SERPL-SCNC: 143 MMOL/L (ref 136–145)
SP GR UR STRIP: 1.01 (ref 1–1.03)
TROPONIN I SERPL DL<=0.01 NG/ML-MCNC: 0.02 NG/ML (ref 0–0.03)
URN SPEC COLLECT METH UR: NORMAL
UROBILINOGEN UR STRIP-ACNC: NEGATIVE EU/DL
WBC # BLD AUTO: 5.84 K/UL (ref 3.9–12.7)

## 2024-11-22 PROCEDURE — 93005 ELECTROCARDIOGRAM TRACING: CPT

## 2024-11-22 PROCEDURE — 81003 URINALYSIS AUTO W/O SCOPE: CPT | Performed by: STUDENT IN AN ORGANIZED HEALTH CARE EDUCATION/TRAINING PROGRAM

## 2024-11-22 PROCEDURE — 93010 ELECTROCARDIOGRAM REPORT: CPT | Mod: ,,, | Performed by: INTERNAL MEDICINE

## 2024-11-22 PROCEDURE — 83880 ASSAY OF NATRIURETIC PEPTIDE: CPT | Performed by: STUDENT IN AN ORGANIZED HEALTH CARE EDUCATION/TRAINING PROGRAM

## 2024-11-22 PROCEDURE — 85025 COMPLETE CBC W/AUTO DIFF WBC: CPT | Performed by: STUDENT IN AN ORGANIZED HEALTH CARE EDUCATION/TRAINING PROGRAM

## 2024-11-22 PROCEDURE — 84484 ASSAY OF TROPONIN QUANT: CPT | Performed by: STUDENT IN AN ORGANIZED HEALTH CARE EDUCATION/TRAINING PROGRAM

## 2024-11-22 PROCEDURE — 99285 EMERGENCY DEPT VISIT HI MDM: CPT | Mod: 25

## 2024-11-22 PROCEDURE — 25000003 PHARM REV CODE 250: Performed by: STUDENT IN AN ORGANIZED HEALTH CARE EDUCATION/TRAINING PROGRAM

## 2024-11-22 PROCEDURE — 80053 COMPREHEN METABOLIC PANEL: CPT | Performed by: STUDENT IN AN ORGANIZED HEALTH CARE EDUCATION/TRAINING PROGRAM

## 2024-11-22 PROCEDURE — 83735 ASSAY OF MAGNESIUM: CPT | Performed by: STUDENT IN AN ORGANIZED HEALTH CARE EDUCATION/TRAINING PROGRAM

## 2024-11-22 RX ORDER — LABETALOL 100 MG/1
100 TABLET, FILM COATED ORAL
Status: COMPLETED | OUTPATIENT
Start: 2024-11-22 | End: 2024-11-22

## 2024-11-22 RX ORDER — AMIODARONE HYDROCHLORIDE 100 MG/1
100 TABLET ORAL
Status: COMPLETED | OUTPATIENT
Start: 2024-11-22 | End: 2024-11-22

## 2024-11-22 RX ADMIN — LABETALOL HYDROCHLORIDE 100 MG: 100 TABLET, FILM COATED ORAL at 01:11

## 2024-11-22 RX ADMIN — AMIODARONE HYDROCHLORIDE 100 MG: 100 TABLET ORAL at 11:11

## 2024-11-22 NOTE — DISCHARGE INSTRUCTIONS
Follow up with Dr. Lane in clinic.  Keep your electrophysiology appointment in January.  Return to the ER if he had lightheadedness worsens or your AFib is not well controlled.      Continue taking your medications as prescribed.  Please see your medication list attached to this discharge summary for the appropriate doses and times to take your medications.

## 2024-11-22 NOTE — ED TRIAGE NOTES
Pt to ED from home with c/o shortness of breath accompanied by jaw discomfort which presented last night. Pt reports history of Afib and is currently taking amiodarone PRN whenever she feels as though she is going into Afib. Pt denies cp, n/v/d. Pt currently on Xarelto, last dose was last night.

## 2024-11-22 NOTE — ED PROVIDER NOTES
Encounter Date: 11/22/2024       History     Chief Complaint   Patient presents with    Atrial Fibrillation    Shortness of Breath    Dizziness     Pt presents to ER with complaints of being in A-Fib since 11/18. Pt states she is weak dizzy and SOB. Pt states she took half pill of her amiodarone last night and this morning about 0800. Pt denies chest pain and nausea but does admit to having bi lateral jaw pain. Pt denies any other issues at this time.       HPI    64 female with significant medical history of coronary artery disease, CABG, hypertension, peripheral artery disease and paroxysmal Afib presenting for atrial fibrillation and lightheadedness.    She reports being in AFib with an irregular heart rates since 11/18/2024.  Since the AFib began, she endorses having lightheadedness on exertion, fatigue, and lightheadedness with positional changes.  She denies vertigo, chest pain, abdominal pain, nausea, vomiting, fever, chills, diarrhea.  She presented to the ER because her daughter was concerned with her being back in AFib.    She reports only taking her amiodarone as needed.  She took a whole dose of amiodarone when her AFib started on 11/18, she took a half dose of amiodarone last evening and again another half dose this morning.  She also only took a half dose of her labetalol as it makes her itchy.  She endorses being completely compliant with her Xarelto and denies missing any doses in the last several weeks.    She has polyuria and dysuria at baseline, she reports following with Urology for this, but reports that this is not any worse than normal.    Per cardiology note on 09/24/2024, patient was on amiodarone but has been taking it p.r.n..  She was taking it p.r.n. because she feels fatigued on it.  Currently rate controlled on p.r.n. amiodarone and labetalol.    Review of patient's allergies indicates:   Allergen Reactions    Amlodipine Swelling     Past Medical History:   Diagnosis Date    Atrial  fibrillation     Colon polyp     Coronary artery disease     Disorder of kidney and ureter     Hyperlipidemia     Hypertension      Past Surgical History:   Procedure Laterality Date    BREAST BIOPSY      CARDIAC SURGERY      COLONOSCOPY N/A 5/25/2020    Procedure: COLONOSCOPY;  Surgeon: José Luis Fonseca MD;  Location: [facility] ENDO;  Service: Endoscopy;  Laterality: N/A;  XARELTO/PLETAL ok    COLONOSCOPY N/A 11/6/2023    Procedure: COLONOSCOPY;  Surgeon: Rocky Frankel MD;  Location: [facility] ENDO;  Service: Endoscopy;  Laterality: N/A;  Ref by [address], instr via portal - PC  ok to hold Xarelto x 2 days per Dr Khalil-CATHERINE GAXIOLA [facility] to Frankel-KP  10/30- pre call complete.  DBM    CORONARY ARTERY BYPASS GRAFT      CYSTOSCOPY W/ URETERAL STENT PLACEMENT Bilateral 6/25/2020    Procedure: CYSTOSCOPY, WITH URETERAL STENT INSERTION;  Surgeon: STEPHANY Smyth MD;  Location: [facility] OR;  Service: Urology;  Laterality: Bilateral;    HYSTERECTOMY      LAPAROSCOPIC HEMICOLECTOMY Right 6/25/2020    Procedure: HAND ASSISTED HEMICOLECTOMY, LAPAROSCOPIC;  Surgeon: Louis Rivas III, MD;  Location: [facility] OR;  Service: General;  Laterality: Right;  RN PREOP 6/19/2020--has cardiac clearance from Dr. Khalil, --COVID NEGATIVE and T/S on 6/23/2020  COMBINED CASE WITH DR SMYTH    TREATMENT OF CARDIAC ARRHYTHMIA N/A 1/10/2022    Procedure: Cardioversion/Defibrillation (FRANCISCO not needed);  Surgeon: José Khalil MD;  Location: [facility] CATH LAB;  Service: Cardiology;  Laterality: N/A;  RN PRE OP, COVID NEGATIVE     Family History   Problem Relation Name Age of Onset    Cancer Father  78        lung    Cancer Mother  26        oral    Breast cancer Maternal Aunt       Social History     Tobacco Use    Smoking status: Never     Passive exposure: Never    Smokeless tobacco: Never   Substance Use Topics    Alcohol use: Not Currently    Drug use: No     Review of Systems  See HPI for pertinent ROS.   Physical Exam      Initial Vitals [11/22/24 1028]   BP Pulse Resp Temp SpO2   (!) 149/80 101 20 97.7 °F (36.5 °C) 95 %      MAP       --         Physical Exam    Constitutional: She appears well-developed and well-nourished.   HENT:   Head: Normocephalic and atraumatic.   Eyes: Conjunctivae are normal. No scleral icterus.   Neck: No JVD present.   Cardiovascular:  Normal heart sounds.     Exam reveals no gallop and no friction rub.       No murmur heard.  Irregular heart rate, tachycardic consistent with AFib   Pulmonary/Chest: Breath sounds normal. No stridor. She has no wheezes. She has no rhonchi. She has no rales.   Abdominal: Abdomen is soft. There is no abdominal tenderness. There is no rebound and no guarding.   Musculoskeletal:         General: No tenderness or edema.     Neurological: She is alert.   Skin: Skin is warm. Capillary refill takes less than 2 seconds.   Psychiatric: She has a normal mood and affect.         ED Course   Procedures  Labs Reviewed   COMPREHENSIVE METABOLIC PANEL - Abnormal       Result Value    Sodium 143      Potassium 4.3      Chloride 109      CO2 22 (*)     Glucose 95      BUN 23      Creatinine 1.1      Calcium 9.5      Total Protein 7.4      Albumin 3.8      Total Bilirubin 0.6      Alkaline Phosphatase 122      AST 24      ALT 28      eGFR 56 (*)     Anion Gap 12     B-TYPE NATRIURETIC PEPTIDE - Abnormal     (*)    CBC W/ AUTO DIFFERENTIAL    WBC 5.84      RBC 4.93      Hemoglobin 14.9      Hematocrit 45.5      MCV 92      MCH 30.2      MCHC 32.7      RDW 12.3      Platelets 238      MPV 9.7      Immature Granulocytes 0.2      Gran # (ANC) 3.8      Immature Grans (Abs) 0.01      Lymph # 1.6      Mono # 0.3      Eos # 0.0      Baso # 0.01      nRBC 0      Gran % 65.5      Lymph % 27.6      Mono % 5.8      Eosinophil % 0.7      Basophil % 0.2      Differential Method Automated     TROPONIN I    Troponin I 0.021     URINALYSIS, REFLEX TO URINE CULTURE    Specimen UA Urine, Clean Catch       Color, UA Yellow      Appearance, UA Clear      pH, UA 6.0      Specific Gravity, UA 1.015      Protein, UA Negative      Glucose, UA Negative      Ketones, UA Negative      Bilirubin (UA) Negative      Occult Blood UA Negative      Nitrite, UA Negative      Urobilinogen, UA Negative      Leukocytes, UA Negative      Narrative:     Specimen Source->Urine   MAGNESIUM    Magnesium 2.0       EKG Readings: (Independently Interpreted)   Initial Reading: No STEMI.   Atrial fibrillation nonspecific ST and T-wave abnormalities rate of 89     ECG Results              EKG 12-lead (Final result)        Collection Time Result Time QRS Duration OHS QTC Calculation    11/22/24 10:39:47 11/23/24 18:18:15 94 445                     Final result by Interface, Lab In [facility] (11/23/24 18:18:18)                   Narrative:    Test Reason : R07.9,    Vent. Rate :  89 BPM     Atrial Rate : 104 BPM     P-R Int :    ms          QRS Dur :  94 ms      QT Int : 366 ms       P-R-T Axes :     15  49 degrees    QTcB Int : 445 ms    Atrial fibrillation  Nonspecific ST and T wave abnormality  Abnormal ECG  When compared with ECG of 24-Sep-2024 12:55,  Significant changes have occurred  Confirmed by José Khalil ([id number]) on 11/23/2024 6:18:13 PM    Referred By: AAAREFERRAL SELF           Confirmed By: José Khalil                                  Imaging Results              X-Ray Chest AP Portable (Final result)  Result time 11/22/24 12:03:24      Final result by William Gilbert MD (11/22/24 12:03:24)                   Impression:      1. Chronic appearing interstitial findings, no large focal consolidation.      Electronically signed by: William Gilbert MD  Date:    11/22/2024  Time:    12:03               Narrative:    EXAMINATION:  XR CHEST AP PORTABLE    CLINICAL HISTORY:  Dizziness and giddiness    TECHNIQUE:  Single frontal view of the chest was performed.    COMPARISON:  09/27/2023    FINDINGS:  The cardiomediastinal  silhouette is not enlarged noting calcification of the aorta..  There is no pleural effusion.  The trachea is midline.  The lungs are symmetrically expanded bilaterally with mildly coarse interstitial attenuation accentuated by habitus..  No large focal consolidation seen.  There is no pneumothorax.  The osseous structures are remarkable for degenerative change.  Surgical change noted of the proximal right humerus..                                       Medications   amiodarone tablet 100 mg (100 mg Oral Given 11/22/24 1133)   labetaloL tablet 100 mg (100 mg Oral Given 11/22/24 1329)     Medical Decision Making            Attending Attestation:   Physician Attestation Statement for Resident:  As the supervising MD   Physician Attestation Statement: I have personally seen and examined this patient.   I agree with the above history.  -:   As the supervising MD I agree with the above PE.     As the supervising MD I agree with the above treatment, course, plan, and disposition.   -: Patient is feeling of dizziness is more feeling faint and weak.  States she can feel herself in AFib and it started when she went back into AFib.  Workup otherwise reassuring.  Specifically no evidence of heart failure.  No focal neurologic deficit making stroke less likely.  States she has felt this way before when she goes into AFib.  Her rate is controlled.  She was not orthostatic.  No evidence of significant anemia or dehydration.  Discussed the case with Cardiology Dr. Golden.  Offered to cardiovert the patient.  She states she was completely compliant with her blood thinner.  However she was intermittently compliant with her other medications.  Patient declined.  She feels she does not feel bad enough to go through that process.  She was not feel she was come into the hospital.  Feels she can follow up in clinic.  Will discharge  I was personally present during the entire procedure.  I have reviewed and agree with the residents  interpretation of the following: lab data, x-rays and EKG.  I have reviewed the following: old records at this facility.                ED Course as of 11/25/24 2041 Fri Nov 22, 2024   1252 Urinalysis, Reflex to Urine Culture Urine, Clean Catch  No UTI  [KB]   1311 Urinalysis, Reflex to Urine Culture Urine, Clean Catch  UTI less likely  [KB]   1324 Comprehensive metabolic panel(!)  No TUAN, no significant electrolyte abnormality,  no evidence of acute hepatobiliary obstruction.   [KB]   1325 CBC auto differential  No evidence of leukocytosis, acute anemia requiring transfusion or thrombocytopenia.   [KB]   1325 Troponin I #1  ACS less likely  [KB]   1325 B-Type natriuretic peptide (BNP)(!)  Elevated but improved from previous. [KB]   1325 Urinalysis, Reflex to Urine Culture Urine, Clean Catch [KB]   1325 Magnesium [KB]   1325 X-Ray Chest AP Portable  Chronic appearing interstitial findings, no large focal consolidation. [KB]      ED Course User Index  [KB] Dayami Castaneda MD                         EKG, AFib, rate 89, normal axis deviation, no QTC prolongation or ST segment elevation.  See ED course for personal interpretation of workup/ differential.       64-year-old female described as above presenting for AFib in lightheadedness.  On presentation, patient was initially tachycardic with an irregularly irregular pulse.  Vital signs otherwise stable.  Physical exam overall reassuring.  Patient was not clinically overloaded on exam.  Troponin within normal limits, ACS less likely.  No electrolyte abnormality.  She was given amiodarone and labetalol to top off to her full daily doses of medicine.  Her heart rate improved to less than 100.  She reports feeling stable for discharge.  Cardiology was consulted who evaluated patient and offered cardioversion, however patient feels fine for discharge and has plans for follow up with electrophysiology in January and cardiology follow up in clinic.  Strongly encouraged  patient to continue to take her medications as prescribed.  She was discharged in stable condition with strict return to ER precautions discussed.      Clinical Impression:  Final diagnoses:  [R42] Lightheaded  [I48.0] Paroxysmal atrial fibrillation (Primary)          ED Disposition Condition    Discharge Stable          ED Prescriptions    None       Follow-up Information       Follow up With Specialties Details Why Contact Info    José Khalil MD Cardiology, Interventional Cardiology   [address] [facility] [address]  [address]  [facility] [address]  [phone]      [facility] - Emergency Dept Emergency Medicine Go to  If symptoms worsen [address] [facility] [place] [address]  [phone]             Dayami Castaneda MD  Resident  11/22/24 1915       Rafal Lopez MD  11/25/24 2041

## 2024-11-23 LAB
OHS QRS DURATION: 94 MS
OHS QTC CALCULATION: 445 MS

## 2024-11-25 ENCOUNTER — PATIENT MESSAGE (OUTPATIENT)
Dept: CARDIOLOGY | Facility: CLINIC | Age: 64
End: 2024-11-25
Payer: MEDICARE

## 2024-11-26 ENCOUNTER — TELEPHONE (OUTPATIENT)
Dept: CARDIOLOGY | Facility: CLINIC | Age: 64
End: 2024-11-26
Payer: MEDICARE

## 2024-11-26 NOTE — TELEPHONE ENCOUNTER
Called patient and informed her that if she is still in Afib and has been persistent for 8 day and she is still not feeling well then she needs to go to ED. I informed her that Dr. Khalil is rounding and is not in clinic so he wouldn't be able to see her in clinic. Patient states that she will go to ED if symptoms persist.

## 2024-11-26 NOTE — TELEPHONE ENCOUNTER
Spoke with patient and scheduled appointment for soonest available. Patient voiced understanding, did not request further assistance.

## 2024-11-26 NOTE — TELEPHONE ENCOUNTER
----- Message from Tech Kirti sent at 11/26/2024 10:52 AM CST -----  Regarding: Requesting advice  .Type:  Needs Medical Advice/Symptom-based Call    Who Called: self     Symptoms (please be specific): caller states she is still in A- Fib; states she's still not feeling herself     How long has patient had these symptoms:  8 days     Would the patient rather a call back or a response via [facility]? Call     Best Call Back Number: [phone]      Additional Information: would like doctors opinion on what to do? States she is supposed to go out of town for the holidays

## 2025-01-17 PROBLEM — I50.32 DIASTOLIC DYSFUNCTION WITH CHRONIC HEART FAILURE: Status: ACTIVE | Noted: 2024-06-27

## 2025-01-19 ENCOUNTER — TELEPHONE (OUTPATIENT)
Dept: ELECTROPHYSIOLOGY | Facility: CLINIC | Age: 65
End: 2025-01-19
Payer: MEDICARE

## 2025-03-05 NOTE — PROGRESS NOTES
Mrs. Guillen is a patient of Dr. Colin and was last seen in clinic 9/24/2024.      Subjective:   Patient ID:  Keyonna Guillen is a 64 y.o. female who presents for follow up of Atrial Fibrillation  .     HPI:    Mrs. Guillen is a 64 y.o. female with CAD (CABG), HTN, PAD, pAF here for follow up.     Background:    64 yoF here for arrhythmia management. CAD/CABG, HTN, PAD and pAF. She was on amiodarone QD but has taken it PRN. She had a recent episode of AF with associated angina. She has felt sleepy on amiodarone and remains taking it PRN. She was on metoprolol but has been off it.     CHADSVASC of 4    CAD s/p CABG 4/5/11: LIMA-LAD, SVG-D, SVG-OM  PAF s/p FRANCISCO/DCCV 3/8/18, DCCV 1/10/22, on Xarelto/amio, CHADS VASC 2  Hx L vert art occlusion  PAD, ?R SFA stenosis ( 5/2018)    9/24/2024: ECG is: NSR nl KY, QRS, QTC  64 yoF here for AF management. Her events are controled on labetolol and PRN amio. Will reasess in . Continue OAC       Update (03/10/2025):    11/22/2024: ED with AF    Today she says she reverted back to SR after starting amiodarone 200mg daily. Feels some fatigue and positional LH. No palps, CP, ABEL, syncope reported.    On xarelto 20mg daily. On toprol 50mg daily. On amiodarone 200mg daily. LFTs WNL 11/2024. Needs updated TSH. CXR ok 11/2024.    I have personally reviewed the patient's EKG today, which shows sinus rhythm at 52bpm. KY interval is 148. QRS is 84. QT is 472.    Relevant Cardiac Test Results:    2D Echo (10/20/2023):    Left Ventricle: The left ventricle is normal in size. Mildly increased wall thickness. Normal wall motion. There is normal systolic function with a visually estimated ejection fraction of 55 - 60%. Grade I diastolic dysfunction.    Left Atrium: Left atrium is moderately dilated.    Right Ventricle: Normal right ventricular cavity size. Systolic function is normal.    Mitral Valve: There is mild to moderate regurgitation.    Tricuspid Valve: There is mild regurgitation.     Pulmonary Artery: The estimated pulmonary artery systolic pressure is 26 mmHg.    IVC/SVC: Normal venous pressure at 3 mmHg.    Current Outpatient Medications   Medication Sig    amiodarone (PACERONE) 200 MG Tab Take 1 tablet (200 mg total) by mouth once daily.    atorvastatin (LIPITOR) 80 MG tablet Take 1 tablet (80 mg total) by mouth every evening.    cyclobenzaprine (FLEXERIL) 10 MG tablet     fluticasone propionate (FLONASE) 50 mcg/actuation nasal spray     furosemide (LASIX) 40 MG tablet TAKE 1 TABLET BY MOUTH EVERY DAY    hydrALAZINE (APRESOLINE) 100 MG tablet Take 0.5 tablets (50 mg total) by mouth every 12 (twelve) hours.    indapamide (LOZOL) 2.5 MG Tab Take 1 tablet (2.5 mg total) by mouth once daily.    isosorbide mononitrate (IMDUR) 120 MG 24 hr tablet Take 2 tablets (240 mg total) by mouth once daily.    metoprolol succinate (TOPROL-XL) 50 MG 24 hr tablet Take 1 tablet (50 mg total) by mouth once daily.    nitroGLYCERIN (NITROSTAT) 0.4 MG SL tablet Place 1 tablet (0.4 mg total) under the tongue every 5 (five) minutes as needed for Chest pain.    olmesartan (BENICAR) 40 MG tablet Take 1 tablet (40 mg total) by mouth once daily.    rivaroxaban (XARELTO) 20 mg Tab Take 1 tablet (20 mg total) by mouth once daily.    semaglutide, weight loss, (WEGOVY) 0.25 mg/0.5 mL PnIj Inject 0.25 mg into the skin every 7 days.    gabapentin (NEURONTIN) 300 MG capsule Take 300 mg by mouth every evening.    trospium (SANCTURA) 20 mg Tab tablet Take 1 tablet (20 mg total) by mouth every 12 (twelve) hours.     No current facility-administered medications for this visit.       Review of Systems   Constitutional: Positive for malaise/fatigue.   Cardiovascular:  Negative for chest pain, dyspnea on exertion, irregular heartbeat, leg swelling and palpitations.   Respiratory:  Negative for shortness of breath.    Hematologic/Lymphatic: Negative for bleeding problem.   Skin:  Negative for rash.   Musculoskeletal:  Negative for  "myalgias.   Gastrointestinal:  Negative for hematemesis, hematochezia and nausea.   Genitourinary:  Negative for hematuria.   Neurological:  Positive for light-headedness (positional).   Psychiatric/Behavioral:  Negative for altered mental status.    Allergic/Immunologic: Negative for persistent infections.       Objective:          /70 (Patient Position: Sitting)   Pulse (!) 52   Ht 5' 3" (1.6 m)   Wt 90.1 kg (198 lb 10.2 oz)   BMI 35.19 kg/m²     Physical Exam  Vitals and nursing note reviewed.   Constitutional:       Appearance: Normal appearance. She is well-developed.   HENT:      Head: Normocephalic.      Nose: Nose normal.   Eyes:      Pupils: Pupils are equal, round, and reactive to light.   Cardiovascular:      Rate and Rhythm: Regular rhythm. Bradycardia present.   Pulmonary:      Effort: No respiratory distress.   Musculoskeletal:         General: Normal range of motion.   Skin:     General: Skin is warm and dry.      Findings: No erythema.   Neurological:      Mental Status: She is alert and oriented to person, place, and time.   Psychiatric:         Speech: Speech normal.         Behavior: Behavior normal.           Lab Results   Component Value Date     11/22/2024    K 4.3 11/22/2024    MG 2.0 11/22/2024    BUN 23 11/22/2024    CREATININE 1.1 11/22/2024    ALT 28 11/22/2024    AST 24 11/22/2024    HGB 14.9 11/22/2024    HCT 45.5 11/22/2024    TSH 1.459 09/27/2023    LDLCALC 96.8 07/01/2024       Recent Labs   Lab 07/01/23  1146   INR 1.1       Assessment:     1. Paroxysmal atrial fibrillation    2. Severe obesity (BMI 35.0-39.9) with comorbidity    3. Diastolic dysfunction with chronic heart failure    4. Essential hypertension    5. On anticoagulant therapy    6. On amiodarone therapy      Plan:     In summary, Mrs. Guillen is a 64 y.o. female with CAD (CABG), HTN, PAD, pAF here for follow up.   Pt with symptomatic AF breakthrough in November, converted spontaneously and remaining in " sinus rhythm now that she is taking amio 200mg daily (had been taking PRN). Tolerating well. Update TSH.  Discussed risks and benefits of continuing amiodarone vs ablation. Long discussion detailing PVI. She will consider but continue current regimen for now. On xarelto for CVA prophylaxis.     TSH  Consider ablation  Continue current meds  RTC 3 mo, sooner if needed    *A copy of this note has been sent to Dr. Colin*    Follow up in about 3 months (around 6/10/2025).    ------------------------------------------------------------------    MANNY Uribe, NP-C  Cardiac Electrophysiology

## 2025-03-10 ENCOUNTER — OFFICE VISIT (OUTPATIENT)
Dept: ELECTROPHYSIOLOGY | Facility: CLINIC | Age: 65
End: 2025-03-10
Payer: MEDICARE

## 2025-03-10 VITALS
DIASTOLIC BLOOD PRESSURE: 70 MMHG | HEIGHT: 63 IN | HEART RATE: 52 BPM | WEIGHT: 198.63 LBS | SYSTOLIC BLOOD PRESSURE: 138 MMHG | BODY MASS INDEX: 35.2 KG/M2

## 2025-03-10 DIAGNOSIS — E66.01 SEVERE OBESITY (BMI 35.0-39.9) WITH COMORBIDITY: ICD-10-CM

## 2025-03-10 DIAGNOSIS — I48.0 PAROXYSMAL ATRIAL FIBRILLATION: Primary | ICD-10-CM

## 2025-03-10 DIAGNOSIS — Z79.01 ON ANTICOAGULANT THERAPY: ICD-10-CM

## 2025-03-10 DIAGNOSIS — I50.32 DIASTOLIC DYSFUNCTION WITH CHRONIC HEART FAILURE: ICD-10-CM

## 2025-03-10 DIAGNOSIS — I10 ESSENTIAL HYPERTENSION: Chronic | ICD-10-CM

## 2025-03-10 DIAGNOSIS — Z79.899 ON AMIODARONE THERAPY: ICD-10-CM

## 2025-03-10 LAB
OHS QRS DURATION: 84 MS
OHS QTC CALCULATION: 438 MS

## 2025-03-10 PROCEDURE — 99999 PR PBB SHADOW E&M-EST. PATIENT-LVL III: CPT | Mod: PBBFAC,,, | Performed by: NURSE PRACTITIONER

## 2025-03-10 PROCEDURE — 93010 ELECTROCARDIOGRAM REPORT: CPT | Mod: S$PBB,,, | Performed by: INTERNAL MEDICINE

## 2025-03-10 PROCEDURE — 93005 ELECTROCARDIOGRAM TRACING: CPT | Mod: PBBFAC | Performed by: INTERNAL MEDICINE

## 2025-03-10 PROCEDURE — 99214 OFFICE O/P EST MOD 30 MIN: CPT | Mod: S$PBB,,, | Performed by: NURSE PRACTITIONER

## 2025-03-10 PROCEDURE — 99213 OFFICE O/P EST LOW 20 MIN: CPT | Mod: PBBFAC,25 | Performed by: NURSE PRACTITIONER

## 2025-03-19 ENCOUNTER — LAB VISIT (OUTPATIENT)
Dept: LAB | Facility: HOSPITAL | Age: 65
End: 2025-03-19
Attending: FAMILY MEDICINE
Payer: MEDICARE

## 2025-03-19 DIAGNOSIS — I10 ESSENTIAL HYPERTENSION: ICD-10-CM

## 2025-03-19 DIAGNOSIS — Z79.899 ON AMIODARONE THERAPY: ICD-10-CM

## 2025-03-19 LAB
ANION GAP SERPL CALC-SCNC: 7 MMOL/L (ref 8–16)
BUN SERPL-MCNC: 31 MG/DL (ref 8–23)
CALCIUM SERPL-MCNC: 9.6 MG/DL (ref 8.7–10.5)
CHLORIDE SERPL-SCNC: 106 MMOL/L (ref 95–110)
CO2 SERPL-SCNC: 29 MMOL/L (ref 23–29)
CREAT SERPL-MCNC: 1.2 MG/DL (ref 0.5–1.4)
EST. GFR  (NO RACE VARIABLE): 50 ML/MIN/1.73 M^2
GLUCOSE SERPL-MCNC: 100 MG/DL (ref 70–110)
POTASSIUM SERPL-SCNC: 4.7 MMOL/L (ref 3.5–5.1)
SODIUM SERPL-SCNC: 142 MMOL/L (ref 136–145)
T4 FREE SERPL-MCNC: 0.84 NG/DL (ref 0.71–1.51)
TSH SERPL DL<=0.005 MIU/L-ACNC: 18.98 UIU/ML (ref 0.4–4)

## 2025-03-19 PROCEDURE — 84443 ASSAY THYROID STIM HORMONE: CPT | Performed by: NURSE PRACTITIONER

## 2025-03-19 PROCEDURE — 36415 COLL VENOUS BLD VENIPUNCTURE: CPT | Performed by: NURSE PRACTITIONER

## 2025-03-19 PROCEDURE — 84439 ASSAY OF FREE THYROXINE: CPT | Performed by: FAMILY MEDICINE

## 2025-03-19 PROCEDURE — 80048 BASIC METABOLIC PNL TOTAL CA: CPT | Performed by: FAMILY MEDICINE

## 2025-03-20 ENCOUNTER — RESULTS FOLLOW-UP (OUTPATIENT)
Dept: ELECTROPHYSIOLOGY | Facility: CLINIC | Age: 65
End: 2025-03-20

## 2025-03-26 DIAGNOSIS — I10 ESSENTIAL HYPERTENSION: Chronic | ICD-10-CM

## 2025-03-26 RX ORDER — HYDRALAZINE HYDROCHLORIDE 100 MG/1
150 TABLET, FILM COATED ORAL 2 TIMES DAILY
Qty: 270 TABLET | Refills: 1 | Status: SHIPPED | OUTPATIENT
Start: 2025-03-26

## 2025-04-03 ENCOUNTER — TELEPHONE (OUTPATIENT)
Dept: ELECTROPHYSIOLOGY | Facility: CLINIC | Age: 65
End: 2025-04-03
Payer: MEDICARE

## 2025-04-03 ENCOUNTER — TELEPHONE (OUTPATIENT)
Dept: FAMILY MEDICINE | Facility: CLINIC | Age: 65
End: 2025-04-03
Payer: MEDICARE

## 2025-04-03 NOTE — TELEPHONE ENCOUNTER
----- Message from Lamine sent at 4/3/2025  7:46 AM CDT -----  Regarding: Keyonna  Type: Patient Callback Who called: Keyonna What is the request in detail: Patient stated that she had blood work done on 03/19/2025 and she has not gotten her results explained to her. Please reach out to the patient as soon as possible with those results. Can the clinic reply by [facility]? Yes Would the patient rather a call back or a response via [facility]? Callback Best call back number: [phone] Information:

## 2025-04-03 NOTE — TELEPHONE ENCOUNTER
----- Message from Gris Mcbride NP sent at 4/3/2025 10:22 AM CDT -----  Regarding: RE: Keyonna  Labs show subclinical hypothyroid (normal free T4) which does not necessitate treatment at this time but may in the future - her PCP will be able to manage if that changes and she has an appt scheduled with her PCP this spring which is perfect.  ----- Message -----  From: Aliya Gonsalez RN  Sent: 4/3/2025   9:36 AM CDT  To: Gris Mcbride NP  Subject: FW: Keyonna                                        You had a clinic visit with this patient on 3/10/25:In summary, Mrs. Guillen is a 64 y.o. female with CAD (CABG), HTN, PAD, pAF here for follow up. Pt with symptomatic AF breakthrough in November, converted spontaneously and remaining in sinus rhythm now that she is taking amio 200mg daily (had been taking PRN). Tolerating well. Update TSH.Discussed risks and benefits of continuing amiodarone vs ablation. Long discussion detailing PVI. She will consider but continue current regimen for now. On xarelto for CVA prophylaxis.  TSHConsider ablationContinue current medsRTC 3 mo, sooner if neededShe had labs done on 3/19/25TSH 18.979Free T4 0.84The patient is asking about her results. What would you like me to tell the patient about her results and are there any new recommendations?Aliya Knott  ----- Message -----  From: Riley Oropeza MA  Sent: 4/3/2025   8:19 AM CDT  To: Aliya Gonsalez RN  Subject: FW: Keyonna                                          ----- Message -----  From: Lamine Mccord  Sent: 4/3/2025   7:48 AM CDT  To: Reed Landry Staff; Africa Mora Staff  Subject: Keyonna                                            Type: Patient Callback Who called: Keyonna What is the request in detail: Patient stated that she had blood work done on 03/19/2025 and she has not gotten her results explained to her. Please reach out to the patient as soon as possible with those results. Can the clinic reply by [facility]? Yes  Would the patient rather a call back or a response via [facility]? Callback Best call back number: [phone] Information:

## 2025-04-03 NOTE — TELEPHONE ENCOUNTER
"Notified pt of Gris's response, pt reports she has been having some high b/p and fatigue recently, pt denies feeling out of rhythm, reports her heart rate has been "good", informed pt she should f/u with PCP about her other lab results and b/p  "

## (undated) DEVICE — STAPLER SKIN ROTATING HEAD

## (undated) DEVICE — Device

## (undated) DEVICE — TROCAR ENDO Z THREAD KII 5X100

## (undated) DEVICE — CATH URTRL OPEN END STR TIP 5F

## (undated) DEVICE — SYR 10CC LUER LOCK

## (undated) DEVICE — SOL CLEARIFY VISUALIZATION LAP

## (undated) DEVICE — GLOVE SURG BIOGEL LATEX SZ 7.5

## (undated) DEVICE — ELECTRODE REM PLYHSV RETURN 9

## (undated) DEVICE — SLEEVE SCD EXPRESS CALF MEDIUM

## (undated) DEVICE — TUBING INSUFFLATION 10

## (undated) DEVICE — GLOVE, SURG,LATEX FREE SZ 7

## (undated) DEVICE — CUTTER PROXIMATE 100MM GRN

## (undated) DEVICE — SUT ETHILON 3-0 BLK MONO FS

## (undated) DEVICE — SUT ETHILON 2 BLK MONO TP-1

## (undated) DEVICE — SUT VICRYL+ 1 CT1 18IN

## (undated) DEVICE — TROCAR KII BLLN 12MM 10CM

## (undated) DEVICE — SOL SOD CHLORIDE 0.9% 10ML

## (undated) DEVICE — SEE MEDLINE ITEM 157110

## (undated) DEVICE — BLANKET UPPER BODY 78.7X29.9IN

## (undated) DEVICE — PACK ENDOSCOPY GENERAL

## (undated) DEVICE — DEVICE ENSEAL X1 LARGE JAW

## (undated) DEVICE — RELOAD PROXIMATE CUT BLU 100MM

## (undated) DEVICE — TRAY FOLEY 16FR INFECTION CONT

## (undated) DEVICE — POSITIONER HEAD DONUT 9IN FOAM

## (undated) DEVICE — ELECTRODE PAD DEFIB STERILE

## (undated) DEVICE — JELLY LUBRICANT STERILE 4 OZ

## (undated) DEVICE — SUT VICRYL PLUS 3-0 SH 18IN

## (undated) DEVICE — RELOAD PROXIMATE CUT BLUE 75MM

## (undated) DEVICE — KIT ANTIFOG

## (undated) DEVICE — CUTTER PROXIMATE BLUE 75MM

## (undated) DEVICE — SOL NS 1000CC

## (undated) DEVICE — CANISTER SUCTION 2 LTR

## (undated) DEVICE — KIT GELPORT LAPAROSCOPIC ABD

## (undated) DEVICE — IRRIGATOR ENDOSCOPY DISP.

## (undated) DEVICE — SUT VICRYL PLUS ANTIBACT

## (undated) DEVICE — SOL 9P NACL IRR PIC IL

## (undated) DEVICE — SUT SILK 3-0 BLK BR SH 30IN

## (undated) DEVICE — APPLICATOR CHLORAPREP ORN 26ML

## (undated) DEVICE — BLADE SURG CARBON STEEL SZ11

## (undated) DEVICE — SUT SILK 3-0 SH 18IN BLACK

## (undated) DEVICE — DRESSING ADHESIVE ISLAND 3 X 6

## (undated) DEVICE — SEE MEDLINE ITEM 152622

## (undated) DEVICE — URETERAL DRAINAGE BAG

## (undated) DEVICE — GOWN B1 X-LG X-LONG

## (undated) DEVICE — SPONGE LAP 18X18 PREWASHED

## (undated) DEVICE — SUPPORT ULNA NERVE PROTECTOR

## (undated) DEVICE — SEALER LIGASURE LAP 37CM 5MM

## (undated) DEVICE — SUT VICRYL 1 CT-1 27 UNDIE